# Patient Record
Sex: FEMALE | Race: BLACK OR AFRICAN AMERICAN | NOT HISPANIC OR LATINO | Employment: OTHER | URBAN - METROPOLITAN AREA
[De-identification: names, ages, dates, MRNs, and addresses within clinical notes are randomized per-mention and may not be internally consistent; named-entity substitution may affect disease eponyms.]

---

## 2022-08-18 ENCOUNTER — APPOINTMENT (OUTPATIENT)
Dept: GENERAL RADIOLOGY | Facility: HOSPITAL | Age: 57
End: 2022-08-18

## 2022-08-18 ENCOUNTER — HOSPITAL ENCOUNTER (OUTPATIENT)
Facility: HOSPITAL | Age: 57
Setting detail: OBSERVATION
LOS: 1 days | Discharge: HOME HEALTH | End: 2022-08-21
Attending: EMERGENCY MEDICINE | Admitting: INTERNAL MEDICINE

## 2022-08-18 DIAGNOSIS — N18.31 STAGE 3A CHRONIC KIDNEY DISEASE: ICD-10-CM

## 2022-08-18 DIAGNOSIS — R06.02 SHORTNESS OF BREATH: ICD-10-CM

## 2022-08-18 DIAGNOSIS — T78.3XXA ANGIOEDEMA DUE TO ANGIOTENSIN CONVERTING ENZYME INHIBITOR (ACE-I): Primary | ICD-10-CM

## 2022-08-18 DIAGNOSIS — D50.0 IRON DEFICIENCY ANEMIA DUE TO CHRONIC BLOOD LOSS: ICD-10-CM

## 2022-08-18 DIAGNOSIS — T46.4X5A ANGIOEDEMA DUE TO ANGIOTENSIN CONVERTING ENZYME INHIBITOR (ACE-I): Primary | ICD-10-CM

## 2022-08-18 DIAGNOSIS — N19 RENAL FAILURE, UNSPECIFIED CHRONICITY: ICD-10-CM

## 2022-08-18 DIAGNOSIS — D64.9 ANEMIA, UNSPECIFIED TYPE: ICD-10-CM

## 2022-08-18 LAB
ABO GROUP BLD: NORMAL
ALBUMIN SERPL-MCNC: 4.1 G/DL (ref 3.5–5.2)
ALBUMIN/GLOB SERPL: 1.1 G/DL
ALP SERPL-CCNC: 86 U/L (ref 39–117)
ALT SERPL W P-5'-P-CCNC: 5 U/L (ref 1–33)
ANION GAP SERPL CALCULATED.3IONS-SCNC: 10 MMOL/L (ref 5–15)
AST SERPL-CCNC: 12 U/L (ref 1–32)
BACTERIA UR QL AUTO: NORMAL /HPF
BASOPHILS # BLD AUTO: 0.04 10*3/MM3 (ref 0–0.2)
BASOPHILS NFR BLD AUTO: 0.6 % (ref 0–1.5)
BILIRUB SERPL-MCNC: 0.5 MG/DL (ref 0–1.2)
BILIRUB UR QL STRIP: NEGATIVE
BLD GP AB SCN SERPL QL: NEGATIVE
BUN SERPL-MCNC: 26 MG/DL (ref 6–20)
BUN/CREAT SERPL: 11.8 (ref 7–25)
C4 SERPL-MCNC: 41 MG/DL (ref 14–44)
CALCIUM SPEC-SCNC: 9.4 MG/DL (ref 8.6–10.5)
CHLORIDE SERPL-SCNC: 107 MMOL/L (ref 98–107)
CLARITY UR: CLEAR
CO2 SERPL-SCNC: 20 MMOL/L (ref 22–29)
COLOR UR: YELLOW
CREAT SERPL-MCNC: 2.2 MG/DL (ref 0.57–1)
CRP SERPL-MCNC: 0.58 MG/DL (ref 0–0.5)
DEPRECATED RDW RBC AUTO: 54 FL (ref 37–54)
EGFRCR SERPLBLD CKD-EPI 2021: 25.4 ML/MIN/1.73
EOSINOPHIL # BLD AUTO: 0.33 10*3/MM3 (ref 0–0.4)
EOSINOPHIL NFR BLD AUTO: 4.7 % (ref 0.3–6.2)
ERYTHROCYTE [DISTWIDTH] IN BLOOD BY AUTOMATED COUNT: 15.5 % (ref 12.3–15.4)
ERYTHROCYTE [SEDIMENTATION RATE] IN BLOOD: 73 MM/HR (ref 0–30)
FERRITIN SERPL-MCNC: 8.5 NG/ML (ref 13–150)
FLUAV RNA RESP QL NAA+PROBE: NOT DETECTED
FLUBV RNA RESP QL NAA+PROBE: NOT DETECTED
FOLATE SERPL-MCNC: 7.93 NG/ML (ref 4.78–24.2)
GLOBULIN UR ELPH-MCNC: 3.9 GM/DL
GLUCOSE SERPL-MCNC: 144 MG/DL (ref 65–99)
GLUCOSE UR STRIP-MCNC: NEGATIVE MG/DL
HCT VFR BLD AUTO: 27.9 % (ref 34–46.6)
HGB BLD-MCNC: 8 G/DL (ref 12–15.9)
HGB UR QL STRIP.AUTO: NEGATIVE
HOLD SPECIMEN: NORMAL
HYALINE CASTS UR QL AUTO: NORMAL /LPF
IMM GRANULOCYTES # BLD AUTO: 0.04 10*3/MM3 (ref 0–0.05)
IMM GRANULOCYTES NFR BLD AUTO: 0.6 % (ref 0–0.5)
IRON 24H UR-MRATE: 22 MCG/DL (ref 37–145)
IRON SATN MFR SERPL: 4 % (ref 20–50)
KETONES UR QL STRIP: NEGATIVE
LEUKOCYTE ESTERASE UR QL STRIP.AUTO: NEGATIVE
LYMPHOCYTES # BLD AUTO: 0.84 10*3/MM3 (ref 0.7–3.1)
LYMPHOCYTES NFR BLD AUTO: 11.9 % (ref 19.6–45.3)
MCH RBC QN AUTO: 27.3 PG (ref 26.6–33)
MCHC RBC AUTO-ENTMCNC: 28.7 G/DL (ref 31.5–35.7)
MCV RBC AUTO: 95.2 FL (ref 79–97)
MONOCYTES # BLD AUTO: 0.56 10*3/MM3 (ref 0.1–0.9)
MONOCYTES NFR BLD AUTO: 7.9 % (ref 5–12)
NEUTROPHILS NFR BLD AUTO: 5.24 10*3/MM3 (ref 1.7–7)
NEUTROPHILS NFR BLD AUTO: 74.3 % (ref 42.7–76)
NITRITE UR QL STRIP: NEGATIVE
NRBC BLD AUTO-RTO: 0 /100 WBC (ref 0–0.2)
PH UR STRIP.AUTO: 5.5 [PH] (ref 5–8)
PLATELET # BLD AUTO: 452 10*3/MM3 (ref 140–450)
PMV BLD AUTO: 9.2 FL (ref 6–12)
POTASSIUM SERPL-SCNC: 4.9 MMOL/L (ref 3.5–5.2)
PROT SERPL-MCNC: 8 G/DL (ref 6–8.5)
PROT UR QL STRIP: ABNORMAL
RBC # BLD AUTO: 2.93 10*6/MM3 (ref 3.77–5.28)
RBC # UR STRIP: NORMAL /HPF
REF LAB TEST METHOD: NORMAL
RH BLD: POSITIVE
SARS-COV-2 RNA RESP QL NAA+PROBE: NOT DETECTED
SODIUM SERPL-SCNC: 137 MMOL/L (ref 136–145)
SP GR UR STRIP: 1.01 (ref 1–1.03)
SQUAMOUS #/AREA URNS HPF: NORMAL /HPF
T&S EXPIRATION DATE: NORMAL
TIBC SERPL-MCNC: 557 MCG/DL (ref 298–536)
TRANSFERRIN SERPL-MCNC: 374 MG/DL (ref 200–360)
UROBILINOGEN UR QL STRIP: ABNORMAL
VIT B12 BLD-MCNC: 318 PG/ML (ref 211–946)
WBC # UR STRIP: NORMAL /HPF
WBC NRBC COR # BLD: 7.05 10*3/MM3 (ref 3.4–10.8)
WHOLE BLOOD HOLD COAG: NORMAL
WHOLE BLOOD HOLD SPECIMEN: NORMAL

## 2022-08-18 PROCEDURE — 25010000002 HEPARIN (PORCINE) PER 1000 UNITS: Performed by: HOSPITALIST

## 2022-08-18 PROCEDURE — 86140 C-REACTIVE PROTEIN: CPT | Performed by: HOSPITALIST

## 2022-08-18 PROCEDURE — P9059 PLASMA, FRZ BETWEEN 8-24HOUR: HCPCS

## 2022-08-18 PROCEDURE — 25010000002 METHYLPREDNISOLONE PER 125 MG: Performed by: EMERGENCY MEDICINE

## 2022-08-18 PROCEDURE — 99220 PR INITIAL OBSERVATION CARE/DAY 70 MINUTES: CPT | Performed by: HOSPITALIST

## 2022-08-18 PROCEDURE — 86923 COMPATIBILITY TEST ELECTRIC: CPT

## 2022-08-18 PROCEDURE — 82728 ASSAY OF FERRITIN: CPT | Performed by: HOSPITALIST

## 2022-08-18 PROCEDURE — 82607 VITAMIN B-12: CPT | Performed by: HOSPITALIST

## 2022-08-18 PROCEDURE — 86900 BLOOD TYPING SEROLOGIC ABO: CPT | Performed by: EMERGENCY MEDICINE

## 2022-08-18 PROCEDURE — 96372 THER/PROPH/DIAG INJ SC/IM: CPT

## 2022-08-18 PROCEDURE — 36430 TRANSFUSION BLD/BLD COMPNT: CPT

## 2022-08-18 PROCEDURE — 25010000002 EPINEPHRINE (ANAPHYLAXIS) 1 MG/ML SOLUTION: Performed by: EMERGENCY MEDICINE

## 2022-08-18 PROCEDURE — 25010000002 NA FERRIC GLUC CPLX PER 12.5 MG: Performed by: NURSE PRACTITIONER

## 2022-08-18 PROCEDURE — 87636 SARSCOV2 & INF A&B AMP PRB: CPT | Performed by: HOSPITALIST

## 2022-08-18 PROCEDURE — 82746 ASSAY OF FOLIC ACID SERUM: CPT | Performed by: HOSPITALIST

## 2022-08-18 PROCEDURE — 80053 COMPREHEN METABOLIC PANEL: CPT | Performed by: EMERGENCY MEDICINE

## 2022-08-18 PROCEDURE — 92610 EVALUATE SWALLOWING FUNCTION: CPT

## 2022-08-18 PROCEDURE — 85025 COMPLETE CBC W/AUTO DIFF WBC: CPT | Performed by: EMERGENCY MEDICINE

## 2022-08-18 PROCEDURE — 94799 UNLISTED PULMONARY SVC/PX: CPT

## 2022-08-18 PROCEDURE — 63710000001 DIPHENHYDRAMINE PER 50 MG: Performed by: HOSPITALIST

## 2022-08-18 PROCEDURE — 94640 AIRWAY INHALATION TREATMENT: CPT

## 2022-08-18 PROCEDURE — 86160 COMPLEMENT ANTIGEN: CPT | Performed by: HOSPITALIST

## 2022-08-18 PROCEDURE — 84466 ASSAY OF TRANSFERRIN: CPT | Performed by: HOSPITALIST

## 2022-08-18 PROCEDURE — 81001 URINALYSIS AUTO W/SCOPE: CPT | Performed by: HOSPITALIST

## 2022-08-18 PROCEDURE — 86850 RBC ANTIBODY SCREEN: CPT | Performed by: EMERGENCY MEDICINE

## 2022-08-18 PROCEDURE — 93005 ELECTROCARDIOGRAM TRACING: CPT | Performed by: EMERGENCY MEDICINE

## 2022-08-18 PROCEDURE — 96365 THER/PROPH/DIAG IV INF INIT: CPT

## 2022-08-18 PROCEDURE — P9017 PLASMA 1 DONOR FRZ W/IN 8 HR: HCPCS

## 2022-08-18 PROCEDURE — 71045 X-RAY EXAM CHEST 1 VIEW: CPT

## 2022-08-18 PROCEDURE — 99222 1ST HOSP IP/OBS MODERATE 55: CPT | Performed by: NURSE PRACTITIONER

## 2022-08-18 PROCEDURE — 36415 COLL VENOUS BLD VENIPUNCTURE: CPT

## 2022-08-18 PROCEDURE — 85652 RBC SED RATE AUTOMATED: CPT | Performed by: HOSPITALIST

## 2022-08-18 PROCEDURE — 86927 PLASMA FRESH FROZEN: CPT

## 2022-08-18 PROCEDURE — 99284 EMERGENCY DEPT VISIT MOD MDM: CPT

## 2022-08-18 PROCEDURE — 96361 HYDRATE IV INFUSION ADD-ON: CPT

## 2022-08-18 PROCEDURE — 86901 BLOOD TYPING SEROLOGIC RH(D): CPT | Performed by: EMERGENCY MEDICINE

## 2022-08-18 PROCEDURE — 63710000001 PREDNISONE PER 1 MG: Performed by: HOSPITALIST

## 2022-08-18 PROCEDURE — 96375 TX/PRO/DX INJ NEW DRUG ADDON: CPT

## 2022-08-18 PROCEDURE — C9803 HOPD COVID-19 SPEC COLLECT: HCPCS

## 2022-08-18 PROCEDURE — 83540 ASSAY OF IRON: CPT | Performed by: HOSPITALIST

## 2022-08-18 RX ORDER — SODIUM CHLORIDE 0.9 % (FLUSH) 0.9 %
10 SYRINGE (ML) INJECTION EVERY 12 HOURS SCHEDULED
Status: DISCONTINUED | OUTPATIENT
Start: 2022-08-18 | End: 2022-08-21 | Stop reason: HOSPADM

## 2022-08-18 RX ORDER — BUDESONIDE AND FORMOTEROL FUMARATE DIHYDRATE 160; 4.5 UG/1; UG/1
2 AEROSOL RESPIRATORY (INHALATION)
Status: DISCONTINUED | OUTPATIENT
Start: 2022-08-18 | End: 2022-08-21 | Stop reason: HOSPADM

## 2022-08-18 RX ORDER — SODIUM CHLORIDE 0.9 % (FLUSH) 0.9 %
10 SYRINGE (ML) INJECTION AS NEEDED
Status: DISCONTINUED | OUTPATIENT
Start: 2022-08-18 | End: 2022-08-21 | Stop reason: HOSPADM

## 2022-08-18 RX ORDER — ONDANSETRON 2 MG/ML
4 INJECTION INTRAMUSCULAR; INTRAVENOUS EVERY 6 HOURS PRN
Status: DISCONTINUED | OUTPATIENT
Start: 2022-08-18 | End: 2022-08-21 | Stop reason: HOSPADM

## 2022-08-18 RX ORDER — CARVEDILOL 3.12 MG/1
3.12 TABLET ORAL 2 TIMES DAILY
COMMUNITY
End: 2023-02-24 | Stop reason: HOSPADM

## 2022-08-18 RX ORDER — BISACODYL 10 MG
10 SUPPOSITORY, RECTAL RECTAL DAILY PRN
Status: DISCONTINUED | OUTPATIENT
Start: 2022-08-18 | End: 2022-08-21 | Stop reason: HOSPADM

## 2022-08-18 RX ORDER — CARVEDILOL 3.12 MG/1
3.12 TABLET ORAL EVERY 12 HOURS SCHEDULED
Status: DISCONTINUED | OUTPATIENT
Start: 2022-08-18 | End: 2022-08-21 | Stop reason: HOSPADM

## 2022-08-18 RX ORDER — ONDANSETRON 4 MG/1
4 TABLET, FILM COATED ORAL EVERY 6 HOURS PRN
Status: DISCONTINUED | OUTPATIENT
Start: 2022-08-18 | End: 2022-08-21 | Stop reason: HOSPADM

## 2022-08-18 RX ORDER — BUDESONIDE AND FORMOTEROL FUMARATE DIHYDRATE 160; 4.5 UG/1; UG/1
2 AEROSOL RESPIRATORY (INHALATION)
Status: ON HOLD | COMMUNITY
End: 2022-10-04 | Stop reason: SDUPTHER

## 2022-08-18 RX ORDER — ACETAMINOPHEN 160 MG/5ML
650 SOLUTION ORAL EVERY 4 HOURS PRN
Status: DISCONTINUED | OUTPATIENT
Start: 2022-08-18 | End: 2022-08-21 | Stop reason: HOSPADM

## 2022-08-18 RX ORDER — ALBUTEROL SULFATE 2.5 MG/3ML
2.5 SOLUTION RESPIRATORY (INHALATION) EVERY 4 HOURS PRN
COMMUNITY

## 2022-08-18 RX ORDER — TRANEXAMIC ACID 10 MG/ML
10 INJECTION, SOLUTION INTRAVENOUS ONCE
Status: COMPLETED | OUTPATIENT
Start: 2022-08-18 | End: 2022-08-18

## 2022-08-18 RX ORDER — HEPARIN SODIUM 5000 [USP'U]/ML
5000 INJECTION, SOLUTION INTRAVENOUS; SUBCUTANEOUS EVERY 8 HOURS SCHEDULED
Status: DISCONTINUED | OUTPATIENT
Start: 2022-08-18 | End: 2022-08-21 | Stop reason: HOSPADM

## 2022-08-18 RX ORDER — POLYETHYLENE GLYCOL 3350 17 G/17G
17 POWDER, FOR SOLUTION ORAL DAILY PRN
Status: DISCONTINUED | OUTPATIENT
Start: 2022-08-18 | End: 2022-08-21 | Stop reason: HOSPADM

## 2022-08-18 RX ORDER — EPINEPHRINE 1 MG/ML
0.3 INJECTION, SOLUTION INTRAMUSCULAR; SUBCUTANEOUS ONCE
Status: COMPLETED | OUTPATIENT
Start: 2022-08-18 | End: 2022-08-18

## 2022-08-18 RX ORDER — PANTOPRAZOLE SODIUM 40 MG/1
40 TABLET, DELAYED RELEASE ORAL
Status: DISCONTINUED | OUTPATIENT
Start: 2022-08-19 | End: 2022-08-21 | Stop reason: HOSPADM

## 2022-08-18 RX ORDER — DIPHENHYDRAMINE HCL 25 MG
25 CAPSULE ORAL EVERY 12 HOURS SCHEDULED
Status: COMPLETED | OUTPATIENT
Start: 2022-08-18 | End: 2022-08-18

## 2022-08-18 RX ORDER — ACETAMINOPHEN 650 MG/1
650 SUPPOSITORY RECTAL EVERY 4 HOURS PRN
Status: DISCONTINUED | OUTPATIENT
Start: 2022-08-18 | End: 2022-08-21 | Stop reason: HOSPADM

## 2022-08-18 RX ORDER — SODIUM CHLORIDE 9 MG/ML
100 INJECTION, SOLUTION INTRAVENOUS CONTINUOUS
Status: DISCONTINUED | OUTPATIENT
Start: 2022-08-18 | End: 2022-08-20

## 2022-08-18 RX ORDER — PREDNISONE 20 MG/1
20 TABLET ORAL
Status: DISCONTINUED | OUTPATIENT
Start: 2022-08-18 | End: 2022-08-20

## 2022-08-18 RX ORDER — CETIRIZINE HYDROCHLORIDE 10 MG/1
10 TABLET ORAL DAILY
Status: DISCONTINUED | OUTPATIENT
Start: 2022-08-18 | End: 2022-08-21 | Stop reason: HOSPADM

## 2022-08-18 RX ORDER — METHYLPREDNISOLONE SODIUM SUCCINATE 125 MG/2ML
125 INJECTION, POWDER, LYOPHILIZED, FOR SOLUTION INTRAMUSCULAR; INTRAVENOUS ONCE
Status: COMPLETED | OUTPATIENT
Start: 2022-08-18 | End: 2022-08-18

## 2022-08-18 RX ORDER — BISACODYL 5 MG/1
5 TABLET, DELAYED RELEASE ORAL DAILY PRN
Status: DISCONTINUED | OUTPATIENT
Start: 2022-08-18 | End: 2022-08-21 | Stop reason: HOSPADM

## 2022-08-18 RX ORDER — ACETAMINOPHEN 325 MG/1
650 TABLET ORAL EVERY 4 HOURS PRN
Status: DISCONTINUED | OUTPATIENT
Start: 2022-08-18 | End: 2022-08-21 | Stop reason: HOSPADM

## 2022-08-18 RX ORDER — FAMOTIDINE 20 MG/1
20 TABLET, FILM COATED ORAL
Status: DISCONTINUED | OUTPATIENT
Start: 2022-08-18 | End: 2022-08-21 | Stop reason: HOSPADM

## 2022-08-18 RX ORDER — AMOXICILLIN 250 MG
2 CAPSULE ORAL 2 TIMES DAILY
Status: DISCONTINUED | OUTPATIENT
Start: 2022-08-18 | End: 2022-08-21 | Stop reason: HOSPADM

## 2022-08-18 RX ORDER — ALBUTEROL SULFATE 2.5 MG/3ML
2.5 SOLUTION RESPIRATORY (INHALATION) EVERY 4 HOURS PRN
Status: DISCONTINUED | OUTPATIENT
Start: 2022-08-18 | End: 2022-08-21 | Stop reason: HOSPADM

## 2022-08-18 RX ADMIN — BUDESONIDE AND FORMOTEROL FUMARATE DIHYDRATE 2 PUFF: 160; 4.5 AEROSOL RESPIRATORY (INHALATION) at 19:13

## 2022-08-18 RX ADMIN — SODIUM CHLORIDE 100 ML/HR: 9 INJECTION, SOLUTION INTRAVENOUS at 13:50

## 2022-08-18 RX ADMIN — SODIUM CHLORIDE 250 MG: 9 INJECTION, SOLUTION INTRAVENOUS at 20:11

## 2022-08-18 RX ADMIN — DIPHENHYDRAMINE HYDROCHLORIDE 25 MG: 25 CAPSULE ORAL at 13:31

## 2022-08-18 RX ADMIN — PREDNISONE 20 MG: 20 TABLET ORAL at 13:31

## 2022-08-18 RX ADMIN — DIPHENHYDRAMINE HYDROCHLORIDE 25 MG: 25 CAPSULE ORAL at 20:12

## 2022-08-18 RX ADMIN — CARVEDILOL 3.12 MG: 3.12 TABLET, FILM COATED ORAL at 20:17

## 2022-08-18 RX ADMIN — Medication 10 ML: at 20:11

## 2022-08-18 RX ADMIN — CETIRIZINE HYDROCHLORIDE 10 MG: 10 TABLET, FILM COATED ORAL at 13:31

## 2022-08-18 RX ADMIN — TRANEXAMIC ACID 1470 MG: 10 INJECTION, SOLUTION INTRAVENOUS at 08:06

## 2022-08-18 RX ADMIN — FAMOTIDINE 20 MG: 20 TABLET ORAL at 16:55

## 2022-08-18 RX ADMIN — CARVEDILOL 3.12 MG: 3.12 TABLET, FILM COATED ORAL at 13:31

## 2022-08-18 RX ADMIN — HEPARIN SODIUM 5000 UNITS: 5000 INJECTION, SOLUTION INTRAVENOUS; SUBCUTANEOUS at 20:17

## 2022-08-18 RX ADMIN — Medication 10 ML: at 13:32

## 2022-08-18 RX ADMIN — EPINEPHRINE 0.3 MG: 1 INJECTION INTRAMUSCULAR; INTRAVENOUS; SUBCUTANEOUS at 07:51

## 2022-08-18 RX ADMIN — HEPARIN SODIUM 5000 UNITS: 5000 INJECTION, SOLUTION INTRAVENOUS; SUBCUTANEOUS at 13:31

## 2022-08-18 RX ADMIN — METHYLPREDNISOLONE SODIUM SUCCINATE 125 MG: 125 INJECTION, POWDER, FOR SOLUTION INTRAMUSCULAR; INTRAVENOUS at 08:03

## 2022-08-18 NOTE — PLAN OF CARE
Goal Outcome Evaluation:              Outcome Evaluation: Pt arrived from ED, A&Ox4, NSR, RA, tongue and throat swelling with difficulty speaking and controlling secretions at times, both seem to be improving, during SLP eval, pt placed on modified diet with NTL, pt refused modified, pt educated on benefits of modified diet as well as risk, aspiration equipment at bedside, refusal in chart, 4 units FFP administered with no adversed reaction, NS @ 75ml/hr, denies SOB.

## 2022-08-18 NOTE — CASE MANAGEMENT/SOCIAL WORK
Discharge Planning Assessment  [facility]     Patient Name: Sonia Bella  MRN: [id number]  Today's Date: 8/18/2022    Admit Date: 8/18/2022     Discharge Needs Assessment     Row Name 08/18/22 1346       Living Environment    People in Home child(priya), adult    Name(s) of People in Home Katerine Jimenez (daughter) [phone]    Current Living Arrangements home    Primary Care Provided by self    Provides Primary Care For no one    Family Caregiver if Needed child(priya), adult    Family Caregiver Names Katerine Jimenez (daughter)    Able to Return to Prior Arrangements yes       Resource/Environmental Concerns    Resource/Environmental Concerns none    Transportation Concerns none       Transition Planning    Patient/Family Anticipates Transition to home with help/services    Patient/Family Anticipated Services at Transition none    Transportation Anticipated family or friend will provide       Discharge Needs Assessment    Readmission Within the Last 30 Days no previous admission in last 30 days    Equipment Currently Used at Home cane, straight;rollator    Concerns to be Addressed denies needs/concerns at this time    Anticipated Changes Related to Illness none    Equipment Needed After Discharge none    Outpatient/Agency/Support Group Needs homecare agency    Discharge Facility/Level of Care Needs home with home health    Provided Post Acute Provider List? Yes    Post Acute Provider List Home Health    Provided Post Acute Provider Quality & Resource List? Yes    Post Acute Provider Quality and Resource List Home Health    Delivered To Patient    Method of Delivery In person    Patient's Choice of Community Agency(s) [facility]               Discharge Plan     Row Name 08/18/22 0179       Plan    Plan Home with [facility]    Patient/Family in Agreement with Plan yes    Plan Comments Spoke with patient at bedside. Lives with Yanci Jimenez (daughter) [phone] in [place]. No  problems with [place] Medicare/[place] Medicaid or medications. Uses straight cane and Rollator at home. No advanced directives. Plan is home with [facility]. Will need a referral closer to discharge. CM will continue to follow.    Final Discharge Disposition Code 06 - home with home health care              Continued Care and Services - Admitted Since 8/18/2022    Coordination has not been started for this encounter.          Demographic Summary     Row Name 08/18/22 1347       General Information    Admission Type inpatient    Arrived From emergency department    Referral Source admission list    Reason for Consult discharge planning    Preferred Language English       Contact Information    Permission Granted to Share Info With     Contact Information Obtained for     Contact Information Comments PCP is Cain Musa MD               Functional Status     Row Name 08/18/22 4447       Functional Status    Usual Activity Tolerance moderate    Current Activity Tolerance moderate       Functional Status, IADL    Medications independent    Meal Preparation independent    Housekeeping assistive person    Laundry assistive person    Shopping assistive person       Mental Status    General Appearance WDL WDL       Mental Status Summary    Recent Changes in Mental Status/Cognitive Functioning no changes       Employment/    Employment Status disabled               Psychosocial    No documentation.                Abuse/Neglect    No documentation.                Legal    No documentation.                Substance Abuse    No documentation.                Patient Forms    No documentation.                   Suhas Galan RN

## 2022-08-18 NOTE — THERAPY EVALUATION
Acute Care - Speech Language Pathology   Swallow Initial Evaluation [facility]   Clinical Swallow Evaluation       Patient Name: Sonia Bella  : 1965  MRN: [id number]  Today's Date: 2022               Admit Date: 2022    Visit Dx:     ICD-10-CM ICD-9-CM   1. Angioedema due to angiotensin converting enzyme inhibitor (ACE-I)  T78.3XXA 995.1    T46.4X5A E942.6   2. Shortness of breath  R06.02 786.05   3. Anemia, unspecified type  D64.9 285.9   4. Renal failure, unspecified chronicity  N19 586     Patient Active Problem List   Diagnosis   • Angioedema due to angiotensin converting enzyme inhibitor (ACE-I)     Past Medical History:   Diagnosis Date   • COPD (chronic obstructive pulmonary disease) (HCC)    • History of stomach ulcers    • Hypertension      History reviewed. No pertinent surgical history.    SLP Recommendation and Plan  SLP Swallowing Diagnosis: suspected pharyngeal dysphagia (22)  SLP Diet Recommendation: regular textures, nectar thick liquids (22)  Recommended Precautions and Strategies: general aspiration precautions (22)  SLP Rec. for Method of Medication Administration: meds crushed, with pudding or applesauce, as tolerated (22)     Monitor for Signs of Aspiration: yes, notify SLP if any concerns (22)  Recommended Diagnostics: VFSS (MBS), other (see comments) () (22)  Swallow Criteria for Skilled Therapeutic Interventions Met: demonstrates skilled criteria (22)  Anticipated Discharge Disposition (SLP): unknown (22)  Rehab Potential/Prognosis, Swallowing: good, to achieve stated therapy goals (22)     Predicted Duration Therapy Intervention (Days): until discharge (22)                                         SWALLOW EVALUATION (last 72 hours)     SLP Adult Swallow Evaluation     Row Name 22                   Rehab Evaluation    Document Type evaluation  -         Subjective Information no complaints  -        Patient Observations alert;cooperative  -        Patient/Family/Caregiver Comments/Observations No family present  -        Patient Effort good  -        Symptoms Noted During/After Treatment none  -                  General Information    Patient Profile Reviewed yes  -        Pertinent History Of Current Problem Adm w/ acute angioedema. Hx: COPD, stomach ulcers, HTN. CXR clear.  -        Current Method of Nutrition regular textures;thin liquids  -        Precautions/Limitations, Vision WFL;for purposes of eval  -        Precautions/Limitations, Hearing WFL;for purposes of eval  -        Prior Level of Function-Communication unknown  -        Prior Level of Function-Swallowing no diet consistency restrictions  -        Plans/Goals Discussed with patient;agreed upon  -        Barriers to Rehab none identified  -        Patient's Goals for Discharge return home  -                  Pain    Additional Documentation Pain Scale: FACES Pre/Post-Treatment (Group)  -                  Pain Scale: FACES Pre/Post-Treatment    Pain: FACES Scale, Pretreatment 0-->no hurt  -        Posttreatment Pain Rating 0-->no hurt  -                  Oral Motor Structure and Function    Dentition Assessment natural, present and adequate  -        Secretion Management WNL/WFL  -        Mucosal Quality moist, healthy  -                  Oral Musculature and Cranial Nerve Assessment    Oral Motor General Assessment WFL  -                  General Eating/Swallowing Observations    Respiratory Support Currently in Use room air  -        Eating/Swallowing Skills self-fed  -        Positioning During Eating upright in bed  -        Utensils Used spoon;cup;straw  -        Consistencies Trialed thin liquids;nectar/syrup-thick liquids;pureed;regular textures  -                  Clinical Swallow Eval    Pharyngeal Phase suspected pharyngeal  impairment  -        Clinical Swallow Evaluation Summary CSE completed.  Pt w/ overt s/s of aspiration. Cough and eventual throat clear w/ thin liquid trials via cup sip. Delayed throat clear w/ thin trials via straw sip. No overt s/s w/ nectar thick liquid, pureed, or regular solid trials. Pt reports difficulty swallowing secretions, slurred speech, and neck swelling 2/2 angioedema. At this time, pt reports symptoms have resolved. Rec: NTL w/ regular solids until MBS tomorrow.  -                  Pharyngeal Phase Concerns    Pharyngeal Phase Concerns cough;throat clear  -        Cough thin  -        Throat Clear thin  -                  SLP Evaluation Clinical Impression    SLP Swallowing Diagnosis suspected pharyngeal dysphagia  -        Functional Impact risk of aspiration/pneumonia  -        Rehab Potential/Prognosis, Swallowing good, to achieve stated therapy goals  -        Swallow Criteria for Skilled Therapeutic Interventions Met demonstrates skilled criteria  -                  Recommendations    Predicted Duration Therapy Intervention (Days) until discharge  -        SLP Diet Recommendation regular textures;nectar thick liquids  -        Recommended Diagnostics VFSS ([facility] [place] [facility]);other (see comments)  8/19  -        Recommended Precautions and Strategies general aspiration precautions  -        Oral Care Recommendations Oral Care BID/PRN  -        SLP Rec. for Method of Medication Administration meds crushed;with pudding or applesauce;as tolerated  -        Monitor for Signs of Aspiration yes;notify SLP if any concerns  -        Anticipated Discharge Disposition (SLP) unknown  -              User Key  (r) = Recorded By, (t) = Taken By, (c) = Cosigned By    Initials Name Effective Dates     Rosibel Brown, MS, LUIS-SLP 06/22/22 -                 EDUCATION  The patient has been educated in the following areas:   Dysphagia (Swallowing Impairment).              Time Calculation:     Time Calculation- SLP     Row Name 08/18/22 1645             Time Calculation- SLP    SLP Start Time 1415  -MH      SLP Received On 08/18/22  -              Untimed Charges    25842-SG Eval Oral Pharyng Swallow Minutes 45  -MH              Total Minutes    Untimed Charges Total Minutes 45  -MH       Total Minutes 45  -MH            User Key  (r) = Recorded By, (t) = Taken By, (c) = Cosigned By    Initials Name Provider Type     Rosibel Brown, MS, CFY-SLP Speech and Language Pathologist                Therapy Charges for Today     Code Description Service Date Service Provider Modifiers Qty    [id number] HC ST EVAL ORAL PHARYNG SWALLOW 3 8/18/2022 Rosibel Brown MS, CFY-SLP GN 1            Patient was not wearing a face mask and did not exhibit coughing during this therapy encounter.  Procedure performed was not aerosolizing, did not involve close contact (within 6 feet for at least 15 minutes or longer), and did not involve contact with infectious secretions or specimens.  Therapist used appropriate personal protective equipment including gloves, standard procedure mask and eye protection.  Appropriate PPE was worn during the entire therapy session.  Hand hygiene was completed before and after therapy session.     Rosibel Brown MS, CFY-SLP  8/18/2022

## 2022-08-18 NOTE — ED PROVIDER NOTES
Subjective   Mrs. Bella presents with swelling in her mouth.  She noted around 1:00 this morning swelling of her tongue and mouth.  She denies rash or itching.  She tells me she was afraid to lie down all night long due to shortness of breath.  Her daughter tells us that it looks worse today than it did last night.  She reports that her tongue does not feel quite as swollen but it feels like her throat is more swollen than it was earlier.  She has never had this happen before.  She takes lisinopril with her last dose being 1600 yesterday.  She took a total of 100 mg oral Benadryl prior to coming in.      History provided by:  Patient  Oral Swelling  Location:  Tongue and mouth  Quality:  Swollen  Severity:  Severe  Onset quality:  Gradual  Timing:  Constant  Progression:  Worsening  Chronicity:  New  Context:  With last dose of ACE inhibitor at 1600 yesterday  Relieved by:  Nothing  Worsened by:  Nothing  Ineffective treatments:  100 mg Benadryl  Associated symptoms: shortness of breath    Associated symptoms: no abdominal pain, no chest pain, no congestion, no cough, no diarrhea, no fever, no headaches, no nausea, no rash, no rhinorrhea, no sore throat and no vomiting        Review of Systems   Constitutional: Negative for chills, diaphoresis and fever.   HENT: Positive for trouble swallowing and voice change. Negative for congestion, rhinorrhea and sore throat.    Eyes: Negative for visual disturbance.   Respiratory: Positive for shortness of breath. Negative for cough.    Cardiovascular: Negative for chest pain and leg swelling.   Gastrointestinal: Negative for abdominal pain, diarrhea, nausea and vomiting.   Genitourinary: Negative for difficulty urinating and dysuria.   Skin: Negative for rash.   Neurological: Negative for dizziness, weakness, numbness and headaches.   Psychiatric/Behavioral: Negative for confusion.   All other systems reviewed and are negative.      Past Medical History:   Diagnosis Date   •  COPD (chronic obstructive pulmonary disease) (HCC)    • History of stomach ulcers    • Hypertension        No Known Allergies    History reviewed. No pertinent surgical history.    History reviewed. No pertinent family history.    Social History     Socioeconomic History   • Marital status:    Tobacco Use   • Smoking status: Former Smoker   • Smokeless tobacco: Never Used   Substance and Sexual Activity   • Alcohol use: Yes           Objective   Physical Exam  Vitals and nursing note reviewed.   Constitutional:       General: She is not in acute distress.     Appearance: Normal appearance. She is well-developed. She is obese.   HENT:      Head: Normocephalic and atraumatic.      Nose: Nose normal. No congestion or rhinorrhea.      Mouth/Throat:      Comments: There is obvious swelling of her tongue.  It is protruding a little bit from her mouth and I can see swelling under her tongue as well.  Her voice is altered.  She is handling her own secretions  Eyes:      General: No scleral icterus.     Conjunctiva/sclera: Conjunctivae normal.   Neck:      Vascular: No JVD.      Trachea: No tracheal deviation.      Comments: No JVD  Cardiovascular:      Rate and Rhythm: Normal rate and regular rhythm.      Heart sounds: Normal heart sounds. No murmur heard.    No friction rub. No gallop.   Pulmonary:      Effort: Pulmonary effort is normal. No respiratory distress.      Breath sounds: Normal breath sounds. No stridor. No wheezing, rhonchi or rales.      Comments: She is able to speak sentences, there is no stridor and lungs are clear  Chest:      Chest wall: No tenderness.   Abdominal:      General: Bowel sounds are normal. There is no distension.      Palpations: Abdomen is soft.      Tenderness: There is no abdominal tenderness. There is no guarding or rebound.   Musculoskeletal:         General: No tenderness or deformity. Normal range of motion.      Cervical back: Normal range of motion and neck supple.       Right lower leg: No edema.      Left lower leg: No edema.   Skin:     General: Skin is warm and dry.      Findings: No erythema or rash.   Neurological:      Mental Status: She is alert and oriented to person, place, and time.      Motor: No weakness.      Coordination: Coordination normal.   Psychiatric:         Mood and Affect: Mood normal.         Behavior: Behavior normal.         Thought Content: Thought content normal.         Procedures           ED Course  ED Course as of 08/18/22 1806   Thu Aug 18, 2022   0750 Called pharmacy to see what medicines we have available. [DT]   0750 I have ordered epinephrine, TXA, and FFP. [DT]   1008 On repeat exam the swelling has visibly gone down.  Her tongue looks a little bit swollen but I do not appreciate any swelling on the floor of her mouth.  She tells me her throat seems back to normal but she tells me her tongue still feels a little bit swollen.  Her voice seems normal now.  I spoke with her about lab findings.  She denies any history of prior renal insufficiency or anemia.  Primary care is Cain Musa.  We have no prior labs in our system however.  Will seek admission to the hospital.  She tells me her shortness of breath is now gone as well. [DT]   1031 D/w Dr Redmond who will admit. [DT]      ED Course User Index  [DT] Rickey Palomino MD                                           MDM  Number of Diagnoses or Management Options  Anemia, unspecified type: new and requires workup  Angioedema due to angiotensin converting enzyme inhibitor (ACE-I): new and requires workup  Renal failure, unspecified chronicity: new and requires workup  Shortness of breath: new and requires workup     Amount and/or Complexity of Data Reviewed  Clinical lab tests: ordered and reviewed  Tests in the radiology section of CPT®: ordered and reviewed  Obtain history from someone other than the patient: yes  Review and summarize past medical records: yes  Discuss the patient with other  providers: yes  Independent visualization of images, tracings, or specimens: yes    Patient Progress  Patient progress: improved      Final diagnoses:   Angioedema due to angiotensin converting enzyme inhibitor (ACE-I)   Shortness of breath   Anemia, unspecified type   Renal failure, unspecified chronicity       ED Disposition  ED Disposition     ED Disposition   Decision to Admit    Condition   --    Comment   Level of Care: Telemetry [5]   Diagnosis: Angioedema due to angiotensin converting enzyme inhibitor (ACE-I) [[id number]]   Admitting Physician: THERESA CONDON [[id number]]   Certification: I Certify That Inpatient Hospital Services Are Medically Necessary For Greater Than 2 Midnights               No follow-up provider specified.       Medication List      No changes were made to your prescriptions during this visit.          Rickey Palomino MD  08/18/22 8923

## 2022-08-18 NOTE — CONSULTS
[facility] [place] [facility]ology Consult    Referring Provider: No ref. provider found    PCP: Cain Musa MD    Reason for Consultation: Iron deficiency anemia    Chief complaint: Weakness, throat swelling    History of present illness:    Sonia Bella is a 57 y.o. female who is admitted with acute angioedema secondary to her ACE inhibitor.  GI consult was received this patient is found to be symptomatic anemic with iron deficiency.  Patient notes that for the past few months she has had worsening weakness and fatigue and change in her functional pattern at home; notes that she struggles completing basic activities of daily living due to the weakness and exertional dyspnea but thought this was secondary to her COPD.  Does not report any melena or hematochezia.  No prior history gastrointestinal bleeding.  Has never undergone EGD or colonoscopy.  At time of exam, does not endorse any N/V/D, abdominal pain, shortness of breath, chest pain, or fever/chills.  At time of exam, symptoms attributed to acute angioedema have subsided.  Denies use of NSAIDs and is not anticoagulated.  At time of admission, hemoglobin is 8.0 with iron panel consistent with SHAYAN. Past medical, surgical, social, and family histories are reviewed for accuracy.  No documented alleviating or exacerbating factors.  Does not endorse pain at time of exam.    Allergies:  Patient has no known allergies.    Scheduled Meds:  budesonide-formoterol, 2 puff, Inhalation, BID - RT  carvedilol, 3.125 mg, Oral, Q12H  cetirizine, 10 mg, Oral, Daily  diphenhydrAMINE, 25 mg, Oral, Q12H  famotidine, 20 mg, Oral, BID AC  heparin (porcine), 5,000 Units, Subcutaneous, Q8H  [START ON 8/19/2022] pantoprazole, 40 mg, Oral, Q AM  predniSONE, 20 mg, Oral, Daily With Breakfast  senna-docusate sodium, 2 tablet, Oral, BID  sodium chloride, 10 mL, Intravenous, Q12H         Infusions:  sodium chloride, 100 mL/hr, Last Rate: 100 mL/hr (08/18/22 1350)        PRN Meds:  •  acetaminophen  **OR** acetaminophen **OR** acetaminophen  •  albuterol  •  senna-docusate sodium **AND** polyethylene glycol **AND** bisacodyl **AND** bisacodyl  •  ondansetron **OR** ondansetron  •  sodium chloride  •  sodium chloride    Home Meds:  Medications Prior to Admission   Medication Sig Dispense Refill Last Dose   • LISINOPRIL PO Take  by mouth.   8/17/2022 at Unknown time   • albuterol (PROVENTIL) (2.5 MG/3ML) 0.083% nebulizer solution Take 2.5 mg by nebulization Every 4 (Four) Hours As Needed for Wheezing.      • budesonide-formoterol (SYMBICORT) 160-4.5 MCG/ACT inhaler Inhale 2 puffs 2 (Two) Times a Day.      • CARVEDILOL PO Take  by mouth.      • FAMOTIDINE PO Take  by mouth.          ROS: Review of Systems   Constitutional: Positive for activity change and fatigue. Negative for appetite change, chills, diaphoresis, fever and unexpected weight change.   HENT: Negative for sore throat, trouble swallowing and voice change.    Eyes: Negative.    Respiratory: Positive for shortness of breath (Exertional dyspnea). Negative for apnea, cough, choking, chest tightness, wheezing and stridor.    Cardiovascular: Negative for chest pain, palpitations and leg swelling.   Gastrointestinal: Positive for blood in stool. Negative for abdominal distention, abdominal pain, anal bleeding, constipation, diarrhea, nausea, rectal pain and vomiting.   Endocrine: Negative.    Genitourinary: Negative.    Musculoskeletal: Negative.    Skin: Positive for pallor.   Allergic/Immunologic: Negative.    Neurological: Positive for weakness and light-headedness.   Hematological: Negative for adenopathy. Does not bruise/bleed easily.   Psychiatric/Behavioral: The patient is nervous/anxious.    All other systems reviewed and are negative.      PAST MED HX:  Past Medical History:   Diagnosis Date   • COPD (chronic obstructive pulmonary disease) (HCC)    • History of stomach ulcers    • Hypertension        PAST SURG HX:  History reviewed. No pertinent  "surgical history.    FAM HX:  History reviewed. No pertinent family history.    SOC HX:  Social History     Socioeconomic History   • Marital status:    Tobacco Use   • Smoking status: Former Smoker   • Smokeless tobacco: Never Used   Substance and Sexual Activity   • Alcohol use: Yes       PHYSICAL EXAM  /98   Pulse 67   Temp 97.8 °F (36.6 °C) (Oral)   Resp 18   Ht 172.7 cm (68\")   Wt (!) 147 kg (325 lb)   SpO2 93%   BMI 49.42 kg/m²   Wt Readings from Last 3 Encounters:   08/18/22 (!) 147 kg (325 lb)   ,body mass index is 49.42 kg/m².  Physical Exam  Vitals and nursing note reviewed.   Constitutional:       General: She is not in acute distress.     Appearance: Normal appearance. She is normal weight. She is not ill-appearing or toxic-appearing.   HENT:      Head: Normocephalic and atraumatic.      Mouth/Throat:      Mouth: Mucous membranes are moist.      Pharynx: Oropharynx is clear. No oropharyngeal exudate.   Eyes:      General: No scleral icterus.     Extraocular Movements: Extraocular movements intact.      Conjunctiva/sclera: Conjunctivae normal.      Pupils: Pupils are equal, round, and reactive to light.   Cardiovascular:      Rate and Rhythm: Normal rate and regular rhythm.      Pulses: Normal pulses.      Heart sounds: Normal heart sounds.   Pulmonary:      Effort: Pulmonary effort is normal. No respiratory distress.      Breath sounds: Normal breath sounds.   Abdominal:      General: Abdomen is flat. Bowel sounds are normal. There is no distension.      Palpations: Abdomen is soft. There is no mass.      Tenderness: There is no abdominal tenderness. There is no guarding or rebound.      Hernia: No hernia is present.      Comments: Exam for organomegaly limited due to abdominal obesity.   Genitourinary:     Comments: Defer  Musculoskeletal:         General: Normal range of motion.      Cervical back: Normal range of motion and neck supple. No rigidity or tenderness.      Right lower " leg: No edema.      Left lower leg: No edema.   Lymphadenopathy:      Cervical: No cervical adenopathy.   Skin:     General: Skin is warm and dry.      Capillary Refill: Capillary refill takes less than 2 seconds.      Coloration: Skin is not jaundiced or pale.   Neurological:      General: No focal deficit present.      Mental Status: She is alert and oriented to person, place, and time.   Psychiatric:         Mood and Affect: Mood normal.         Behavior: Behavior normal.         Thought Content: Thought content normal.         Judgment: Judgment normal.         Results Review:   I reviewed the patient's new clinical results.  I reviewed the patient's new imaging results and agree with the interpretation.  I personally viewed and interpreted the patient's EKG/Telemetry data    Lab Results   Component Value Date    WBC 7.05 08/18/2022    HGB 8.0 (L) 08/18/2022    HCT 27.9 (L) 08/18/2022    MCV 95.2 08/18/2022     (H) 08/18/2022       No results found for: INR    Lab Results   Component Value Date    GLUCOSE 144 (H) 08/18/2022    BUN 26 (H) 08/18/2022    CREATININE 2.20 (H) 08/18/2022    BCR 11.8 08/18/2022     08/18/2022    K 4.9 08/18/2022    CO2 20.0 (L) 08/18/2022    CALCIUM 9.4 08/18/2022    ALBUMIN 4.10 08/18/2022    ALKPHOS 86 08/18/2022    BILITOT 0.5 08/18/2022    ALT 5 08/18/2022    AST 12 08/18/2022     XR Chest 1 View  Result Date: 8/18/2022  DATE OF EXAM: 8/18/2022 8:15 AM  PROCEDURE: XR CHEST 1 VW-  INDICATIONS: Short of breath; T78.3XXA-Angioneurotic edema, initial encounter; T46.8V4U-Yhakptj effect of angiotensin-converting-enzyme inhibitors, initial encounter; R06.02-Shortness of breath  COMPARISON: No comparisons available.  TECHNIQUE: Single radiographic AP view of the chest was obtained.  FINDINGS: Prominent cardiac enlargement is present. There is no focal airspace consolidation. No significant pleural effusion or distinct pneumothorax.      Prominent cardiac enlargement, without  additional acute cardiopulmonary abnormality.  This report was finalized on 8/18/2022 8:58 AM by Иван Busby.      COVID19   Date Value Ref Range Status   08/18/2022 Not Detected Not Detected - Ref. Range Final     ASSESSMENTS/PLANS  1.  Acute angioedema, secondary to ACE inhibitor  2.  Acute on chronic anemia, iron deficiency anemia, symptomatic  3.  Acute kidney injury  4.  Obesity, BMI 49.42, complicating all aspects of care    Sonia Bella is a 57 y.o. female presenting to hospital secondary to her ACE inhibitor.  Patient was also found to be anemic with hemoglobin of 8.0 and iron deficient concerning for chronic blood losses, likely through the bowel.  Typically, would proceed with bidirectional endoscopy for further evaluation of GI blood losses, however, patient is adamant that we wait and do this as an outpatient given her anxiety related to hospitals and medical providers.  I discussed the risk and benefit of waiting in detail as well as the likelihood that her iron deficiency anemia is contributing greatly to her decreased functional status at home secondary to exertional dyspnea and overall energy level; patient voices understanding of the risk and benefits and remains adamant that we proceed as an outpatient.  Plan for IV iron while inpatient with outpatient bidirectional endoscopy within the next month.    >>> Okay for diet at this time  >>> IV ferric gluconate 250 mg daily x3 doses  >>> At time of d/c, recommend Feosol with Bifera 2 capsules every other day  >>> Continue to monitor H&H; transfuse for hemoglobins less than 7 or symptomatic anemia per protocol.  >>> GI prophylaxis with PPI    Will arrange outpatient appointment for bidirectional endoscopy in the next month; should anemia worsen or patient have clear evidence of gastrointestinal bleeding, or evaluate for possible inpatient scope before discharge.    I discussed the patient's findings and my recommendations with patient, family and  nursing staff    Margarito Solorio, YASHIRA  08/18/22  18:10 EDT

## 2022-08-18 NOTE — PLAN OF CARE
Goal Outcome Evaluation:  SLP evaluation completed. Will continue to address dysphagia. Please see note for further details and recommendations.

## 2022-08-18 NOTE — H&P
[facility] Medicine Services  HISTORY AND PHYSICAL    Patient Name: Sonia Bella  : 1965  MRN: [id number]  Primary Care Physician: Provider, No Known  Date of admission: 2022      Subjective   Subjective     Chief Complaint:  Tongue swelling    HPI:  Sonia Bella is a 57 y.o. female that noted tongue swelling around 1 AM. Then around 5 AM she noted difficulty clearing secretions and was worried about her breathing. Her speech became slurred. Her daughter noted that her neck seemed swollen. She presented to the ED and was given benadryl, steroids, tranexamic acid, epinephrine, and FFP. There were no exacerbating factors at home and nothing relieved. She denies any new exposures or new medications at home. Prior to her presentation she feels that she was in her usual state of health. She had been eating and drinking well. Denies hematuria. Denies bloody stool. Denies vaginal bleeding. No recent f/c/cough. No SOA. No palpitations. Does note occasional dizziness.       Review of Systems   Constitutional: Positive for activity change and fatigue. Negative for appetite change, chills and diaphoresis.   HENT:        Tongue and neck swelling, drooling   Respiratory: Positive for chest tightness and shortness of breath.    Gastrointestinal: Negative.    Genitourinary: Negative.    Musculoskeletal: Negative.    Neurological: Positive for dizziness and light-headedness.   Hematological: Negative.         All other systems reviewed and are negative.     Personal History     Past Medical History:   Diagnosis Date   • COPD (chronic obstructive pulmonary disease) (HCC)    • History of stomach ulcers    • Hypertension    Stated that she has a cardiac history but denies CHF or history of CAD and couldn't further characterize    Knee surgery  Tubal ligation    Family History Her family history is not on file. Otherwise pertinent FHx was reviewed and unremarkable.     Social History:   reports that she has quit smoking. She has never used smokeless tobacco. She reports current alcohol use.  Social History     Social History Narrative   • Not on file       Medications:  Available home medication information reviewed.  Medications Prior to Admission   Medication Sig Dispense Refill Last Dose   • LISINOPRIL PO Take  by mouth.   8/17/2022 at Unknown time   • albuterol (PROVENTIL) (2.5 MG/3ML) 0.083% nebulizer solution Take 2.5 mg by nebulization Every 4 (Four) Hours As Needed for Wheezing.      • budesonide-formoterol (SYMBICORT) 160-4.5 MCG/ACT inhaler Inhale 2 puffs 2 (Two) Times a Day.      • CARVEDILOL PO Take  by mouth.      • FAMOTIDINE PO Take  by mouth.          No Known Allergies    Objective   Objective     Vital Signs:   Temp:  [97.2 °F (36.2 °C)-98.4 °F (36.9 °C)] 97.2 °F (36.2 °C)  Heart Rate:  [61-84] 66  Resp:  [20] 20  BP: (139-162)/() 139/85       Physical Exam   NAD, alert and oriented  OP clear, MMM  Neck supple  No LAD  RRR  CTAB  +BS, ND, NT, soft  CARL  Normal affect  Speech slightly slurred, she feels that it's close to baseline now  No obvious rashes    Result Review:  I have personally reviewed the results from the time of this admission to 8/18/2022 12:50 EDT and agree with these findings:  []  Laboratory list / accordion  []  Microbiology  []  Radiology  []  EKG/Telemetry   []  Cardiology/Vascular   []  Pathology  []  Old records  []  Other:  Most notable findings include: Imaging/labs reviewed        LAB RESULTS:      Lab 08/18/22  0753   WBC 7.05   HEMOGLOBIN 8.0*   HEMATOCRIT 27.9*   PLATELETS 452*   NEUTROS ABS 5.24   IMMATURE GRANS (ABS) 0.04   LYMPHS ABS 0.84   MONOS ABS 0.56   EOS ABS 0.33   MCV 95.2         Lab 08/18/22  0855   SODIUM 137   POTASSIUM 4.9   CHLORIDE 107   CO2 20.0*   ANION GAP 10.0   BUN 26*   CREATININE 2.20*   EGFR 25.4*   GLUCOSE 144*   CALCIUM 9.4         Lab 08/18/22  0855   TOTAL PROTEIN 8.0   ALBUMIN 4.10   GLOBULIN 3.9   ALT (SGPT) 5    AST (SGOT) 12   BILIRUBIN 0.5   ALK PHOS 86                 Lab 08/18/22  0855   IRON 22*   IRON SATURATION 4*   TIBC 557*   TRANSFERRIN 374*   ABO TYPING O   RH TYPING Positive   ANTIBODY SCREEN Negative             Microbiology Results (last 10 days)     ** No results found for the last 240 hours. **          XR Chest 1 View    Result Date: 8/18/2022  DATE OF EXAM: 8/18/2022 8:15 AM  PROCEDURE: XR CHEST 1 VW-  INDICATIONS: Short of breath; T78.3XXA-Angioneurotic edema, initial encounter; T46.9P7I-Myieeup effect of angiotensin-converting-enzyme inhibitors, initial encounter; R06.02-Shortness of breath  COMPARISON: No comparisons available.  TECHNIQUE: Single radiographic AP view of the chest was obtained.  FINDINGS: Prominent cardiac enlargement is present. There is no focal airspace consolidation. No significant pleural effusion or distinct pneumothorax.      Impression: Prominent cardiac enlargement, without additional acute cardiopulmonary abnormality.  This report was finalized on 8/18/2022 8:58 AM by Иван uBsby.            Assessment & Plan   Assessment & Plan     Active Hospital Problems    Diagnosis  POA   • Angioedema due to angiotensin converting enzyme inhibitor (ACE-I) [T78.3XXA, T46.4X5A]  Yes     Acute angioedema  -stop ACE  -s/p steroids, benadryl, tranexamic acid, FFP, epinephrine  -check ESR, CRP, C4 (although s/p FFP)  -continue prednisone, zyrtec, benadryl    Anemia  SHAYAN  -hx of PUD  -PPI  -consult GI    ELIANE, presumed  -unknown baseline  -check UA, renal US, give IVF and consult nephrology    HTN  -monitor for now        DVT prophylaxis:: SCD      CODE STATUS:  Full/CPR  Code Status and Medical Interventions:   Ordered at: 08/18/22 1249     Code Status (Patient has no pulse and is not breathing):    CPR (Attempt to Resuscitate)     Medical Interventions (Patient has pulse or is breathing):    Full Support         Fernando Yu MD  08/18/22

## 2022-08-19 ENCOUNTER — APPOINTMENT (OUTPATIENT)
Dept: GENERAL RADIOLOGY | Facility: HOSPITAL | Age: 57
End: 2022-08-19

## 2022-08-19 ENCOUNTER — APPOINTMENT (OUTPATIENT)
Dept: ULTRASOUND IMAGING | Facility: HOSPITAL | Age: 57
End: 2022-08-19

## 2022-08-19 LAB
ALBUMIN SERPL-MCNC: 4.1 G/DL (ref 3.5–5.2)
ALBUMIN/GLOB SERPL: 1.2 G/DL
ALP SERPL-CCNC: 78 U/L (ref 39–117)
ALT SERPL W P-5'-P-CCNC: 7 U/L (ref 1–33)
ANION GAP SERPL CALCULATED.3IONS-SCNC: 11 MMOL/L (ref 5–15)
AST SERPL-CCNC: 10 U/L (ref 1–32)
BH BB BLOOD EXPIRATION DATE: NORMAL
BH BB BLOOD TYPE BARCODE: 5100
BH BB DISPENSE STATUS: NORMAL
BH BB PRODUCT CODE: NORMAL
BH BB UNIT NUMBER: NORMAL
BILIRUB SERPL-MCNC: 0.3 MG/DL (ref 0–1.2)
BUN SERPL-MCNC: 25 MG/DL (ref 6–20)
BUN/CREAT SERPL: 12.5 (ref 7–25)
CALCIUM SPEC-SCNC: 9.6 MG/DL (ref 8.6–10.5)
CHLORIDE SERPL-SCNC: 109 MMOL/L (ref 98–107)
CHOLEST SERPL-MCNC: 154 MG/DL (ref 0–200)
CO2 SERPL-SCNC: 19 MMOL/L (ref 22–29)
CREAT SERPL-MCNC: 2 MG/DL (ref 0.57–1)
DEPRECATED RDW RBC AUTO: 52.2 FL (ref 37–54)
EGFRCR SERPLBLD CKD-EPI 2021: 28.5 ML/MIN/1.73
ERYTHROCYTE [DISTWIDTH] IN BLOOD BY AUTOMATED COUNT: 15.4 % (ref 12.3–15.4)
GLOBULIN UR ELPH-MCNC: 3.4 GM/DL
GLUCOSE SERPL-MCNC: 122 MG/DL (ref 65–99)
HAPTOGLOB SERPL-MCNC: 159 MG/DL (ref 30–200)
HBA1C MFR BLD: 5.1 % (ref 4.8–5.6)
HCT VFR BLD AUTO: 22.6 % (ref 34–46.6)
HCT VFR BLD AUTO: 23.4 % (ref 34–46.6)
HDLC SERPL-MCNC: 64 MG/DL (ref 40–60)
HGB BLD-MCNC: 6.6 G/DL (ref 12–15.9)
HGB BLD-MCNC: 7 G/DL (ref 12–15.9)
LDH SERPL-CCNC: 148 U/L (ref 135–214)
LDLC SERPL CALC-MCNC: 77 MG/DL (ref 0–100)
LDLC/HDLC SERPL: 1.21 {RATIO}
MCH RBC QN AUTO: 26.9 PG (ref 26.6–33)
MCHC RBC AUTO-ENTMCNC: 29.2 G/DL (ref 31.5–35.7)
MCV RBC AUTO: 92.2 FL (ref 79–97)
PLATELET # BLD AUTO: 396 10*3/MM3 (ref 140–450)
PMV BLD AUTO: 9.8 FL (ref 6–12)
POTASSIUM SERPL-SCNC: 5.3 MMOL/L (ref 3.5–5.2)
PROT SERPL-MCNC: 7.5 G/DL (ref 6–8.5)
PTH-INTACT SERPL-MCNC: 84.2 PG/ML (ref 15–65)
QT INTERVAL: 430 MS
QTC INTERVAL: 436 MS
RBC # BLD AUTO: 2.45 10*6/MM3 (ref 3.77–5.28)
RETICS # AUTO: 0.07 10*6/MM3 (ref 0.02–0.13)
RETICS/RBC NFR AUTO: 2.94 % (ref 0.7–1.9)
SODIUM SERPL-SCNC: 139 MMOL/L (ref 136–145)
TRIGL SERPL-MCNC: 63 MG/DL (ref 0–150)
TSH SERPL DL<=0.05 MIU/L-ACNC: 0.34 UIU/ML (ref 0.27–4.2)
UNIT  ABO: NORMAL
UNIT  RH: NORMAL
VLDLC SERPL-MCNC: 13 MG/DL (ref 5–40)
WBC NRBC COR # BLD: 9.12 10*3/MM3 (ref 3.4–10.8)

## 2022-08-19 PROCEDURE — 94664 DEMO&/EVAL PT USE INHALER: CPT

## 2022-08-19 PROCEDURE — 80061 LIPID PANEL: CPT | Performed by: INTERNAL MEDICINE

## 2022-08-19 PROCEDURE — 63710000001 PREDNISONE PER 1 MG: Performed by: HOSPITALIST

## 2022-08-19 PROCEDURE — 83615 LACTATE (LD) (LDH) ENZYME: CPT | Performed by: INTERNAL MEDICINE

## 2022-08-19 PROCEDURE — 85045 AUTOMATED RETICULOCYTE COUNT: CPT | Performed by: INTERNAL MEDICINE

## 2022-08-19 PROCEDURE — 92611 MOTION FLUOROSCOPY/SWALLOW: CPT

## 2022-08-19 PROCEDURE — 96367 TX/PROPH/DG ADDL SEQ IV INF: CPT

## 2022-08-19 PROCEDURE — 76775 US EXAM ABDO BACK WALL LIM: CPT

## 2022-08-19 PROCEDURE — 74230 X-RAY XM SWLNG FUNCJ C+: CPT

## 2022-08-19 PROCEDURE — 99226 PR SBSQ OBSERVATION CARE/DAY 35 MINUTES: CPT | Performed by: INTERNAL MEDICINE

## 2022-08-19 PROCEDURE — 94799 UNLISTED PULMONARY SVC/PX: CPT

## 2022-08-19 PROCEDURE — 96366 THER/PROPH/DIAG IV INF ADDON: CPT

## 2022-08-19 PROCEDURE — 85014 HEMATOCRIT: CPT | Performed by: INTERNAL MEDICINE

## 2022-08-19 PROCEDURE — 83010 ASSAY OF HAPTOGLOBIN QUANT: CPT | Performed by: INTERNAL MEDICINE

## 2022-08-19 PROCEDURE — G0378 HOSPITAL OBSERVATION PER HR: HCPCS

## 2022-08-19 PROCEDURE — 96372 THER/PROPH/DIAG INJ SC/IM: CPT

## 2022-08-19 PROCEDURE — 85018 HEMOGLOBIN: CPT | Performed by: INTERNAL MEDICINE

## 2022-08-19 PROCEDURE — 25010000002 HEPARIN (PORCINE) PER 1000 UNITS: Performed by: HOSPITALIST

## 2022-08-19 PROCEDURE — 83036 HEMOGLOBIN GLYCOSYLATED A1C: CPT | Performed by: INTERNAL MEDICINE

## 2022-08-19 PROCEDURE — 96361 HYDRATE IV INFUSION ADD-ON: CPT

## 2022-08-19 PROCEDURE — 84443 ASSAY THYROID STIM HORMONE: CPT | Performed by: HOSPITALIST

## 2022-08-19 PROCEDURE — 25010000002 NA FERRIC GLUC CPLX PER 12.5 MG: Performed by: NURSE PRACTITIONER

## 2022-08-19 PROCEDURE — 99232 SBSQ HOSP IP/OBS MODERATE 35: CPT | Performed by: PHYSICIAN ASSISTANT

## 2022-08-19 PROCEDURE — 85027 COMPLETE CBC AUTOMATED: CPT | Performed by: HOSPITALIST

## 2022-08-19 PROCEDURE — 83970 ASSAY OF PARATHORMONE: CPT | Performed by: INTERNAL MEDICINE

## 2022-08-19 PROCEDURE — 80053 COMPREHEN METABOLIC PANEL: CPT | Performed by: HOSPITALIST

## 2022-08-19 RX ORDER — TRIAMTERENE AND HYDROCHLOROTHIAZIDE 75; 50 MG/1; MG/1
1 TABLET ORAL DAILY
COMMUNITY
End: 2022-10-04 | Stop reason: HOSPADM

## 2022-08-19 RX ORDER — FLUOXETINE 10 MG/1
10 CAPSULE ORAL DAILY
COMMUNITY
End: 2022-08-21 | Stop reason: HOSPADM

## 2022-08-19 RX ORDER — PEG-3350, SODIUM SULFATE, SODIUM CHLORIDE, POTASSIUM CHLORIDE, SODIUM ASCORBATE AND ASCORBIC ACID 7.5-2.691G
1000 KIT ORAL
Status: DISCONTINUED | OUTPATIENT
Start: 2022-08-19 | End: 2022-08-19

## 2022-08-19 RX ORDER — PEG-3350, SODIUM SULFATE, SODIUM CHLORIDE, POTASSIUM CHLORIDE, SODIUM ASCORBATE AND ASCORBIC ACID 7.5-2.691G
1000 KIT ORAL
Status: DISCONTINUED | OUTPATIENT
Start: 2022-08-20 | End: 2022-08-19

## 2022-08-19 RX ADMIN — SENNOSIDES AND DOCUSATE SODIUM 2 TABLET: 50; 8.6 TABLET ORAL at 21:29

## 2022-08-19 RX ADMIN — Medication 10 ML: at 08:41

## 2022-08-19 RX ADMIN — HEPARIN SODIUM 5000 UNITS: 5000 INJECTION, SOLUTION INTRAVENOUS; SUBCUTANEOUS at 14:38

## 2022-08-19 RX ADMIN — SODIUM CHLORIDE 100 ML/HR: 9 INJECTION, SOLUTION INTRAVENOUS at 12:01

## 2022-08-19 RX ADMIN — HEPARIN SODIUM 5000 UNITS: 5000 INJECTION, SOLUTION INTRAVENOUS; SUBCUTANEOUS at 06:07

## 2022-08-19 RX ADMIN — SODIUM CHLORIDE 100 ML/HR: 9 INJECTION, SOLUTION INTRAVENOUS at 23:22

## 2022-08-19 RX ADMIN — SODIUM CHLORIDE 100 ML/HR: 9 INJECTION, SOLUTION INTRAVENOUS at 01:15

## 2022-08-19 RX ADMIN — CARVEDILOL 3.12 MG: 3.12 TABLET, FILM COATED ORAL at 08:44

## 2022-08-19 RX ADMIN — Medication 10 ML: at 21:30

## 2022-08-19 RX ADMIN — PREDNISONE 20 MG: 20 TABLET ORAL at 08:40

## 2022-08-19 RX ADMIN — BUDESONIDE AND FORMOTEROL FUMARATE DIHYDRATE 2 PUFF: 160; 4.5 AEROSOL RESPIRATORY (INHALATION) at 09:20

## 2022-08-19 RX ADMIN — BARIUM SULFATE 20 ML: 400 PASTE ORAL at 12:37

## 2022-08-19 RX ADMIN — FAMOTIDINE 20 MG: 20 TABLET ORAL at 16:58

## 2022-08-19 RX ADMIN — SENNOSIDES AND DOCUSATE SODIUM 2 TABLET: 50; 8.6 TABLET ORAL at 08:40

## 2022-08-19 RX ADMIN — PANTOPRAZOLE SODIUM 40 MG: 40 TABLET, DELAYED RELEASE ORAL at 06:07

## 2022-08-19 RX ADMIN — BARIUM SULFATE 100 ML: 0.81 POWDER, FOR SUSPENSION ORAL at 12:37

## 2022-08-19 RX ADMIN — SODIUM CHLORIDE 250 MG: 9 INJECTION, SOLUTION INTRAVENOUS at 08:40

## 2022-08-19 RX ADMIN — CETIRIZINE HYDROCHLORIDE 10 MG: 10 TABLET, FILM COATED ORAL at 08:40

## 2022-08-19 RX ADMIN — CARVEDILOL 3.12 MG: 3.12 TABLET, FILM COATED ORAL at 21:29

## 2022-08-19 RX ADMIN — HEPARIN SODIUM 5000 UNITS: 5000 INJECTION, SOLUTION INTRAVENOUS; SUBCUTANEOUS at 21:29

## 2022-08-19 RX ADMIN — FAMOTIDINE 20 MG: 20 TABLET ORAL at 06:06

## 2022-08-19 NOTE — PROGRESS NOTES
"                    Clinical Nutrition     Patient Name: Sonia Bella  YOB: 1965  MRN: [id number]  Date of Encounter: 08/19/22 13:22 EDT  Admission date: 8/18/2022    Reason for Visit   Identified at risk by screening criteria (difficulty chewing/swallowing)    EMR Reviewed: Yes     Diet Nutrition Related History:      Pt admitted d/t acute angioedema. Pt reports no UWL. Pt said they are having difficulties swallowing d/t swollen tongue and throat. Prior to symptoms, pt reported not having any appetite decrease, or decrease in intakes. SLP seen pt and recommends regular textures, nectar thick liquids. Pt reports being ready for regular liquids. Pt's diet downgraded from a regular texture, to clear liquids after visit. GI is following, had plans for EGD and colonoscopy for tomorrow d/t possibly GI bleed. Pt refused EGD and colonoscopy for tomorrow d/t not wanting to be on a clear liquid diet. Diet advanced to low fiber/low residue diet. RD f/up with SLP to determine if nectar thick liquids were needed, SLP reported to RD pt passed their MBS for regular diet.     Anthropometrics   Height: Height: 172.7 cm (68\")  Admission Weight:   Flowsheet Rows    Flowsheet Row First Filed Value   Admission Height 172.7 cm (68\") Documented at 08/18/2022 0746   Admission Weight 147 kg (325 lb) Documented at 08/18/2022 0746        Last Filed Weight:Weight: (!) 147 kg (325 lb) (08/18/22 0746) (unknown source)  BMI: BMI (Calculated): 49.4  BMI classification: Obese Class III extreme obesity: > or equal to 40kg/m2   IBW: Ideal Body Weight (IBW) (kg): 64.15     Current Nutrition Prescription     Current Diet: Low Fiber/Low Residue    Average Intake from Charting:   No intakes recorded    Intake History:     Intake Category  N/A    Actions   Appropriateness for MSA:  Criteria for further malnutrition exam not met at this time. Follow per protocol.   Interventions:   Follow treatment progress, Care plan reviewed, Menu " provided, Encourage intake    Sujatha Nogueira,   Time Spent: 25 min

## 2022-08-19 NOTE — PLAN OF CARE
Goal Outcome Evaluation:  Plan of Care Reviewed With: patient            MBS completed Functional oropharyngeal swallow. Safe for regular diet and thin liquids. No further SLP needs. Please see full note for further details.

## 2022-08-19 NOTE — PROGRESS NOTES
"GI Daily Progress Note  Subjective:    Chief Complaint:  Follow up iron deficiency anemia     Anemia worsening overnight.   Complains of fatigue and generalized weakness.       Objective:    /76 (BP Location: Left arm, Patient Position: Lying)   Pulse 62   Temp 97.2 °F (36.2 °C) (Oral)   Resp 18   Ht 172.7 cm (68\")   Wt (!) 147 kg (325 lb)   SpO2 95%   BMI 49.42 kg/m²     Physical Exam  Constitutional:       General: She is not in acute distress.     Appearance: She is obese.   Cardiovascular:      Rate and Rhythm: Normal rate and regular rhythm.   Pulmonary:      Effort: Pulmonary effort is normal. No respiratory distress.   Abdominal:      General: Bowel sounds are normal. There is no distension.      Palpations: Abdomen is soft.      Tenderness: There is no abdominal tenderness.   Neurological:      Mental Status: She is alert and oriented to person, place, and time.         Lab  Lab Results   Component Value Date    WBC 9.12 08/19/2022    HGB 7.0 (L) 08/19/2022    HGB 6.6 (C) 08/19/2022    HGB 8.0 (L) 08/18/2022    MCV 92.2 08/19/2022     08/19/2022       Lab Results   Component Value Date    GLUCOSE 122 (H) 08/19/2022    BUN 25 (H) 08/19/2022    CREATININE 2.00 (H) 08/19/2022    BCR 12.5 08/19/2022     08/19/2022    K 5.3 (H) 08/19/2022    CO2 19.0 (L) 08/19/2022    CALCIUM 9.6 08/19/2022    ALBUMIN 4.10 08/19/2022    ALKPHOS 78 08/19/2022    BILITOT 0.3 08/19/2022    ALT 7 08/19/2022    AST 10 08/19/2022       Assessment:    Acute on chronic iron deficiency anemia  Acute angioedema, secondary to ACE inhibitor, resolved.    Acute kidney injury  Obesity, BMI of 49, complicating all aspects of care     Plan:    >>> Recommend inpatient EGD and Colonoscopy tomorrow, 8/20/22, secondary to worsening anemia.  We discussed this in detail and she elects to proceed  >>> Split dose Moviprep to begin this afternoon  >>> NPO at midnight  >>> Clear liquid diet diet   >>> IV Iron.  Serial H&H.  " Anticipate she will need transfusion.      Addendum: 1302 contacted by patient's primary RN, Nano that patient refuses to undergo inpatient colonoscopy as she does not wish to receive a clear liquid only diet.   Risks of worsening anemia and ongoing blood loss discussed and patient voiced understanding.   Will cancel EGD and Colonoscopy for tomorrow.  Low residue diet ordered.   She does wish to pursue this outpatient.       ELIJAH Barajas  08/19/22  11:00 EDT

## 2022-08-19 NOTE — PROGRESS NOTES
Spoke with patient she is agreeable to [facility]. Message left for Dr Musa-to sign home health orders. Leonor STEWART, [facility]-Liaison

## 2022-08-19 NOTE — CASE MANAGEMENT/SOCIAL WORK
Continued Stay Note  [facility]     Patient Name: Sonia Bella  MRN: [id number]  Today's Date: 8/19/2022    Admit Date: 8/18/2022     Discharge Plan     Row Name 08/19/22 0943       Plan    Plan home with [facility] SN/PT/OT    Patient/Family in Agreement with Plan yes    Plan Comments Spoke with patient at bedside. Plan is home with [facility] SN/PT/OT. Leonor has accepted. Patient denies any discharge needs at this time. CM will continue to follow.    Final Discharge Disposition Code 06 - home with home health care               Discharge Codes    No documentation.               Expected Discharge Date and Time     Expected Discharge Date Expected Discharge Time    Aug 22, 2022             Suhas Galan RN

## 2022-08-19 NOTE — MBS/VFSS/FEES
Acute Care - Speech Language Pathology   Swallow Initial Evaluation  Giovanna   Modified Barium Swallow Study (MBS)     Patient Name: Sonia Bella  : 1965  MRN: [id number]  Today's Date: 2022               Admit Date: 2022    Visit Dx:     ICD-10-CM ICD-9-CM   1. Angioedema due to angiotensin converting enzyme inhibitor (ACE-I)  T78.3XXA 995.1    T46.4X5A E942.6   2. Shortness of breath  R06.02 786.05   3. Anemia, unspecified type  D64.9 285.9   4. Renal failure, unspecified chronicity  N19 586   5. Iron deficiency anemia due to chronic blood loss  D50.0 280.0   6. Dysphagia, unspecified type  R13.10 787.20     Patient Active Problem List   Diagnosis   • Angioedema due to angiotensin converting enzyme inhibitor (ACE-I)     Past Medical History:   Diagnosis Date   • COPD (chronic obstructive pulmonary disease) (HCC)    • History of stomach ulcers    • Hypertension      History reviewed. No pertinent surgical history.    SLP Recommendation and Plan  SLP Swallowing Diagnosis: functional oral phase, functional pharyngeal phase (22 1220)  SLP Diet Recommendation: regular textures, thin liquids, other (see comments) (ordered later PM once RD conveyed off clear liquid diet.) (22 1220)  Recommended Precautions and Strategies: upright posture during/after eating (22 1220)  SLP Rec. for Method of Medication Administration: as tolerated (22 1220)           Swallow Criteria for Skilled Therapeutic Interventions Met: no problems identified which require skilled intervention (22 1220)        Therapy Frequency (Swallow): evaluation only (22 122)                                     Plan of Care Reviewed With: patient      SWALLOW EVALUATION (last 72 hours)     SLP Adult Swallow Evaluation     Row Name 22 1220 22 1415                Rehab Evaluation    Document Type evaluation  -SM evaluation  -       Subjective Information no complaints  -SM no complaints   -       Patient Observations alert;cooperative  - alert;cooperative  [facility]       Patient/Family/Caregiver Comments/Observations -- No family present  -       Patient Effort -- good  -       Symptoms Noted During/After Treatment -- none  -                General Information    Patient Profile Reviewed -- yes  -       Pertinent History Of Current Problem Pt reported to have colonoscopy today but that was going to tell MD that she did not wish to have it, will not consent. [place] [facility] [place] to reason for refusal)  - Adm w/ acute angioedema. Hx: COPD, stomach ulcers, HTN. CXR clear.  -       Current Method of Nutrition clear liquids;other (see comments)  previously regular solids and nectar-thick liquids  - regular textures;thin liquids  -       Precautions/Limitations, Vision -- WFL;for purposes of eval  -       Precautions/Limitations, Hearing -- WFL;for purposes of eval  -       Prior Level of Function-Communication -- unknown  -       Prior Level of Function-Swallowing safe, efficient swallowing in all situations  - no diet consistency restrictions  -       Plans/Goals Discussed with patient;agreed upon  - patient;agreed upon  [facility]       Barriers to Rehab none identified  - none identified  -       Patient's Goals for Discharge -- return home  -                Pain    Additional Documentation -- Pain Scale: FACES Pre/Post-Treatment (Group)  [facility]                Pain Scale: FACES Pre/Post-Treatment    Pain: FACES Scale, Pretreatment 2-->hurts little bit  - 0-->no hurt  -       Posttreatment Pain Rating 2-->hurts little bit  - 0-->no hurt  [facility]                Oral Motor Structure and Function    Dentition Assessment -- natural, present and adequate  -       Secretion Management -- WNL/WF  -       Mucosal Quality -- moist, healthy  -                Oral Musculature and Cranial Nerve Assessment    Oral Motor General Assessment -- WFL  -                 General Eating/Swallowing Observations    Respiratory Support Currently in Use -- room air  -       Eating/Swallowing Skills -- self-fed  -       Positioning During Eating -- upright in bed  -       Utensils Used -- spoon;cup;straw  -       Consistencies Trialed -- thin liquids;nectar/syrup-thick liquids;pureed;regular textures  -                Clinical Swallow Eval    Pharyngeal Phase -- suspected pharyngeal impairment  -       Clinical Swallow Evaluation Summary -- CSE completed.  Pt w/ overt s/s of aspiration. Cough and eventual throat clear w/ thin liquid trials via cup sip. Delayed throat clear w/ thin trials via straw sip. No overt s/s w/ nectar thick liquid, pureed, or regular solid trials. Pt reports difficulty swallowing secretions, slurred speech, and neck swelling 2/2 angioedema. At this time, pt reports symptoms have resolved. Rec: NTL w/ regular solids until MBS tomorrow.  -                Pharyngeal Phase Concerns    Pharyngeal Phase Concerns -- cough;throat clear  -       Cough -- thin  -       Throat Clear -- thin  -                MBS/VFSS    Utensils Used spoon;cup;straw  - --       Consistencies Trialed thin liquids;pureed;regular textures  - --                MBS/VFSS Interpretation    Oral Prep Phase WFL  -SM --       Oral Transit Phase WFL  -SM --       Oral Residue WFL  -SM --                Initiation of Pharyngeal Swallow    Pharyngeal Phase functional pharyngeal phase of swallowing  - --                SLP Evaluation Clinical Impression    SLP Swallowing Diagnosis functional oral phase;functional pharyngeal phase  - suspected pharyngeal dysphagia  -       Functional Impact -- risk of aspiration/pneumonia  -       Rehab Potential/Prognosis, Swallowing -- good, to achieve stated therapy goals  -       Swallow Criteria for Skilled Therapeutic Interventions Met no problems identified which require skilled intervention  - demonstrates skilled criteria  -                 Recommendations    Therapy Frequency (Swallow) evaluation only  - --       Predicted Duration Therapy Intervention (Days) -- until discharge  -       SLP Diet Recommendation regular textures;thin liquids;other (see comments)  ordered later PM once RD conveyed off clear liquid diet.  - regular textures;nectar thick liquids  -       Recommended Diagnostics -- VFSS (MBS);other (see comments)  8/19  -       Recommended Precautions and Strategies upright posture during/after eating  - general aspiration precautions  -       Oral Care Recommendations -- Oral Care BID/PRN  -       SLP Rec. for Method of Medication Administration as tolerated  - meds crushed;with pudding or applesauce;as tolerated  -       Monitor for Signs of Aspiration -- yes;notify SLP if any concerns  -       Anticipated Discharge Disposition (SLP) -- unknown  -             User Key  (r) = Recorded By, (t) = Taken By, (c) = Cosigned By    Initials Name Effective Dates    Carolann Carpenter MS CCC-SLP 06/16/21 -      Rosibel Brown MS, CFY-SLP 06/22/22 -                 EDUCATION  The patient has been educated in the following areas:   Dysphagia (Swallowing Impairment).              Time Calculation:    Time Calculation- SLP     Row Name 08/19/22 1450             Time Calculation- [facility]    SLP Start Time 1220  -      SLP Received On 08/19/22  -              Untimed Charges    34716-[facility] Motion Fluoro Eval Swallow Minutes 54  -SM              Total Minutes    Untimed Charges Total Minutes 54  -SM       Total Minutes 54  -SM            User Key  (r) = Recorded By, (t) = Taken By, (c) = Cosigned By    Initials Name Provider Type    Carolann Carpenter MS CCC-SLP Speech and Language Pathologist                Therapy Charges for Today     Code Description Service Date Service Provider Modifiers Qty    [id number] HC ST MOTION FLUORO EVAL SWALLOW 4 8/19/2022 Carolann Quiroz MS CCC-SLP GN 1             Patient was not wearing a face mask and did not exhibit coughing during this therapy encounter.  Procedure performed was aerosolizing, involved close contact (within 6 feet for at least 15 minutes or longer), and did not involve contact with infectious secretions or specimens.  Therapist used appropriate personal protective equipment including gloves, standard procedure mask and eye protection.  Appropriate PPE was worn during the entire therapy session.  Hand hygiene was completed before and after therapy session.       Carolann Quiroz MS CCC-SLP  8/19/2022

## 2022-08-19 NOTE — CONSULTS
Patient Care Team:  Cain Musa MD as PCP - General (Family Medicine)    Chief complaint: Acute kidney injury   Angioedema     Subjective     History of Present Illness: Ms Bella is a 56 yo lady with hx of HTN. She is admitted with acute tongue and throat swelling. Patient woke up at 1 am noticing swelling of her tongue. She later noticed slurred speech and throat swelling. On admission patient received solu-medrol, benadryl and epi. Nephrology has been consulted for evaluation and management of acute kidney injury. Patient has no prior hx of renal disease. Labs on this admission remarkable for cr ~ 2.2, UA significant for trace proteinuria. Patient admits taking intermittent NSAID for pain. Anastasia any n/v or diarrhea. She has been taking lisinopril for many years.     Review of Systems   Constitutional: Negative.    HENT: Negative.    Respiratory: Negative.    Cardiovascular: Negative.    Gastrointestinal: Negative.    Genitourinary: Negative.    Musculoskeletal: Negative.    Skin: Negative.    Neurological: Negative.    Hematological: Negative.    Psychiatric/Behavioral: Negative.         Past Medical History:   Diagnosis Date   • COPD (chronic obstructive pulmonary disease) (HCC)    • History of stomach ulcers    • Hypertension    , History reviewed. No pertinent surgical history., History reviewed. No pertinent family history.,   Social History     Socioeconomic History   • Marital status:    Tobacco Use   • Smoking status: Former Smoker   • Smokeless tobacco: Never Used   Substance and Sexual Activity   • Alcohol use: Yes     E-cigarette/Vaping     E-cigarette/Vaping Substances     E-cigarette/Vaping Devices       ,   Medications Prior to Admission   Medication Sig Dispense Refill Last Dose   • LISINOPRIL PO Take  by mouth.   8/17/2022 at Unknown time   • albuterol (PROVENTIL) (2.5 MG/3ML) 0.083% nebulizer solution Take 2.5 mg by nebulization Every 4 (Four) Hours As Needed for Wheezing.      •  budesonide-formoterol (SYMBICORT) 160-4.5 MCG/ACT inhaler Inhale 2 puffs 2 (Two) Times a Day.      • CARVEDILOL PO Take  by mouth.      • FAMOTIDINE PO Take  by mouth.       and Scheduled Meds:  budesonide-formoterol, 2 puff, Inhalation, BID - RT  carvedilol, 3.125 mg, Oral, Q12H  cetirizine, 10 mg, Oral, Daily  famotidine, 20 mg, Oral, BID AC  ferric gluconate, 250 mg, Intravenous, Daily  heparin (porcine), 5,000 Units, Subcutaneous, Q8H  [START ON 8/19/2022] pantoprazole, 40 mg, Oral, Q AM  predniSONE, 20 mg, Oral, Daily With Breakfast  senna-docusate sodium, 2 tablet, Oral, BID  sodium chloride, 10 mL, Intravenous, Q12H        Objective     Vital Signs  Temp:  [97.2 °F (36.2 °C)-98.5 °F (36.9 °C)] 98.5 °F (36.9 °C)  Heart Rate:  [61-84] 71  Resp:  [18-20] 18  BP: (139-162)/() 150/79    No intake/output data recorded.  I/O last 3 completed shifts:  In: 1181 [Blood:1181]  Out: -     Physical Exam  Constitutional:       General: She is not in acute distress.     Appearance: Normal appearance. She is not ill-appearing.   HENT:      Head: Normocephalic.      Nose: Nose normal.      Mouth/Throat:      Mouth: Mucous membranes are moist.   Eyes:      Extraocular Movements: Extraocular movements intact.      Pupils: Pupils are equal, round, and reactive to light.   Cardiovascular:      Rate and Rhythm: Normal rate and regular rhythm.      Pulses: Normal pulses.      Heart sounds: Normal heart sounds.   Pulmonary:      Effort: Pulmonary effort is normal.      Breath sounds: Normal breath sounds.   Abdominal:      General: Abdomen is flat.      Palpations: Abdomen is soft.   Musculoskeletal:         General: Normal range of motion.      Right lower leg: No edema.      Left lower leg: No edema.   Skin:     General: Skin is warm.   Neurological:      General: No focal deficit present.      Mental Status: She is alert and oriented to person, place, and time. Mental status is at baseline.   Psychiatric:         Mood and  Affect: Mood normal.         Behavior: Behavior normal.         Results Review:    I reviewed the patient's new clinical results.    WBC WBC   Date Value Ref Range Status   08/18/2022 7.05 3.40 - 10.80 10*3/mm3 Final      HGB Hemoglobin   Date Value Ref Range Status   08/18/2022 8.0 (L) 12.0 - 15.9 g/dL Final      HCT Hematocrit   Date Value Ref Range Status   08/18/2022 27.9 (L) 34.0 - 46.6 % Final      Platlets No results found for: LABPLAT   MCV MCV   Date Value Ref Range Status   08/18/2022 95.2 79.0 - 97.0 fL Final          Sodium Sodium   Date Value Ref Range Status   08/18/2022 137 136 - 145 mmol/L Final      Potassium Potassium   Date Value Ref Range Status   08/18/2022 4.9 3.5 - 5.2 mmol/L Final      Chloride Chloride   Date Value Ref Range Status   08/18/2022 107 98 - 107 mmol/L Final      CO2 CO2   Date Value Ref Range Status   08/18/2022 20.0 (L) 22.0 - 29.0 mmol/L Final      BUN BUN   Date Value Ref Range Status   08/18/2022 26 (H) 6 - 20 mg/dL Final      Creatinine Creatinine   Date Value Ref Range Status   08/18/2022 2.20 (H) 0.57 - 1.00 mg/dL Final      Calcium Calcium   Date Value Ref Range Status   08/18/2022 9.4 8.6 - 10.5 mg/dL Final      PO4 No results found for: CAPO4   Albumin Albumin   Date Value Ref Range Status   08/18/2022 4.10 3.50 - 5.20 g/dL Final      Magnesium No results found for: MG   Uric Acid No results found for: URICACID         Assessment & Plan       Angioedema due to angiotensin converting enzyme inhibitor (ACE-I)      Assessment & Plan    Abnormal kidney function: Unknown baseline renal function. Cr 2.2 on this admission. UA bland other than trace proteinuria. ELIANE vs CKD. Pending urine lytes and renal US.     Angioedema: Likely related to ACE I. ( Although she has been on it for 4 yrs)    HTN: Well controlled    Recs  Continue with IV fluids to determine renal response. Will do additional workup to determine to evaluate for chronic kidney disease: Check urine protein/cr.  Renal US, Urine Na, cl and cr. Dose meds to eGFR. She needs to avoid chronic NSAID use. Discontinue ACE I indefinitely     I discussed the patients findings and my recommendations with patient    Jamal Juan MD  08/18/22  22:03 EDT

## 2022-08-19 NOTE — PLAN OF CARE
Problem: Adult Inpatient Plan of Care  Goal: Plan of Care Review  8/19/2022 1411 by Carolann Quiroz MS CCC-SLP  Outcome: Ongoing, Progressing  Flowsheets (Taken 8/19/2022 1411)  Plan of Care Reviewed With: patient   Goal Outcome Evaluation:  Plan of Care Reviewed With: patient         SLP evaluation with MBS completed. Safe for regular diet and thin liquids. No SLP needs. Will sign-off. Please see note for further details and recommendations.

## 2022-08-19 NOTE — PROGRESS NOTES
[facility] Medicine Services  PROGRESS NOTE    Patient Name: Sonia Bella  : 1965  MRN: [id number]    Date of Admission: 2022  Primary Care Physician: Cain Musa MD    Subjective   Subjective     CC:  Follow-up for Enge edema and anemia    HPI:  I have seen and evaluated the patient this morning.  Comfortable in bed.  Denies any shortness of breath or chest pain.  Admits to feeling weak and tired and smothering with minimal activity at home.  Reported that she did not have any blood work-up over the last 7 years with her family doctor and she is not aware of any chronic kidney problems.  Surprised to find out about her kidney disease and about her anemia.    ROS:  General : no fevers, chills  CVS: No chest pain, palpitations  Respiratory: No cough, dyspnea  GI: No N/V/D, abd pain  10 point review of systems is negative except for what is mentioned in HPI    Objective   Objective     Vital Signs:   Temp:  [97.2 °F (36.2 °C)-98.8 °F (37.1 °C)] 97.2 °F (36.2 °C)  Heart Rate:  [57-84] 62  Resp:  [18-20] 20  BP: (135-162)/(76-99) 135/76  Flow (L/min):  [1.5-2] 1.5     Physical Exam:  General: Comfortable, not in any acute distress, appears stated age, conversant and cooperative  Head: Atraumatic and normocephalic, without obvious abnormality  Eyes:   Conjunctivae and sclerae normal, no Icterus.  Slight pallor  Ears:  Ears appear intact with no abnormalities noted  Throat: No oral lesions, no thrush, oral mucosa moist  Neck: Supple, trachea midline, no thyromegaly  Back:   No kyphoscoliosis present. No tenderness to palpation,   no sacral edema  Lungs: Clear to auscultation bilaterally, equal air entry, no wheezing or crackles  Heart:  Normal S1 and S2, no murmur, no gallop, No JVD, no lower extremity swelling  Abdomen:  Soft, no tenderness, no organomegaly, normal bowel sounds  Normal bowel sounds, no masses, no organomegaly. Soft, nontender, nondistended, no guarding, no  rebound tenderness.  Extremities: No gross abnormalities, no clubbing, pulses palpable and equal bilaterally  Skin: No bleeding, bruising or rash, normal skin turgor and elasticity  Neurologic: Cranial nerves appear intact with no evidence of facial asymmetry, normal motor and sensory functions in all 4 extremities  Psych: Alert and oriented x 3, normal mood    Results Reviewed:  LAB RESULTS:      Lab 08/19/22  0427 08/18/22  0855 08/18/22  0753   WBC 9.12  --  7.05   HEMOGLOBIN 6.6*  --  8.0*   HEMATOCRIT 22.6*  --  27.9*   PLATELETS 396  --  452*   NEUTROS ABS  --   --  5.24   IMMATURE GRANS (ABS)  --   --  0.04   LYMPHS ABS  --   --  0.84   MONOS ABS  --   --  0.56   EOS ABS  --   --  0.33   MCV 92.2  --  95.2   SED RATE  --   --  73*   CRP  --  0.58*  --      --   --          Lab 08/19/22  0427 08/18/22  0855   SODIUM 139 137   POTASSIUM 5.3* 4.9   CHLORIDE 109* 107   CO2 19.0* 20.0*   ANION GAP 11.0 10.0   BUN 25* 26*   CREATININE 2.00* 2.20*   EGFR 28.5* 25.4*   GLUCOSE 122* 144*   CALCIUM 9.6 9.4   TSH 0.338  --          Lab 08/19/22  0427 08/18/22  0855   TOTAL PROTEIN 7.5 8.0   ALBUMIN 4.10 4.10   GLOBULIN 3.4 3.9   ALT (SGPT) 7 5   AST (SGOT) 10 12   BILIRUBIN 0.3 0.5   ALK PHOS 78 86                 Lab 08/18/22  0855   IRON 22*   IRON SATURATION 4*   TIBC 557*   TRANSFERRIN 374*   FERRITIN 8.50*   FOLATE 7.93   VITAMIN B 12 318   ABO TYPING O   RH TYPING Positive   ANTIBODY SCREEN Negative         Brief Urine Lab Results  (Last result in the past 365 days)      Color   Clarity   Blood   Leuk Est   Nitrite   Protein   CREAT   Urine HCG        08/18/22 1615 Yellow   Clear   Negative   Negative   Negative   30 mg/dL (1+)                 Microbiology Results Abnormal     Procedure Component Value - Date/Time    COVID PRE-OP / PRE-PROCEDURE SCREENING ORDER (NO ISOLATION) - Swab, Nasopharynx [[id number]]  (Normal) Collected: 08/18/22 1355    Lab Status: Final result Specimen: Swab from Nasopharynx  Updated: 08/18/22 1417    Narrative:      The following orders were created for panel order COVID PRE-OP / PRE-PROCEDURE SCREENING ORDER (NO ISOLATION) - Swab, Nasopharynx.  Procedure                               Abnormality         Status                     ---------                               -----------         ------                     COVID-19 and FLU A/B PCR...[[id number]]  Normal              Final result                 Please view results for these tests on the individual orders.    COVID-19 and FLU A/B PCR - Swab, Nasopharynx [[id number]]  (Normal) Collected: 08/18/22 5681    Lab Status: Final result Specimen: Swab from Nasopharynx Updated: 08/18/22 1417     COVID19 Not Detected     Influenza A PCR Not Detected     Influenza B PCR Not Detected    Narrative:      Fact sheet for providers: https://www.fda.gov/media/100585/download    Fact sheet for patients: https://www.fda.gov/media/252904/download    Test performed by PCR.          XR Chest 1 View    Result Date: 8/18/2022  DATE OF EXAM: 8/18/2022 8:15 AM  PROCEDURE: XR CHEST 1 VW-  INDICATIONS: Short of breath; T78.3XXA-Angioneurotic edema, initial encounter; T46.6I7H-Ahzqzfu effect of angiotensin-converting-enzyme inhibitors, initial encounter; R06.02-Shortness of breath  COMPARISON: No comparisons available.  TECHNIQUE: Single radiographic AP view of the chest was obtained.  FINDINGS: Prominent cardiac enlargement is present. There is no focal airspace consolidation. No significant pleural effusion or distinct pneumothorax.      Impression: Prominent cardiac enlargement, without additional acute cardiopulmonary abnormality.  This report was finalized on 8/18/2022 8:58 AM by Иван Busby.            I have reviewed the medications:  Scheduled Meds:budesonide-formoterol, 2 puff, Inhalation, BID - RT  carvedilol, 3.125 mg, Oral, Q12H  cetirizine, 10 mg, Oral, Daily  famotidine, 20 mg, Oral, BID AC  ferric gluconate, 250 mg, Intravenous,  Daily  heparin (porcine), 5,000 Units, Subcutaneous, Q8H  pantoprazole, 40 mg, Oral, Q AM  predniSONE, 20 mg, Oral, Daily With Breakfast  senna-docusate sodium, 2 tablet, Oral, BID  sodium chloride, 10 mL, Intravenous, Q12H      Continuous Infusions:sodium chloride, 100 mL/hr, Last Rate: 100 mL/hr (08/19/22 0115)      PRN Meds:.•  acetaminophen **OR** acetaminophen **OR** acetaminophen  •  albuterol  •  senna-docusate sodium **AND** polyethylene glycol **AND** bisacodyl **AND** bisacodyl  •  ondansetron **OR** ondansetron  •  sodium chloride  •  sodium chloride    Assessment & Plan   Assessment & Plan     Active Hospital Problems    Diagnosis  POA   • Angioedema due to angiotensin converting enzyme inhibitor (ACE-I) [T78.3XXA, T46.4X5A]  Yes      Resolved Hospital Problems   No resolved problems to display.        Brief Hospital Course to date:  Sonia Bella is a 57 y.o. female with past medical history of essential hypertension, gastric ulcers and COPD who presented to the hospital with angioedema and was found to be anemic with elevated creatinine    Assessment and plan:  Angioedema, resolved  Likely precipitated by ACE inhibitor use  Continue steroids, Zyrtec and Benadryl  ACE will need to be discontinued upon discharge    Iron deficiency anemia  Vitamin B12 deficiency  Anemia work-up is indicative of iron deficiency anemia and B12 deficiency  Receiving IV iron infusion  Start B12 replacement therapy  Hemoglobin this morning is 6.6.  We will repeat H&H to confirm low hemoglobin prior to blood transfusion  Will benefit from GI eval as outpatient given her history of gastric ulcer    ELIANE versus CKD  Patient denies any history of chronic kidney problems but did not have any blood work-up over the last 7 years  Continue IV fluids and monitor kidney functions  Check intact PTH to determine the chronicity of her kidney disease  Nephrology following.  Appreciate help    Essential hypertension  Currently normotensive  off ACE inhibitor  Monitor for now    PCP work-up: Since the patient did not have any blood work-up for the last 7 years, will check  Check A1c  Check lipid profile      Expected Discharge Location and Transportation: Home via personal vehicle  Expected Discharge Date: 8/20/2022    DVT prophylaxis:  Medical and mechanical DVT prophylaxis orders are present.          CODE STATUS:   Code Status and Medical Interventions:   Ordered at: 08/18/22 1249     Code Status (Patient has no pulse and is not breathing):    CPR (Attempt to Resuscitate)     Medical Interventions (Patient has pulse or is breathing):    Full Support       Faisal Perez MD  08/19/22

## 2022-08-19 NOTE — PROGRESS NOTES
" LOS: 1 day   Patient Care Team:  Cain Musa MD as PCP - General (Family Medicine)    Chief Complaint: Angioedema     Subjective       Subjective:  Symptoms:  Stable.  No shortness of breath, chest pain, chest pressure or anxiety.    Diet:  Adequate intake.    Activity level: Normal.    Pain:  She reports no pain.        History taken from: patient    Objective     Vital Sign Min/Max for last 24 hours  Temp  Min: 97.2 °F (36.2 °C)  Max: 98.8 °F (37.1 °C)   BP  Min: 135/76  Max: 159/98   Pulse  Min: 57  Max: 81   Resp  Min: 18  Max: 20   SpO2  Min: 89 %  Max: 95 %   Flow (L/min)  Min: 1.5  Max: 2   No data recorded     Flowsheet Rows    Flowsheet Row First Filed Value   Admission Height 172.7 cm (68\") Documented at 08/18/2022 0746   Admission Weight 147 kg (325 lb) Documented at 08/18/2022 0746          I/O this shift:  In: 1890 [I.V.:1890]  Out: -   I/O last 3 completed shifts:  In: 1541 [P.O.:360; Blood:1181]  Out: -     Objective:  General Appearance:  Comfortable.    Vital signs: (most recent): Blood pressure 135/76, pulse 81, temperature 97.2 °F (36.2 °C), temperature source Oral, resp. rate 18, height 172.7 cm (68\"), weight (!) 147 kg (325 lb), SpO2 93 %.  Vital signs are normal.    Output: Producing urine.    HEENT: Normal HEENT exam.    Lungs:  Normal effort and normal respiratory rate.  Breath sounds clear to auscultation.  She is not in respiratory distress.    Heart: Normal rate.  Regular rhythm.  S1 normal and S2 normal.  No murmur.   Abdomen: Abdomen is soft and non-distended.  Bowel sounds are normal.     Extremities: Normal range of motion.  There is no dependent edema or local swelling.    Neurological: Patient is alert and oriented to person, place and time.  Normal strength.    Pupils:  Pupils are equal, round, and reactive to light.              Results Review:     I reviewed the patient's new clinical results.    WBC WBC   Date Value Ref Range Status   08/19/2022 9.12 3.40 - 10.80 10*3/mm3 " Final   08/18/2022 7.05 3.40 - 10.80 10*3/mm3 Final      HGB Hemoglobin   Date Value Ref Range Status   08/19/2022 7.0 (L) 12.0 - 15.9 g/dL Final   08/19/2022 6.6 (C) 12.0 - 15.9 g/dL Final   08/18/2022 8.0 (L) 12.0 - 15.9 g/dL Final      HCT Hematocrit   Date Value Ref Range Status   08/19/2022 23.4 (L) 34.0 - 46.6 % Final   08/19/2022 22.6 (L) 34.0 - 46.6 % Final   08/18/2022 27.9 (L) 34.0 - 46.6 % Final      Platlets No results found for: LABPLAT   MCV MCV   Date Value Ref Range Status   08/19/2022 92.2 79.0 - 97.0 fL Final   08/18/2022 95.2 79.0 - 97.0 fL Final          Sodium Sodium   Date Value Ref Range Status   08/19/2022 139 136 - 145 mmol/L Final   08/18/2022 137 136 - 145 mmol/L Final      Potassium Potassium   Date Value Ref Range Status   08/19/2022 5.3 (H) 3.5 - 5.2 mmol/L Final   08/18/2022 4.9 3.5 - 5.2 mmol/L Final      Chloride Chloride   Date Value Ref Range Status   08/19/2022 109 (H) 98 - 107 mmol/L Final   08/18/2022 107 98 - 107 mmol/L Final      CO2 CO2   Date Value Ref Range Status   08/19/2022 19.0 (L) 22.0 - 29.0 mmol/L Final   08/18/2022 20.0 (L) 22.0 - 29.0 mmol/L Final      BUN BUN   Date Value Ref Range Status   08/19/2022 25 (H) 6 - 20 mg/dL Final   08/18/2022 26 (H) 6 - 20 mg/dL Final      Creatinine Creatinine   Date Value Ref Range Status   08/19/2022 2.00 (H) 0.57 - 1.00 mg/dL Final   08/18/2022 2.20 (H) 0.57 - 1.00 mg/dL Final      Calcium Calcium   Date Value Ref Range Status   08/19/2022 9.6 8.6 - 10.5 mg/dL Final   08/18/2022 9.4 8.6 - 10.5 mg/dL Final      PO4 No results found for: CAPO4   Albumin Albumin   Date Value Ref Range Status   08/19/2022 4.10 3.50 - 5.20 g/dL Final   08/18/2022 4.10 3.50 - 5.20 g/dL Final      Magnesium No results found for: MG   Uric Acid No results found for: URICACID     Medication Review: yes    Assessment & Plan       Angioedema due to angiotensin converting enzyme inhibitor (ACE-I)      Assessment & Plan     Abnormal kidney function: Unknown  baseline renal function. Cr 2.2 on this admission. UA bland other than trace proteinuria. ELIANE vs CKD. Pending urine lytes and renal US.      Angioedema: Likely related to ACE I. ( Although she has been on it for 4 yrs)     HTN: Well controlled    Hyperkalemia: K 5.3     Anemia: GI following. Plan for EGD     Recs  Suspect chronic kidney disease likely etiology is nephrosclerosis. Pending renal US. Lokelma 10 gm x 1. Dose meds to eGFR. She needs to avoid chronic NSAID use. Discontinue ACE I indefinitely      I discussed the patients findings and my recommendations with patient    Jamal Juan MD  08/19/22  12:55 EDT

## 2022-08-20 LAB
ABO GROUP BLD: NORMAL
ALBUMIN SERPL-MCNC: 3.6 G/DL (ref 3.5–5.2)
ALBUMIN/GLOB SERPL: 1.1 G/DL
ALP SERPL-CCNC: 69 U/L (ref 39–117)
ALT SERPL W P-5'-P-CCNC: 5 U/L (ref 1–33)
ANION GAP SERPL CALCULATED.3IONS-SCNC: 8 MMOL/L (ref 5–15)
AST SERPL-CCNC: 9 U/L (ref 1–32)
BASOPHILS # BLD AUTO: 0.02 10*3/MM3 (ref 0–0.2)
BASOPHILS NFR BLD AUTO: 0.2 % (ref 0–1.5)
BILIRUB SERPL-MCNC: 0.2 MG/DL (ref 0–1.2)
BUN SERPL-MCNC: 28 MG/DL (ref 6–20)
BUN/CREAT SERPL: 14.7 (ref 7–25)
CALCIUM SPEC-SCNC: 9.1 MG/DL (ref 8.6–10.5)
CHLORIDE SERPL-SCNC: 111 MMOL/L (ref 98–107)
CO2 SERPL-SCNC: 21 MMOL/L (ref 22–29)
CREAT SERPL-MCNC: 1.91 MG/DL (ref 0.57–1)
DEPRECATED RDW RBC AUTO: 53.5 FL (ref 37–54)
EGFRCR SERPLBLD CKD-EPI 2021: 30.1 ML/MIN/1.73
EOSINOPHIL # BLD AUTO: 0.04 10*3/MM3 (ref 0–0.4)
EOSINOPHIL NFR BLD AUTO: 0.4 % (ref 0.3–6.2)
ERYTHROCYTE [DISTWIDTH] IN BLOOD BY AUTOMATED COUNT: 15.7 % (ref 12.3–15.4)
GLOBULIN UR ELPH-MCNC: 3.2 GM/DL
GLUCOSE SERPL-MCNC: 91 MG/DL (ref 65–99)
HCT VFR BLD AUTO: 22 % (ref 34–46.6)
HEMOCCULT STL QL: NEGATIVE
HGB BLD-MCNC: 6.4 G/DL (ref 12–15.9)
IMM GRANULOCYTES # BLD AUTO: 0.07 10*3/MM3 (ref 0–0.05)
IMM GRANULOCYTES NFR BLD AUTO: 0.6 % (ref 0–0.5)
LYMPHOCYTES # BLD AUTO: 0.53 10*3/MM3 (ref 0.7–3.1)
LYMPHOCYTES NFR BLD AUTO: 4.9 % (ref 19.6–45.3)
MCH RBC QN AUTO: 27.4 PG (ref 26.6–33)
MCHC RBC AUTO-ENTMCNC: 29.1 G/DL (ref 31.5–35.7)
MCV RBC AUTO: 94 FL (ref 79–97)
MONOCYTES # BLD AUTO: 0.81 10*3/MM3 (ref 0.1–0.9)
MONOCYTES NFR BLD AUTO: 7.5 % (ref 5–12)
NEUTROPHILS NFR BLD AUTO: 86.4 % (ref 42.7–76)
NEUTROPHILS NFR BLD AUTO: 9.34 10*3/MM3 (ref 1.7–7)
NRBC BLD AUTO-RTO: 0.2 /100 WBC (ref 0–0.2)
PLATELET # BLD AUTO: 376 10*3/MM3 (ref 140–450)
PMV BLD AUTO: 9.6 FL (ref 6–12)
POTASSIUM SERPL-SCNC: 4.3 MMOL/L (ref 3.5–5.2)
PROT SERPL-MCNC: 6.8 G/DL (ref 6–8.5)
RBC # BLD AUTO: 2.34 10*6/MM3 (ref 3.77–5.28)
RH BLD: POSITIVE
SODIUM SERPL-SCNC: 140 MMOL/L (ref 136–145)
WBC NRBC COR # BLD: 10.81 10*3/MM3 (ref 3.4–10.8)

## 2022-08-20 PROCEDURE — 94664 DEMO&/EVAL PT USE INHALER: CPT

## 2022-08-20 PROCEDURE — G0378 HOSPITAL OBSERVATION PER HR: HCPCS

## 2022-08-20 PROCEDURE — 25010000002 NA FERRIC GLUC CPLX PER 12.5 MG: Performed by: NURSE PRACTITIONER

## 2022-08-20 PROCEDURE — P9016 RBC LEUKOCYTES REDUCED: HCPCS

## 2022-08-20 PROCEDURE — 86901 BLOOD TYPING SEROLOGIC RH(D): CPT

## 2022-08-20 PROCEDURE — 63710000001 PREDNISONE PER 1 MG: Performed by: HOSPITALIST

## 2022-08-20 PROCEDURE — 82272 OCCULT BLD FECES 1-3 TESTS: CPT | Performed by: INTERNAL MEDICINE

## 2022-08-20 PROCEDURE — 96372 THER/PROPH/DIAG INJ SC/IM: CPT

## 2022-08-20 PROCEDURE — 94799 UNLISTED PULMONARY SVC/PX: CPT

## 2022-08-20 PROCEDURE — 96375 TX/PRO/DX INJ NEW DRUG ADDON: CPT

## 2022-08-20 PROCEDURE — 85018 HEMOGLOBIN: CPT | Performed by: INTERNAL MEDICINE

## 2022-08-20 PROCEDURE — 36430 TRANSFUSION BLD/BLD COMPNT: CPT

## 2022-08-20 PROCEDURE — 99226 PR SBSQ OBSERVATION CARE/DAY 35 MINUTES: CPT | Performed by: INTERNAL MEDICINE

## 2022-08-20 PROCEDURE — 99232 SBSQ HOSP IP/OBS MODERATE 35: CPT | Performed by: INTERNAL MEDICINE

## 2022-08-20 PROCEDURE — 25010000002 HEPARIN (PORCINE) PER 1000 UNITS: Performed by: HOSPITALIST

## 2022-08-20 PROCEDURE — 85025 COMPLETE CBC W/AUTO DIFF WBC: CPT | Performed by: INTERNAL MEDICINE

## 2022-08-20 PROCEDURE — 96361 HYDRATE IV INFUSION ADD-ON: CPT

## 2022-08-20 PROCEDURE — 85014 HEMATOCRIT: CPT | Performed by: INTERNAL MEDICINE

## 2022-08-20 PROCEDURE — 86900 BLOOD TYPING SEROLOGIC ABO: CPT

## 2022-08-20 PROCEDURE — 97530 THERAPEUTIC ACTIVITIES: CPT

## 2022-08-20 PROCEDURE — 25010000002 FUROSEMIDE PER 20 MG: Performed by: INTERNAL MEDICINE

## 2022-08-20 PROCEDURE — 80053 COMPREHEN METABOLIC PANEL: CPT | Performed by: INTERNAL MEDICINE

## 2022-08-20 PROCEDURE — 25010000002 CYANOCOBALAMIN PER 1000 MCG: Performed by: INTERNAL MEDICINE

## 2022-08-20 PROCEDURE — 97162 PT EVAL MOD COMPLEX 30 MIN: CPT

## 2022-08-20 RX ORDER — CYANOCOBALAMIN 1000 UG/ML
1000 INJECTION, SOLUTION INTRAMUSCULAR; SUBCUTANEOUS DAILY
Status: DISCONTINUED | OUTPATIENT
Start: 2022-08-20 | End: 2022-08-21 | Stop reason: HOSPADM

## 2022-08-20 RX ORDER — FUROSEMIDE 10 MG/ML
20 INJECTION INTRAMUSCULAR; INTRAVENOUS ONCE
Status: COMPLETED | OUTPATIENT
Start: 2022-08-20 | End: 2022-08-20

## 2022-08-20 RX ORDER — DIPHENHYDRAMINE HYDROCHLORIDE AND LIDOCAINE HYDROCHLORIDE AND ALUMINUM HYDROXIDE AND MAGNESIUM HYDRO
5 KIT EVERY 6 HOURS
Status: DISCONTINUED | OUTPATIENT
Start: 2022-08-20 | End: 2022-08-21 | Stop reason: HOSPADM

## 2022-08-20 RX ADMIN — DIPHENHYDRAMINE HYDROCHLORIDE AND LIDOCAINE HYDROCHLORIDE AND ALUMINUM HYDROXIDE AND MAGNESIUM HYDRO 5 ML: KIT at 22:39

## 2022-08-20 RX ADMIN — SODIUM CHLORIDE 100 ML/HR: 9 INJECTION, SOLUTION INTRAVENOUS at 09:03

## 2022-08-20 RX ADMIN — CETIRIZINE HYDROCHLORIDE 10 MG: 10 TABLET, FILM COATED ORAL at 09:04

## 2022-08-20 RX ADMIN — SODIUM CHLORIDE 250 MG: 9 INJECTION, SOLUTION INTRAVENOUS at 09:03

## 2022-08-20 RX ADMIN — NYSTATIN 500000 UNITS: 100000 SUSPENSION ORAL at 11:21

## 2022-08-20 RX ADMIN — CYANOCOBALAMIN 1000 MCG: 1000 INJECTION, SOLUTION INTRAMUSCULAR; SUBCUTANEOUS at 11:21

## 2022-08-20 RX ADMIN — CARVEDILOL 3.12 MG: 3.12 TABLET, FILM COATED ORAL at 20:43

## 2022-08-20 RX ADMIN — ACETAMINOPHEN 650 MG: 325 TABLET, FILM COATED ORAL at 12:03

## 2022-08-20 RX ADMIN — SENNOSIDES AND DOCUSATE SODIUM 2 TABLET: 50; 8.6 TABLET ORAL at 09:03

## 2022-08-20 RX ADMIN — FAMOTIDINE 20 MG: 20 TABLET ORAL at 18:03

## 2022-08-20 RX ADMIN — Medication 10 ML: at 20:49

## 2022-08-20 RX ADMIN — HEPARIN SODIUM 5000 UNITS: 5000 INJECTION, SOLUTION INTRAVENOUS; SUBCUTANEOUS at 20:43

## 2022-08-20 RX ADMIN — FAMOTIDINE 20 MG: 20 TABLET ORAL at 06:36

## 2022-08-20 RX ADMIN — FUROSEMIDE 20 MG: 10 INJECTION INTRAMUSCULAR; INTRAVENOUS at 11:21

## 2022-08-20 RX ADMIN — NYSTATIN 500000 UNITS: 100000 SUSPENSION ORAL at 18:03

## 2022-08-20 RX ADMIN — Medication 10 ML: at 09:04

## 2022-08-20 RX ADMIN — PANTOPRAZOLE SODIUM 40 MG: 40 TABLET, DELAYED RELEASE ORAL at 06:36

## 2022-08-20 RX ADMIN — PREDNISONE 20 MG: 20 TABLET ORAL at 09:03

## 2022-08-20 RX ADMIN — DIPHENHYDRAMINE HYDROCHLORIDE AND LIDOCAINE HYDROCHLORIDE AND ALUMINUM HYDROXIDE AND MAGNESIUM HYDRO 5 ML: KIT at 11:22

## 2022-08-20 RX ADMIN — CARVEDILOL 3.12 MG: 3.12 TABLET, FILM COATED ORAL at 09:04

## 2022-08-20 RX ADMIN — BUDESONIDE AND FORMOTEROL FUMARATE DIHYDRATE 2 PUFF: 160; 4.5 AEROSOL RESPIRATORY (INHALATION) at 10:06

## 2022-08-20 RX ADMIN — NYSTATIN 500000 UNITS: 100000 SUSPENSION ORAL at 22:39

## 2022-08-20 RX ADMIN — DIPHENHYDRAMINE HYDROCHLORIDE AND LIDOCAINE HYDROCHLORIDE AND ALUMINUM HYDROXIDE AND MAGNESIUM HYDRO 5 ML: KIT at 18:03

## 2022-08-20 NOTE — PROGRESS NOTES
" LOS: 1 day   Patient Care Team:  Cain Musa MD as PCP - General (Family Medicine)    Chief Complaint: Angioedema     Subjective   C/o mild sob this morning on 2 liter supplemental o2. Cr 1.9mg/dl. Getting 1 unit PRBC today     Subjective:  Symptoms:  Stable.  No shortness of breath, chest pain, chest pressure or anxiety.    Diet:  Adequate intake.    Activity level: Normal.    Pain:  She reports no pain.        History taken from: patient    Objective     Vital Sign Min/Max for last 24 hours  Temp  Min: 97.7 °F (36.5 °C)  Max: 98.2 °F (36.8 °C)   BP  Min: 114/57  Max: 134/81   Pulse  Min: 59  Max: 88   Resp  Min: 18  Max: 18   SpO2  Min: 93 %  Max: 99 %   Flow (L/min)  Min: 1.5  Max: 1.5   No data recorded     Flowsheet Rows    Flowsheet Row First Filed Value   Admission Height 172.7 cm (68\") Documented at 08/18/2022 0746   Admission Weight 147 kg (325 lb) Documented at 08/18/2022 0746          No intake/output data recorded.  I/O last 3 completed shifts:  In: 3311.7 [P.O.:840; I.V.:2471.7]  Out: -     Objective:  General Appearance:  Comfortable.    Vital signs: (most recent): Blood pressure 127/65, pulse 74, temperature 98.2 °F (36.8 °C), temperature source Oral, resp. rate 18, height 172.7 cm (68\"), weight (!) 147 kg (325 lb), SpO2 99 %.  Vital signs are normal.    Output: Producing urine.    HEENT: Normal HEENT exam.    Lungs:  Normal effort and normal respiratory rate.  Breath sounds clear to auscultation.  She is not in respiratory distress.    Heart: Normal rate.  Regular rhythm.  S1 normal and S2 normal.  No murmur.   Abdomen: Abdomen is soft and non-distended.  Bowel sounds are normal.     Extremities: Normal range of motion.  There is no dependent edema or local swelling.    Neurological: Patient is alert and oriented to person, place and time.  Normal strength.    Pupils:  Pupils are equal, round, and reactive to light.              Results Review:     I reviewed the patient's new clinical " results.    WBC WBC   Date Value Ref Range Status   08/20/2022 10.81 (H) 3.40 - 10.80 10*3/mm3 Final   08/19/2022 9.12 3.40 - 10.80 10*3/mm3 Final   08/18/2022 7.05 3.40 - 10.80 10*3/mm3 Final      HGB Hemoglobin   Date Value Ref Range Status   08/20/2022 6.4 (C) 12.0 - 15.9 g/dL Final   08/19/2022 7.0 (L) 12.0 - 15.9 g/dL Final   08/19/2022 6.6 (C) 12.0 - 15.9 g/dL Final   08/18/2022 8.0 (L) 12.0 - 15.9 g/dL Final      HCT Hematocrit   Date Value Ref Range Status   08/20/2022 22.0 (L) 34.0 - 46.6 % Final   08/19/2022 23.4 (L) 34.0 - 46.6 % Final   08/19/2022 22.6 (L) 34.0 - 46.6 % Final   08/18/2022 27.9 (L) 34.0 - 46.6 % Final      Platlets No results found for: LABPLAT   MCV MCV   Date Value Ref Range Status   08/20/2022 94.0 79.0 - 97.0 fL Final   08/19/2022 92.2 79.0 - 97.0 fL Final   08/18/2022 95.2 79.0 - 97.0 fL Final          Sodium Sodium   Date Value Ref Range Status   08/20/2022 140 136 - 145 mmol/L Final   08/19/2022 139 136 - 145 mmol/L Final   08/18/2022 137 136 - 145 mmol/L Final      Potassium Potassium   Date Value Ref Range Status   08/20/2022 4.3 3.5 - 5.2 mmol/L Final   08/19/2022 5.3 (H) 3.5 - 5.2 mmol/L Final   08/18/2022 4.9 3.5 - 5.2 mmol/L Final      Chloride Chloride   Date Value Ref Range Status   08/20/2022 111 (H) 98 - 107 mmol/L Final   08/19/2022 109 (H) 98 - 107 mmol/L Final   08/18/2022 107 98 - 107 mmol/L Final      CO2 CO2   Date Value Ref Range Status   08/20/2022 21.0 (L) 22.0 - 29.0 mmol/L Final   08/19/2022 19.0 (L) 22.0 - 29.0 mmol/L Final   08/18/2022 20.0 (L) 22.0 - 29.0 mmol/L Final      BUN BUN   Date Value Ref Range Status   08/20/2022 28 (H) 6 - 20 mg/dL Final   08/19/2022 25 (H) 6 - 20 mg/dL Final   08/18/2022 26 (H) 6 - 20 mg/dL Final      Creatinine Creatinine   Date Value Ref Range Status   08/20/2022 1.91 (H) 0.57 - 1.00 mg/dL Final   08/19/2022 2.00 (H) 0.57 - 1.00 mg/dL Final   08/18/2022 2.20 (H) 0.57 - 1.00 mg/dL Final      Calcium Calcium   Date Value Ref  Range Status   08/20/2022 9.1 8.6 - 10.5 mg/dL Final   08/19/2022 9.6 8.6 - 10.5 mg/dL Final   08/18/2022 9.4 8.6 - 10.5 mg/dL Final      PO4 No results found for: CAPO4   Albumin Albumin   Date Value Ref Range Status   08/20/2022 3.60 3.50 - 5.20 g/dL Final   08/19/2022 4.10 3.50 - 5.20 g/dL Final   08/18/2022 4.10 3.50 - 5.20 g/dL Final      Magnesium No results found for: MG   Uric Acid No results found for: URICACID     Medication Review: yes    Assessment & Plan       Angioedema due to angiotensin converting enzyme inhibitor (ACE-I)      Assessment & Plan     Abnormal kidney function: Unknown baseline renal function. Cr 2.2 on this admission. UA bland other than trace proteinuria. ELIANE vs CKD. Renal US b/l echogenic kidneys. Simple cyst on the left kidney      Angioedema: Likely related to ACE I. ( Although she has been on it for 4 yrs)     HTN: Well controlled    Hyperkalemia: K 5.3     Anemia: GI following. Plan for EGD     Recs  Suspect chronic kidney disease likely etiology is nephrosclerosis. D/C IV fluids. Agre with 1 unit PRBC for hb<7. Will give lasix 20 mg x 1 with blood transfusion. Dose meds to eGFR. She needs to avoid chronic NSAID use. Discontinue ACE I indefinitely      I discussed the patients findings and my recommendations with patient    Jamal Juan MD  08/20/22  11:02 EDT

## 2022-08-20 NOTE — PLAN OF CARE
Goal Outcome Evaluation:  Plan of Care Reviewed With: patient           Outcome Evaluation: Pt A/Ox4, VSS on 1.5LNC, pt sats 90's on RA but reports she feels more comfortable on 02 while sleeping. Facial swelling has improved, no difficutlies noted while eating or drinking. Critical HGB this AM 6.4. Provider notified x2, waiting for response. Pt asymptomatic. No acute events overnight. Safety maintained.

## 2022-08-20 NOTE — PROGRESS NOTES
"GI Daily Progress Note  Subjective     Sonia Bella is a 57 y.o. female who was admitted with angioedema.  This is improving.  States her stools are green.  No nausea or vomiting.  States she has history peptic ulcer disease in 2016.  Had a colonoscopy that time as well..         Chief Complaint: Iron deficiency anemia    Objective     /76   Pulse 72   Temp 98.4 °F (36.9 °C) (Oral)   Resp 18   Ht 172.7 cm (68\")   Wt (!) 147 kg (325 lb)   SpO2 97%   BMI 49.42 kg/m²     Intake/Output last 3 shifts:  I/O last 3 completed shifts:  In: 3311.7 [P.O.:840; I.V.:2471.7]  Out: -   Intake/Output this shift:  I/O this shift:  In: 360 [Blood:360]  Out: -       Physical Exam  Wt Readings from Last 3 Encounters:   08/18/22 (!) 147 kg (325 lb)   ,body mass index is 49.42 kg/m².,@FLOWAMB(6)@,@FLOWAMB(5)@,@FLOWAMB(8)@   CONSTITUTIONAL: Lying in bed no distress  Resp CTA; no rhonchi, rales, or wheezes.  Respiration effort normal  CV RRR; no M/R/G. No lower extremity edema  GI Abd soft, NT, ND, normal active bowel sounds.    Psych: Awake alert and oriented    DATA:   Latest Reference Range & Units 08/18/22 07:53 08/19/22 04:27 08/19/22 08:35 08/20/22 04:07   WBC 3.40 - 10.80 10*3/mm3 7.05 9.12  10.81 (H)   RBC 3.77 - 5.28 10*6/mm3 2.93 (L) 2.45 (L)  2.34 (L)   Hemoglobin 12.0 - 15.9 g/dL 8.0 (L) 6.6 (C) 7.0 (L) 6.4 (C)   Hematocrit 34.0 - 46.6 % 27.9 (L) 22.6 (L) 23.4 (L) 22.0 (L)   RDW 12.3 - 15.4 % 15.5 (H) 15.4  15.7 (H)   MCV 79.0 - 97.0 fL 95.2 92.2  94.0   MCH 26.6 - 33.0 pg 27.3 26.9  27.4   MCHC 31.5 - 35.7 g/dL 28.7 (L) 29.2 (L)  29.1 (L)   MPV 6.0 - 12.0 fL 9.2 9.8  9.6   Platelets 140 - 450 10*3/mm3 452 (H) 396  376   (H): Data is abnormally high  (L): Data is abnormally low  (C): Data is critically low     Latest Reference Range & Units 08/20/22 09:28   Fecal Occult Blood Negative  Negative       Assessment & Plan     Iron deficient anemia  Angioedema  B12 deficiency    Unsure as to the etiology of why her " hemoglobin keeps dropping.  No evidence of active or overt GI bleeding.  She is currently Hemoccult negative.  She is agreeable to outpatient EGD and colonoscopy.  LFTs are not consistent with hemolysis.    It is unclear as to why her hemoglobin would continue to drop.    Would recommend CT scan of abdomen/pelvis without contrast if this trend continues.    My office will arrange outpatient EGD and colonoscopy.  She states she is willing to comply          Angioedema due to angiotensin converting enzyme inhibitor (ACE-I)       LOS: 1 day     Win Al MD  08/20/22  13:37 EDT

## 2022-08-20 NOTE — PLAN OF CARE
Goal Outcome Evaluation:  Plan of Care Reviewed With: patient        Progress: no change  Outcome Evaluation: PT initial evaluation completed. Pt demonstrates generalized weakness and decreased functional endurance re: mobilty tasks, warranting further skilled pT services to promote PLOF. Limited today by fatigue and SOA, but able to ambulate 80 ft with RW, CGA. Recommend d/c home with assist and HHPT.

## 2022-08-20 NOTE — THERAPY EVALUATION
Patient Name: Sonia Bella  : 1965    MRN: [id number]                              Today's Date: 2022       Admit Date: 2022    Visit Dx:     ICD-10-CM ICD-9-CM   1. Angioedema due to angiotensin converting enzyme inhibitor (ACE-I)  T78.3XXA 995.1    T46.4X5A E942.6   2. Shortness of breath  R06.02 786.05   3. Anemia, unspecified type  D64.9 285.9   4. Renal failure, unspecified chronicity  N19 586   5. Iron deficiency anemia due to chronic blood loss  D50.0 280.0     Patient Active Problem List   Diagnosis   • Angioedema due to angiotensin converting enzyme inhibitor (ACE-I)     Past Medical History:   Diagnosis Date   • COPD (chronic obstructive pulmonary disease) (HCC)    • History of stomach ulcers    • Hypertension      History reviewed. No pertinent surgical history.   General Information     Row Name 22 154          Physical Therapy Time and Intention    Document Type evaluation  -LS     Mode of Treatment physical therapy  -     Row Name 22 1542          General Information    Patient Profile Reviewed yes  -LS     Prior Level of Function independent:;all household mobility;ADL's;min assist:;bathing  short distance gait at home  -LS     Existing Precautions/Restrictions fall;oxygen therapy device and L/min;other (see comments)  monitor H/H  -LS     Barriers to Rehab medically complex;previous functional deficit  -LS     Row Name 22 1542          Living Environment    People in Home child(priya), adult;grandchild(priya)  -LS     Row Name 22 1542          Home Main Entrance    Number of Stairs, Main Entrance two  -LS     Row Name 22 1542          Stairs Within Home, Primary    Number of Stairs, Within Home, Primary none  -LS     Row Name 22 1542          Cognition    Orientation Status (Cognition) oriented x 3  -LS     Row Name 22 1542          Safety Issues, Functional Mobility    Impairments Affecting Function (Mobility) balance;endurance/activity  tolerance;shortness of breath;strength  -LS           User Key  (r) = Recorded By, (t) = Taken By, (c) = Cosigned By    Initials Name Provider Type    Shavon Villeda, PT Physical Therapist               Mobility     Row Name 08/20/22 1544          Bed Mobility    Bed Mobility supine-sit;sit-supine  -LS     Supine-Sit [place] (Bed Mobility) supervision;verbal cues  -LS     Sit-Supine [place] (Bed Mobility) supervision;verbal cues  -LS     Assistive Device (Bed Mobility) head of bed elevated;bed rails  -LS     Row Name 08/20/22 1547          Sit-Stand Transfer    Sit-Stand [place] (Transfers) contact guard;verbal cues  -LS     Assistive Device (Sit-Stand Transfers) walker, front-wheeled  -LS     Row Name 08/20/22 1543          Gait/Stairs (Locomotion)    [place] Level (Gait) contact guard;verbal cues  -LS     Assistive Device (Gait) walker, front-wheeled  -LS     Distance in Feet (Gait) 80  -LS     Deviations/Abnormal Patterns (Gait) bilateral deviations;fredis decreased;stride length decreased  -LS     Left Sided Gait Deviations forward flexed posture;heel strike decreased  -LS     Comment, (Gait/Stairs) VC for upright posture, keeping RW close to body. Distance limited by fatigue, SOA.  -LS           User Key  (r) = Recorded By, (t) = Taken By, (c) = Cosigned By    Initials Name Provider Type    Shavon Villeda, PT Physical Therapist               Obj/Interventions     Row Name 08/20/22 1541          Range of Motion Comprehensive    General Range of Motion bilateral lower extremity ROM WFL  -     Row Name 08/20/22 1547          Strength Comprehensive (MMT)    General Manual Muscle Testing (MMT) Assessment lower extremity strength deficits identified  -LS     Comment, General Manual Muscle Testing (MMT) Assessment BLE grossly 4-/5  -LS     Row Name 08/20/22 1548          Motor Skills    Therapeutic Exercise ankle;knee  -     Row Name 08/20/22 154          Knee (Therapeutic Exercise)     Knee (Therapeutic Exercise) isometric exercises  -     Knee Isometrics (Therapeutic Exercise) bilateral;quad sets;gluteal sets;supine;10 repetitions  -     Row Name 08/20/22 1545          Ankle (Therapeutic Exercise)    Ankle (Therapeutic Exercise) AROM (active range of motion)  -     Ankle AROM (Therapeutic Exercise) bilateral;dorsiflexion;plantarflexion;10 repetitions;supine  -     Row Name 08/20/22 1545          Balance    Balance Assessment sitting static balance;standing static balance  -     Static Sitting Balance supervision  -LS     Position, Sitting Balance unsupported;sitting edge of bed  -     Static Standing Balance supervision  -LS     Position/Device Used, Standing Balance supported;walker, front-wheeled  -     Row Name 08/20/22 1545          Sensory Assessment (Somatosensory)    Sensory Assessment (Somatosensory) LE sensation intact  -           User Key  (r) = Recorded By, (t) = Taken By, (c) = Cosigned By    Initials Name Provider Type    LS Shavon Nelson, PT Physical Therapist               Goals/Plan     Row Name 08/20/22 1600          Bed Mobility Goal 1 (PT)    Activity/Assistive Device (Bed Mobility Goal 1, PT) sit to supine/supine to sit  -LS     [place] Level/Cues Needed (Bed Mobility Goal 1, PT) independent  -LS     Time Frame (Bed Mobility Goal 1, PT) 2 weeks  -LS     Progress/Outcomes (Bed Mobility Goal 1, PT) goal ongoing  -     Row Name 08/20/22 1600          Transfer Goal 1 (PT)    Activity/Assistive Device (Transfer Goal 1, PT) sit-to-stand/stand-to-sit;walker, rolling  -LS     [place] Level/Cues Needed (Transfer Goal 1, PT) modified independence  -LS     Time Frame (Transfer Goal 1, PT) 2 weeks  -LS     Progress/Outcome (Transfer Goal 1, PT) goal ongoing  -     Row Name 08/20/22 1600          Gait Training Goal 1 (PT)    Activity/Assistive Device (Gait Training Goal 1, PT) gait (walking locomotion);walker, rolling  -LS     [place] Level (Gait  Training Goal 1, PT) modified independence  -LS     Distance (Gait Training Goal 1, PT) 150  -LS     Time Frame (Gait Training Goal 1, PT) 2 weeks  -LS     Progress/Outcome (Gait Training Goal 1, PT) goal ongoing  -LS     Row Name 08/20/22 1600          Stairs Goal 1 (PT)    Activity/Assistive Device (Stairs Goal 1, PT) ascending stairs;descending stairs  -LS     Number of Stairs (Stairs Goal 1, PT) 2  -LS     Time Frame (Stairs Goal 1, PT) 2 weeks  -LS     Progress/Outcome (Stairs Goal 1, PT) goal ongoing  -LS     Row Name 08/20/22 1600          Therapy Assessment/Plan (PT)    Planned Therapy Interventions (PT) balance training;bed mobility training;gait training;home exercise program;strengthening;stair training;transfer training;patient/family education  -           User Key  (r) = Recorded By, (t) = Taken By, (c) = Cosigned By    Initials Name Provider Type    LS Shavon Nelson, PT Physical Therapist               Clinical Impression     Row Name 08/20/22 1939          Pain    Pretreatment Pain Rating 0/10 - no pain  -LS     Posttreatment Pain Rating 0/10 - no pain  -LS     Row Name 08/20/22 1545          Plan of Care Review    Plan of Care Reviewed With patient  -LS     Progress no change  -LS     Outcome Evaluation PT initial evaluation completed. Pt demonstrates generalized weakness and decreased functional endurance re: mobilty tasks, warranting further skilled pT services to promote PLOF. Limited today by fatigue and SOA, but able to ambulate 80 ft with RW, CGA. Recommend d/c home with assist and HHPT.  -     Row Name 08/20/22 7104          Therapy Assessment/Plan (PT)    Patient/Family Therapy Goals Statement (PT) return to PLOF; to go home  -     Rehab Potential (PT) good, to achieve stated therapy goals  -LS     Criteria for Skilled Interventions Met (PT) yes;skilled treatment is necessary  -     Therapy Frequency (PT) daily  -     Row Name 08/20/22 6844          Vital Signs    Pre Systolic BP  Rehab 119  -LS     Pre Treatment Diastolic BP 63  -LS     Pretreatment Heart Rate (beats/min) 74  -LS     Posttreatment Heart Rate (beats/min) 61  -LS     Pre SpO2 (%) 96  -LS     O2 Delivery Pre Treatment nasal cannula  -LS     Intra SpO2 (%) 94  -LS     O2 Delivery Intra Treatment nasal cannula  -LS     Post SpO2 (%) 97  -LS     O2 Delivery Post Treatment nasal cannula  -LS     Pre Patient Position Supine  -LS     Intra Patient Position Standing  -LS     Post Patient Position Supine  -LS     Row Name 08/20/22 1547          Positioning and Restraints    Pre-Treatment Position in bed  -LS     Post Treatment Position bed  -LS     In Bed notified nsg;fowlers;exit alarm on;encouraged to call for assist;call light within reach  -LS           User Key  (r) = Recorded By, (t) = Taken By, (c) = Cosigned By    Initials Name Provider Type    Shavon Villeda, PT Physical Therapist               Outcome Measures     Row Name 08/20/22 1601          How much help from another person do you currently need...    Turning from your back to your side while in flat bed without using bedrails? 3  -LS     Moving from lying on back to sitting on the side of a flat bed without bedrails? 3  -LS     Moving to and from a bed to a chair (including a wheelchair)? 3  -LS     Standing up from a chair using your arms (e.g., wheelchair, bedside chair)? 3  -LS     Climbing 3-5 steps with a railing? 3  -LS     To walk in hospital room? 3  -LS     AM-PAC 6 Clicks Score (PT) 18  -LS     Highest level of mobility 6 --> Walked 10 steps or more  -     Row Name 08/20/22 1601          Functional Assessment    Outcome Measure Options AM-PAC 6 Clicks Basic Mobility (PT)  -LS           User Key  (r) = Recorded By, (t) = Taken By, (c) = Cosigned By    Initials Name Provider Type    Shavon Villeda PT Physical Therapist                             Physical Therapy Education                 Title: PT OT SLP Therapies (Done)     Topic: Physical Therapy  (Done)     Point: Mobility training (Done)     Learning Progress Summary           Patient Acceptance, E,D, VU,NR by  at 8/20/2022 1602                   Point: Home exercise program (Done)     Learning Progress Summary           Patient Acceptance, E,D, VU,NR by  at 8/20/2022 1602                   Point: Body mechanics (Done)     Learning Progress Summary           Patient Acceptance, E,D, VU,NR by  at 8/20/2022 1602                   Point: Precautions (Done)     Learning Progress Summary           Patient Acceptance, E,D, VU,NR by  at 8/20/2022 1602                               User Key     Initials Effective Dates Name Provider Type Discipline     06/16/21 -  Shavon Nelson, PT Physical Therapist PT              PT Recommendation and Plan  Planned Therapy Interventions (PT): balance training, bed mobility training, gait training, home exercise program, strengthening, stair training, transfer training, patient/family education  Plan of Care Reviewed With: patient  Progress: no change  Outcome Evaluation: PT initial evaluation completed. Pt demonstrates generalized weakness and decreased functional endurance re: mobilty tasks, warranting further skilled pT services to promote PLOF. Limited today by fatigue and SOA, but able to ambulate 80 ft with RW, CGA. Recommend d/c home with assist and HHPT.     Time Calculation:    PT Charges     Row Name 08/20/22 1603             Time Calculation    Start Time 1402  -LS      PT Received On 08/20/22  -      PT Goal Re-Cert Due Date 08/30/22  -              Timed Charges    90021 - PT Therapeutic Exercise Minutes 4  -LS      46652 - PT Therapeutic Activity Minutes 6  -LS              Untimed Charges    PT Eval/Re-eval Minutes 34  -LS              Total Minutes    Timed Charges Total Minutes 10  -LS      Untimed Charges Total Minutes 34  -LS       Total Minutes 44  -LS            User Key  (r) = Recorded By, (t) = Taken By, (c) = Cosigned By    Initials Name  Provider Type    LS Shavon Nelson, PT Physical Therapist              Therapy Charges for Today     Code Description Service Date Service Provider Modifiers Qty    47660709218 HC PT EVAL MOD COMPLEXITY 3 8/20/2022 Shavon Nelson, PT GP 1    92473969333 HC PT THER SUPP EA 15 MIN 8/20/2022 Shavon Nelson, PT GP 2    09471787170 HC PT THERAPEUTIC ACT EA 15 MIN 8/20/2022 Shavon Nelson, PT GP 1          PT G-Codes  Outcome Measure Options: AM-PAC 6 Clicks Basic Mobility (PT)  AM-PAC 6 Clicks Score (PT): 18    Shavon Nelson, PT  8/20/2022

## 2022-08-20 NOTE — PROGRESS NOTES
[facility] Medicine Services  PROGRESS NOTE    Patient Name: Sonia Bella  : 1965  MRN: [id number]    Date of Admission: 2022  Primary Care Physician: Cain Musa MD    Subjective   Subjective     CC:  Follow-up for Enge edema and anemia    HPI:  I have seen and evaluated the patient this morning.  Today she appears to be in a better mood.  With a drop of her hemoglobin, she was offered EGD and colonoscopy but declined and she wants to have them done as outpatient.  Currently with no acute complaints.  Agreeable to blood transfusion    ROS:  General : no fevers, chills  CVS: No chest pain, palpitations  Respiratory: No cough, dyspnea  GI: No N/V/D, abd pain  10 point review of systems is negative except for what is mentioned in HPI    Objective   Objective     Vital Signs:   Temp:  [97.7 °F (36.5 °C)-98.2 °F (36.8 °C)] 98.2 °F (36.8 °C)  Heart Rate:  [59-88] 74  Resp:  [18] 18  BP: (114-134)/(57-81) 127/65  Flow (L/min):  [1.5] 1.5     Physical Exam:  General: Comfortable, not in any acute distress, appears stated age, conversant and cooperative  Head: Atraumatic and normocephalic, without obvious abnormality  Eyes:   Conjunctivae and sclerae normal, no Icterus.  Slight pallor  Ears:  Ears appear intact with no abnormalities noted  Throat: No oral lesions, no thrush, oral mucosa moist  Neck: Supple, trachea midline, no thyromegaly  Back:   No kyphoscoliosis present. No tenderness to palpation,   no sacral edema  Lungs: Clear to auscultation bilaterally, equal air entry, no wheezing or crackles  Heart:  Normal S1 and S2, no murmur, no gallop, No JVD, no lower extremity swelling  Abdomen:  Soft, no tenderness, no organomegaly, normal bowel sounds  Normal bowel sounds, no masses, no organomegaly. Soft, nontender, nondistended, no guarding, no rebound tenderness.  Extremities: No gross abnormalities, no clubbing, pulses palpable and equal bilaterally  Skin: No bleeding,  bruising or rash, normal skin turgor and elasticity  Neurologic: Cranial nerves appear intact with no evidence of facial asymmetry, normal motor and sensory functions in all 4 extremities  Psych: Alert and oriented x 3, normal mood    Results Reviewed:  LAB RESULTS:      Lab 08/20/22  0407 08/19/22  0835 08/19/22 0427 08/18/22  0855 08/18/22  0753   WBC 10.81*  --  9.12  --  7.05   HEMOGLOBIN 6.4* 7.0* 6.6*  --  8.0*   HEMATOCRIT 22.0* 23.4* 22.6*  --  27.9*   PLATELETS 376  --  396  --  452*   NEUTROS ABS 9.34*  --   --   --  5.24   IMMATURE GRANS (ABS) 0.07*  --   --   --  0.04   LYMPHS ABS 0.53*  --   --   --  0.84   MONOS ABS 0.81  --   --   --  0.56   EOS ABS 0.04  --   --   --  0.33   MCV 94.0  --  92.2  --  95.2   SED RATE  --   --   --   --  73*   CRP  --   --   --  0.58*  --    LDH  --   --  148  --   --          Lab 08/20/22 0407 08/19/22  0427 08/18/22  0855   SODIUM 140 139 137   POTASSIUM 4.3 5.3* 4.9   CHLORIDE 111* 109* 107   CO2 21.0* 19.0* 20.0*   ANION GAP 8.0 11.0 10.0   BUN 28* 25* 26*   CREATININE 1.91* 2.00* 2.20*   EGFR 30.1* 28.5* 25.4*   GLUCOSE 91 122* 144*   CALCIUM 9.1 9.6 9.4   HEMOGLOBIN A1C  --  5.10  --    TSH  --  0.338  --          Lab 08/20/22 0407 08/19/22  0427 08/18/22  0855   TOTAL PROTEIN 6.8 7.5 8.0   ALBUMIN 3.60 4.10 4.10   GLOBULIN 3.2 3.4 3.9   ALT (SGPT) 5 7 5   AST (SGOT) 9 10 12   BILIRUBIN 0.2 0.3 0.5   ALK PHOS 69 78 86             Lab 08/19/22  0835   CHOLESTEROL 154   LDL CHOL 77   HDL CHOL 64*   TRIGLYCERIDES 63         Lab 08/18/22  0855   IRON 22*   IRON SATURATION 4*   TIBC 557*   TRANSFERRIN 374*   FERRITIN 8.50*   FOLATE 7.93   VITAMIN B 12 318   ABO TYPING O   RH TYPING Positive   ANTIBODY SCREEN Negative         Brief Urine Lab Results  (Last result in the past 365 days)      Color   Clarity   Blood   Leuk Est   Nitrite   Protein   CREAT   Urine HCG        08/18/22 1615 Yellow   Clear   Negative   Negative   Negative   30 mg/dL (1+)                  Microbiology Results Abnormal     Procedure Component Value - Date/Time    COVID PRE-OP / PRE-PROCEDURE SCREENING ORDER (NO ISOLATION) - Swab, Nasopharynx [[id number]]  (Normal) Collected: 08/18/22 1355    Lab Status: Final result Specimen: Swab from Nasopharynx Updated: 08/18/22 1417    Narrative:      The following orders were created for panel order COVID PRE-OP / PRE-PROCEDURE SCREENING ORDER (NO ISOLATION) - Swab, Nasopharynx.  Procedure                               Abnormality         Status                     ---------                               -----------         ------                     COVID-19 and FLU A/B PCR...[[id number]]  Normal              Final result                 Please view results for these tests on the individual orders.    COVID-19 and FLU A/B PCR - Swab, Nasopharynx [[id number]]  (Normal) Collected: 08/18/22 1355    Lab Status: Final result Specimen: Swab from Nasopharynx Updated: 08/18/22 1417     COVID19 Not Detected     Influenza A PCR Not Detected     Influenza B PCR Not Detected    Narrative:      Fact sheet for providers: https://www.fda.gov/media/730790/download    Fact sheet for patients: https://www.fda.gov/media/379106/download    Test performed by PCR.          FL Video Swallow With Speech Single Contrast    Result Date: 8/19/2022  DATE OF EXAM: 8/19/2022 12:18 PM  PROCEDURE: FL VIDEO SWALLOW W SPEECH SINGLE-CONTRAST-  INDICATIONS: dysphagia; T78.3XXA-Angioneurotic edema, initial encounter; T46.5H6X-Hliitbq effect of angiotensin-converting-enzyme inhibitors, initial encounter; R06.02-Shortness of breath; D64.9-Anemia, unspecified; N19-Unspecified kidney failure; D50.0-Iron deficiency anemia secondary to blood loss (chronic)  TECHNIQUE: 36 seconds of fluoroscopic time was used for this exam. 6 associated fluoroscopic loops were saved. The patient was evaluated in the seated lateral position while taking a variety of consistencies of barium by mouth under the direction  of speech pathology.  COMPARISON: NONE  FINDINGS: 36 seconds of fluoroscopy provided for a modified barium swallow. Please see speech therapy report for full details and recommendations.      Impression: Fluoroscopy provided for a modified barium swallow. Please see speech therapy report for full details and recommendations.   This report was finalized on 8/19/2022 2:31 PM by Иван Busby.      XR Chest 1 View    Result Date: 8/18/2022  DATE OF EXAM: 8/18/2022 8:15 AM  PROCEDURE: XR CHEST 1 VW-  INDICATIONS: Short of breath; T78.3XXA-Angioneurotic edema, initial encounter; T46.0I7G-Khyfsmv effect of angiotensin-converting-enzyme inhibitors, initial encounter; R06.02-Shortness of breath  COMPARISON: No comparisons available.  TECHNIQUE: Single radiographic AP view of the chest was obtained.  FINDINGS: Prominent cardiac enlargement is present. There is no focal airspace consolidation. No significant pleural effusion or distinct pneumothorax.      Impression: Prominent cardiac enlargement, without additional acute cardiopulmonary abnormality.  This report was finalized on 8/18/2022 8:58 AM by Иван Busby.      US Renal Bilateral    Result Date: 8/19/2022  Examination: US RENAL BILATERAL-  Date of Exam: 8/19/2022 3:45 PM  Indication: Renal failure; T78.3XXA-Angioneurotic edema, initial encounter; T46.7U7Q-Fjmbzlk effect of angiotensin-converting-enzyme inhibitors, initial encounter; R06.02-Shortness of breath; D64.9-Anemia, unspecified; N19-Unspecified kidney failure; D50.0-Iron deficiency anemia secondary to blood loss (chronic).   Comparison: None available  Technique: Grayscale and color Doppler ultrasound evaluation of the kidneys and urinary bladder was performed  Findings: The right kidney measures 10.7 x 4.5 x 3.7 cm. There is increased echogenicity of the renal parenchyma with thinning of the renal cortex to 8 mm likely related to underlying chronic medical renal disease. There is no hydronephrosis.   The left kidney measures 10.3 x 4.2 x 4.1 cm. There is increased echogenicity of the renal parenchyma likely related to chronic medical renal disease. The renal cortex thickness is preserved measuring 1.4 cm. There is a anechoic simple cyst measuring 2.4 cm within the inferior aspect of the left kidney.  The urinary bladder is fluid-filled without wall thickening.      Impression: 1. Increased echogenicity of the bilateral kidneys compatible with underlying chronic medical renal disease. No hydronephrosis. 2. Benign anechoic simple cyst measuring 2.4 cm at the inferior aspect of the left kidney.  This report was finalized on 8/19/2022 4:16 PM by Andre Stoll MD.            I have reviewed the medications:  Scheduled Meds:budesonide-formoterol, 2 puff, Inhalation, BID - RT  carvedilol, 3.125 mg, Oral, Q12H  cetirizine, 10 mg, Oral, Daily  famotidine, 20 mg, Oral, BID AC  ferric gluconate, 250 mg, Intravenous, Daily  heparin (porcine), 5,000 Units, Subcutaneous, Q8H  pantoprazole, 40 mg, Oral, Q AM  predniSONE, 20 mg, Oral, Daily With Breakfast  senna-docusate sodium, 2 tablet, Oral, BID  sodium chloride, 10 mL, Intravenous, Q12H      Continuous Infusions:sodium chloride, 100 mL/hr, Last Rate: 100 mL/hr (08/19/22 2322)      PRN Meds:.•  acetaminophen **OR** acetaminophen **OR** acetaminophen  •  albuterol  •  senna-docusate sodium **AND** polyethylene glycol **AND** bisacodyl **AND** bisacodyl  •  ondansetron **OR** ondansetron  •  sodium chloride  •  sodium chloride    Assessment & Plan   Assessment & Plan     Active Hospital Problems    Diagnosis  POA   • Angioedema due to angiotensin converting enzyme inhibitor (ACE-I) [T78.3XXA, T46.4X5A]  Yes      Resolved Hospital Problems   No resolved problems to display.        Brief Hospital Course to date:  Sonia Bella is a 57 y.o. female with past medical history of essential hypertension, gastric ulcers and COPD who presented to the hospital with angioedema and  was found to be anemic with elevated creatinine    Assessment and plan:  Iron deficiency anemia  Vitamin B12 deficiency  Anemia work-up is indicative of iron deficiency anemia and B12 deficiency  Receiving IV iron infusion x3 days  Continue parenteral B12 replacement therapy, transition to p.o. upon discharge  Hemoglobin this morning is 6.5 with morning lab.  Patient is agreeable to blood transfusion.  We will transfuse 2 units of blood  GI team following and recommended EGD and colonoscopy but the patient wants to have them done as outpatient.  Currently no evidence of active GI bleed.  We will arrange for that after discharge    Angioedema, resolved  Likely precipitated by ACE inhibitor use  Discontinue steroids  Continue Zyrtec and Benadryl  ACE will need to be discontinued upon discharge    ELIANE versus CKD  Patient denies any history of chronic kidney problems but did not have any blood work-up over the last 7 years  Continue IV fluids and monitor kidney functions.  Creatinine remains elevated at 1.9 with morning lab  intact PTH is elevated which might indicate chronic kidney disease   Nephrology following.  Appreciate help    Essential hypertension  Currently normotensive off ACE inhibitor  Monitor for now    PCP work-up: Since the patient did not have any blood work-up for the last 7 years, will check  A1c 5.1%  Lipid profile within normal limits      Expected Discharge Location and Transportation: Home via personal vehicle  Expected Discharge Date: 8/20/2022    DVT prophylaxis:  Medical and mechanical DVT prophylaxis orders are present.          CODE STATUS:   Code Status and Medical Interventions:   Ordered at: 08/18/22 1249     Code Status (Patient has no pulse and is not breathing):    CPR (Attempt to Resuscitate)     Medical Interventions (Patient has pulse or is breathing):    Full Support       Faisal Perez MD  08/20/22

## 2022-08-21 ENCOUNTER — HOME HEALTH ADMISSION (OUTPATIENT)
Dept: HOME HEALTH SERVICES | Facility: HOME HEALTHCARE | Age: 57
End: 2022-08-21

## 2022-08-21 VITALS
DIASTOLIC BLOOD PRESSURE: 87 MMHG | BODY MASS INDEX: 44.41 KG/M2 | TEMPERATURE: 98.6 F | HEIGHT: 68 IN | WEIGHT: 293 LBS | SYSTOLIC BLOOD PRESSURE: 139 MMHG | RESPIRATION RATE: 14 BRPM | HEART RATE: 65 BPM | OXYGEN SATURATION: 98 %

## 2022-08-21 PROBLEM — D50.9 IRON DEFICIENCY ANEMIA: Status: ACTIVE | Noted: 2022-08-21

## 2022-08-21 PROBLEM — D51.9 B12 DEFICIENCY ANEMIA: Status: ACTIVE | Noted: 2022-08-21

## 2022-08-21 PROBLEM — N18.30 CKD (CHRONIC KIDNEY DISEASE) STAGE 3, GFR 30-59 ML/MIN: Status: ACTIVE | Noted: 2022-08-21

## 2022-08-21 PROBLEM — I10 HTN (HYPERTENSION): Status: ACTIVE | Noted: 2022-08-21

## 2022-08-21 LAB
ANION GAP SERPL CALCULATED.3IONS-SCNC: 9 MMOL/L (ref 5–15)
BASOPHILS # BLD AUTO: 0.04 10*3/MM3 (ref 0–0.2)
BASOPHILS NFR BLD AUTO: 0.3 % (ref 0–1.5)
BH BB BLOOD EXPIRATION DATE: NORMAL
BH BB BLOOD EXPIRATION DATE: NORMAL
BH BB BLOOD TYPE BARCODE: 5100
BH BB BLOOD TYPE BARCODE: 5100
BH BB DISPENSE STATUS: NORMAL
BH BB DISPENSE STATUS: NORMAL
BH BB PRODUCT CODE: NORMAL
BH BB PRODUCT CODE: NORMAL
BH BB UNIT NUMBER: NORMAL
BH BB UNIT NUMBER: NORMAL
BUN SERPL-MCNC: 31 MG/DL (ref 6–20)
BUN/CREAT SERPL: 14.8 (ref 7–25)
CALCIUM SPEC-SCNC: 9.3 MG/DL (ref 8.6–10.5)
CHLORIDE SERPL-SCNC: 109 MMOL/L (ref 98–107)
CO2 SERPL-SCNC: 24 MMOL/L (ref 22–29)
CREAT SERPL-MCNC: 2.1 MG/DL (ref 0.57–1)
CROSSMATCH INTERPRETATION: NORMAL
CROSSMATCH INTERPRETATION: NORMAL
DEPRECATED RDW RBC AUTO: 57.2 FL (ref 37–54)
EGFRCR SERPLBLD CKD-EPI 2021: 26.9 ML/MIN/1.73
EOSINOPHIL # BLD AUTO: 0.22 10*3/MM3 (ref 0–0.4)
EOSINOPHIL NFR BLD AUTO: 1.9 % (ref 0.3–6.2)
ERYTHROCYTE [DISTWIDTH] IN BLOOD BY AUTOMATED COUNT: 17.2 % (ref 12.3–15.4)
GLUCOSE SERPL-MCNC: 80 MG/DL (ref 65–99)
HCT VFR BLD AUTO: 27 % (ref 34–46.6)
HCT VFR BLD AUTO: 29.5 % (ref 34–46.6)
HGB BLD-MCNC: 8 G/DL (ref 12–15.9)
HGB BLD-MCNC: 8.8 G/DL (ref 12–15.9)
IMM GRANULOCYTES # BLD AUTO: 0.07 10*3/MM3 (ref 0–0.05)
IMM GRANULOCYTES NFR BLD AUTO: 0.6 % (ref 0–0.5)
LYMPHOCYTES # BLD AUTO: 0.79 10*3/MM3 (ref 0.7–3.1)
LYMPHOCYTES NFR BLD AUTO: 6.9 % (ref 19.6–45.3)
MCH RBC QN AUTO: 27.3 PG (ref 26.6–33)
MCHC RBC AUTO-ENTMCNC: 29.8 G/DL (ref 31.5–35.7)
MCV RBC AUTO: 91.6 FL (ref 79–97)
MONOCYTES # BLD AUTO: 1.06 10*3/MM3 (ref 0.1–0.9)
MONOCYTES NFR BLD AUTO: 9.3 % (ref 5–12)
NEUTROPHILS NFR BLD AUTO: 81 % (ref 42.7–76)
NEUTROPHILS NFR BLD AUTO: 9.25 10*3/MM3 (ref 1.7–7)
NRBC BLD AUTO-RTO: 0 /100 WBC (ref 0–0.2)
PLATELET # BLD AUTO: 359 10*3/MM3 (ref 140–450)
PMV BLD AUTO: 9.3 FL (ref 6–12)
POTASSIUM SERPL-SCNC: 4.2 MMOL/L (ref 3.5–5.2)
RBC # BLD AUTO: 3.22 10*6/MM3 (ref 3.77–5.28)
SODIUM SERPL-SCNC: 142 MMOL/L (ref 136–145)
UNIT  ABO: NORMAL
UNIT  ABO: NORMAL
UNIT  RH: NORMAL
UNIT  RH: NORMAL
WBC NRBC COR # BLD: 11.43 10*3/MM3 (ref 3.4–10.8)

## 2022-08-21 PROCEDURE — 94664 DEMO&/EVAL PT USE INHALER: CPT

## 2022-08-21 PROCEDURE — 25010000002 HEPARIN (PORCINE) PER 1000 UNITS: Performed by: HOSPITALIST

## 2022-08-21 PROCEDURE — 94799 UNLISTED PULMONARY SVC/PX: CPT

## 2022-08-21 PROCEDURE — 96372 THER/PROPH/DIAG INJ SC/IM: CPT

## 2022-08-21 PROCEDURE — 25010000002 CYANOCOBALAMIN PER 1000 MCG: Performed by: INTERNAL MEDICINE

## 2022-08-21 PROCEDURE — 80048 BASIC METABOLIC PNL TOTAL CA: CPT | Performed by: INTERNAL MEDICINE

## 2022-08-21 PROCEDURE — 97165 OT EVAL LOW COMPLEX 30 MIN: CPT

## 2022-08-21 PROCEDURE — 85025 COMPLETE CBC W/AUTO DIFF WBC: CPT | Performed by: INTERNAL MEDICINE

## 2022-08-21 PROCEDURE — G0378 HOSPITAL OBSERVATION PER HR: HCPCS

## 2022-08-21 PROCEDURE — 99217 PR OBSERVATION CARE DISCHARGE MANAGEMENT: CPT | Performed by: INTERNAL MEDICINE

## 2022-08-21 RX ORDER — PANTOPRAZOLE SODIUM 40 MG/1
40 TABLET, DELAYED RELEASE ORAL DAILY
Qty: 30 TABLET | Refills: 3 | Status: SHIPPED | OUTPATIENT
Start: 2022-08-21 | End: 2023-08-21

## 2022-08-21 RX ORDER — DIPHENHYDRAMINE HYDROCHLORIDE AND LIDOCAINE HYDROCHLORIDE AND ALUMINUM HYDROXIDE AND MAGNESIUM HYDRO
5 KIT EVERY 6 HOURS
Qty: 119 ML | Refills: 2 | Status: SHIPPED | OUTPATIENT
Start: 2022-08-21 | End: 2022-10-04 | Stop reason: HOSPADM

## 2022-08-21 RX ORDER — NIFEDIPINE 30 MG/1
30 TABLET, EXTENDED RELEASE ORAL DAILY
Qty: 30 TABLET | Refills: 3 | Status: ON HOLD | OUTPATIENT
Start: 2022-08-21 | End: 2023-02-16

## 2022-08-21 RX ORDER — LANOLIN ALCOHOL/MO/W.PET/CERES
325 CREAM (GRAM) TOPICAL
Qty: 60 TABLET | Refills: 3 | Status: ON HOLD | OUTPATIENT
Start: 2022-08-21 | End: 2023-02-16

## 2022-08-21 RX ADMIN — CARVEDILOL 3.12 MG: 3.12 TABLET, FILM COATED ORAL at 08:57

## 2022-08-21 RX ADMIN — FAMOTIDINE 20 MG: 20 TABLET ORAL at 06:52

## 2022-08-21 RX ADMIN — NYSTATIN 500000 UNITS: 100000 SUSPENSION ORAL at 08:57

## 2022-08-21 RX ADMIN — Medication 10 ML: at 08:58

## 2022-08-21 RX ADMIN — HEPARIN SODIUM 5000 UNITS: 5000 INJECTION, SOLUTION INTRAVENOUS; SUBCUTANEOUS at 06:52

## 2022-08-21 RX ADMIN — PANTOPRAZOLE SODIUM 40 MG: 40 TABLET, DELAYED RELEASE ORAL at 06:52

## 2022-08-21 RX ADMIN — CETIRIZINE HYDROCHLORIDE 10 MG: 10 TABLET, FILM COATED ORAL at 08:57

## 2022-08-21 RX ADMIN — DIPHENHYDRAMINE HYDROCHLORIDE AND LIDOCAINE HYDROCHLORIDE AND ALUMINUM HYDROXIDE AND MAGNESIUM HYDRO 5 ML: KIT at 10:30

## 2022-08-21 RX ADMIN — SENNOSIDES AND DOCUSATE SODIUM 2 TABLET: 50; 8.6 TABLET ORAL at 08:57

## 2022-08-21 RX ADMIN — BUDESONIDE AND FORMOTEROL FUMARATE DIHYDRATE 2 PUFF: 160; 4.5 AEROSOL RESPIRATORY (INHALATION) at 08:08

## 2022-08-21 RX ADMIN — CYANOCOBALAMIN 1000 MCG: 1000 INJECTION, SOLUTION INTRAMUSCULAR; SUBCUTANEOUS at 08:57

## 2022-08-21 NOTE — DISCHARGE SUMMARY
[facility] Medicine Services  DISCHARGE SUMMARY    Patient Name: Sonia Bella  : 1965  MRN: [id number]    Date of Admission: 2022  7:37 AM  Date of Discharge:  2022  Primary Care Physician: Cain Musa MD    Consults     Date and Time Order Name Status Description    2022 12:50 PM Inpatient Nephrology Consult      2022 12:50 PM Inpatient Gastroenterology Consult Completed         Hospital Course     Presenting Problem:   Angioedema due to angiotensin converting enzyme inhibitor (ACE-I) [T78.3XXA, T46.4X5A]    Active Hospital Problems    Diagnosis  POA   • HTN (hypertension) [I10]  Yes   • B12 deficiency anemia [D51.9]  Yes   • Iron deficiency anemia [D50.9]  Yes   • CKD (chronic kidney disease) stage 3, GFR 30-59 ml/min (HCC) [N18.30]  Yes   • Angioedema due to angiotensin converting enzyme inhibitor (ACE-I) [T78.3XXA, T46.4X5A]  Yes      Resolved Hospital Problems   No resolved problems to display.      Hospital Course:  Sonia Bella is a 57 y.o. female  with past medical history of essential hypertension, gastric ulcers and COPD who presented to the hospital with angioedema and was found to be anemic with elevated creatinine.  Angioedema improved after holding her ACE inhibitor and with IV steroids and Zyrtec.  Patient was instructed not to take lisinopril or losartan at any point in time and was started on nifedipine upon discharge for her hypertension.  She reported that she did not have any work-up for 7 years now.  Serial creatinine while hospitalized remained stable around 7 and her intact PTH was elevated which is indicative of chronic kidney disease.  She was given referral to nephrology service after discharge.  In regards to her anemia, hemoglobin dropped to 6.5 and her follow-up lab and her work-up showed that she has iron deficiency anemia and vitamin B12 deficiency.  She was transfused 2 units of blood and hemoglobin remained stable with  no evidence of active GI bleed.  She was offered to have EGD and colonoscopy done during this hospitalization by GI service but she declined and wants to have them done as outpatient which she was given referral to see GI service after discharge.     Iron deficiency anemia  Vitamin B12 deficiency  · Anemia work-up is indicative of iron deficiency anemia and B12 deficiency  · Received IV iron infusion x3 days, discharged on oral supplement  · Received parenteral B12 replacement therapy, discharged on p.o. supplement   · Hemoglobin was 6.5 with morning lab.  Status post 2 units of blood transfusion  · GI team following and recommended EGD and colonoscopy but the patient wants to have them done as outpatient.  Currently no evidence of active GI bleed.    Given referral to GI service after discharge     Angioedema, resolved  · Likely precipitated by ACE inhibitor use  · Received steroids  · Received Zyrtec and Benadryl  · ACE inhibitor was discontinued upon discharge.  Given nifedipine XL for hypertension      CKD stage III  · Patient denies any history of chronic kidney problems but did not have any blood work-up over the last 7 years  · Continue IV fluids and monitor kidney functions.  Creatinine remains elevated at 1.9 with morning lab  · intact PTH is elevated which might indicate chronic kidney disease   · Given referral to see nephrology service after discharge     Essential hypertension  · Lisinopril discontinued  · Nifedipine XL prescription sent to pharmacy     Discharge Follow Up Recommendations for outpatient labs/diagnostics:  PCP in 1 week  GI service and nephrology service in 4 weeks    Day of Discharge     HPI:   I have seen and evaluated the patient this morning.  Feeling much better today after she received blood transfusion.  More energetic.  Again offered her to stay to get EGD and colonoscopy done but she declined and she wants to have them done as outpatient.  She reported that she is got a change  in her family doctor who is retiring in September.  Willing to follow-up with GI and nephrology service after discharge    Review of Systems  General : no fevers, chills  CVS: No chest pain, palpitations  Respiratory: No cough, dyspnea  GI: No N/V/D, abd pain  10 point review of systems is negative except for what is mentioned in HPI    Vital Signs:   Temp:  [97.5 °F (36.4 °C)-99.4 °F (37.4 °C)] 98.6 °F (37 °C)  Heart Rate:  [63-82] 65  Resp:  [14-22] 14  BP: (119-151)/(63-88) 139/87  Flow (L/min):  [1.5-2] 2    Physical Exam:  General: Comfortable, not in any acute distress, appears stated age, conversant and cooperative  Head: Atraumatic and normocephalic, without obvious abnormality  Eyes:   Conjunctivae and sclerae normal, no Icterus. No pallor  Ears:  Ears appear intact with no abnormalities noted  Throat: No oral lesions, no thrush, oral mucosa moist  Neck: Supple, trachea midline, no thyromegaly  Back:   No kyphoscoliosis present. No tenderness to palpation,   no sacral edema  Lungs: Clear to auscultation bilaterally, equal air entry, no wheezing or crackles  Heart:  Normal S1 and S2, no murmur, no gallop, No JVD, no lower extremity swelling  Abdomen:  Soft, no tenderness, no organomegaly, normal bowel sounds  Normal bowel sounds, no masses, no organomegaly. Soft, nontender, nondistended, no guarding, no rebound tenderness.  Extremities: No gross abnormalities, no clubbing, pulses palpable and equal bilaterally  Skin: No bleeding, bruising or rash, normal skin turgor and elasticity  Neurologic: Cranial nerves appear intact with no evidence of facial asymmetry, normal motor and sensory functions in all 4 extremities  Psych: Alert and oriented x 3, normal mood    Pertinent  and/or Most Recent Results     LAB RESULTS:      Lab 08/21/22  0839 08/20/22  2323 08/20/22  0407 08/19/22  0835 08/19/22  0427 08/18/22  0855 08/18/22  0753   WBC 11.43*  --  10.81*  --  9.12  --  7.05   HEMOGLOBIN 8.8* 8.0* 6.4* 7.0* 6.6*   --  8.0*   HEMATOCRIT 29.5* 27.0* 22.0* 23.4* 22.6*  --  27.9*   PLATELETS 359  --  376  --  396  --  452*   NEUTROS ABS 9.25*  --  9.34*  --   --   --  5.24   IMMATURE GRANS (ABS) 0.07*  --  0.07*  --   --   --  0.04   LYMPHS ABS 0.79  --  0.53*  --   --   --  0.84   MONOS ABS 1.06*  --  0.81  --   --   --  0.56   EOS ABS 0.22  --  0.04  --   --   --  0.33   MCV 91.6  --  94.0  --  92.2  --  95.2   SED RATE  --   --   --   --   --   --  73*   CRP  --   --   --   --   --  0.58*  --    LDH  --   --   --   --  148  --   --          Lab 08/21/22  0839 08/20/22  0407 08/19/22  0427 08/18/22  0855   SODIUM 142 140 139 137   POTASSIUM 4.2 4.3 5.3* 4.9   CHLORIDE 109* 111* 109* 107   CO2 24.0 21.0* 19.0* 20.0*   ANION GAP 9.0 8.0 11.0 10.0   BUN 31* 28* 25* 26*   CREATININE 2.10* 1.91* 2.00* 2.20*   EGFR 26.9* 30.1* 28.5* 25.4*   GLUCOSE 80 91 122* 144*   CALCIUM 9.3 9.1 9.6 9.4   HEMOGLOBIN A1C  --   --  5.10  --    TSH  --   --  0.338  --          Lab 08/20/22  0407 08/19/22  0427 08/18/22  0855   TOTAL PROTEIN 6.8 7.5 8.0   ALBUMIN 3.60 4.10 4.10   GLOBULIN 3.2 3.4 3.9   ALT (SGPT) 5 7 5   AST (SGOT) 9 10 12   BILIRUBIN 0.2 0.3 0.5   ALK PHOS 69 78 86             Lab 08/19/22  0835   CHOLESTEROL 154   LDL CHOL 77   HDL CHOL 64*   TRIGLYCERIDES 63         Lab 08/20/22  0407 08/18/22  0855   IRON  --  22*   IRON SATURATION  --  4*   TIBC  --  557*   TRANSFERRIN  --  374*   FERRITIN  --  8.50*   FOLATE  --  7.93   VITAMIN B 12  --  318   ABO TYPING O O   RH TYPING Positive Positive   ANTIBODY SCREEN  --  Negative         Brief Urine Lab Results  (Last result in the past 365 days)      Color   Clarity   Blood   Leuk Est   Nitrite   Protein   CREAT   Urine HCG        08/18/22 1615 Yellow   Clear   Negative   Negative   Negative   30 mg/dL (1+)               Microbiology Results (last 10 days)     Procedure Component Value - Date/Time    COVID PRE-OP / PRE-PROCEDURE SCREENING ORDER (NO ISOLATION) - Swab, Nasopharynx  [[id number]]  (Normal) Collected: 08/18/22 1355    Lab Status: Final result Specimen: Swab from Nasopharynx Updated: 08/18/22 1417    Narrative:      The following orders were created for panel order COVID PRE-OP / PRE-PROCEDURE SCREENING ORDER (NO ISOLATION) - Swab, Nasopharynx.  Procedure                               Abnormality         Status                     ---------                               -----------         ------                     COVID-19 and FLU A/B PCR...[[id number]]  Normal              Final result                 Please view results for these tests on the individual orders.    COVID-19 and FLU A/B PCR - Swab, Nasopharynx [[id number]]  (Normal) Collected: 08/18/22 1355    Lab Status: Final result Specimen: Swab from Nasopharynx Updated: 08/18/22 1417     COVID19 Not Detected     Influenza A PCR Not Detected     Influenza B PCR Not Detected    Narrative:      Fact sheet for providers: https://www.fda.gov/media/055043/download    Fact sheet for patients: https://www.fda.gov/media/277629/download    Test performed by PCR.        FL Video Swallow With Speech Single Contrast    Result Date: 8/19/2022  DATE OF EXAM: 8/19/2022 12:18 PM  PROCEDURE: FL VIDEO SWALLOW W SPEECH SINGLE-CONTRAST-  INDICATIONS: dysphagia; T78.3XXA-Angioneurotic edema, initial encounter; T46.4H6S-Ftirlmo effect of angiotensin-converting-enzyme inhibitors, initial encounter; R06.02-Shortness of breath; D64.9-Anemia, unspecified; N19-Unspecified kidney failure; D50.0-Iron deficiency anemia secondary to blood loss (chronic)  TECHNIQUE: 36 seconds of fluoroscopic time was used for this exam. 6 associated fluoroscopic loops were saved. The patient was evaluated in the seated lateral position while taking a variety of consistencies of barium by mouth under the direction of speech pathology.  COMPARISON: NONE  FINDINGS: 36 seconds of fluoroscopy provided for a modified barium swallow. Please see speech therapy report for full  details and recommendations.      Fluoroscopy provided for a modified barium swallow. Please see speech therapy report for full details and recommendations.   This report was finalized on 8/19/2022 2:31 PM by Иван Busby.      XR Chest 1 View    Result Date: 8/18/2022  DATE OF EXAM: 8/18/2022 8:15 AM  PROCEDURE: XR CHEST 1 VW-  INDICATIONS: Short of breath; T78.3XXA-Angioneurotic edema, initial encounter; T46.5X7T-Japfift effect of angiotensin-converting-enzyme inhibitors, initial encounter; R06.02-Shortness of breath  COMPARISON: No comparisons available.  TECHNIQUE: Single radiographic AP view of the chest was obtained.  FINDINGS: Prominent cardiac enlargement is present. There is no focal airspace consolidation. No significant pleural effusion or distinct pneumothorax.      Prominent cardiac enlargement, without additional acute cardiopulmonary abnormality.  This report was finalized on 8/18/2022 8:58 AM by Иван Busby.      US Renal Bilateral    Result Date: 8/19/2022  Examination: US RENAL BILATERAL-  Date of Exam: 8/19/2022 3:45 PM  Indication: Renal failure; T78.3XXA-Angioneurotic edema, initial encounter; T46.5D7Z-Hraixab effect of angiotensin-converting-enzyme inhibitors, initial encounter; R06.02-Shortness of breath; D64.9-Anemia, unspecified; N19-Unspecified kidney failure; D50.0-Iron deficiency anemia secondary to blood loss (chronic).   Comparison: None available  Technique: Grayscale and color Doppler ultrasound evaluation of the kidneys and urinary bladder was performed  Findings: The right kidney measures 10.7 x 4.5 x 3.7 cm. There is increased echogenicity of the renal parenchyma with thinning of the renal cortex to 8 mm likely related to underlying chronic medical renal disease. There is no hydronephrosis.  The left kidney measures 10.3 x 4.2 x 4.1 cm. There is increased echogenicity of the renal parenchyma likely related to chronic medical renal disease. The renal cortex thickness is  preserved measuring 1.4 cm. There is a anechoic simple cyst measuring 2.4 cm within the inferior aspect of the left kidney.  The urinary bladder is fluid-filled without wall thickening.      1. Increased echogenicity of the bilateral kidneys compatible with underlying chronic medical renal disease. No hydronephrosis. 2. Benign anechoic simple cyst measuring 2.4 cm at the inferior aspect of the left kidney.  This report was finalized on 8/19/2022 4:16 PM by Andre Stoll MD.      Plan for Follow-up of Pending Labs/Results:     Discharge Details        Discharge Medications      New Medications      Instructions Start Date   cyanocobalamin 1000 MCG tablet  Commonly known as: CVS Vitamin B-12   1,000 mcg, Oral, Daily      ferrous sulfate 325 (65 FE) MG EC tablet   325 mg, Oral, Daily With Breakfast      First Mouthwash (Magic Mouthwash) suspension   5 mL, Swish & Spit, Every 6 Hours      NIFEdipine XL 30 MG 24 hr tablet  Commonly known as: PROCARDIA XL   30 mg, Oral, Daily      nystatin 100,000 unit/mL suspension  Commonly known as: MYCOSTATIN   500,000 Units, Swish & Spit, 4 Times Daily      pantoprazole 40 MG EC tablet  Commonly known as: Protonix   40 mg, Oral, Daily         Continue These Medications      Instructions Start Date   albuterol (2.5 MG/3ML) 0.083% nebulizer solution  Commonly known as: PROVENTIL   2.5 mg, Nebulization, Every 4 Hours PRN      budesonide-formoterol 160-4.5 MCG/ACT inhaler  Commonly known as: SYMBICORT   2 puffs, Inhalation, 2 Times Daily - RT      CARVEDILOL PO   3.125 mg, Oral, 2 Times Daily      triamterene-hydrochlorothiazide 75-50 MG per tablet  Commonly known as: MAXZIDE   1 tablet, Oral, Daily         Stop These Medications    FAMOTIDINE PO     FLUoxetine 10 MG capsule  Commonly known as: PROzac     LISINOPRIL PO          Allergies   Allergen Reactions   • Ace Inhibitors Angioedema     lisinopril     Discharge Disposition:  Home or Self Care    Diet:  Hospital:  Diet Order    Procedures   • Diet Regular; Thin; Low Fiber / Low Residue     Activity:  Activity Instructions     Activity as Tolerated          Restrictions or Other Recommendations:  None        CODE STATUS:    Code Status and Medical Interventions:   Ordered at: 08/18/22 1249     Code Status (Patient has no pulse and is not breathing):    CPR (Attempt to Resuscitate)     Medical Interventions (Patient has pulse or is breathing):    Full Support     No future appointments.    Faisal Perez MD  08/21/22    Time Spent on Discharge:  I spent  36 minutes on this discharge activity which included: face-to-face encounter with the patient, reviewing the data in the system, coordination of the care with the nursing staff as well as consultants, documentation, and entering orders.

## 2022-08-21 NOTE — CASE MANAGEMENT/SOCIAL WORK
Case Management Discharge Note      Final Note: Order placed for home health to follow patient for skilled nursing, PT, and OT. Referral was previously called and accepted by Leonor at [facility]. Order placed today and called to [place] with [facility] for notification of discharge today. Patient has all necessary DME at home already - no new needs identified - melissa [phone]    Provided Post Acute Provider List?: Yes  Post Acute Provider List: Home Health  Provided Post Acute Provider Quality & Resource List?: Yes  Post Acute Provider Quality and Resource List: Home Health  Delivered To: Patient  Method of Delivery: In person    Selected Continued Care - Discharged on 8/21/2022 Admission date: 8/18/2022 - Discharge disposition: Home or Self Care    Destination    No services have been selected for the patient.              Durable Medical Equipment    No services have been selected for the patient.              Dialysis/Infusion    No services have been selected for the patient.              Home Medical Care Coordination complete.    Service Provider Selected Services Address Phone Fax Patient Preferred    [facility]  Home Health Services [address] [address] [phone] --          Therapy    No services have been selected for the patient.              Community Resources    No services have been selected for the patient.              Community & DME    No services have been selected for the patient.                       Final Discharge Disposition Code: 06 - home with home health care

## 2022-08-21 NOTE — PLAN OF CARE
Goal Outcome Evaluation:  Plan of Care Reviewed With: patient        Progress: no change  Outcome Evaluation: OT evaluation completed with pt. demonstrating generalized weakness, decreased balance in standing and impaired functional endurance impacting ADL independence.  Skilled OT services appropriate to promote return to PLOF.  Pt. demonstrated ability to independently transfer in and out of bed, SBA with transfer and mvmt in room with walker.  Pt. completed UBD/LBD post setup.  Recommend home with family and  HHOT services.  Recommend grab bars for tub and pt. may need home 02.  Pt. with concerns that her insurance does not cover 02 at home.

## 2022-08-21 NOTE — THERAPY EVALUATION
Patient Name: Sonia Bella  : 1965    MRN: [id number]                              Today's Date: 2022       Admit Date: 2022    Visit Dx:     ICD-10-CM ICD-9-CM   1. Angioedema due to angiotensin converting enzyme inhibitor (ACE-I)  T78.3XXA 995.1    T46.4X5A E942.6   2. Shortness of breath  R06.02 786.05   3. Anemia, unspecified type  D64.9 285.9   4. Renal failure, unspecified chronicity  N19 586   5. Iron deficiency anemia due to chronic blood loss  D50.0 280.0     Patient Active Problem List   Diagnosis   • Angioedema due to angiotensin converting enzyme inhibitor (ACE-I)     Past Medical History:   Diagnosis Date   • COPD (chronic obstructive pulmonary disease) (HCC)    • History of stomach ulcers    • Hypertension      History reviewed. No pertinent surgical history.   General Information     Row Name 22 [facility]          OT Time and Intention    Document Type evaluation  -MIKE     Mode of Treatment individual therapy;occupational therapy  -MIKE     Row Name 22 [facility]          General Information    Patient Profile Reviewed yes  -MIKE     Prior Level of Function independent:;all household mobility;gait;transfer;bed mobility;feeding;grooming;dressing;bathing;mod assist:;cooking;dependent:;cleaning  pt. watches 3 yo grandchild while dtr. works, dtr. does most of cleaning  -MIKE     Existing Precautions/Restrictions oxygen therapy device and L/min  watch HH  -MIKE     Barriers to Rehab previous functional deficit;medically complex  -MIKE     Row Name 22 [facility]          Occupational Profile    Environmental Supports and Barriers (Occupational Profile) tub shower with shower seat, rollator, cane  -MIKE     Row Name 22 [facility]          Living Environment    People in Home child(priya), adult;grandchild(priya)  pt. lives with dtr., who works days, and 10 yo and 1yo grandchildren  -MIKE     Row Name 22 [facility]          Home Main Entrance    Number of Stairs, Main Entrance two  -MIKE     Stair Railings,  Main Entrance none  -     Row Name 08/21/22 0836          Stairs Within Home, Primary    Number of Stairs, Within Home, Primary none  -     Row Name 08/21/22 0836          Cognition    Orientation Status (Cognition) oriented x 3  -     Row Name 08/21/22 0836          Safety Issues, Functional Mobility    Impairments Affecting Function (Mobility) balance;endurance/activity tolerance;shortness of breath  -           User Key  (r) = Recorded By, (t) = Taken By, (c) = Cosigned By    Initials Name Provider Type    Radha Bautista, OT Occupational Therapist                 Mobility/ADL's     Row Name 08/21/22 0838          Bed Mobility    Supine-Sit [place] (Bed Mobility) modified independence  -     Sit-Supine [place] (Bed Mobility) independent  -     Assistive Device (Bed Mobility) head of bed elevated  -MIKE     Comment, (Bed Mobility) HOB up for supine to sit, but flat no rail sit to supine  -     Row Name 08/21/22 0838          Transfers    Transfers sit-stand transfer;stand-sit transfer;toilet transfer  -     Sit-Stand [place] (Transfers) standby assist  -     Stand-Sit [place] (Transfers) standby assist  -     [place] Level (Toilet Transfer) standby assist  -     Assistive Device (Toilet Transfer) commode;grab bars/safety frame  -     Row Name 08/21/22 0838          Sit-Stand Transfer    Assistive Device (Sit-Stand Transfers) walker, front-wheeled  -     Row Name 08/21/22 0838          Stand-Sit Transfer    Assistive Device (Stand-Sit Transfers) walker, front-wheeled  -     Row Name 08/21/22 0838          Toilet Transfer    Type (Toilet Transfer) sit-stand;stand-sit  -MIKE     Comment, (Toilet Transfer) pt. report having a sink to pull up on at home  -     Row Name 08/21/22 0838          Functional Mobility    Functional Mobility- Ind. Level standby assist  -     Functional Mobility- Device walker, front-wheeled  -     Functional Mobility-Distance (Feet) 32   -     Functional Mobility- Safety Issues supplemental O2;step length decreased  -     Row Name 08/21/22 0838          Activities of Daily Living    BADL Assessment/Intervention lower body dressing;upper body dressing;grooming;toileting  -     Row Name 08/21/22 0838          Lower Body Dressing Assessment/Training    [place] Level (Lower Body Dressing) doff;don;socks;independent  -MIKE     Position (Lower Body Dressing) edge of bed sitting  -     Row Name 08/21/22 0838          Upper Body Dressing Assessment/Training    [place] Level (Upper Body Dressing) don;pajama/robe;set up  -MIKE     Position (Upper Body Dressing) edge of bed sitting  -MIKE     Row Name 08/21/22 0838          Grooming Assessment/Training    Comment, (Grooming) pt. declined  -     Row Name 08/21/22 0838          Toileting Assessment/Training    [place] Level (Toileting) adjust/manage clothing;independent  -MIKE     Position (Toileting) unsupported sitting  -MIKE     Comment, (Toileting) with toilet transfer pt. was able to control robe and gown  -           User Key  (r) = Recorded By, (t) = Taken By, (c) = Cosigned By    Initials Name Provider Type    Radha Bautista OT Occupational Therapist               Obj/Interventions     Row Name 08/21/22 0841          Sensory Assessment (Somatosensory)    Sensory Assessment (Somatosensory) UE sensation intact  -     Row Name 08/21/22 0841          Vision Assessment/Intervention    Visual Impairment/Limitations corrective lenses full-time  -     Row Name 08/21/22 0841          Range of Motion Comprehensive    General Range of Motion bilateral upper extremity ROM WFL  -     Row Name 08/21/22 0841          Strength Comprehensive (MMT)    General Manual Muscle Testing (MMT) Assessment upper extremity strength deficits identified  -     Comment, General Manual Muscle Testing (MMT) Assessment BUE grossly 4/5  -     Row Name 08/21/22 0841          Motor Skills    Motor Skills  functional endurance  -MIKE     Functional Endurance Pt. on 2L NC.  With mvmt. up in room pt. with increased dyspnea.  02 sats low to mid 90's.  -MIKE     Row Name 08/21/22 0841          Balance    Balance Assessment sitting dynamic balance;standing static balance;standing dynamic balance  -MIKE     Static Sitting Balance independent  -MIKE     Dynamic Sitting Balance independent  -MIKE     Position, Sitting Balance unsupported;sitting edge of bed  LBD  -MIKE     Static Standing Balance supervision  -MIKE     Dynamic Standing Balance standby assist  -MIKE     Position/Device Used, Standing Balance supported;walker, rolling  -MIKE     Balance Interventions sit to stand;occupation based/functional task  -MIKE           User Key  (r) = Recorded By, (t) = Taken By, (c) = Cosigned By    Initials Name Provider Type    Radha Bautista OT Occupational Therapist               Goals/Plan     Row Name 08/21/22 0847          ROM Goal 1 (OT)    ROM Goal 1 (OT) Pt. will complete a 10 minutes BADL task with 50% in standing with 02 sats 95%+ on 2L or less 02 NC.  -MIKE     Time Frame (ROM Goal 1, OT) long term goal (LTG);10 days  -MIKE     Progress/Outcome (ROM Goal 1, OT) new goal  -MIKE     Row Name 08/21/22 0847          Problem Specific Goal 1 (OT)    Problem Specific Goal 1 (OT) Pt. will verbalize understanding of EC/WS techniques and home safety recommendations.  -MIKE     Time Frame (Problem Specific Goal 1, OT) long term goal (LTG);10 days  -MIKE     Progress/Outcome (Problem Specific Goal 1, OT) new goal  -MIKE           User Key  (r) = Recorded By, (t) = Taken By, (c) = Cosigned By    Initials Name Provider Type    Radha Bautista, OT Occupational Therapist               Clinical Impression     Row Name 08/21/22 0843          Pain Assessment    Pretreatment Pain Rating 2/10  -MIKE     Posttreatment Pain Rating 2/10  -MIKE     Pain Location - Side/Orientation Bilateral  -MKIE     Pain Location - head  -MIKE     Pre/Posttreatment Pain Comment pt. declined  need for pain meds  -MIKE     Pain Intervention(s) Repositioned;Ambulation/increased activity  -     Row Name 08/21/22 0843          Plan of Care Review    Plan of Care Reviewed With patient  -MIKE     Progress no change  -MIKE     Outcome Evaluation OT evaluation completed with pt. demonstrating generalized weakness, decreased balance in standing and impaired functional endurance impacting ADL independence.  Skilled OT services appropriate to promote return to PLOF.  Pt. demonstrated ability to independently transfer in and out of bed, SBA with transfer and mvmt in room with walker.  Pt. completed UBD/LBD post setup.  Recommend home with family and  HHOT services.  Recommend grab bars for tub and pt. may need home 02.  Pt. with concerns that her insurance does not cover 02 at home.  -     Row Name 08/21/22 0843          Therapy Assessment/Plan (OT)    Patient/Family Therapy Goal Statement (OT) return to PLOF  -MIKE     Rehab Potential (OT) good, to achieve stated therapy goals  -     Criteria for Skilled Therapeutic Interventions Met (OT) yes;meets criteria;skilled treatment is necessary  -     Therapy Frequency (OT) daily  -     Row Name 08/21/22 0843          Therapy Plan Review/Discharge Plan (OT)    Equipment Needs Upon Discharge (OT) --  grab bars  -MIKE     Anticipated Discharge Disposition (OT) home with assist;home with home health  -     Row Name 08/21/22 0843          Vital Signs    Pre Systolic BP Rehab 139  -MIKE     Pre Treatment Diastolic BP 87  -MIKE     Post Systolic BP Rehab 150  -MIKE     Post Treatment Diastolic BP 98  -MIKE     Pretreatment Heart Rate (beats/min) 65  -MIKE     Posttreatment Heart Rate (beats/min) 68  -MIKE     Pre SpO2 (%) 99  -MIKE     O2 Delivery Pre Treatment nasal cannula  -MIKE     O2 Delivery Intra Treatment nasal cannula  -MIKE     Post SpO2 (%) 96  -MIKE     O2 Delivery Post Treatment nasal cannula  -MIKE     Pre Patient Position Supine  -MIKE     Intra Patient Position Standing  -MIKE     Post  Patient Position Supine  -MIKE     Row Name 08/21/22 0843          Positioning and Restraints    Pre-Treatment Position in bed  -MIKE     Post Treatment Position bed  -MIKE     In Bed sitting;call light within reach;encouraged to call for assist  per nursing no alarm needed  -           User Key  (r) = Recorded By, (t) = Taken By, (c) = Cosigned By    Initials Name Provider Type    Radha Bautista OT Occupational Therapist               Outcome Measures     Row Name 08/21/22 0850          How much help from another is currently needed...    Putting on and taking off regular lower body clothing? 4  -MIKE     Bathing (including washing, rinsing, and drying) 3  -MIKE     Toileting (which includes using toilet bed pan or urinal) 4  -MIKE     Putting on and taking off regular upper body clothing 4  -MIKE     Taking care of personal grooming (such as brushing teeth) 4  -MIKE     Eating meals 4  -MIKE     AM-PAC 6 Clicks Score (OT) 23  -MIKE     Row Name 08/21/22 0850          Functional Assessment    Outcome Measure Options AM-PAC 6 Clicks Daily Activity (OT)  -           User Key  (r) = Recorded By, (t) = Taken By, (c) = Cosigned By    Initials Name Provider Type    Radha Bautista, BANDAR Occupational Therapist                Occupational Therapy Education                 Title: PT OT SLP Therapies (In Progress)     Topic: Occupational Therapy (In Progress)     Point: ADL training (Done)     Description:   Instruct learner(s) on proper safety adaptation and remediation techniques during self care or transfers.   Instruct in proper use of assistive devices.              Learning Progress Summary           Patient Acceptance, E,H, VU,NR by MIKE at 8/21/2022 0851    Comment: reason for consult, evaluation results, EC/WS, home safety                   Point: Home exercise program (Not Started)     Description:   Instruct learner(s) on appropriate technique for monitoring, assisting and/or progressing therapeutic exercises/activities.               Learner Progress:  Not documented in this visit.          Point: Precautions (Not Started)     Description:   Instruct learner(s) on prescribed precautions during self-care and functional transfers.              Learner Progress:  Not documented in this visit.          Point: Body mechanics (Done)     Description:   Instruct learner(s) on proper positioning and spine alignment during self-care, functional mobility activities and/or exercises.              Learning Progress Summary           Patient Acceptance, E,H, VU,NR by  at 8/21/2022 0851    Comment: reason for consult, evaluation results, EC/WS, home safety                               User Key     Initials Effective Dates Name Provider Type Discipline     06/16/21 -  Radha Silva, OT Occupational Therapist OT              OT Recommendation and Plan  Therapy Frequency (OT): daily  Plan of Care Review  Plan of Care Reviewed With: patient  Progress: no change  Outcome Evaluation: OT evaluation completed with pt. demonstrating generalized weakness, decreased balance in standing and impaired functional endurance impacting ADL independence.  Skilled OT services appropriate to promote return to PLOF.  Pt. demonstrated ability to independently transfer in and out of bed, SBA with transfer and mvmt in room with walker.  Pt. completed UBD/LBD post setup.  Recommend home with family and  HHOT services.  Recommend grab bars for tub and pt. may need home 02.  Pt. with concerns that her insurance does not cover 02 at home.     Time Calculation:    Time Calculation- OT     Row Name 08/21/22 0852             Time Calculation- OT    OT Start Time 0810  -MIKE      OT Received On 08/21/22  -MIKE      OT Goal Re-Cert Due Date 08/31/22  -MIKE              Untimed Charges    OT Eval/Re-eval Minutes 46  -MIKE              Total Minutes    Untimed Charges Total Minutes 46  -MIKE       Total Minutes 46  -MIKE            User Key  (r) = Recorded By, (t) = Taken By, (c) = Cosigned  By    Initials Name Provider Type    Radha Bautista, OT Occupational Therapist              Therapy Charges for Today     Code Description Service Date Service Provider Modifiers Qty    [id number] HC OT EVAL LOW COMPLEXITY 4 8/21/2022 Radha Silva OT GO 1               Radha Silva OT  8/21/2022

## 2022-09-30 ENCOUNTER — HOSPITAL ENCOUNTER (INPATIENT)
Facility: HOSPITAL | Age: 57
LOS: 4 days | Discharge: HOME | End: 2022-10-04
Attending: EMERGENCY MEDICINE | Admitting: INTERNAL MEDICINE

## 2022-09-30 ENCOUNTER — APPOINTMENT (OUTPATIENT)
Dept: GENERAL RADIOLOGY | Facility: HOSPITAL | Age: 57
End: 2022-09-30

## 2022-09-30 ENCOUNTER — APPOINTMENT (OUTPATIENT)
Dept: CT IMAGING | Facility: HOSPITAL | Age: 57
End: 2022-09-30

## 2022-09-30 DIAGNOSIS — J96.22 ACUTE ON CHRONIC RESPIRATORY FAILURE WITH HYPOXIA AND HYPERCAPNIA: Primary | ICD-10-CM

## 2022-09-30 DIAGNOSIS — U07.1 COVID-19 VIRUS INFECTION: ICD-10-CM

## 2022-09-30 DIAGNOSIS — J96.21 ACUTE ON CHRONIC RESPIRATORY FAILURE WITH HYPOXIA AND HYPERCAPNIA: Primary | ICD-10-CM

## 2022-09-30 DIAGNOSIS — U07.1 COVID-19: ICD-10-CM

## 2022-09-30 PROBLEM — J96.01 ACUTE RESPIRATORY FAILURE WITH HYPOXIA AND HYPERCAPNIA: Status: ACTIVE | Noted: 2022-09-30

## 2022-09-30 PROBLEM — E66.01 OBESITY, MORBID, BMI 40.0-49.9: Status: ACTIVE | Noted: 2022-09-30

## 2022-09-30 PROBLEM — J44.9 COPD (CHRONIC OBSTRUCTIVE PULMONARY DISEASE): Status: ACTIVE | Noted: 2022-09-30

## 2022-09-30 PROBLEM — J96.02 ACUTE RESPIRATORY FAILURE WITH HYPOXIA AND HYPERCAPNIA: Status: ACTIVE | Noted: 2022-09-30

## 2022-09-30 LAB
ALBUMIN SERPL-MCNC: 4 G/DL (ref 3.5–5.2)
ALBUMIN/GLOB SERPL: 1.1 G/DL
ALP SERPL-CCNC: 140 U/L (ref 39–117)
ALT SERPL W P-5'-P-CCNC: 17 U/L (ref 1–33)
ANION GAP SERPL CALCULATED.3IONS-SCNC: 8 MMOL/L (ref 5–15)
ARTERIAL PATENCY WRIST A: ABNORMAL
AST SERPL-CCNC: 17 U/L (ref 1–32)
ATMOSPHERIC PRESS: ABNORMAL MM[HG]
BASE EXCESS BLDA CALC-SCNC: 0.5 MMOL/L (ref 0–2)
BASOPHILS # BLD AUTO: 0.02 10*3/MM3 (ref 0–0.2)
BASOPHILS NFR BLD AUTO: 0.4 % (ref 0–1.5)
BDY SITE: ABNORMAL
BILIRUB SERPL-MCNC: 0.5 MG/DL (ref 0–1.2)
BODY TEMPERATURE: 37 C
BUN SERPL-MCNC: 15 MG/DL (ref 6–20)
BUN/CREAT SERPL: 8.7 (ref 7–25)
CALCIUM SPEC-SCNC: 9.3 MG/DL (ref 8.6–10.5)
CHLORIDE SERPL-SCNC: 105 MMOL/L (ref 98–107)
CO2 BLDA-SCNC: 30.8 MMOL/L (ref 22–33)
CO2 SERPL-SCNC: 28 MMOL/L (ref 22–29)
COHGB MFR BLD: 1.5 % (ref 0–2)
CREAT SERPL-MCNC: 1.73 MG/DL (ref 0.57–1)
CRP SERPL-MCNC: 2.26 MG/DL (ref 0–0.5)
D DIMER PPP FEU-MCNC: 0.88 MCGFEU/ML (ref 0.01–0.5)
D-LACTATE SERPL-SCNC: 1.1 MMOL/L (ref 0.5–2)
DEPRECATED RDW RBC AUTO: 62.7 FL (ref 37–54)
EGFRCR SERPLBLD CKD-EPI 2021: 33.9 ML/MIN/1.73
EOSINOPHIL # BLD AUTO: 0.08 10*3/MM3 (ref 0–0.4)
EOSINOPHIL NFR BLD AUTO: 1.7 % (ref 0.3–6.2)
EPAP: 0
ERYTHROCYTE [DISTWIDTH] IN BLOOD BY AUTOMATED COUNT: 17.7 % (ref 12.3–15.4)
FERRITIN SERPL-MCNC: 25.55 NG/ML (ref 13–150)
FIBRINOGEN PPP-MCNC: 497 MG/DL (ref 220–470)
GLOBULIN UR ELPH-MCNC: 3.8 GM/DL
GLUCOSE SERPL-MCNC: 113 MG/DL (ref 65–99)
HCO3 BLDA-SCNC: 28.8 MMOL/L (ref 20–26)
HCT VFR BLD AUTO: 35.7 % (ref 34–46.6)
HCT VFR BLD CALC: 29.9 % (ref 38–51)
HGB BLD-MCNC: 9.9 G/DL (ref 12–15.9)
HGB BLDA-MCNC: 9.8 G/DL (ref 14–18)
HOLD SPECIMEN: NORMAL
IMM GRANULOCYTES # BLD AUTO: 0.02 10*3/MM3 (ref 0–0.05)
IMM GRANULOCYTES NFR BLD AUTO: 0.4 % (ref 0–0.5)
INHALED O2 CONCENTRATION: 80 %
INR PPP: 1.16 (ref 0.84–1.13)
IPAP: 0
LDH SERPL-CCNC: 188 U/L (ref 135–214)
LYMPHOCYTES # BLD AUTO: 0.47 10*3/MM3 (ref 0.7–3.1)
LYMPHOCYTES NFR BLD AUTO: 10.2 % (ref 19.6–45.3)
Lab: ABNORMAL
MCH RBC QN AUTO: 26.9 PG (ref 26.6–33)
MCHC RBC AUTO-ENTMCNC: 27.7 G/DL (ref 31.5–35.7)
MCV RBC AUTO: 97 FL (ref 79–97)
METHGB BLD QL: 0.2 % (ref 0–1.5)
MODALITY: ABNORMAL
MONOCYTES # BLD AUTO: 0.51 10*3/MM3 (ref 0.1–0.9)
MONOCYTES NFR BLD AUTO: 11.1 % (ref 5–12)
NEUTROPHILS NFR BLD AUTO: 3.5 10*3/MM3 (ref 1.7–7)
NEUTROPHILS NFR BLD AUTO: 76.2 % (ref 42.7–76)
NOTE: ABNORMAL
NOTIFIED BY: ABNORMAL
NOTIFIED WHO: ABNORMAL
NRBC BLD AUTO-RTO: 0.7 /100 WBC (ref 0–0.2)
NT-PROBNP SERPL-MCNC: 6926 PG/ML (ref 0–900)
OVALOCYTES BLD QL SMEAR: NORMAL
OXYHGB MFR BLDV: 97.9 % (ref 94–99)
PAW @ PEAK INSP FLOW SETTING VENT: 0 CMH2O
PCO2 BLDA: 67 MM HG (ref 35–45)
PCO2 TEMP ADJ BLD: 67 MM HG (ref 35–45)
PH BLDA: 7.24 PH UNITS (ref 7.35–7.45)
PH, TEMP CORRECTED: 7.24 PH UNITS
PLAT MORPH BLD: NORMAL
PLATELET # BLD AUTO: 301 10*3/MM3 (ref 140–450)
PMV BLD AUTO: 10.3 FL (ref 6–12)
PO2 BLDA: 160 MM HG (ref 83–108)
PO2 TEMP ADJ BLD: 160 MM HG (ref 83–108)
POTASSIUM SERPL-SCNC: 4.2 MMOL/L (ref 3.5–5.2)
PROCALCITONIN SERPL-MCNC: 0.11 NG/ML (ref 0–0.25)
PROT SERPL-MCNC: 7.8 G/DL (ref 6–8.5)
PROTHROMBIN TIME: 14.7 SECONDS (ref 11.4–14.4)
RBC # BLD AUTO: 3.68 10*6/MM3 (ref 3.77–5.28)
SODIUM SERPL-SCNC: 141 MMOL/L (ref 136–145)
TOTAL RATE: 0 BREATHS/MINUTE
TROPONIN T SERPL-MCNC: 0.02 NG/ML (ref 0–0.03)
WBC MORPH BLD: NORMAL
WBC NRBC COR # BLD: 4.6 10*3/MM3 (ref 3.4–10.8)
WHOLE BLOOD HOLD COAG: NORMAL
WHOLE BLOOD HOLD SPECIMEN: NORMAL

## 2022-09-30 PROCEDURE — 87040 BLOOD CULTURE FOR BACTERIA: CPT | Performed by: EMERGENCY MEDICINE

## 2022-09-30 PROCEDURE — 99284 EMERGENCY DEPT VISIT MOD MDM: CPT

## 2022-09-30 PROCEDURE — 85379 FIBRIN DEGRADATION QUANT: CPT | Performed by: NURSE PRACTITIONER

## 2022-09-30 PROCEDURE — 80053 COMPREHEN METABOLIC PANEL: CPT | Performed by: EMERGENCY MEDICINE

## 2022-09-30 PROCEDURE — 94799 UNLISTED PULMONARY SVC/PX: CPT

## 2022-09-30 PROCEDURE — 84145 PROCALCITONIN (PCT): CPT | Performed by: EMERGENCY MEDICINE

## 2022-09-30 PROCEDURE — 25010000002 METHYLPREDNISOLONE PER 125 MG: Performed by: EMERGENCY MEDICINE

## 2022-09-30 PROCEDURE — 94640 AIRWAY INHALATION TREATMENT: CPT

## 2022-09-30 PROCEDURE — 25010000002 ENOXAPARIN PER 10 MG: Performed by: INTERNAL MEDICINE

## 2022-09-30 PROCEDURE — 94660 CPAP INITIATION&MGMT: CPT

## 2022-09-30 PROCEDURE — 36415 COLL VENOUS BLD VENIPUNCTURE: CPT

## 2022-09-30 PROCEDURE — 85610 PROTHROMBIN TIME: CPT | Performed by: NURSE PRACTITIONER

## 2022-09-30 PROCEDURE — 84484 ASSAY OF TROPONIN QUANT: CPT | Performed by: EMERGENCY MEDICINE

## 2022-09-30 PROCEDURE — 85025 COMPLETE CBC W/AUTO DIFF WBC: CPT | Performed by: EMERGENCY MEDICINE

## 2022-09-30 PROCEDURE — 83605 ASSAY OF LACTIC ACID: CPT | Performed by: EMERGENCY MEDICINE

## 2022-09-30 PROCEDURE — 25010000002 DEXAMETHASONE SODIUM PHOSPHATE 100 MG/10ML SOLUTION: Performed by: INTERNAL MEDICINE

## 2022-09-30 PROCEDURE — 99291 CRITICAL CARE FIRST HOUR: CPT | Performed by: INTERNAL MEDICINE

## 2022-09-30 PROCEDURE — 85007 BL SMEAR W/DIFF WBC COUNT: CPT | Performed by: EMERGENCY MEDICINE

## 2022-09-30 PROCEDURE — 82728 ASSAY OF FERRITIN: CPT | Performed by: NURSE PRACTITIONER

## 2022-09-30 PROCEDURE — 83050 HGB METHEMOGLOBIN QUAN: CPT

## 2022-09-30 PROCEDURE — 83880 ASSAY OF NATRIURETIC PEPTIDE: CPT | Performed by: EMERGENCY MEDICINE

## 2022-09-30 PROCEDURE — 86140 C-REACTIVE PROTEIN: CPT | Performed by: NURSE PRACTITIONER

## 2022-09-30 PROCEDURE — 85384 FIBRINOGEN ACTIVITY: CPT | Performed by: NURSE PRACTITIONER

## 2022-09-30 PROCEDURE — 82375 ASSAY CARBOXYHB QUANT: CPT

## 2022-09-30 PROCEDURE — 36600 WITHDRAWAL OF ARTERIAL BLOOD: CPT

## 2022-09-30 PROCEDURE — 71045 X-RAY EXAM CHEST 1 VIEW: CPT

## 2022-09-30 PROCEDURE — 93005 ELECTROCARDIOGRAM TRACING: CPT | Performed by: EMERGENCY MEDICINE

## 2022-09-30 PROCEDURE — 83615 LACTATE (LD) (LDH) ENZYME: CPT | Performed by: NURSE PRACTITIONER

## 2022-09-30 PROCEDURE — 82805 BLOOD GASES W/O2 SATURATION: CPT

## 2022-09-30 RX ORDER — SODIUM CHLORIDE 0.9 % (FLUSH) 0.9 %
10 SYRINGE (ML) INJECTION AS NEEDED
Status: DISCONTINUED | OUTPATIENT
Start: 2022-09-30 | End: 2022-10-04 | Stop reason: HOSPADM

## 2022-09-30 RX ORDER — PANTOPRAZOLE SODIUM 40 MG/1
40 TABLET, DELAYED RELEASE ORAL DAILY
Status: DISCONTINUED | OUTPATIENT
Start: 2022-09-30 | End: 2022-10-04 | Stop reason: HOSPADM

## 2022-09-30 RX ORDER — NITROGLYCERIN 20 MG/100ML
5-200 INJECTION INTRAVENOUS
Status: DISCONTINUED | OUTPATIENT
Start: 2022-09-30 | End: 2022-10-02

## 2022-09-30 RX ORDER — ALBUTEROL SULFATE 90 UG/1
2 AEROSOL, METERED RESPIRATORY (INHALATION) ONCE
Status: COMPLETED | OUTPATIENT
Start: 2022-09-30 | End: 2022-09-30

## 2022-09-30 RX ORDER — IPRATROPIUM BROMIDE AND ALBUTEROL SULFATE 2.5; .5 MG/3ML; MG/3ML
3 SOLUTION RESPIRATORY (INHALATION)
Status: DISCONTINUED | OUTPATIENT
Start: 2022-09-30 | End: 2022-10-01

## 2022-09-30 RX ORDER — METHYLPREDNISOLONE SODIUM SUCCINATE 125 MG/2ML
125 INJECTION, POWDER, LYOPHILIZED, FOR SOLUTION INTRAMUSCULAR; INTRAVENOUS ONCE
Status: COMPLETED | OUTPATIENT
Start: 2022-09-30 | End: 2022-09-30

## 2022-09-30 RX ORDER — ENOXAPARIN SODIUM 100 MG/ML
40 INJECTION SUBCUTANEOUS EVERY 12 HOURS SCHEDULED
Status: DISCONTINUED | OUTPATIENT
Start: 2022-09-30 | End: 2022-10-03

## 2022-09-30 RX ADMIN — PANTOPRAZOLE SODIUM 40 MG: 40 TABLET, DELAYED RELEASE ORAL at 23:54

## 2022-09-30 RX ADMIN — ENOXAPARIN SODIUM 40 MG: 40 INJECTION SUBCUTANEOUS at 23:54

## 2022-09-30 RX ADMIN — DEXAMETHASONE SODIUM PHOSPHATE 10 MG: 10 INJECTION, SOLUTION INTRAMUSCULAR; INTRAVENOUS at 22:20

## 2022-09-30 RX ADMIN — NITROGLYCERIN 5 MCG/MIN: 20 INJECTION INTRAVENOUS at 18:18

## 2022-09-30 RX ADMIN — METHYLPREDNISOLONE SODIUM SUCCINATE 125 MG: 125 INJECTION, POWDER, FOR SOLUTION INTRAMUSCULAR; INTRAVENOUS at 18:15

## 2022-09-30 RX ADMIN — ALBUTEROL SULFATE 2 PUFF: 90 AEROSOL, METERED RESPIRATORY (INHALATION) at 17:53

## 2022-09-30 RX ADMIN — Medication 10 ML: at 22:21

## 2022-10-01 ENCOUNTER — APPOINTMENT (OUTPATIENT)
Dept: CARDIOLOGY | Facility: HOSPITAL | Age: 57
End: 2022-10-01

## 2022-10-01 ENCOUNTER — APPOINTMENT (OUTPATIENT)
Dept: CT IMAGING | Facility: HOSPITAL | Age: 57
End: 2022-10-01

## 2022-10-01 PROBLEM — J96.21 ACUTE ON CHRONIC RESPIRATORY FAILURE WITH HYPOXIA AND HYPERCAPNIA: Status: ACTIVE | Noted: 2022-10-01

## 2022-10-01 PROBLEM — J96.22 ACUTE ON CHRONIC RESPIRATORY FAILURE WITH HYPOXIA AND HYPERCAPNIA: Status: ACTIVE | Noted: 2022-10-01

## 2022-10-01 LAB
ALBUMIN SERPL-MCNC: 3.7 G/DL (ref 3.5–5.2)
ALBUMIN/GLOB SERPL: 0.9 G/DL
ALP SERPL-CCNC: 129 U/L (ref 39–117)
ALT SERPL W P-5'-P-CCNC: 16 U/L (ref 1–33)
ANION GAP SERPL CALCULATED.3IONS-SCNC: 10 MMOL/L (ref 5–15)
AST SERPL-CCNC: 15 U/L (ref 1–32)
BACTERIA UR QL AUTO: NORMAL /HPF
BASOPHILS # BLD AUTO: 0.01 10*3/MM3 (ref 0–0.2)
BASOPHILS NFR BLD AUTO: 0.2 % (ref 0–1.5)
BH CV ECHO MEAS - AI P1/2T: 541.7 MSEC
BH CV ECHO MEAS - AO MAX PG: 13.4 MMHG
BH CV ECHO MEAS - AO MEAN PG: 7 MMHG
BH CV ECHO MEAS - AO ROOT DIAM: 3.4 CM
BH CV ECHO MEAS - AO V2 MAX: 183 CM/SEC
BH CV ECHO MEAS - AO V2 VTI: 39.2 CM
BH CV ECHO MEAS - AVA(I,D): 2.44 CM2
BH CV ECHO MEAS - EDV(CUBED): 274.6 ML
BH CV ECHO MEAS - EDV(MOD-SP2): 133 ML
BH CV ECHO MEAS - EDV(MOD-SP4): 178 ML
BH CV ECHO MEAS - EF(MOD-BP): 61 %
BH CV ECHO MEAS - EF(MOD-SP2): 54.8 %
BH CV ECHO MEAS - EF(MOD-SP4): 67.8 %
BH CV ECHO MEAS - ESV(CUBED): 68.9 ML
BH CV ECHO MEAS - ESV(MOD-SP2): 60.1 ML
BH CV ECHO MEAS - ESV(MOD-SP4): 57.3 ML
BH CV ECHO MEAS - FS: 36.9 %
BH CV ECHO MEAS - IVS/LVPW: 0.73 CM
BH CV ECHO MEAS - IVSD: 0.8 CM
BH CV ECHO MEAS - LA DIMENSION: 4.6 CM
BH CV ECHO MEAS - LAT PEAK E' VEL: 7.2 CM/SEC
BH CV ECHO MEAS - LV DIASTOLIC VOL/BSA (35-75): 73.1 CM2
BH CV ECHO MEAS - LV MASS(C)D: 265.2 GRAMS
BH CV ECHO MEAS - LV MAX PG: 8.2 MMHG
BH CV ECHO MEAS - LV MEAN PG: 3 MMHG
BH CV ECHO MEAS - LV SYSTOLIC VOL/BSA (12-30): 23.5 CM2
BH CV ECHO MEAS - LV V1 MAX: 143 CM/SEC
BH CV ECHO MEAS - LV V1 VTI: 30.4 CM
BH CV ECHO MEAS - LVIDD: 6.5 CM
BH CV ECHO MEAS - LVIDS: 4.1 CM
BH CV ECHO MEAS - LVOT AREA: 3.1 CM2
BH CV ECHO MEAS - LVOT DIAM: 2 CM
BH CV ECHO MEAS - LVPWD: 1.1 CM
BH CV ECHO MEAS - MED PEAK E' VEL: 8.3 CM/SEC
BH CV ECHO MEAS - MV A MAX VEL: 84.4 CM/SEC
BH CV ECHO MEAS - MV DEC SLOPE: 490 CM/SEC2
BH CV ECHO MEAS - MV DEC TIME: 0.18 MSEC
BH CV ECHO MEAS - MV E MAX VEL: 87.5 CM/SEC
BH CV ECHO MEAS - MV E/A: 1.04
BH CV ECHO MEAS - PA ACC TIME: 0.09 SEC
BH CV ECHO MEAS - PA PR(ACCEL): 40.8 MMHG
BH CV ECHO MEAS - RAP SYSTOLE: 8 MMHG
BH CV ECHO MEAS - RVSP: 49 MMHG
BH CV ECHO MEAS - SI(MOD-SP2): 29.9 ML/M2
BH CV ECHO MEAS - SI(MOD-SP4): 49.6 ML/M2
BH CV ECHO MEAS - SV(LVOT): 95.5 ML
BH CV ECHO MEAS - SV(MOD-SP2): 72.9 ML
BH CV ECHO MEAS - SV(MOD-SP4): 120.7 ML
BH CV ECHO MEAS - TAPSE (>1.6): 2.09 CM
BH CV ECHO MEAS - TR MAX PG: 28.8 MMHG
BH CV ECHO MEAS - TR MAX VEL: 260.3 CM/SEC
BH CV ECHO MEASUREMENTS AVERAGE E/E' RATIO: 11.29
BH CV VAS BP LEFT ARM: NORMAL MMHG
BH CV XLRA - RV BASE: 5.1 CM
BH CV XLRA - RV LENGTH: 8.4 CM
BH CV XLRA - RV MID: 4.1 CM
BH CV XLRA - TDI S': 12.8 CM/SEC
BILIRUB SERPL-MCNC: 0.3 MG/DL (ref 0–1.2)
BILIRUB UR QL STRIP: NEGATIVE
BUN SERPL-MCNC: 14 MG/DL (ref 6–20)
BUN/CREAT SERPL: 8 (ref 7–25)
CALCIUM SPEC-SCNC: 9 MG/DL (ref 8.6–10.5)
CHLORIDE SERPL-SCNC: 108 MMOL/L (ref 98–107)
CLARITY UR: CLEAR
CO2 SERPL-SCNC: 26 MMOL/L (ref 22–29)
COLOR UR: YELLOW
CREAT SERPL-MCNC: 1.76 MG/DL (ref 0.57–1)
DEPRECATED RDW RBC AUTO: 64.1 FL (ref 37–54)
EGFRCR SERPLBLD CKD-EPI 2021: 33.2 ML/MIN/1.73
EOSINOPHIL # BLD AUTO: 0 10*3/MM3 (ref 0–0.4)
EOSINOPHIL NFR BLD AUTO: 0 % (ref 0.3–6.2)
ERYTHROCYTE [DISTWIDTH] IN BLOOD BY AUTOMATED COUNT: 17.4 % (ref 12.3–15.4)
GLOBULIN UR ELPH-MCNC: 3.9 GM/DL
GLUCOSE SERPL-MCNC: 128 MG/DL (ref 65–99)
GLUCOSE UR STRIP-MCNC: NEGATIVE MG/DL
HCT VFR BLD AUTO: 35.1 % (ref 34–46.6)
HGB BLD-MCNC: 9.4 G/DL (ref 12–15.9)
HGB UR QL STRIP.AUTO: ABNORMAL
HYALINE CASTS UR QL AUTO: NORMAL /LPF
IMM GRANULOCYTES # BLD AUTO: 0.03 10*3/MM3 (ref 0–0.05)
IMM GRANULOCYTES NFR BLD AUTO: 0.7 % (ref 0–0.5)
KETONES UR QL STRIP: ABNORMAL
LEFT ATRIUM VOLUME INDEX: 39.1 ML/M2
LEUKOCYTE ESTERASE UR QL STRIP.AUTO: NEGATIVE
LV EF 2D ECHO EST: 60 %
LYMPHOCYTES # BLD AUTO: 0.14 10*3/MM3 (ref 0.7–3.1)
LYMPHOCYTES NFR BLD AUTO: 3.2 % (ref 19.6–45.3)
MAGNESIUM SERPL-MCNC: 2.2 MG/DL (ref 1.6–2.6)
MAXIMAL PREDICTED HEART RATE: 162 BPM
MCH RBC QN AUTO: 26.6 PG (ref 26.6–33)
MCHC RBC AUTO-ENTMCNC: 26.8 G/DL (ref 31.5–35.7)
MCV RBC AUTO: 99.4 FL (ref 79–97)
MONOCYTES # BLD AUTO: 0.05 10*3/MM3 (ref 0.1–0.9)
MONOCYTES NFR BLD AUTO: 1.2 % (ref 5–12)
NEUTROPHILS NFR BLD AUTO: 4.08 10*3/MM3 (ref 1.7–7)
NEUTROPHILS NFR BLD AUTO: 94.7 % (ref 42.7–76)
NITRITE UR QL STRIP: NEGATIVE
NRBC BLD AUTO-RTO: 0.7 /100 WBC (ref 0–0.2)
PH UR STRIP.AUTO: 5.5 [PH] (ref 5–8)
PHOSPHATE SERPL-MCNC: 3.9 MG/DL (ref 2.5–4.5)
PLATELET # BLD AUTO: 289 10*3/MM3 (ref 140–450)
PMV BLD AUTO: 10.1 FL (ref 6–12)
POTASSIUM SERPL-SCNC: 5 MMOL/L (ref 3.5–5.2)
PROT SERPL-MCNC: 7.6 G/DL (ref 6–8.5)
PROT UR QL STRIP: ABNORMAL
RBC # BLD AUTO: 3.53 10*6/MM3 (ref 3.77–5.28)
RBC # UR STRIP: NORMAL /HPF
REF LAB TEST METHOD: NORMAL
SODIUM SERPL-SCNC: 144 MMOL/L (ref 136–145)
SP GR UR STRIP: 1.02 (ref 1–1.03)
SQUAMOUS #/AREA URNS HPF: NORMAL /HPF
STRESS TARGET HR: 138 BPM
UROBILINOGEN UR QL STRIP: ABNORMAL
WBC # UR STRIP: NORMAL /HPF
WBC NRBC COR # BLD: 4.31 10*3/MM3 (ref 3.4–10.8)

## 2022-10-01 PROCEDURE — 93306 TTE W/DOPPLER COMPLETE: CPT

## 2022-10-01 PROCEDURE — 81001 URINALYSIS AUTO W/SCOPE: CPT | Performed by: EMERGENCY MEDICINE

## 2022-10-01 PROCEDURE — XW033H5 INTRODUCTION OF TOCILIZUMAB INTO PERIPHERAL VEIN, PERCUTANEOUS APPROACH, NEW TECHNOLOGY GROUP 5: ICD-10-PCS | Performed by: INTERNAL MEDICINE

## 2022-10-01 PROCEDURE — 99233 SBSQ HOSP IP/OBS HIGH 50: CPT | Performed by: INTERNAL MEDICINE

## 2022-10-01 PROCEDURE — 93970 EXTREMITY STUDY: CPT

## 2022-10-01 PROCEDURE — 94799 UNLISTED PULMONARY SVC/PX: CPT

## 2022-10-01 PROCEDURE — 25010000002 REMDESIVIR 100 MG/20ML SOLUTION 1 EACH VIAL: Performed by: INTERNAL MEDICINE

## 2022-10-01 PROCEDURE — 93306 TTE W/DOPPLER COMPLETE: CPT | Performed by: INTERNAL MEDICINE

## 2022-10-01 PROCEDURE — XW033E5 INTRODUCTION OF REMDESIVIR ANTI-INFECTIVE INTO PERIPHERAL VEIN, PERCUTANEOUS APPROACH, NEW TECHNOLOGY GROUP 5: ICD-10-PCS | Performed by: INTERNAL MEDICINE

## 2022-10-01 PROCEDURE — 93970 EXTREMITY STUDY: CPT | Performed by: INTERNAL MEDICINE

## 2022-10-01 PROCEDURE — 85025 COMPLETE CBC W/AUTO DIFF WBC: CPT | Performed by: NURSE PRACTITIONER

## 2022-10-01 PROCEDURE — 25010000002 ENOXAPARIN PER 10 MG: Performed by: INTERNAL MEDICINE

## 2022-10-01 PROCEDURE — 84100 ASSAY OF PHOSPHORUS: CPT | Performed by: NURSE PRACTITIONER

## 2022-10-01 PROCEDURE — 71250 CT THORAX DX C-: CPT

## 2022-10-01 PROCEDURE — 80053 COMPREHEN METABOLIC PANEL: CPT | Performed by: INTERNAL MEDICINE

## 2022-10-01 PROCEDURE — 25010000002 TOCILIZUMAB 400 MG/20ML SOLUTION 20 ML VIAL: Performed by: INTERNAL MEDICINE

## 2022-10-01 PROCEDURE — M0249 HC INTRAVENOUS INFUSION TOCILIZUMAB 1ST DOSE: HCPCS | Performed by: INTERNAL MEDICINE

## 2022-10-01 PROCEDURE — 25010000002 DEXAMETHASONE SODIUM PHOSPHATE 100 MG/10ML SOLUTION: Performed by: INTERNAL MEDICINE

## 2022-10-01 PROCEDURE — 83735 ASSAY OF MAGNESIUM: CPT | Performed by: NURSE PRACTITIONER

## 2022-10-01 RX ORDER — ALBUTEROL SULFATE 90 UG/1
2 AEROSOL, METERED RESPIRATORY (INHALATION)
Status: DISCONTINUED | OUTPATIENT
Start: 2022-10-01 | End: 2022-10-02

## 2022-10-01 RX ORDER — BUDESONIDE AND FORMOTEROL FUMARATE DIHYDRATE 80; 4.5 UG/1; UG/1
2 AEROSOL RESPIRATORY (INHALATION) 2 TIMES DAILY
COMMUNITY
Start: 2022-07-18 | End: 2022-10-04 | Stop reason: HOSPADM

## 2022-10-01 RX ORDER — ACETAMINOPHEN 325 MG/1
650 TABLET ORAL EVERY 6 HOURS PRN
Status: DISCONTINUED | OUTPATIENT
Start: 2022-10-01 | End: 2022-10-04 | Stop reason: HOSPADM

## 2022-10-01 RX ORDER — FAMOTIDINE 20 MG/1
20 TABLET, FILM COATED ORAL 2 TIMES DAILY
COMMUNITY
Start: 2022-07-18

## 2022-10-01 RX ADMIN — TOCILIZUMAB 800 MG: 20 INJECTION, SOLUTION, CONCENTRATE INTRAVENOUS at 00:50

## 2022-10-01 RX ADMIN — ENOXAPARIN SODIUM 40 MG: 40 INJECTION SUBCUTANEOUS at 08:38

## 2022-10-01 RX ADMIN — NITROGLYCERIN 80 MCG/MIN: 20 INJECTION INTRAVENOUS at 13:45

## 2022-10-01 RX ADMIN — ACETAMINOPHEN 650 MG: 325 TABLET, FILM COATED ORAL at 05:49

## 2022-10-01 RX ADMIN — DEXAMETHASONE SODIUM PHOSPHATE 10 MG: 10 INJECTION, SOLUTION INTRAMUSCULAR; INTRAVENOUS at 08:38

## 2022-10-01 RX ADMIN — ENOXAPARIN SODIUM 40 MG: 40 INJECTION SUBCUTANEOUS at 20:38

## 2022-10-01 RX ADMIN — NITROGLYCERIN 120 MCG/MIN: 20 INJECTION INTRAVENOUS at 06:42

## 2022-10-01 RX ADMIN — ALBUTEROL SULFATE 2 PUFF: 90 AEROSOL, METERED RESPIRATORY (INHALATION) at 19:57

## 2022-10-01 RX ADMIN — REMDESIVIR 100 MG: 100 INJECTION, POWDER, LYOPHILIZED, FOR SOLUTION INTRAVENOUS at 16:01

## 2022-10-01 RX ADMIN — REMDESIVIR 200 MG: 100 INJECTION, POWDER, LYOPHILIZED, FOR SOLUTION INTRAVENOUS at 01:27

## 2022-10-01 RX ADMIN — ALBUTEROL SULFATE 2 PUFF: 90 AEROSOL, METERED RESPIRATORY (INHALATION) at 13:08

## 2022-10-01 NOTE — PLAN OF CARE
Goal Outcome Evaluation:  Plan of Care Reviewed With: patient, daughter        Progress: improving  Outcome Evaluation: Nitro gtt weaned off today. ECHO and Venous Duplex completed. Pt on high flow NC at 45L and 40%. Pt desaturates with activity to the 80's but comes up quickly. Keeping O2sat between 88 and 92%. While sleeping wears BiPap. BM x1. UOP adequate. Family updated.

## 2022-10-01 NOTE — PROGRESS NOTES
Intensivist Note     10/2/2022  Hospital Day: 2  * No surgery found *  ICU Stays Timeline            Hospital Admission: 09/30/22 1722 - Current  ICU stays: 1      In Date/Time Event Department ICU Stay Duration     09/30/22 1722 Admission  [facility]      09/30/22 2320 Transfer In [facility] 2A ICU 19 hours 15 minutes             Ms. Sonia Bella, 57 y.o. female is followed for:    COVID-19 virus infection    COPD    A/C mixed hypoxemic/hypercarbic respiratory failure (HCC)    HTN (hypertension)    CKD (chronic kidney disease) stage 3, GFR 30-59 ml/min ([facility])    Obesity, morbid, BMI 40.0-49.9 ([facility])       SUBJECTIVE     57-year-old unvaccinated white female with PMH COPD and hypertension.  Has apparently been on oxygen in the past but her insurance company would no longer pay for it so she has been without for quite some time.  Her daughter states that at times her O2 saturation at home will drop into the 70s.  Patient presented to [facility] ER 9/30/2022 complaining of dyspnea, cough, hypoxemia.  Apparently she was exposed to numerous family members with COVID over the past 2 weeks.  Patient began to note increased cough, dyspnea, and fever for the past week and went for COVID testing which was positive the day of admission 9/30/2022.  While at the testing center her pulse ox was checked and saturations were in the 60s and it was recommended she be taken to [facility] immediately for evaluation.  In the ER ABGs revealed a pH 7.24, PCO2 67, PO2 of 160 on an unknown amount of oxygen.  Pertinent labs included hematocrit 35.7, WBC 4.6, sodium 141, potassium 4.2, chloride 105, bicarb 28, BUN 15, creatinine 1.73, glucose 113.  LDH was 188 troponin was 0.018, and proBNP was 6,926  ROS: Per subjective, all other systems reviewed and were negative.  Procalcitonin was 0.11, CRP 2.26, lactate 1.1, D-dimer 0.88, fibrinogen 497, INR 1.13, and proBNP 6,926.  Chest x-ray revealed marked cardiomegaly with pulmonary vascular congestion.   "Patient was admitted and placed on Decadron, remdesivir, and Actemra.  The possibility of occult PE was in the differential, but because of her renal impairment it was decided not to proceed with CT angiogram.  Instead she underwent lower extremity venous duplex and a noncontrast CT of the chest.    Interval history: Uneventful evening since midnight.  On 40% BiPAP did well last evening but was converted to HFNC at 50% today and O2 sats remained 91%.  Hemodynamics adequate with blood pressure 152/84 and HR is 73 bpm.  UOP adequate with 800 cc out since admission and BUN/creatinine nearly unchanged at 14/1.76.  Remains afebrile and WBC only 4.31.  Completed Actemra and remains on Decadron and remdesivir.  Lower extremity venous duplex yesterday revealed no evidence of DVT and echocardiogram is normal.    The patient's relevant PMH, PSH, FH, and SH were reviewed and updated in Epic as appropriate. Allergies and Medications reviewed.    OBJECTIVE     /90   Pulse 70   Temp 97.8 °F (36.6 °C) (Axillary)   Resp 20   Ht 170 cm (66.93\")   Wt (!) 137 kg (302 lb)   SpO2 99%   BMI 47.40 kg/m²   Oxygen Concentration (%): 45      Flowsheet Rows    Flowsheet Row First Filed Value   Admission Height 172.7 cm (68\") Documented at 09/30/2022 1724   Admission Weight 147 kg (325 lb) Documented at 09/30/2022 1724        Intake & Output (last day)       09/30 0701  10/01 0700 10/01 0701  10/02 0700    P.O. 480     I.V. (mL/kg) 140 (1) 281.3 (2.1)    IV Piggyback 390 50    Total Intake(mL/kg) 1010 (7.4) 331.3 (2.4)    Urine (mL/kg/hr)  400 (0.3)    Stool  0    Total Output  400    Net +1010 -68.7          Urine Unmeasured Occurrence  1 x    Stool Unmeasured Occurrence  1 x          Exam:  General Exam:  Obese white female propped up in bed in NAD.  HEENT: Pupils equal and reactive. Nose and throat clear.  Neck:                          Supple, no JVD, thyromegaly, or adenopathy  Lungs: Clear anteriorly and laterally without " wheezes, rales, rhonchi  Cardiovascular: RRR without murmurs or gallops.  HR 72 bpm  Abdomen: Soft nontender without organomegaly or masses.  Obese   and rectal: External urinary collection device  Extremities: No cyanosis clubbing edema.  Neurologic:                 Symmetric strength. No focal deficits.      Chest X-Ray: No film today    INFUSIONS  nitroglycerin, 5-200 mcg/min, Last Rate: Stopped (10/01/22 1600)        Results from last 7 days   Lab Units 10/01/22  0215 09/30/22  1754   WBC 10*3/mm3 4.31 4.60   HEMOGLOBIN g/dL 9.4* 9.9*   HEMATOCRIT % 35.1 35.7   PLATELETS 10*3/mm3 289 301     Results from last 7 days   Lab Units 10/01/22  0215 09/30/22  1754   SODIUM mmol/L 144 141   POTASSIUM mmol/L 5.0 4.2   CHLORIDE mmol/L 108* 105   CO2 mmol/L 26.0 28.0   BUN mg/dL 14 15   CREATININE mg/dL 1.76* 1.73*   GLUCOSE mg/dL 128* 113*   CALCIUM mg/dL 9.0 9.3     Results from last 7 days   Lab Units 10/01/22  0215   MAGNESIUM mg/dL 2.2   PHOSPHORUS mg/dL 3.9     Results from last 7 days   Lab Units 10/01/22  0215 09/30/22  1754   ALK PHOS U/L 129* 140*   BILIRUBIN mg/dL 0.3 0.5   ALT (SGPT) U/L 16 17   AST (SGOT) U/L 15 17       Lab Results   Component Value Date    SEDRATE 73 (H) 08/18/2022       Lab Results   Component Value Date    PROBNP 6,926.0 (H) 09/30/2022         Procalitonin Results:      Lab 09/30/22  1754   PROCALCITONIN 0.11         Covid Tests    Common Labsle 8/18/22   COVID19 Not Detected            COVID LABS:  Results From Last 14 Days   Lab Units 09/30/22  1754   PROBNP pg/mL 6,926.0*   CRP mg/dL 2.26*   D DIMER QUANT MCGFEU/mL 0.88*   FERRITIN ng/mL 25.55   LACTATE mmol/L 1.1   LDH U/L 188   PROCALCITONIN ng/mL 0.11   PROTIME Seconds 14.7*   INR  1.16*   TROPONIN T ng/mL 0.018          Lab Results   Component Value Date    TROPONINT 0.018 09/30/2022     Lab Results   Component Value Date    TSH 0.338 08/19/2022     Lab Results   Component Value Date    LACTATE 1.1 09/30/2022     No results found  for: CORTISOL    Results from last 7 days   Lab Units 09/30/22  1742   PH, ARTERIAL pH units 7.242*   PCO2, ARTERIAL mm Hg 67.0*   PO2 ART mm Hg 160.0*   HCO3 ART mmol/L 28.8*   FIO2 % 80           I reviewed the patient's results, images and medication.    Assessment & Plan   ASSESSMENT        COVID-19 virus infection    COPD    A/C mixed hypoxemic/hypercarbic respiratory failure (HCC)    HTN (hypertension)    CKD (chronic kidney disease) stage 3, GFR 30-59 ml/min ([facility])    Obesity, morbid, BMI 40.0-49.9 ([facility])      DISCUSSION: Acceptable hospital course and on appropriate therapy.  Was placed on a nitroglycerin drip for hypertension last evening but is not on her home antihypertensives.  These will be resumed    PLAN     Start home Maxide, Coreg, and nifedipine.  Continue Decadron and remdesivir  Continue to follow renal function closely  Follow-up ABGs and chest x-ray in a.m.    Plan of care and goals reviewed with multidisciplinary team at daily rounds.    I discussed the patient's findings and my recommendations with patient and nursing staff    Patient is critically ill due to COVID-pneumonia and mixed hypoxemic/hypercarbic respiratory failure and has a high risk of life-threatening decline in condition.  They require continuous monitoring and frequent reassessment of condition for adjustment of management in order to lessen risk.  I visited the patient's bedside and interacted with nurse numerous times today.    Time spent Critical care 30 min (It does not include procedure time).    Electronically signed by Jerel Yoder MD, 10/01/22, 6:35 PM EDT.   Pulmonary / Critical care medicine

## 2022-10-01 NOTE — PAYOR COMM NOTE
"Leanne Callahan (57 y.o. Female)             Date of Birth   1965    Social Security Number       Address   [address] [place] [address] [facility] [address]    Home Phone   [phone]    MRN   [id number]       Worship   None    Marital Status                               Admission Date   9/30/22    Admission Type   Emergency    Admitting Provider   Abraham Dixon MD    Attending Provider   Abraham Dixon MD    Department, Room/Bed   [facility] 2A ICU, N209/1       Discharge Date       Discharge Disposition       Discharge Destination                               Attending Provider: Abraham Dixon MD    Allergies: Ace Inhibitors    Isolation: None   Infection: None   Code Status: CPR   Advance Care Planning Activity    Ht: 172.7 cm (68\")   Wt: 137 kg (302 lb 4 oz)    Admission Cmt: None   Principal Problem: Acute respiratory failure with hypoxia and hypercapnia (HCC) [J96.01,J96.02]                 Active Insurance as of 9/30/2022     Primary Coverage     Payor Plan Insurance Group Employer/Plan Group    ANTHEM MEDICARE REPLACEMENT ANTHEM MEDICARE ADVANTAGE [id number]     Payor Plan Address Payor Plan Phone Number Payor Plan Fax Number Effective Dates    [address] [phone]  6/1/2022 - None Entered    [facility] [phone]       Subscriber Name Subscriber Birth Date Member ID       LEANNE CALLAHAN 1965 [id number]           Secondary Coverage     Payor Plan Insurance Group Employer/Plan Group    [place]      Payor Plan Address Payor Plan Phone Number Payor Plan Fax Number Effective Dates    [address] [phone]  8/18/2022 - None Entered    [facility] [id number]       Subscriber Name Subscriber Birth Date Member ID       LEANNE CALLAHAN 1965 [id number]                 Emergency Contacts      (Rel.) Home Phone Work Phone Mobile Phone    OMAR MILES (Daughter) [phone] -- [phone]    "         Giovanna: ANURADHA [id number] Tax ID [id number]  Insurance Information                ANTHEM MEDICARE REPLACEMENT/ANTHEM MEDICARE ADVANTAGE Phone: [phone]    Subscriber: Sonia Bella Subscriber#: [id number]    Group#: [id number] Precert#: [id number]        [place] Phone: [phone]    Subscriber: Sonia Bella Subscriber#: [id number]    Group#: -- Precert#: --             History & Physical      Abraham Dixon MD at 09/30/22 5039                Chief complaint:  COVID-19    Subjective     Sonia Bella is a 57 y.o. female with PMH COPD, HTN, and who presents to [facility] ED with SOA and hypoxia.  She reports that their family has had COVID over the past two weeks and the patient has experienced coughing, shortness of breath and fever for the past week. She was taken for COVID testing which was positive today and she was noted to be hypoxic with saturations in the 60s. It was recommended the patient come to the ED immediately for evaluation.       Pertinent labs: ABG: pH 7.242, pCO2 67.0, pO2 160, HCO3 28.8. ProBNP 6,926, Cr 1.73 (baseline averages 2.0), CRP 2.26, D-dimer 0.88, fibrinogen 497. Blood cultures obtained in ED pending.    Patient's daughter states that her oxygen saturation stays in the 70s and that, due to problems with insurance, she has been unable to obtain home oxygen.     Time spent: 15 minutes    Maryjane Hinton, MSN, APRN, ACNPC-AG    Electronically signed by YASHIRA Turner, 09/30/22, 6:51 PM EDT.         History  Past Medical History:   Diagnosis Date   • Bleeding ulcer    • COPD (chronic obstructive pulmonary disease) (HCC)    • Coronary artery disease    • History of stomach ulcers    • Hypertension      History reviewed. No pertinent surgical history.  History reviewed. No pertinent family history.  Social History     Tobacco Use   • Smoking status: Former Smoker   • Smokeless tobacco: Never Used   Substance Use Topics   • Alcohol use:  "Yes       Review of Systems   Pertinent items are noted in HPI, all other systems reviewed and negative      Objective     Vital Signs  Temp:  [98.9 °F (37.2 °C)] 98.9 °F (37.2 °C)  Heart Rate:  [68-79] 68  Resp:  [18] 18  BP: (154-211)/() 154/96    Physical Exam:    Objective:  General Appearance:  Ill-appearing and uncomfortable.    Vital signs: (most recent): Blood pressure 154/96, pulse 68, temperature 98.9 °F (37.2 °C), temperature source Oral, resp. rate 18, height 172.7 cm (68\"), weight (!) 147 kg (325 lb), SpO2 94 %.    HEENT: (BiPAP mask in place)    Lungs:  There are rales and decreased breath sounds.  No wheezes or rhonchi.    Heart: Normal rate.  Regular rhythm.  S1 normal and S2 normal.  No murmur, gallop or friction rub.   Chest: Symmetric chest wall expansion.   Abdomen: Abdomen is soft and non-distended.  Bowel sounds are normal.   There is no abdominal tenderness.   There is no mass. There is no splenomegaly. There is no hepatomegaly.   Extremities: There is no deformity or dependent edema.    Neurological: Patient is alert and oriented to person, place and time.    Pupils:  Pupils are equal, round, and reactive to light.    Skin:  Warm and dry.              Results Review:    I reviewed the patient's new clinical results.  I reviewed the patient's new imaging results and agree with the interpretation.  I reviewed the patient's other test results and agree with the interpretation    Assessment & Plan     Assessment:    Active Hospital Problems    Diagnosis    • **Acute respiratory failure with hypoxia and hypercapnia (HCC)    • COVID-19 virus infection    • COPD    • Obesity, morbid, BMI 40.0-49.9 (HCC)    • CKD (chronic kidney disease) stage 3, GFR 30-59 ml/min (HCC)    • HTN (hypertension)        57-year-old female with a past medical history of COPD, hypertension, chronic kidney disease, anemia, and angioedema.  She has a BMI of 49.  She presents with a positive COVID test as well as " approximately a week of upper and lower respiratory symptoms as well as fever.  She has been exposed to COVID.  She has never received a COVID vaccination.    Her ABG is consistent with acute hypercapnic and hypoxic respiratory failure.  Chest x-ray reveals an enlarged heart as well as interstitial and vascular prominence.  I think she has a significant COVID infection.  She is at risk for thromboembolic phenomenon but she does not have any evidence of this in her lower extremities.  Her D-dimer is mildly elevated but this could be related to her systemic inflammation.  Her GFR is very low and I feel that she would be at risk for renal failure with a contrast infusion.    Plan:    1. ICU admission   2. IV Decadron  3. I think that the benefits of both remdesivir and Actemra outweigh the risks.  Renal function is marginal but acceptable.  I have discussed this with her.  4. Her D-dimer is mildly elevated but I am loath to perform a CT angiogram as I feel with her very low GFR this would put her at risk of renal failure.  We will dose her with Lovenox as her GFR and BMI are borderline but acceptable.  Will check lower extremity Dopplers.  I will check a noncontrast CT  5. Echocardiogram given her enlarged cardiac silhouette  6. Monitor renal function  7. BiPAP for now  8. Blood pressure control within parameters  9. I discussed the possibility of intubation with her.  She was reticent to have this done but would not commit to a full DNR status.  We will therefore maintain her as a full code.  She will continue to think about this and update us if she changes her mind.  Given her comorbidities, BMI, and lack of prior vaccination she is at high risk for significant morbidity and mortality with a severe COVID infection.    I discussed the patients findings and my recommendations with patient, nursing staff and Dr. Flores.     Critical Care time spent in direct patient care: 50 minutes (excluding procedure time, if  applicable) including high complexity decision making to assess, manipulate, and support vital organ system failure in this individual who has impairment of one or more vital organ systems such that there is a high probability of imminent or life threatening deterioration in the patient’s condition.    I have seen and examined patient, performing a face-to-face diagnostic evaluation with plan of care reviewed and developed with APRN and nursing staff. I have addended and modified the above history of present illness, physical examination, and assessment and plan to reflect my findings and impressions.    Abraham Dixon MD  Pulmonary and Critical Care Medicine                Electronically signed by Abraham Dixon MD at 09/30/22 2058       Emergency Department Notes    No notes of this type exist for this encounter.         Vital Signs (last day)     Date/Time Temp Temp src Pulse Resp BP Patient Position SpO2    10/01/22 0642 -- -- 78 -- 146/81 -- --    10/01/22 0630 -- -- 71 -- 161/76 -- 94    10/01/22 0600 -- -- 66 -- 148/84 -- 94    10/01/22 0556 -- -- 106 -- 158/89 -- --    10/01/22 0547 -- -- 105 -- 144/83 -- --    10/01/22 0529 -- -- 74 -- 146/83 -- 91    10/01/22 0510 -- -- 74 -- 145/89 -- 95    10/01/22 0505 -- -- 73 -- 151/88 -- 92    10/01/22 0500 -- -- 76 -- 150/88 -- 94    10/01/22 0455 -- -- 82 -- 125/94 -- 95    10/01/22 0452 -- -- 110 -- 147/93 -- --    10/01/22 0450 -- -- 79 -- 147/93 -- 93    10/01/22 0445 -- -- 79 -- 153/92 -- 94    10/01/22 0444 -- -- 84 -- 145/90 -- --    10/01/22 0442 -- -- -- -- -- -- 94    10/01/22 0440 -- -- 78 -- 145/90 -- 87    10/01/22 0439 -- -- 79 -- 141/86 -- --    10/01/22 0435 -- -- 80 -- 141/86 -- --    10/01/22 0432 -- -- 79 -- 150/90 -- --    10/01/22 0430 -- -- 79 -- 150/90 -- --    10/01/22 0427 -- -- 77 -- 160/96 -- --    10/01/22 0426 -- -- 76 -- 150/89 -- 95    10/01/22 0423 -- -- 77 -- 160/96 -- 95    10/01/22 0400 98.6 (16) Axillary 79 21  158/99 Lying 94    10/01/22 0346 -- -- 80 -- -- -- 95    10/01/22 0330 -- -- 81 -- 158/100 -- 91    10/01/22 0323 -- -- 73 -- 143/101 -- --    10/01/22 0318 -- -- 82 -- 161/96 -- --    10/01/22 0300 -- -- 72 -- 161/96 -- 95    10/01/22 0230 -- -- 76 -- 152/96 -- 95    10/01/22 0200 -- -- 75 -- 150/85 -- 94    10/01/22 0130 -- -- 77 -- 161/95 -- 94    10/01/22 0030 -- -- 73 -- 134/77 -- 92    10/01/22 0000 98.6 (37) Axillary 76 25 147/102 Lying 93    09/30/22 2338 -- -- 73 -- -- -- 95    09/30/22 2330 -- -- 80 -- 156/94 -- 96    09/30/22 2325 97 (36.1) Axillary 84 28 156/64 Lying 95    09/30/22 2049 -- -- 73 -- -- -- 95    09/30/22 2045 -- -- 73 -- 151/98 -- 93    09/30/22 2015 -- -- 68 -- 154/96 -- 94    09/30/22 1946 -- -- 70 -- -- -- 95    09/30/22 1945 -- -- 69 -- 161/106 -- 94    09/30/22 1915 -- -- 69 -- 163/102 -- 96    09/30/22 1900 -- -- 68 -- 169/105 -- 96    09/30/22 1857 -- -- 71 -- 174/109 -- --    09/30/22 1845 -- -- 73 -- 174/109 -- 98    09/30/22 1830 -- -- 71 -- 203/133 -- 97    09/30/22 1818 -- -- 72 -- 189/131 -- --    09/30/22 1815 -- -- -- -- -- -- 100    09/30/22 1800 -- -- -- -- 189/131 -- 100    09/30/22 1745 -- -- -- -- -- -- 100    09/30/22 1739 98.9 (37.2) Oral -- -- -- -- --    09/30/22 1726 -- -- 79 18 211/146 -- 95            Current Facility-Administered Medications   Medication Dose Route Frequency Provider Last Rate Last Admin   • acetaminophen (TYLENOL) tablet 650 mg  650 mg Oral Q6H PRN Vickie Swenson APRN   650 mg at 10/01/22 0549   • dexamethasone (DECADRON) IVPB 10 mg  10 mg Intravenous Daily Abraham Dixon MD   10 mg at 09/30/22 2220   • Enoxaparin Sodium (LOVENOX) syringe 40 mg  40 mg Subcutaneous Q12H Abraham Dixon MD   40 mg at 09/30/22 8214   • ipratropium-albuterol (DUO-NEB) nebulizer solution 3 mL  3 mL Nebulization Q6H - RT Abraham Dixon MD       • nitroglycerin (TRIDIL) 200 mcg/ml infusion  5-200 mcg/min Intravenous Titrated [place],  DO Olegario 39 mL/hr at 10/01/22 0652 130 mcg/min at 10/01/22 0652   • pantoprazole (PROTONIX) EC tablet 40 mg  40 mg Oral Daily Abraham Dixon MD   40 mg at 09/30/22 4021   • Pharmacy Consult - Remdesivir   Does not apply Continuous PRN Abraham Dixon MD       • Pharmacy to Dose Tocilizumab (COVID-19)   Does not apply Once Abraham Dixon MD       • remdesivir 100 mg in sodium chloride 0.9 % 250 mL IVPB (powder vial)  100 mg Intravenous Daily With Lunch Abraham Dixon MD       • sodium chloride 0.9 % flush 10 mL  10 mL Intravenous PRN Olegario Flores DO   10 mL at 09/30/22 2221       Lab Results (last 24 hours)     Procedure Component Value Units Date/Time    Urinalysis, Microscopic Only - Urine, Clean Catch [[id number]] Collected: 10/01/22 0434    Specimen: Urine, Clean Catch Updated: 10/01/22 0517     RBC, UA 0-2 /HPF      WBC, UA None Seen /HPF      Bacteria, UA None Seen /HPF      Squamous Epithelial Cells, UA 0-2 /HPF      Hyaline Casts, UA 0-6 /LPF      Methodology Automated Microscopy    Urinalysis With Microscopic If Indicated (No Culture) - Urine, Clean Catch [[id number]]  (Abnormal) Collected: 10/01/22 0434    Specimen: Urine, Clean Catch Updated: 10/01/22 0517     Color, UA Yellow     Appearance, UA Clear     pH, UA 5.5     Specific Gravity, UA 1.022     Glucose, UA Negative     Ketones, UA Trace     Bilirubin, UA Negative     Blood, UA Trace     Protein, UA >=300 mg/dL (3+)     Leuk Esterase, UA Negative     Nitrite, UA Negative     Urobilinogen, UA 0.2 E.U./dL    CBC & Differential [[id number]]  (Abnormal) Collected: 10/01/22 0215    Specimen: Blood Updated: 10/01/22 0314    Narrative:      The following orders were created for panel order CBC & Differential.  Procedure                               Abnormality         Status                     ---------                               -----------         ------                     CBC Auto Differential[[id number]]         Abnormal            Final result               Scan Slide[[id number]]                                                                    Please view results for these tests on the individual orders.    CBC Auto Differential [[id number]]  (Abnormal) Collected: 10/01/22 0215    Specimen: Blood Updated: 10/01/22 0314     WBC 4.31 10*3/mm3      RBC 3.53 10*6/mm3      Hemoglobin 9.4 g/dL      Hematocrit 35.1 %      MCV 99.4 fL      MCH 26.6 pg      MCHC 26.8 g/dL      RDW 17.4 %      RDW-SD 64.1 fl      MPV 10.1 fL      Platelets 289 10*3/mm3      Neutrophil % 94.7 %      Lymphocyte % 3.2 %      Monocyte % 1.2 %      Eosinophil % 0.0 %      Basophil % 0.2 %      Immature Grans % 0.7 %      Neutrophils, Absolute 4.08 10*3/mm3      Lymphocytes, Absolute 0.14 10*3/mm3      Monocytes, Absolute 0.05 10*3/mm3      Eosinophils, Absolute 0.00 10*3/mm3      Basophils, Absolute 0.01 10*3/mm3      Immature Grans, Absolute 0.03 10*3/mm3      nRBC 0.7 /100 WBC     Comprehensive Metabolic Panel [[id number]]  (Abnormal) Collected: 10/01/22 0215    Specimen: Blood Updated: 10/01/22 0307     Glucose 128 mg/dL      BUN 14 mg/dL      Creatinine 1.76 mg/dL      Sodium 144 mmol/L      Potassium 5.0 mmol/L      Chloride 108 mmol/L      CO2 26.0 mmol/L      Calcium 9.0 mg/dL      Total Protein 7.6 g/dL      Albumin 3.70 g/dL      ALT (SGPT) 16 U/L      AST (SGOT) 15 U/L      Alkaline Phosphatase 129 U/L      Total Bilirubin 0.3 mg/dL      Globulin 3.9 gm/dL      Comment: Calculated Result        A/G Ratio 0.9 g/dL      BUN/Creatinine Ratio 8.0     Anion Gap 10.0 mmol/L      eGFR 33.2 mL/min/1.73      Comment: National Kidney Foundation and American Society of Nephrology (ASN) Task Force recommended calculation based on the Chronic Kidney Disease Epidemiology Collaboration (CKD-EPI) equation refit without adjustment for race.       Narrative:      GFR Normal >60  Chronic Kidney Disease <60  Kidney Failure <15      Magnesium [[id number]]  (Normal)  Collected: 10/01/22 0215    Specimen: Blood Updated: 10/01/22 0307     Magnesium 2.2 mg/dL     Phosphorus [[id number]]  (Normal) Collected: 10/01/22 0215    Specimen: Blood Updated: 10/01/22 0307     Phosphorus 3.9 mg/dL     [place] Draw [[id number]] Collected: 09/30/22 1754    Specimen: Blood Updated: 09/30/22 2202    Narrative:      The following orders were created for panel order [place] Draw.  Procedure                               Abnormality         Status                     ---------                               -----------         ------                     Green Top (Gel)[[id number]]                                  Final result               Lavender Top[[id number]]                                     Final result               Gold Top - SST[[id number]]                                   Final result               Hill Top[[id number]]                                         Final result               Light Blue Top[[id number]]                                   Final result                 Please view results for these tests on the individual orders.    Gray Top [[id number]] Collected: 09/30/22 1754    Specimen: Blood Updated: 09/30/22 2202     Extra Tube Hold for add-ons.     Comment: Auto resulted.       Ferritin [[id number]]  (Normal) Collected: 09/30/22 1754    Specimen: Blood Updated: 09/30/22 1952     Ferritin 25.55 ng/mL     Narrative:      Results may be falsely decreased if patient taking Biotin.      Blood Culture - Blood, Arm, Right [[id number]] Collected: 09/30/22 1800    Specimen: Blood from Arm, Right Updated: 09/30/22 1912    Blood Culture - Blood, Arm, Right [[id number]] Collected: 09/30/22 1825    Specimen: Blood from Arm, Right Updated: 09/30/22 1912    Light Blue Top [[id number]] Collected: 09/30/22 1754    Specimen: Blood Updated: 09/30/22 1904     Extra Tube Hold for add-ons.     Comment: Auto resulted       Green Top (Gel) [[id number]] Collected: 09/30/22 1754    Specimen: Blood Updated:  09/30/22 1904     Extra Tube Hold for add-ons.     Comment: Auto resulted.       Lavender Top [[id number]] Collected: 09/30/22 1754    Specimen: Blood Updated: 09/30/22 1904     Extra Tube hold for add-on     Comment: Auto resulted       Gold Top - SST [[id number]] Collected: 09/30/22 1754    Specimen: Blood Updated: 09/30/22 1904     Extra Tube Hold for add-ons.     Comment: Auto resulted.       D-dimer, Quantitative [[id number]]  (Abnormal) Collected: 09/30/22 1754    Specimen: Blood Updated: 09/30/22 1856     D-Dimer, Quantitative 0.88 MCGFEU/mL     Narrative:      The D-Dimer assay test is to be used in conjunction with a clinical pretest probability (PTP) assessment model, and has been approved by the FDA to exclude pulmonary embolism (PE) and deep venous thrombosis (DVT) in outpatients suspected of PE or DVT, with a cutoff of 0.5 MCGFEU/mL.    Protime-INR [[id number]]  (Abnormal) Collected: 09/30/22 1754    Specimen: Blood Updated: 09/30/22 1856     Protime 14.7 Seconds      INR 1.16    Fibrinogen [[id number]]  (Abnormal) Collected: 09/30/22 1754    Specimen: Blood Updated: 09/30/22 1856     Fibrinogen 497 mg/dL     Lactate Dehydrogenase [[id number]]  (Normal) Collected: 09/30/22 1754    Specimen: Blood Updated: 09/30/22 1847      U/L     C-reactive Protein [[id number]]  (Abnormal) Collected: 09/30/22 1754    Specimen: Blood Updated: 09/30/22 1847     C-Reactive Protein 2.26 mg/dL     Procalcitonin [[id number]]  (Normal) Collected: 09/30/22 1754    Specimen: Blood Updated: 09/30/22 1837     Procalcitonin 0.11 ng/mL     Narrative:      As a Marker for Sepsis (Non-Neonates):    1. <0.5 ng/mL represents a low risk of severe sepsis and/or septic shock.  2. >2 ng/mL represents a high risk of severe sepsis and/or septic shock.    As a Marker for Lower Respiratory Tract Infections that require antibiotic therapy:    PCT on Admission    Antibiotic Therapy       6-12 Hrs later    >0.5                Strongly  "Recommended  >0.25 - <0.5        Recommended   0.1 - 0.25          Discouraged              Remeasure/reassess PCT  <0.1                Strongly Discouraged     Remeasure/reassess PCT    As 28 day mortality risk marker: \"Change in Procalcitonin Result\" (>80% or <=80%) if Day 0 (or Day 1) and Day 4 values are available. Refer to http://www.[facility]-pct-calculator.com    Change in PCT <=80%  A decrease of PCT levels below or equal to 80% defines a positive change in PCT test result representing a higher risk for 28-day all-cause mortality of patients diagnosed with severe sepsis for septic shock.    Change in PCT >80%  A decrease of PCT levels of more than 80% defines a negative change in PCT result representing a lower risk for 28-day all-cause mortality of patients diagnosed with severe sepsis or septic shock.       Comprehensive Metabolic Panel [[id number]]  (Abnormal) Collected: 09/30/22 1754    Specimen: Blood Updated: 09/30/22 1832     Glucose 113 mg/dL      BUN 15 mg/dL      Creatinine 1.73 mg/dL      Sodium 141 mmol/L      Potassium 4.2 mmol/L      Chloride 105 mmol/L      CO2 28.0 mmol/L      Calcium 9.3 mg/dL      Total Protein 7.8 g/dL      Albumin 4.00 g/dL      ALT (SGPT) 17 U/L      AST (SGOT) 17 U/L      Alkaline Phosphatase 140 U/L      Total Bilirubin 0.5 mg/dL      Globulin 3.8 gm/dL      Comment: Calculated Result        A/G Ratio 1.1 g/dL      BUN/Creatinine Ratio 8.7     Anion Gap 8.0 mmol/L      eGFR 33.9 mL/min/1.73      Comment: National Kidney Foundation and American Society of Nephrology (ASN) Task Force recommended calculation based on the Chronic Kidney Disease Epidemiology Collaboration (CKD-EPI) equation refit without adjustment for race.       Narrative:      GFR Normal >60  Chronic Kidney Disease <60  Kidney Failure <15      CBC & Differential [[id number]]  (Abnormal) Collected: 09/30/22 1754    Specimen: Blood Updated: 09/30/22 1830    Narrative:      The following orders were created for " panel order CBC & Differential.  Procedure                               Abnormality         Status                     ---------                               -----------         ------                     CBC Auto Differential[[id number]]        Abnormal            Final result               Scan Slide[[id number]]                                       Final result                 Please view results for these tests on the individual orders.    CBC Auto Differential [[id number]]  (Abnormal) Collected: 09/30/22 1754    Specimen: Blood Updated: 09/30/22 1830     WBC 4.60 10*3/mm3      RBC 3.68 10*6/mm3      Hemoglobin 9.9 g/dL      Hematocrit 35.7 %      MCV 97.0 fL      MCH 26.9 pg      MCHC 27.7 g/dL      RDW 17.7 %      RDW-SD 62.7 fl      MPV 10.3 fL      Platelets 301 10*3/mm3      Neutrophil % 76.2 %      Lymphocyte % 10.2 %      Monocyte % 11.1 %      Eosinophil % 1.7 %      Basophil % 0.4 %      Immature Grans % 0.4 %      Neutrophils, Absolute 3.50 10*3/mm3      Lymphocytes, Absolute 0.47 10*3/mm3      Monocytes, Absolute 0.51 10*3/mm3      Eosinophils, Absolute 0.08 10*3/mm3      Basophils, Absolute 0.02 10*3/mm3      Immature Grans, Absolute 0.02 10*3/mm3      nRBC 0.7 /100 WBC     Narrative:      Appended report. These results have been appended to a previously verified report.    Scan Slide [[id number]] Collected: 09/30/22 1754    Specimen: Blood Updated: 09/30/22 1830     Ovalocytes Slight/1+     WBC Morphology Normal     Platelet Morphology Normal    Lactic Acid, Plasma [[id number]]  (Normal) Collected: 09/30/22 1754    Specimen: Blood Updated: 09/30/22 1830     Lactate 1.1 mmol/L      Comment: Falsely depressed results may occur on samples drawn from patients receiving N-Acetylcysteine (NAC) or Metamizole.       Troponin [[id number]]  (Normal) Collected: 09/30/22 1754    Specimen: Blood Updated: 09/30/22 1830     Troponin T 0.018 ng/mL     Narrative:      Troponin T Reference Range:  <= 0.03 ng/mL-    Negative for AMI  >0.03 ng/mL-     Abnormal for myocardial necrosis.  Clinicians would have to utilize clinical acumen, EKG, Troponin and serial changes to determine if it is an Acute Myocardial Infarction or myocardial injury due to an underlying chronic condition.       Results may be falsely decreased if patient taking Biotin.      BNP [[id number]]  (Abnormal) Collected: 09/30/22 1754    Specimen: Blood Updated: 09/30/22 1830     proBNP 6,926.0 pg/mL     Narrative:      Among patients with dyspnea, NT-proBNP is highly sensitive for the detection of acute congestive heart failure. In addition NT-proBNP of <300 pg/ml effectively rules out acute congestive heart failure with 99% negative predictive value.    Results may be falsely decreased if patient taking Biotin.      Blood Gas, Arterial With Co-Ox [[id number]]  (Abnormal) Collected: 09/30/22 1742    Specimen: Arterial Blood Updated: 09/30/22 1743     Site Right Brachial     Stephen's Test N/A     pH, Arterial 7.242 pH units      Comment: 84 Value below reference range        pCO2, Arterial 67.0 mm Hg      Comment: 86 Value above critical limit        pO2, Arterial 160.0 mm Hg      Comment: 83 Value above reference range        HCO3, Arterial 28.8 mmol/L      Base Excess, Arterial 0.5 mmol/L      Hemoglobin, Blood Gas 9.8 g/dL      Comment: 84 Value below reference range        Hematocrit, Blood Gas 29.9 %      Oxyhemoglobin 97.9 %      Methemoglobin 0.20 %      Carboxyhemoglobin 1.5 %      CO2 Content 30.8 mmol/L      Temperature 37.0 C      Barometric Pressure for Blood Gas --     Comment: N/A        Modality NRB     FIO2 80 %      Rate 0 Breaths/minute      PIP 0 cmH2O      Comment: Meter: [id number]     :  [id number]        IPAP 0     EPAP 0     Note --     Notified Who DO CURTIS     Notified By [id number]     Notified Time 09/30/2022 17:41     pH, Temp Corrected 7.242 pH Units      pCO2, Temperature Corrected 67.0 mm Hg      pO2, Temperature Corrected  160 mm Hg         Imaging Results (Last 24 Hours)     Procedure Component Value Units Date/Time    CT Chest Without Contrast Diagnostic [[id number]] Collected: 10/01/22 0136     Updated: 10/01/22 0142    Narrative:      EXAMINATION: CT SCAN OF THE CHEST WITHOUT INTRAVENOUS CONTRAST    DATE OF EXAM: 10/1/2022 1:06 AM    HISTORY: Pneumonia, suspected complication. Covid positive, shortness of breath    COMPARISON: Chest radiograph dated August 18, 2022.    TECHNIQUE: CT examination of the chest was performed without intravenous contrast. CT dose lowering techniques were used, to include: automated exposure control, adjustment for patient size, and/or use of iterative reconstruction. 3D MIP reconstruction   was performed under concurrent supervision not requiring an independent workstation, in order to increase the sensitivity for detection of pulmonary nodules.    Note: The exam is limited because some types of pathology may not be adequately demonstrated due to lack of contrast enhancement.    FINDINGS:    CHEST:    Lungs/pleura:  Mild interlobular septal thickening most pronounced in the right apex. Bilateral pleural effusions, right greater than left. Atelectasis or consolidation right lung base.     Mediastinum And Cecilia: Trachea and central airways are patent. Visualized portion of the thyroid gland is normal. Esophagus is unremarkable. No mediastinal or hilar lymphadenopathy. Mildly prominent AP window node measures approximately 9 mm in short   axis, likely reactive. Calcified lymph nodes compatible with prior granulomatous infection.    Cardiovascular: Heart size is enlarged without significant pericardial fluid. Mediastinal vessels are normal in course and caliber. Atherosclerotic vascular calcifications of the descending thoracic aorta.    Chest Wall:  Normal.     Upper Abdomen: Granulomatous calcifications in the spleen. Subtle hyperdensity in the dependent gallbladder, likely sludge or  "stones.    MUSCULOSKELETAL: No acute osseous abnormality.        Impression:        1.  Findings may reflect edema or pneumonia.    2.  Cardiomegaly without significant pericardial fluid.    3.  Sludge and gallstones in the gallbladder.          Thank you for the referral of this patient. This exam was interpreted by an American Board of Radiology certified radiologist with subspecialty training in . If there are any questions regarding this exam please feel free to contact a radiologist   directly at [phone].      Slot    Electronically signed by:  Felicity Bynum D.O.    9/30/2022 11:40 PM Mountain Time    XR Chest 1 View [[id number]] Collected: 09/30/22 1955     Updated: 09/30/22 1959    Narrative:      XR CHEST 1 VW-     Date of Exam: 9/30/2022 7:15 PM     Indication: SOA triage protocol.     Comparison Exams: 08/18/2022     Technique: Single AP chest radiograph     FINDINGS:  The lungs are clear. The heart is enlarged. There is pulmonary vascular  congestion. The osseous structures appear intact.       Impression:      Cardiomegaly with pulmonary vascular congestion.     This report was finalized on 9/30/2022 7:56 PM by Issac Smith MD.           Orders (last 24 hrs)      Start     Ordered    10/01/22 1600  remdesivir 100 mg in sodium chloride 0.9 % 250 mL IVPB (powder vial)  Daily With Lunch        \"Followed by\" Linked Group Details    09/30/22 2050    10/01/22 0650  Inpatient Admission  Once         10/01/22 0649    10/01/22 0600  Document Pulse Oximetry - On Room Air / Home O2 Level  Daily      Comments: Reapply Oxygen if O2 Sat Drops Below 88%    09/30/22 1829    10/01/22 0600  CBC & Differential  Daily       09/30/22 1829    10/01/22 0600  Magnesium  Daily       09/30/22 1829    10/01/22 0600  Phosphorus  Daily       09/30/22 1829    10/01/22 0600  Comprehensive Metabolic Panel  Daily       09/30/22 2034    10/01/22 0600  CBC Auto Differential  PROCEDURE ONCE         09/30/22 2205    10/01/22 0516  " "Urinalysis, Microscopic Only - Urine, Clean Catch  Once         10/01/22 0515    10/01/22 0512  acetaminophen (TYLENOL) tablet 650 mg  Every 6 Hours PRN         10/01/22 0512    10/01/22 0305  Scan Slide  Once,   Status:  Canceled         10/01/22 0304    10/01/22 0000  Duplex Venous Lower Extremity - Bilateral CAR  Once         09/30/22 2056 09/30/22 2300  tocilizumab (ACTEMRA) 800 mg in sodium chloride 0.9 % 100 mL IVPB  Once         09/30/22 2110 09/30/22 2200  remdesivir 200 mg in sodium chloride 0.9 % 290 mL IVPB (powder vial)  Every 24 Hours        \"Followed by\" Linked Group Details    09/30/22 2050 09/30/22 2109  tocilizumab (ACTEMRA) injection 800 mg  Once,   Status:  Discontinued         09/30/22 2107 09/30/22 2106  pantoprazole (PROTONIX) EC tablet 40 mg  Daily         09/30/22 2057 09/30/22 2100  Enoxaparin Sodium (LOVENOX) syringe 40 mg  Every 12 Hours Scheduled         09/30/22 2048 09/30/22 2057  Diet Clear Liquid  Diet Effective Now         09/30/22 2057 09/30/22 2054  ipratropium-albuterol (DUO-NEB) nebulizer solution 3 mL  Every 6 Hours - RT         09/30/22 2052 09/30/22 2053  Pharmacy to Dose Tocilizumab (COVID-19)  Once         09/30/22 2051 09/30/22 2051  dexamethasone (DECADRON) IVPB 10 mg  Daily         09/30/22 2049 09/30/22 2050  Pharmacy Consult - Remdesivir  Continuous PRN         09/30/22 2050 09/30/22 2038  CT Chest Without Contrast Diagnostic  1 Time Imaging         09/30/22 2037 09/30/22 2038  ICU / CCU Bed Request ([facility] / [facility] ONLY)  Once         09/30/22 2038 09/30/22 1932  COVID PRE-OP / PRE-PROCEDURE SCREENING ORDER (NO ISOLATION) - Swab, Nasopharynx  Once,   Status:  Canceled         09/30/22 1932 09/30/22 1932  Respiratory Panel PCR w/COVID-19(SARS-CoV-2) [facility] In-House, NP Swab in UTM/VTM, 3-4 HR TAT - Swab, Nasopharynx  PROCEDURE ONCE,   Status:  Canceled         09/30/22 1932 09/30/22 1849  Adult Transthoracic " Echo Complete W/ Cont if Necessary Per Protocol  Once         09/30/22 1848    09/30/22 1829  Fibrinogen  STAT         09/30/22 1829    09/30/22 1829  D-dimer, Quantitative  STAT         09/30/22 1829 09/30/22 1829  C-reactive Protein  STAT         09/30/22 1829 09/30/22 1829  Procalcitonin  STAT,   Status:  Canceled         09/30/22 1829 09/30/22 1828  VTE Prophylaxis Not Indicated: Other: Patient Currently Anticoagulated / Receiving Prophylaxis  Once         09/30/22 1829    09/30/22 1828  Code Status and Medical Interventions:  Continuous         09/30/22 1829    09/30/22 1828  Lactate Dehydrogenase  STAT         09/30/22 1829 09/30/22 1828  Ferritin  STAT         09/30/22 1829 09/30/22 1828  Protime-INR  STAT         09/30/22 1829 09/30/22 1827  Intake & Output  Every Shift       09/30/22 1829 09/30/22 1827  Weigh Patient  Once         09/30/22 1829 09/30/22 1827  Notify Provider (Specify Parameters)  Until Discontinued         09/30/22 1829 09/30/22 1812  Scan Slide  Once         09/30/22 1811    09/30/22 1758  Urinalysis With Microscopic If Indicated (No Culture) - Urine, Clean Catch  Once         09/30/22 1757    09/30/22 1757  Blood Culture - Blood, Arm, Right  Once         09/30/22 1756    09/30/22 1757  Blood Culture - Blood, Arm, Right  Once         09/30/22 1756    09/30/22 1756  NIPPV (CPAP or BIPAP)  Until Discontinued         09/30/22 1755    09/30/22 1743  Blood Gas, Arterial With Co-Ox  PROCEDURE ONCE         09/30/22 1742    09/30/22 1732  albuterol sulfate HFA (PROVENTIL HFA;VENTOLIN HFA;PROAIR HFA) inhaler 2 puff  Once         09/30/22 1730    09/30/22 1732  methylPREDNISolone sodium succinate (SOLU-Medrol) injection 125 mg  Once         09/30/22 1730    09/30/22 1732  nitroglycerin (TRIDIL) 200 mcg/ml infusion  Titrated         09/30/22 1730    09/30/22 1728  Procalcitonin  STAT         09/30/22 1727    09/30/22 1728  Lactic Acid, Plasma  STAT         09/30/22 1727     09/30/22 1727  Blood Gas, Arterial -With Co-Ox Panel: Yes  STAT         09/30/22 1726    09/30/22 1726  CT Angiogram Chest  1 Time Imaging,   Status:  Canceled         09/30/22 1726    09/30/22 1723  NPO Diet NPO Type: Strict NPO  Diet Effective Now,   Status:  Canceled         09/30/22 1722    09/30/22 1723  Undress & Gown  Once         09/30/22 1722    09/30/22 1723  Cardiac Monitoring  Continuous         09/30/22 1722    09/30/22 1723  Continuous Pulse Oximetry  Continuous         09/30/22 1722    09/30/22 1723  Vital Signs  Per Hospital Policy         09/30/22 1722    09/30/22 1723  ECG 12 Lead  Once         09/30/22 1722    09/30/22 1723  XR Chest 1 View  1 Time Imaging         09/30/22 1722    09/30/22 1723  Insert Peripheral IV  Once         09/30/22 1722    09/30/22 1723  [place] Draw  Once         09/30/22 1722    09/30/22 1723  CBC & Differential  Once         09/30/22 1722    09/30/22 1723  Comprehensive Metabolic Panel  Once         09/30/22 1722    09/30/22 1723  BNP  Once         09/30/22 1722    09/30/22 1723  Troponin  Once         09/30/22 1722    09/30/22 1723  Green Top (Gel)  PROCEDURE ONCE         09/30/22 1722    09/30/22 1723  Lavender Top  PROCEDURE ONCE         09/30/22 1722    09/30/22 1723  Gold Top - SST  PROCEDURE ONCE         09/30/22 1722    09/30/22 1723  Hill Top  PROCEDURE ONCE         09/30/22 1722    09/30/22 1723  Light Blue Top  PROCEDURE ONCE         09/30/22 1722    09/30/22 1723  CBC Auto Differential  PROCEDURE ONCE         09/30/22 1722    09/30/22 1722  sodium chloride 0.9 % flush 10 mL  As Needed         09/30/22 1722    07/18/22 0000  famotidine (PEPCID) 20 MG tablet  2 Times Daily         10/01/22 0447    07/18/22 0000  budesonide-formoterol (SYMBICORT) 80-4.5 MCG/ACT inhaler  2 Times Daily         10/01/22 4132    Unscheduled  Oxygen Therapy- Nasal Cannula; 2 LPM; Titrate for SPO2: equal to or greater than, 92%  Continuous PRN       09/30/22 5441                 Physician Progress Notes (last 24 hours)  Notes from 09/30/22 0802 through 10/01/22 0802   No notes of this type exist for this encounter.         Consult Notes (last 24 hours)  Notes from 09/30/22 0802 through 10/01/22 0802   No notes of this type exist for this encounter.

## 2022-10-01 NOTE — PLAN OF CARE
Goal Outcome Evaluation:  Plan of Care Reviewed With: patient        Progress: no change  Outcome Evaluation: Pt arrived from ED around midnight. O2 > 94% on 40% BiPap. Continuing to titrate up on the Nitro gtt to keep SBP < 140. Pt has rested well throughout the night. No complaints of pain or SOA. UOP adequate.

## 2022-10-02 LAB
ALBUMIN SERPL-MCNC: 3.6 G/DL (ref 3.5–5.2)
ALBUMIN/GLOB SERPL: 1.1 G/DL
ALP SERPL-CCNC: 108 U/L (ref 39–117)
ALT SERPL W P-5'-P-CCNC: 11 U/L (ref 1–33)
ANION GAP SERPL CALCULATED.3IONS-SCNC: 8 MMOL/L (ref 5–15)
ANISOCYTOSIS BLD QL: NORMAL
AST SERPL-CCNC: 10 U/L (ref 1–32)
BASOPHILS # BLD AUTO: 0.01 10*3/MM3 (ref 0–0.2)
BASOPHILS NFR BLD AUTO: 0.4 % (ref 0–1.5)
BH CV LOWER VASCULAR LEFT COMMON FEMORAL AUGMENT: NORMAL
BH CV LOWER VASCULAR LEFT COMMON FEMORAL COMPRESS: NORMAL
BH CV LOWER VASCULAR LEFT COMMON FEMORAL PHASIC: NORMAL
BH CV LOWER VASCULAR LEFT COMMON FEMORAL SPONT: NORMAL
BH CV LOWER VASCULAR LEFT DISTAL FEMORAL AUGMENT: NORMAL
BH CV LOWER VASCULAR LEFT DISTAL FEMORAL COMPRESS: NORMAL
BH CV LOWER VASCULAR LEFT DISTAL FEMORAL PHASIC: NORMAL
BH CV LOWER VASCULAR LEFT DISTAL FEMORAL SPONT: NORMAL
BH CV LOWER VASCULAR LEFT GASTRONEMIUS COMPRESS: NORMAL
BH CV LOWER VASCULAR LEFT GREATER SAPH AK COMPRESS: NORMAL
BH CV LOWER VASCULAR LEFT GREATER SAPH BK COMPRESS: NORMAL
BH CV LOWER VASCULAR LEFT LESSER SAPH COMPRESS: NORMAL
BH CV LOWER VASCULAR LEFT MID FEMORAL AUGMENT: NORMAL
BH CV LOWER VASCULAR LEFT MID FEMORAL COMPRESS: NORMAL
BH CV LOWER VASCULAR LEFT MID FEMORAL PHASIC: NORMAL
BH CV LOWER VASCULAR LEFT MID FEMORAL SPONT: NORMAL
BH CV LOWER VASCULAR LEFT PERONEAL COMPRESS: NORMAL
BH CV LOWER VASCULAR LEFT POPLITEAL AUGMENT: NORMAL
BH CV LOWER VASCULAR LEFT POPLITEAL COMPRESS: NORMAL
BH CV LOWER VASCULAR LEFT POPLITEAL PHASIC: NORMAL
BH CV LOWER VASCULAR LEFT POPLITEAL SPONT: NORMAL
BH CV LOWER VASCULAR LEFT POSTERIOR TIBIAL COMPRESS: NORMAL
BH CV LOWER VASCULAR LEFT PROFUNDA FEMORAL AUGMENT: NORMAL
BH CV LOWER VASCULAR LEFT PROFUNDA FEMORAL COMPRESS: NORMAL
BH CV LOWER VASCULAR LEFT PROFUNDA FEMORAL PHASIC: NORMAL
BH CV LOWER VASCULAR LEFT PROFUNDA FEMORAL SPONT: NORMAL
BH CV LOWER VASCULAR LEFT PROXIMAL FEMORAL AUGMENT: NORMAL
BH CV LOWER VASCULAR LEFT PROXIMAL FEMORAL COMPRESS: NORMAL
BH CV LOWER VASCULAR LEFT PROXIMAL FEMORAL PHASIC: NORMAL
BH CV LOWER VASCULAR LEFT PROXIMAL FEMORAL SPONT: NORMAL
BH CV LOWER VASCULAR LEFT SAPHENOFEMORAL JUNCTION AUGMENT: NORMAL
BH CV LOWER VASCULAR LEFT SAPHENOFEMORAL JUNCTION COMPRESS: NORMAL
BH CV LOWER VASCULAR LEFT SAPHENOFEMORAL JUNCTION PHASIC: NORMAL
BH CV LOWER VASCULAR LEFT SAPHENOFEMORAL JUNCTION SPONT: NORMAL
BH CV LOWER VASCULAR LEFT SOLEAL COMPRESS: NORMAL
BH CV LOWER VASCULAR RIGHT COMMON FEMORAL AUGMENT: NORMAL
BH CV LOWER VASCULAR RIGHT COMMON FEMORAL COMPRESS: NORMAL
BH CV LOWER VASCULAR RIGHT COMMON FEMORAL PHASIC: NORMAL
BH CV LOWER VASCULAR RIGHT COMMON FEMORAL SPONT: NORMAL
BH CV LOWER VASCULAR RIGHT DISTAL FEMORAL AUGMENT: NORMAL
BH CV LOWER VASCULAR RIGHT DISTAL FEMORAL COMPRESS: NORMAL
BH CV LOWER VASCULAR RIGHT DISTAL FEMORAL PHASIC: NORMAL
BH CV LOWER VASCULAR RIGHT DISTAL FEMORAL SPONT: NORMAL
BH CV LOWER VASCULAR RIGHT GASTRONEMIUS COMPRESS: NORMAL
BH CV LOWER VASCULAR RIGHT GREATER SAPH AK COMPRESS: NORMAL
BH CV LOWER VASCULAR RIGHT GREATER SAPH BK COMPRESS: NORMAL
BH CV LOWER VASCULAR RIGHT LESSER SAPH COMPRESS: NORMAL
BH CV LOWER VASCULAR RIGHT MID FEMORAL AUGMENT: NORMAL
BH CV LOWER VASCULAR RIGHT MID FEMORAL COMPRESS: NORMAL
BH CV LOWER VASCULAR RIGHT MID FEMORAL PHASIC: NORMAL
BH CV LOWER VASCULAR RIGHT MID FEMORAL SPONT: NORMAL
BH CV LOWER VASCULAR RIGHT PERONEAL COMPRESS: NORMAL
BH CV LOWER VASCULAR RIGHT POPLITEAL AUGMENT: NORMAL
BH CV LOWER VASCULAR RIGHT POPLITEAL COMPRESS: NORMAL
BH CV LOWER VASCULAR RIGHT POPLITEAL PHASIC: NORMAL
BH CV LOWER VASCULAR RIGHT POPLITEAL SPONT: NORMAL
BH CV LOWER VASCULAR RIGHT POSTERIOR TIBIAL COMPRESS: NORMAL
BH CV LOWER VASCULAR RIGHT PROFUNDA FEMORAL AUGMENT: NORMAL
BH CV LOWER VASCULAR RIGHT PROFUNDA FEMORAL COMPRESS: NORMAL
BH CV LOWER VASCULAR RIGHT PROFUNDA FEMORAL PHASIC: NORMAL
BH CV LOWER VASCULAR RIGHT PROFUNDA FEMORAL SPONT: NORMAL
BH CV LOWER VASCULAR RIGHT PROXIMAL FEMORAL AUGMENT: NORMAL
BH CV LOWER VASCULAR RIGHT PROXIMAL FEMORAL COMPRESS: NORMAL
BH CV LOWER VASCULAR RIGHT PROXIMAL FEMORAL PHASIC: NORMAL
BH CV LOWER VASCULAR RIGHT PROXIMAL FEMORAL SPONT: NORMAL
BH CV LOWER VASCULAR RIGHT SAPHENOFEMORAL JUNCTION AUGMENT: NORMAL
BH CV LOWER VASCULAR RIGHT SAPHENOFEMORAL JUNCTION COMPRESS: NORMAL
BH CV LOWER VASCULAR RIGHT SAPHENOFEMORAL JUNCTION PHASIC: NORMAL
BH CV LOWER VASCULAR RIGHT SAPHENOFEMORAL JUNCTION SPONT: NORMAL
BH CV LOWER VASCULAR RIGHT SOLEAL COMPRESS: NORMAL
BILIRUB SERPL-MCNC: 0.3 MG/DL (ref 0–1.2)
BUN SERPL-MCNC: 18 MG/DL (ref 6–20)
BUN/CREAT SERPL: 11.6 (ref 7–25)
CALCIUM SPEC-SCNC: 9.1 MG/DL (ref 8.6–10.5)
CHLORIDE SERPL-SCNC: 106 MMOL/L (ref 98–107)
CO2 SERPL-SCNC: 28 MMOL/L (ref 22–29)
CREAT SERPL-MCNC: 1.55 MG/DL (ref 0.57–1)
DEPRECATED RDW RBC AUTO: 63.5 FL (ref 37–54)
EGFRCR SERPLBLD CKD-EPI 2021: 38.7 ML/MIN/1.73
EOSINOPHIL # BLD AUTO: 0 10*3/MM3 (ref 0–0.4)
EOSINOPHIL NFR BLD AUTO: 0 % (ref 0.3–6.2)
ERYTHROCYTE [DISTWIDTH] IN BLOOD BY AUTOMATED COUNT: 17.5 % (ref 12.3–15.4)
GLOBULIN UR ELPH-MCNC: 3.3 GM/DL
GLUCOSE SERPL-MCNC: 86 MG/DL (ref 65–99)
HCT VFR BLD AUTO: 33.2 % (ref 34–46.6)
HGB BLD-MCNC: 9 G/DL (ref 12–15.9)
IMM GRANULOCYTES # BLD AUTO: 0.04 10*3/MM3 (ref 0–0.05)
IMM GRANULOCYTES NFR BLD AUTO: 1.4 % (ref 0–0.5)
LYMPHOCYTES # BLD AUTO: 0.46 10*3/MM3 (ref 0.7–3.1)
LYMPHOCYTES NFR BLD AUTO: 16.5 % (ref 19.6–45.3)
MAGNESIUM SERPL-MCNC: 2 MG/DL (ref 1.6–2.6)
MAXIMAL PREDICTED HEART RATE: 162 BPM
MCH RBC QN AUTO: 26.8 PG (ref 26.6–33)
MCHC RBC AUTO-ENTMCNC: 27.1 G/DL (ref 31.5–35.7)
MCV RBC AUTO: 98.8 FL (ref 79–97)
MONOCYTES # BLD AUTO: 0.46 10*3/MM3 (ref 0.1–0.9)
MONOCYTES NFR BLD AUTO: 16.5 % (ref 5–12)
NEUTROPHILS NFR BLD AUTO: 1.81 10*3/MM3 (ref 1.7–7)
NEUTROPHILS NFR BLD AUTO: 65.2 % (ref 42.7–76)
NRBC BLD AUTO-RTO: 1.8 /100 WBC (ref 0–0.2)
OVALOCYTES BLD QL SMEAR: NORMAL
PHOSPHATE SERPL-MCNC: 3.2 MG/DL (ref 2.5–4.5)
PLAT MORPH BLD: NORMAL
PLATELET # BLD AUTO: 301 10*3/MM3 (ref 140–450)
PMV BLD AUTO: 10.7 FL (ref 6–12)
POTASSIUM SERPL-SCNC: 4.4 MMOL/L (ref 3.5–5.2)
PROT SERPL-MCNC: 6.9 G/DL (ref 6–8.5)
RBC # BLD AUTO: 3.36 10*6/MM3 (ref 3.77–5.28)
SODIUM SERPL-SCNC: 142 MMOL/L (ref 136–145)
SODIUM UR-SCNC: 67 MMOL/L
STRESS TARGET HR: 138 BPM
WBC MORPH BLD: NORMAL
WBC NRBC COR # BLD: 2.78 10*3/MM3 (ref 3.4–10.8)

## 2022-10-02 PROCEDURE — 94799 UNLISTED PULMONARY SVC/PX: CPT

## 2022-10-02 PROCEDURE — 99232 SBSQ HOSP IP/OBS MODERATE 35: CPT | Performed by: INTERNAL MEDICINE

## 2022-10-02 PROCEDURE — 94761 N-INVAS EAR/PLS OXIMETRY MLT: CPT

## 2022-10-02 PROCEDURE — 85007 BL SMEAR W/DIFF WBC COUNT: CPT | Performed by: NURSE PRACTITIONER

## 2022-10-02 PROCEDURE — 94664 DEMO&/EVAL PT USE INHALER: CPT

## 2022-10-02 PROCEDURE — 25010000002 DEXAMETHASONE SODIUM PHOSPHATE 100 MG/10ML SOLUTION: Performed by: INTERNAL MEDICINE

## 2022-10-02 PROCEDURE — 83735 ASSAY OF MAGNESIUM: CPT | Performed by: NURSE PRACTITIONER

## 2022-10-02 PROCEDURE — 85025 COMPLETE CBC W/AUTO DIFF WBC: CPT | Performed by: NURSE PRACTITIONER

## 2022-10-02 PROCEDURE — 84100 ASSAY OF PHOSPHORUS: CPT | Performed by: NURSE PRACTITIONER

## 2022-10-02 PROCEDURE — 25010000002 REMDESIVIR 100 MG/20ML SOLUTION 1 EACH VIAL: Performed by: INTERNAL MEDICINE

## 2022-10-02 PROCEDURE — 80053 COMPREHEN METABOLIC PANEL: CPT | Performed by: INTERNAL MEDICINE

## 2022-10-02 PROCEDURE — 84300 ASSAY OF URINE SODIUM: CPT | Performed by: INTERNAL MEDICINE

## 2022-10-02 PROCEDURE — 25010000002 ENOXAPARIN PER 10 MG: Performed by: INTERNAL MEDICINE

## 2022-10-02 RX ORDER — ALBUTEROL SULFATE 90 UG/1
2 AEROSOL, METERED RESPIRATORY (INHALATION)
Status: DISCONTINUED | OUTPATIENT
Start: 2022-10-02 | End: 2022-10-04 | Stop reason: HOSPADM

## 2022-10-02 RX ORDER — CARVEDILOL 3.12 MG/1
3.12 TABLET ORAL 2 TIMES DAILY WITH MEALS
Status: DISCONTINUED | OUTPATIENT
Start: 2022-10-02 | End: 2022-10-04 | Stop reason: HOSPADM

## 2022-10-02 RX ORDER — TRIAMTERENE AND HYDROCHLOROTHIAZIDE 37.5; 25 MG/1; MG/1
1 TABLET ORAL DAILY
Status: DISCONTINUED | OUTPATIENT
Start: 2022-10-03 | End: 2022-10-03

## 2022-10-02 RX ORDER — NIFEDIPINE 30 MG/1
30 TABLET, EXTENDED RELEASE ORAL
Status: DISCONTINUED | OUTPATIENT
Start: 2022-10-02 | End: 2022-10-04 | Stop reason: HOSPADM

## 2022-10-02 RX ADMIN — ALBUTEROL SULFATE 2 PUFF: 90 AEROSOL, METERED RESPIRATORY (INHALATION) at 19:53

## 2022-10-02 RX ADMIN — PANTOPRAZOLE SODIUM 40 MG: 40 TABLET, DELAYED RELEASE ORAL at 09:10

## 2022-10-02 RX ADMIN — ALBUTEROL SULFATE 2 PUFF: 90 AEROSOL, METERED RESPIRATORY (INHALATION) at 15:59

## 2022-10-02 RX ADMIN — ALBUTEROL SULFATE 2 PUFF: 90 AEROSOL, METERED RESPIRATORY (INHALATION) at 13:18

## 2022-10-02 RX ADMIN — TIOTROPIUM BROMIDE INHALATION SPRAY 2 PUFF: 3.12 SPRAY, METERED RESPIRATORY (INHALATION) at 08:16

## 2022-10-02 RX ADMIN — Medication 10 ML: at 20:35

## 2022-10-02 RX ADMIN — CARVEDILOL 3.12 MG: 3.12 TABLET, FILM COATED ORAL at 18:03

## 2022-10-02 RX ADMIN — CARVEDILOL 3.12 MG: 3.12 TABLET, FILM COATED ORAL at 09:09

## 2022-10-02 RX ADMIN — ALBUTEROL SULFATE 2 PUFF: 90 AEROSOL, METERED RESPIRATORY (INHALATION) at 08:17

## 2022-10-02 RX ADMIN — ENOXAPARIN SODIUM 40 MG: 40 INJECTION SUBCUTANEOUS at 09:09

## 2022-10-02 RX ADMIN — ENOXAPARIN SODIUM 40 MG: 40 INJECTION SUBCUTANEOUS at 20:35

## 2022-10-02 RX ADMIN — REMDESIVIR 100 MG: 100 INJECTION, POWDER, LYOPHILIZED, FOR SOLUTION INTRAVENOUS at 12:43

## 2022-10-02 RX ADMIN — NIFEDIPINE 30 MG: 30 TABLET, FILM COATED, EXTENDED RELEASE ORAL at 09:09

## 2022-10-02 RX ADMIN — DEXAMETHASONE SODIUM PHOSPHATE 10 MG: 10 INJECTION, SOLUTION INTRAMUSCULAR; INTRAVENOUS at 09:10

## 2022-10-02 NOTE — PLAN OF CARE
Goal Outcome Evaluation:  Plan of Care Reviewed With: patient        Progress: improving  Outcome Evaluation: VSS. Weaned pt to 1L NC. Up to chair for 4 hours. Up with standby assistance. Diet advanced to regular cardiac. UOP adequate. BM x1.

## 2022-10-02 NOTE — PROGRESS NOTES
Intensivist Note     10/2/2022  Hospital Day: 2  * No surgery found *  ICU Stays Timeline            Hospital Admission: 09/30/22 1722 - Current  ICU stays: 1      In Date/Time Event Department ICU Stay Duration     09/30/22 1722 Admission  [facility]      09/30/22 2320 Transfer In [facility] 2A ICU 1 day 15 hours 25 minutes             Ms. Sonia Bella, 57 y.o. female is followed for:    COVID-19 virus infection    COPD    A/C mixed hypoxemic/hypercarbic respiratory failure (HCC)    HTN (hypertension)    CKD (chronic kidney disease) stage 3, GFR 30-59 ml/min ([facility])    Obesity, morbid, BMI 40.0-49.9 ([facility])       SUBJECTIVE     57-year-old unvaccinated white female with PMH COPD and hypertension.  Has apparently been on oxygen in the past but her insurance company would no longer pay for it so she has been without for quite some time.  Her daughter states that at times her O2 saturation at home will drop into the 70s.  Patient presented to [facility] ER 9/30/2022 complaining of dyspnea, cough, hypoxemia.  Apparently she was exposed to numerous family members with COVID over the past 2 weeks.  Patient began to note increased cough, dyspnea, and fever for the past week and went for COVID testing which was positive the day of admission 9/30/2022.  While at the testing center her pulse ox was checked and saturations were in the 60s and it was recommended she be taken to [facility] immediately for evaluation.  In the ER ABGs revealed a pH 7.24, PCO2 67, PO2 of 160 on an unknown amount of oxygen.  Pertinent labs included hematocrit 35.7, WBC 4.6, sodium 141, potassium 4.2, chloride 105, bicarb 28, BUN 15, creatinine 1.73, glucose 113.  LDH was 188 troponin was 0.018, and proBNP was 6,926,  Procalcitonin was 0.11, CRP 2.26, lactate 1.1, D-dimer 0.88, fibrinogen 497, INR 1.13, and proBNP 6,926.  CXR Revealed marked cardiomegaly with pulmonary vascular congestion.  Patient was admitted and placed on Decadron, remdesivir, and  "Actemra.  The possibility of occult PE was in the differential, but because of her renal impairment it was decided not to proceed with CT angiogram.  Instead she underwent lower extremity venous duplex and a noncontrast CT of the chest.  The duplex was negative for DVT, echocardiogram was normal, and CT of the chest did not reveal consolidative infiltrate although did reveal some atelectasis in the bases right greater than left.     Interval history: Patient had an uneventful evening she rested well on BiPAP with an FiO2 of 45%.  She had 1 episode in which she dropped her sats into the 70s but rapidly recovered and was asymptomatic.  Today she is much improved and on 2 L her O2 sat is 92% and she denies dyspnea.  She denies chest pain, cough, purulent sputum production, hemoptysis, or pleuritic pain.  No nausea, vomiting, diarrhea, melena, hematochezia, or hematemesis.  Denies difficulty voiding but has a external urinary collection device in place.  UOP is adequate with 800 cc and BUNs/creatinine continue to improve and today are 18/1.55 (creatinine yesterday was 1.76).  Patient afebrile but WBC down to 2.78 (despite daily Decadron).      ROS: Per subjective, all other systems reviewed and were negative.    The patient's relevant PMH, PSH, FH, and SH were reviewed and updated in Epic as appropriate. Allergies and Medications reviewed.    OBJECTIVE     /81 (BP Location: Left arm, Patient Position: Lying)   Pulse 77   Temp 98.5 °F (36.9 °C) (Axillary)   Resp 16   Ht 170 cm (66.93\")   Wt (!) 137 kg (302 lb)   SpO2 93%   BMI 47.40 kg/m²   Oxygen Concentration (%): 35  Flow (L/min): 2    Flowsheet Rows    Flowsheet Row First Filed Value   Admission Height 172.7 cm (68\") Documented at 09/30/2022 1724   Admission Weight 147 kg (325 lb) Documented at 09/30/2022 1724        Intake & Output (last day)       10/01 0701  10/02 0700 10/02 0701  10/03 0700    P.O. 240 240    I.V. (mL/kg) 281.3 (2.1)     IV Piggyback " 50     Total Intake(mL/kg) 571.3 (4.2) 240 (1.8)    Urine (mL/kg/hr) 800 (0.2) 400 (0.4)    Stool 0     Total Output 800 400    Net -228.7 -160          Urine Unmeasured Occurrence 1 x 1 x    Stool Unmeasured Occurrence 1 x           Exam:  General Exam:  Well-developed female in good spirits. No acute distress.  HEENT: Pupils equal and reactive. Nose and throat clear.  Neck:                          Supple, no JVD, thyromegaly, or adenopathy  Lungs: Clear to auscultation and percussion anteriorly and posteriorly.  Cardiovascular: Regular rate and rhythm without murmurs or gallops.  Abdomen: Soft nontender without organomegaly or masses.   and rectal: Deferred.  Extremities: No cyanosis clubbing edema.  Neurologic:                 Symmetric strength. No focal deficits.      Chest X-Ray: No film today    INFUSIONS  nitroglycerin, 5-200 mcg/min, Last Rate: Stopped (10/01/22 1600)        Results from last 7 days   Lab Units 10/02/22  0504 10/01/22  0215 09/30/22  1754   WBC 10*3/mm3 2.78* 4.31 4.60   HEMOGLOBIN g/dL 9.0* 9.4* 9.9*   HEMATOCRIT % 33.2* 35.1 35.7   PLATELETS 10*3/mm3 301 289 301     Results from last 7 days   Lab Units 10/02/22  0504 10/01/22  0215   SODIUM mmol/L 142 144   POTASSIUM mmol/L 4.4 5.0   CHLORIDE mmol/L 106 108*   CO2 mmol/L 28.0 26.0   BUN mg/dL 18 14   CREATININE mg/dL 1.55* 1.76*   GLUCOSE mg/dL 86 128*   CALCIUM mg/dL 9.1 9.0     Results from last 7 days   Lab Units 10/02/22  0504 10/01/22  0215   MAGNESIUM mg/dL 2.0 2.2   PHOSPHORUS mg/dL 3.2 3.9     Results from last 7 days   Lab Units 10/02/22  0504 10/01/22  0215 09/30/22  1754   ALK PHOS U/L 108 129* 140*   BILIRUBIN mg/dL 0.3 0.3 0.5   ALT (SGPT) U/L 11 16 17   AST (SGOT) U/L 10 15 17       Lab Results   Component Value Date    SEDRATE 73 (H) 08/18/2022       Lab Results   Component Value Date    PROBNP 6,926.0 (H) 09/30/2022         Procalitonin Results:      Lab 09/30/22  1754   PROCALCITONIN 0.11         Covid Tests    Common  Labsle 8/18/22   COVID19 Not Detected            COVID LABS:  Results From Last 14 Days   Lab Units 09/30/22  1754   PROBNP pg/mL 6,926.0*   CRP mg/dL 2.26*   D DIMER QUANT MCGFEU/mL 0.88*   FERRITIN ng/mL 25.55   LACTATE mmol/L 1.1   LDH U/L 188   PROCALCITONIN ng/mL 0.11   PROTIME Seconds 14.7*   INR  1.16*   TROPONIN T ng/mL 0.018          Lab Results   Component Value Date    TROPONINT 0.018 09/30/2022     Lab Results   Component Value Date    TSH 0.338 08/19/2022     Lab Results   Component Value Date    LACTATE 1.1 09/30/2022     No results found for: CORTISOL    Results from last 7 days   Lab Units 09/30/22  1742   PH, ARTERIAL pH units 7.242*   PCO2, ARTERIAL mm Hg 67.0*   PO2 ART mm Hg 160.0*   HCO3 ART mmol/L 28.8*   FIO2 % 80       I reviewed the patient's results, images and medication.    Assessment & Plan   ASSESSMENT        COVID-19 virus infection    COPD    A/C mixed hypoxemic/hypercarbic respiratory failure (HCC)    HTN (hypertension)    CKD (chronic kidney disease) stage 3, GFR 30-59 ml/min (HCC)    Obesity, morbid, BMI 40.0-49.9 ([facility])      DISCUSSION: Doing well with respect to oxygenation and only requiring 2 L of nasal oxygen now.  I am concerned about the drop in her white count.  It could be due to COVID-19 and associated treatment.  Only has 1 more day of remdesivir    PLAN     Continue same  Okay to transfer to telemetry and will ask the hospitalists to assume attending role  Follow-up on low WBC  Repeat a baseline ABG tomorrow    Plan of care and goals reviewed with multidisciplinary team at daily rounds.    I discussed the patient's findings and my recommendations with patient and nursing staff    Time spent Critical care 20 min (It does not include procedure time).    Electronically signed by Jerel Yoder MD, 10/02/22, 2:45 PM EDT.   Pulmonary / Critical care medicine

## 2022-10-02 NOTE — PLAN OF CARE
Problem: Adult Inpatient Plan of Care  Goal: Plan of Care Review  Outcome: Ongoing, Progressing  Flowsheets (Taken 10/2/2022 0526)  Progress: improving  Outcome Evaluation: VSS. Pt rested well through out the night on the BiPap at 45%. Patient had one episode where her sats dropped to the 70's, but was able to recover. UOP adequate.     Problem: Adult Inpatient Plan of Care  Goal: Plan of Care Review  Outcome: Ongoing, Progressing  Flowsheets (Taken 10/2/2022 0526)  Progress: improving  Outcome Evaluation: VSS. Pt rested well through out the night on the BiPap at 45%. Patient had one episode where her sats dropped to the 70's, but was able to recover. UOP adequate.   Goal Outcome Evaluation:           Progress: improving  Outcome Evaluation: VSS. Pt rested well through out the night on the BiPap at 45%. Patient had one episode where her sats dropped to the 70's, but was able to recover. UOP adequate.

## 2022-10-02 NOTE — CASE MANAGEMENT/SOCIAL WORK
Discharge Planning Assessment  [facility]     Patient Name: Sonia Bella  MRN: [id number]  Today's Date: 10/2/2022    Admit Date: 9/30/2022    Plan: Home   Discharge Needs Assessment     Row Name 10/02/22 1311       Living Environment    People in Home child(priya), adult    Current Living Arrangements home    Primary Care Provided by self    Provides Primary Care For no one    Family Caregiver if Needed child(priya), adult    Quality of Family Relationships unable to assess    Able to Return to Prior Arrangements yes       Resource/Environmental Concerns    Resource/Environmental Concerns none       Transition Planning    Patient/Family Anticipates Transition to home with family    Patient/Family Anticipated Services at Transition ;rehabilitation services    Transportation Anticipated family or friend will provide       Discharge Needs Assessment    Readmission Within the Last 30 Days no previous admission in last 30 days    Equipment Currently Used at Home cane, straight;rollator    Concerns to be Addressed discharge planning    Anticipated Changes Related to Illness none    Equipment Needed After Discharge oxygen               Discharge Plan     Row Name 10/02/22 1312       Plan    Plan Home    Patient/Family in Agreement with Plan yes    Plan Comments Patient lives with her daughter in [facility].  She is independent with ADL's, using either a straight cane or rollator to assist with ambulation.  She has no other DME at home and is not current with home health.  Her PCP is Cain Musa.  She does not have an advanced directive.  Ms. Bella has RX coverage and has her scripts filled at [facility].  Her plan is to return home at discharge.  CM consulted to arrange home O2 for patient.  Will need a qualifying room air saturation documented closer to discharge.  CM will continue to follow.    Final Discharge Disposition Code 01 - home or self-care              Continued Care and Services - Admitted  Since 9/30/2022    Coordination has not been started for this encounter.     Selected Continued Care - Prior Encounters Includes selections from prior encounters from 7/2/2022 to 10/2/2022    Discharged on 8/21/2022 Admission date: 8/18/2022 - Discharge disposition: Home-Health Care c    Home Medical Care     Service Provider Selected Services Address Phone Fax Patient Preferred    [facility]  Home Health Services [address] [place] [id number] [phone] --                       Demographic Summary     Row Name 10/02/22 1310       General Information    Admission Type inpatient    Arrived From emergency department    Referral Source admission list    Reason for Consult discharge planning    Preferred Language English               Functional Status     Row Name 10/02/22 1311       Functional Status    Usual Activity Tolerance moderate    Current Activity Tolerance moderate       Functional Status, IADL    Medications independent    Meal Preparation independent    Housekeeping independent    Laundry independent    Shopping independent               Psychosocial    No documentation.                Abuse/Neglect    No documentation.                Legal    No documentation.                Substance Abuse    No documentation.                Patient Forms    No documentation.                   Hanna Jackson RN

## 2022-10-03 ENCOUNTER — APPOINTMENT (OUTPATIENT)
Dept: GENERAL RADIOLOGY | Facility: HOSPITAL | Age: 57
End: 2022-10-03

## 2022-10-03 LAB
ALBUMIN SERPL-MCNC: 3.3 G/DL (ref 3.5–5.2)
ALBUMIN/GLOB SERPL: 0.9 G/DL
ALP SERPL-CCNC: 106 U/L (ref 39–117)
ALT SERPL W P-5'-P-CCNC: 12 U/L (ref 1–33)
ANION GAP SERPL CALCULATED.3IONS-SCNC: 8 MMOL/L (ref 5–15)
ARTERIAL PATENCY WRIST A: ABNORMAL
AST SERPL-CCNC: 14 U/L (ref 1–32)
ATMOSPHERIC PRESS: ABNORMAL MM[HG]
BASE EXCESS BLDA CALC-SCNC: 1.8 MMOL/L (ref 0–2)
BASOPHILS # BLD AUTO: 0.01 10*3/MM3 (ref 0–0.2)
BASOPHILS NFR BLD AUTO: 0.3 % (ref 0–1.5)
BDY SITE: ABNORMAL
BILIRUB SERPL-MCNC: 0.3 MG/DL (ref 0–1.2)
BODY TEMPERATURE: 37 C
BUN SERPL-MCNC: 24 MG/DL (ref 6–20)
BUN/CREAT SERPL: 11.7 (ref 7–25)
CALCIUM SPEC-SCNC: 8.8 MG/DL (ref 8.6–10.5)
CHLORIDE SERPL-SCNC: 105 MMOL/L (ref 98–107)
CO2 BLDA-SCNC: 30.7 MMOL/L (ref 22–33)
CO2 SERPL-SCNC: 27 MMOL/L (ref 22–29)
COHGB MFR BLD: 1.4 % (ref 0–2)
CREAT SERPL-MCNC: 2.05 MG/DL (ref 0.57–1)
DEPRECATED RDW RBC AUTO: 62.8 FL (ref 37–54)
EGFRCR SERPLBLD CKD-EPI 2021: 27.7 ML/MIN/1.73
EOSINOPHIL # BLD AUTO: 0.04 10*3/MM3 (ref 0–0.4)
EOSINOPHIL NFR BLD AUTO: 1.1 % (ref 0.3–6.2)
EPAP: 0
ERYTHROCYTE [DISTWIDTH] IN BLOOD BY AUTOMATED COUNT: 17.6 % (ref 12.3–15.4)
GLOBULIN UR ELPH-MCNC: 3.5 GM/DL
GLUCOSE SERPL-MCNC: 95 MG/DL (ref 65–99)
HCO3 BLDA-SCNC: 28.9 MMOL/L (ref 20–26)
HCT VFR BLD AUTO: 33.4 % (ref 34–46.6)
HCT VFR BLD CALC: 29.7 % (ref 38–51)
HGB BLD-MCNC: 9.4 G/DL (ref 12–15.9)
HGB BLDA-MCNC: 9.7 G/DL (ref 14–18)
IMM GRANULOCYTES # BLD AUTO: 0.01 10*3/MM3 (ref 0–0.05)
IMM GRANULOCYTES NFR BLD AUTO: 0.3 % (ref 0–0.5)
INHALED O2 CONCENTRATION: 21 %
IPAP: 0
LYMPHOCYTES # BLD AUTO: 0.56 10*3/MM3 (ref 0.7–3.1)
LYMPHOCYTES NFR BLD AUTO: 16 % (ref 19.6–45.3)
MAGNESIUM SERPL-MCNC: 2.1 MG/DL (ref 1.6–2.6)
MCH RBC QN AUTO: 27.2 PG (ref 26.6–33)
MCHC RBC AUTO-ENTMCNC: 28.1 G/DL (ref 31.5–35.7)
MCV RBC AUTO: 96.8 FL (ref 79–97)
METHGB BLD QL: 0.5 % (ref 0–1.5)
MODALITY: ABNORMAL
MONOCYTES # BLD AUTO: 0.48 10*3/MM3 (ref 0.1–0.9)
MONOCYTES NFR BLD AUTO: 13.8 % (ref 5–12)
NEUTROPHILS NFR BLD AUTO: 2.39 10*3/MM3 (ref 1.7–7)
NEUTROPHILS NFR BLD AUTO: 68.5 % (ref 42.7–76)
NOTE: ABNORMAL
NRBC BLD AUTO-RTO: 0 /100 WBC (ref 0–0.2)
OXYHGB MFR BLDV: 89.7 % (ref 94–99)
PAW @ PEAK INSP FLOW SETTING VENT: 0 CMH2O
PCO2 BLDA: 58.1 MM HG (ref 35–45)
PCO2 TEMP ADJ BLD: 58.1 MM HG (ref 35–45)
PH BLDA: 7.31 PH UNITS (ref 7.35–7.45)
PH, TEMP CORRECTED: 7.31 PH UNITS
PHOSPHATE SERPL-MCNC: 2.8 MG/DL (ref 2.5–4.5)
PLATELET # BLD AUTO: 280 10*3/MM3 (ref 140–450)
PMV BLD AUTO: 10.2 FL (ref 6–12)
PO2 BLDA: 63.4 MM HG (ref 83–108)
PO2 TEMP ADJ BLD: 63.4 MM HG (ref 83–108)
POTASSIUM SERPL-SCNC: 4.7 MMOL/L (ref 3.5–5.2)
PROT SERPL-MCNC: 6.8 G/DL (ref 6–8.5)
RBC # BLD AUTO: 3.45 10*6/MM3 (ref 3.77–5.28)
SODIUM SERPL-SCNC: 140 MMOL/L (ref 136–145)
TOTAL RATE: 0 BREATHS/MINUTE
WBC NRBC COR # BLD: 3.49 10*3/MM3 (ref 3.4–10.8)

## 2022-10-03 PROCEDURE — 94761 N-INVAS EAR/PLS OXIMETRY MLT: CPT

## 2022-10-03 PROCEDURE — 94799 UNLISTED PULMONARY SVC/PX: CPT

## 2022-10-03 PROCEDURE — 25010000002 REMDESIVIR 100 MG/20ML SOLUTION 1 EACH VIAL: Performed by: INTERNAL MEDICINE

## 2022-10-03 PROCEDURE — 63710000001 DEXAMETHASONE PER 0.25 MG: Performed by: INTERNAL MEDICINE

## 2022-10-03 PROCEDURE — 25010000002 ENOXAPARIN PER 10 MG: Performed by: INTERNAL MEDICINE

## 2022-10-03 PROCEDURE — 36600 WITHDRAWAL OF ARTERIAL BLOOD: CPT

## 2022-10-03 PROCEDURE — 71045 X-RAY EXAM CHEST 1 VIEW: CPT

## 2022-10-03 PROCEDURE — 82375 ASSAY CARBOXYHB QUANT: CPT

## 2022-10-03 PROCEDURE — 83050 HGB METHEMOGLOBIN QUAN: CPT

## 2022-10-03 PROCEDURE — 80053 COMPREHEN METABOLIC PANEL: CPT | Performed by: INTERNAL MEDICINE

## 2022-10-03 PROCEDURE — 82805 BLOOD GASES W/O2 SATURATION: CPT

## 2022-10-03 PROCEDURE — 94664 DEMO&/EVAL PT USE INHALER: CPT

## 2022-10-03 PROCEDURE — 83735 ASSAY OF MAGNESIUM: CPT | Performed by: INTERNAL MEDICINE

## 2022-10-03 PROCEDURE — 84100 ASSAY OF PHOSPHORUS: CPT | Performed by: INTERNAL MEDICINE

## 2022-10-03 PROCEDURE — 99232 SBSQ HOSP IP/OBS MODERATE 35: CPT | Performed by: INTERNAL MEDICINE

## 2022-10-03 PROCEDURE — 85025 COMPLETE CBC W/AUTO DIFF WBC: CPT | Performed by: INTERNAL MEDICINE

## 2022-10-03 RX ORDER — ENOXAPARIN SODIUM 100 MG/ML
40 INJECTION SUBCUTANEOUS EVERY 24 HOURS
Status: DISCONTINUED | OUTPATIENT
Start: 2022-10-04 | End: 2022-10-04 | Stop reason: HOSPADM

## 2022-10-03 RX ADMIN — CARVEDILOL 3.12 MG: 3.12 TABLET, FILM COATED ORAL at 08:22

## 2022-10-03 RX ADMIN — ALBUTEROL SULFATE 2 PUFF: 90 AEROSOL, METERED RESPIRATORY (INHALATION) at 20:11

## 2022-10-03 RX ADMIN — PANTOPRAZOLE SODIUM 40 MG: 40 TABLET, DELAYED RELEASE ORAL at 08:22

## 2022-10-03 RX ADMIN — DEXAMETHASONE 10 MG: 2 TABLET ORAL at 08:22

## 2022-10-03 RX ADMIN — NIFEDIPINE 30 MG: 30 TABLET, FILM COATED, EXTENDED RELEASE ORAL at 08:22

## 2022-10-03 RX ADMIN — TIOTROPIUM BROMIDE INHALATION SPRAY 2 PUFF: 3.12 SPRAY, METERED RESPIRATORY (INHALATION) at 08:12

## 2022-10-03 RX ADMIN — ENOXAPARIN SODIUM 40 MG: 40 INJECTION SUBCUTANEOUS at 08:22

## 2022-10-03 RX ADMIN — ALBUTEROL SULFATE 2 PUFF: 90 AEROSOL, METERED RESPIRATORY (INHALATION) at 08:12

## 2022-10-03 RX ADMIN — CARVEDILOL 3.12 MG: 3.12 TABLET, FILM COATED ORAL at 17:29

## 2022-10-03 RX ADMIN — REMDESIVIR 100 MG: 100 INJECTION, POWDER, LYOPHILIZED, FOR SOLUTION INTRAVENOUS at 12:57

## 2022-10-03 NOTE — PLAN OF CARE
Goal Outcome Evaluation:     Patient is a transfer from 2A to room 622.  Patient is NSR on the monitor and on 1-2L NC. Alert and oriented times four.  Purewick in use.  Patient is up with standby assist.  Bed in lowest position and phone and call light in use.

## 2022-10-03 NOTE — CASE MANAGEMENT/SOCIAL WORK
Continued Stay Note  [facility]     Patient Name: Sonia Bella  MRN: [id number]  Today's Date: 10/3/2022    Admit Date: 9/30/2022    Plan: Home   Discharge Plan     Row Name 10/03/22 1052       Plan    Plan Home    Plan Comments Spoke with patients daughter Katerine over the phone.  Daughter states that patient had home oxygen just a little over a year ago but states that patient had told her her insurance had stopped paying for it.  Daughter also states that patient was not going to any of her follow up appointments with her PCP.  Daughter doesn't know who the provider was for her mothers oxygen when she did have it.  Patient will need a new order and referral for home oxygen.  She will need a resting room air sat within 24 hours of discharge.  CM will continue to follow.               Discharge Codes    No documentation.               Expected Discharge Date and Time     Expected Discharge Date Expected Discharge Time    Oct 8, 2022             Anabell Truong RN

## 2022-10-03 NOTE — PROGRESS NOTES
Intensive Care Follow-up     Hospital:  LOS: 3 days   Ms. Sonia Bella, 57 y.o. female is followed for:   COVID-19 virus infection            History of present illness:     57-year-old unvaccinated female with past medical history of COPD and hypertension.  Patient apparently was on supplemental oxygen in the past but was taken off from her.  Patient presented to [facility] ER on September 30 with complaints of dyspnea, cough, hypoxemia.  Patient has had exposure to a lot of family members with COVID over the past few weeks.  In the ER patient was found to have mixed hypercapnic and hypoxemic respiratory failure so was admitted to intensive care unit.  Patient was given Decadron, remdesivir and Actemra.  Patient also underwent work-up for thromboembolic disease and lower extremity duplex was negative.  CT PE study was not done due to renal dysfunction.  Echocardiogram is normal.      Subjective   Interval History:  Overnight no acute events.  Patient currently is on 1 L nasal cannula oxygen.  She is eating and drinking well.             The patient's past medical, surgical and social history were reviewed and updated in Epic as appropriate.       Objective denies any other acute issues.  Creatinine slightly worse.     Infusions:     Medications:  albuterol sulfate HFA, 2 puff, Inhalation, 4x Daily - RT   And  tiotropium bromide monohydrate, 2 puff, Inhalation, Daily - RT  carvedilol, 3.125 mg, Oral, BID With Meals  [START ON 10/4/2022] dexamethasone, 6 mg, Oral, Daily With Breakfast  [START ON 10/4/2022] enoxaparin, 40 mg, Subcutaneous, Q24H  NIFEdipine XL, 30 mg, Oral, Q24H  pantoprazole, 40 mg, Oral, Daily  remdesivir, 100 mg, Intravenous, Daily With Lunch        Vital Sign Min/Max for last 24 hours  Temp  Min: 97.5 °F (36.4 °C)  Max: 98.6 °F (37 °C)   BP  Min: 120/85  Max: 161/101   Pulse  Min: 66  Max: 89   Resp  Min: 16  Max: 25   SpO2  Min: 88 %  Max: 97 %   Flow (L/min)  Min: 1  Max: 2        Input/Output for last 24 hour shift  10/02 0701 - 10/03 0700  In: 240 [P.O.:240]  Out: 600 [Urine:600]      Objective  General Appearance: Awake, alert, in no acute distress  Head:    Atraumatic, Normocephalic, without obvious abnormality.   Lungs:   B/L Breath sounds present with decreased breath sounds on bases, no wheezing heard, no crackles.   Heart: S1 and S2 present, no murmur  Abdomen: Soft, nontender, no guarding or rigidity, bowel sounds positive, no masses.  Extremities:  no cyanosis or clubbing,  no edema, warm to touch.  Pulses: Positive and symmetric.  Neurologic:  Moving all four extremities. Good strength bilaterally.  Psychological: Normal affect, Cooperative    Results from last 7 days   Lab Units 10/03/22  0324 10/02/22  0504 10/01/22  0215   WBC 10*3/mm3 3.49 2.78* 4.31   HEMOGLOBIN g/dL 9.4* 9.0* 9.4*   PLATELETS 10*3/mm3 280 301 289     Results from last 7 days   Lab Units 10/03/22  0324 10/02/22  0504 10/01/22  0215   SODIUM mmol/L 140 142 144   POTASSIUM mmol/L 4.7 4.4 5.0   CO2 mmol/L 27.0 28.0 26.0   BUN mg/dL 24* 18 14   CREATININE mg/dL 2.05* 1.55* 1.76*   MAGNESIUM mg/dL 2.1 2.0 2.2   PHOSPHORUS mg/dL 2.8 3.2 3.9   GLUCOSE mg/dL 95 86 128*     Estimated Creatinine Clearance: 43.3 mL/min (A) (by C-G formula based on SCr of 2.05 mg/dL (H)).    Results from last 7 days   Lab Units 10/03/22  0841   PH, ARTERIAL pH units 7.306*   PCO2, ARTERIAL mm Hg 58.1*   PO2 ART mm Hg 63.4*       Images:   CXR reviewed and showed minimal basilar atelectasis left base.     I reviewed the patient's results and images.     Assessment & Plan   Impression        COVID-19 virus infection    HTN (hypertension)    CKD (chronic kidney disease) stage 3, GFR 30-59 ml/min (HCC)    COPD    A/C mixed hypoxemic/hypercarbic respiratory failure (HCC)    Obesity, morbid, BMI 40.0-49.9 ([facility])       Plan        1.  Patient clinically doing better.  Blood gases are showing improvement from before.  Patient is more awake and  alert.  Continue BiPAP support as needed.  FiO2 requirements are significantly improved.  We will continue to monitor closely.  Patient is s/p remdesivir and Actemra.  On Decadron 10 mg daily.  Since patient is clinically improving I will decrease Decadron to 6 mg daily and complete 10 days of therapy.  2.  Renal function is slightly worse.  Will DC Maxide for now.  Monitor renal function closely.  She is eating and drinking well.   Hemodynamically stable.  3.  Adjust Lovenox dose for renal function.  4.  BP control with current meds.   5.  GI prophylaxis.    6.  WBC count normalized.  7.  Patient will benefit from outpatient work-up for sleep apnea.    Patient awaiting bed to transfer to telemetry floor.  Hospitalist service resume attending care    Plan of care and goals reviewed with multidisciplinary/antibiotic stewardship team during rounds.   I discussed the patient's findings and my recommendations with patient and nursing staff       Time spent 25 min (exclusive of procedure time)  including high complexity decision making to assess, manipulate, and support vital organ system failure in this individual who has impairment of one or more vital organ systems such that there is a high probability of imminent or life threatening deterioration in the patient’s condition.      Rashel Bowden MD, [facility]  Pulmonary, Critical care and Sleep Medicine

## 2022-10-03 NOTE — PLAN OF CARE
Goal Outcome Evaluation:  Plan of Care Reviewed With: patient        Progress: improving  Outcome Evaluation: VSS. Patient rested well through out the night on the BiPap at 30% and on 1 L NC when awake. UOP adequate. Patient has transfer orders.

## 2022-10-04 ENCOUNTER — READMISSION MANAGEMENT (OUTPATIENT)
Dept: CALL CENTER | Facility: HOSPITAL | Age: 57
End: 2022-10-04

## 2022-10-04 VITALS
SYSTOLIC BLOOD PRESSURE: 141 MMHG | RESPIRATION RATE: 18 BRPM | OXYGEN SATURATION: 93 % | HEIGHT: 67 IN | WEIGHT: 293 LBS | DIASTOLIC BLOOD PRESSURE: 87 MMHG | HEART RATE: 75 BPM | TEMPERATURE: 98.3 F | BODY MASS INDEX: 45.99 KG/M2

## 2022-10-04 PROBLEM — D89.831 CYTOKINE RELEASE SYNDROME, GRADE 1: Status: ACTIVE | Noted: 2022-10-04

## 2022-10-04 LAB
BASOPHILS # BLD AUTO: 0.01 10*3/MM3 (ref 0–0.2)
BASOPHILS NFR BLD AUTO: 0.2 % (ref 0–1.5)
DEPRECATED RDW RBC AUTO: 59.4 FL (ref 37–54)
EOSINOPHIL # BLD AUTO: 0 10*3/MM3 (ref 0–0.4)
EOSINOPHIL NFR BLD AUTO: 0 % (ref 0.3–6.2)
ERYTHROCYTE [DISTWIDTH] IN BLOOD BY AUTOMATED COUNT: 17.1 % (ref 12.3–15.4)
HCT VFR BLD AUTO: 33 % (ref 34–46.6)
HGB BLD-MCNC: 9.3 G/DL (ref 12–15.9)
IMM GRANULOCYTES # BLD AUTO: 0.01 10*3/MM3 (ref 0–0.05)
IMM GRANULOCYTES NFR BLD AUTO: 0.2 % (ref 0–0.5)
LYMPHOCYTES # BLD AUTO: 0.36 10*3/MM3 (ref 0.7–3.1)
LYMPHOCYTES NFR BLD AUTO: 8.5 % (ref 19.6–45.3)
MCH RBC QN AUTO: 26.6 PG (ref 26.6–33)
MCHC RBC AUTO-ENTMCNC: 28.2 G/DL (ref 31.5–35.7)
MCV RBC AUTO: 94.6 FL (ref 79–97)
MONOCYTES # BLD AUTO: 0.49 10*3/MM3 (ref 0.1–0.9)
MONOCYTES NFR BLD AUTO: 11.6 % (ref 5–12)
NEUTROPHILS NFR BLD AUTO: 3.35 10*3/MM3 (ref 1.7–7)
NEUTROPHILS NFR BLD AUTO: 79.5 % (ref 42.7–76)
NRBC BLD AUTO-RTO: 0.9 /100 WBC (ref 0–0.2)
PLATELET # BLD AUTO: 292 10*3/MM3 (ref 140–450)
PMV BLD AUTO: 10.5 FL (ref 6–12)
QT INTERVAL: 448 MS
QTC INTERVAL: 490 MS
RBC # BLD AUTO: 3.49 10*6/MM3 (ref 3.77–5.28)
WBC NRBC COR # BLD: 4.22 10*3/MM3 (ref 3.4–10.8)

## 2022-10-04 PROCEDURE — 25010000002 ENOXAPARIN PER 10 MG: Performed by: INTERNAL MEDICINE

## 2022-10-04 PROCEDURE — 25010000002 REMDESIVIR 100 MG/20ML SOLUTION 1 EACH VIAL: Performed by: INTERNAL MEDICINE

## 2022-10-04 PROCEDURE — 94799 UNLISTED PULMONARY SVC/PX: CPT

## 2022-10-04 PROCEDURE — 63710000001 DEXAMETHASONE PER 0.25 MG: Performed by: INTERNAL MEDICINE

## 2022-10-04 PROCEDURE — 99239 HOSP IP/OBS DSCHRG MGMT >30: CPT | Performed by: INTERNAL MEDICINE

## 2022-10-04 PROCEDURE — 85025 COMPLETE CBC W/AUTO DIFF WBC: CPT | Performed by: INTERNAL MEDICINE

## 2022-10-04 RX ORDER — DEXAMETHASONE 6 MG/1
6 TABLET ORAL
Qty: 5 TABLET | Refills: 0 | Status: SHIPPED | OUTPATIENT
Start: 2022-10-05 | End: 2022-10-10

## 2022-10-04 RX ORDER — BUDESONIDE AND FORMOTEROL FUMARATE DIHYDRATE 160; 4.5 UG/1; UG/1
2 AEROSOL RESPIRATORY (INHALATION)
Qty: 10.2 G | Refills: 0 | Status: SHIPPED | OUTPATIENT
Start: 2022-10-04

## 2022-10-04 RX ORDER — AMLODIPINE BESYLATE 5 MG/1
5 TABLET ORAL
Status: DISCONTINUED | OUTPATIENT
Start: 2022-10-04 | End: 2022-10-04 | Stop reason: HOSPADM

## 2022-10-04 RX ORDER — AMLODIPINE BESYLATE 5 MG/1
5 TABLET ORAL
Qty: 30 TABLET | Refills: 0 | Status: SHIPPED | OUTPATIENT
Start: 2022-10-04 | End: 2022-11-23 | Stop reason: SDUPTHER

## 2022-10-04 RX ADMIN — ALBUTEROL SULFATE 2 PUFF: 90 AEROSOL, METERED RESPIRATORY (INHALATION) at 08:19

## 2022-10-04 RX ADMIN — ENOXAPARIN SODIUM 40 MG: 40 INJECTION SUBCUTANEOUS at 10:26

## 2022-10-04 RX ADMIN — REMDESIVIR 100 MG: 100 INJECTION, POWDER, LYOPHILIZED, FOR SOLUTION INTRAVENOUS at 12:51

## 2022-10-04 RX ADMIN — ALBUTEROL SULFATE 2 PUFF: 90 AEROSOL, METERED RESPIRATORY (INHALATION) at 12:09

## 2022-10-04 RX ADMIN — PANTOPRAZOLE SODIUM 40 MG: 40 TABLET, DELAYED RELEASE ORAL at 10:26

## 2022-10-04 RX ADMIN — ALBUTEROL SULFATE 2 PUFF: 90 AEROSOL, METERED RESPIRATORY (INHALATION) at 16:25

## 2022-10-04 RX ADMIN — NIFEDIPINE 30 MG: 30 TABLET, FILM COATED, EXTENDED RELEASE ORAL at 10:26

## 2022-10-04 RX ADMIN — DEXAMETHASONE 6 MG: 2 TABLET ORAL at 10:26

## 2022-10-04 RX ADMIN — AMLODIPINE BESYLATE 5 MG: 5 TABLET ORAL at 10:27

## 2022-10-04 RX ADMIN — CARVEDILOL 3.12 MG: 3.12 TABLET, FILM COATED ORAL at 10:26

## 2022-10-04 NOTE — CASE MANAGEMENT/SOCIAL WORK
Case Management Discharge Note      Final Note: Patient's plan is to return home at discharge.  CM consulted to arrange home O2.  Order called to [place] with [facility].  Portable tank to be delivered prior to discharge.  No other need snoted.         Selected Continued Care - Admitted Since 9/30/2022     Destination    No services have been selected for the patient.              Durable Medical Equipment     Service Provider Selected Services Address Phone Fax Patient Preferred    [facility] [place]  [facility] Medical [facility] [address] [id number] [phone] --          Dialysis/Infusion    No services have been selected for the patient.              Home Medical Care    No services have been selected for the patient.              Therapy    No services have been selected for the patient.              Community Resources    No services have been selected for the patient.              Community & DME    No services have been selected for the patient.                Selected Continued Care - Prior Encounters Includes selections from prior encounters from 7/2/2022 to 10/4/2022    Discharged on 8/21/2022 Admission date: 8/18/2022 - Discharge disposition: Home-Health Care [facility]    Home Medical Care     Service Provider Selected Services Address Phone Fax Patient Preferred    [facility]  [facility] Health Services [address] [facility] [id number] [phone] --                         Final Discharge Disposition Code: 01 - home or self-care

## 2022-10-04 NOTE — DISCHARGE SUMMARY
[facility] Medicine Services  DISCHARGE SUMMARY    Patient Name: Sonia Bella  : 1965  MRN: [id number]    Date of Admission: 2022  5:22 PM  Date of Discharge:  10/4/22  Primary Care Physician: Cain Musa MD    Consults     No orders found from 2022 to 10/1/2022.          Hospital Course     Presenting Problem:   Acute on chronic respiratory failure with hypoxia and hypercapnia (HCC) [J96.21, J96.22]    Active Hospital Problems    Diagnosis  POA   • **COVID-19 virus infection [U07.1]  Yes   • Cytokine release syndrome, grade 1 [D89.831]  No   • COPD [J44.9]  Yes   • A/C mixed hypoxemic/hypercarbic respiratory failure (HCC) [J96.01, J96.02]  Yes   • Obesity, morbid, BMI 40.0-49.9 ([facility]) [E66.01]  Yes   • CKD (chronic kidney disease) stage 3, GFR 30-59 ml/min ([facility]) [N18.30]  Yes   • HTN (hypertension) [I10]  Yes      Resolved Hospital Problems   No resolved problems to display.          Hospital Course:  57-year-old female with a past medical history of COPD, hypertension, chronic kidney disease, anemia, and angioedema.  She has a BMI of 49.  She presents with a positive COVID test as well as approximately a week of upper and lower respiratory symptoms as well as fever.  She has been exposed to COVID.  She has never received a COVID vaccination.     Her ABG was consistent with acute hypercapnic and hypoxic respiratory failure.  Chest x-ray reveals an enlarged heart as well as interstitial and vascular prominence.  Her D-dimer was mildly elevated but this could be related to her systemic inflammation.  Her GFR is very low and CT Angiogram was deferred    COVID 19 infection  Acute hypercarbic and hypoxic respiratory failure  - patient received remdesivir x 5 days, actemra and will complete 10 days of total steroid therapy.    COPD  - Will discharge on symbicort and spirva. Patient has a rescue inhaler.  - Needs Pulm f/u outpatient    Possible BERNY  - referral for sleep  study    CKDIII  - ACEi discontinued by Nephrology during recent admit  - follow up Nephrology outpatient    HTN  - continue Nifedipine  - added norvasc 5 mg    Morbid Obesity    Anemia  - hemoglobin 9.3  - follow up [facility] PCP for further workup  - BMI 48             Discharge Follow Up Recommendations for outpatient labs/diagnostics:  Further Anemia workup by PCP    Day of Discharge     HPI:   Feels much better. Cough, slightly productive, improving.  Wants to go home today    Review of Systems  Gen- No fevers, chills  CV- No chest pain, palpitations  Resp- + cough  GI- No N/V/D, abd pain        Vital Signs:   Temp:  [97.7 °F (36.5 °C)-98.5 °F (36.9 °C)] 98.2 °F (36.8 °C)  Heart Rate:  [70-79] 71  Resp:  [18-20] 18  BP: (135-152)/(83-92) 152/83  Flow (L/min):  [1-2] 2      Physical Exam:  Constitutional - no acute distress, nontoxic, in bed, eating breakfast  HEENT-NCAT, mucous membranes moist  CV-RRR, S1 S2 normal, no m/r/g  Resp-diminished bilaterally  Abd-soft, nontender, nondistended, normoactive bowel sounds, obese  Ext-No lower extremity cyanosis, clubbing or edema bilaterally  Neuro-alert and oriented, speech clear, moves all extremities   Psych-normal affect   Skin- No rash on exposed UE or LE bilaterally      Pertinent  and/or Most Recent Results     LAB RESULTS:      Lab 10/04/22  0559 10/03/22  0324 10/02/22  0504 10/01/22  0215 09/30/22  1754   WBC 4.22 3.49 2.78* 4.31 4.60   HEMOGLOBIN 9.3* 9.4* 9.0* 9.4* 9.9*   HEMATOCRIT 33.0* 33.4* 33.2* 35.1 35.7   PLATELETS 292 280 301 289 301   NEUTROS ABS 3.35 2.39 1.81 4.08 3.50   IMMATURE GRANS (ABS) 0.01 0.01 0.04 0.03 0.02   LYMPHS ABS 0.36* 0.56* 0.46* 0.14* 0.47*   MONOS ABS 0.49 0.48 0.46 0.05* 0.51   EOS ABS 0.00 0.04 0.00 0.00 0.08   MCV 94.6 96.8 98.8* 99.4* 97.0   CRP  --   --   --   --  2.26*   PROCALCITONIN  --   --   --   --  0.11   LACTATE  --   --   --   --  1.1   LDH  --   --   --   --  188   PROTIME  --   --   --   --  14.7*   D DIMER QUANT  --    --   --   --  0.88*         Lab 10/03/22  0324 10/02/22  0504 10/01/22  0215 09/30/22  1754   SODIUM 140 142 144 141   POTASSIUM 4.7 4.4 5.0 4.2   CHLORIDE 105 106 108* 105   CO2 27.0 28.0 26.0 28.0   ANION GAP 8.0 8.0 10.0 8.0   BUN 24* 18 14 15   CREATININE 2.05* 1.55* 1.76* 1.73*   EGFR 27.7* 38.7* 33.2* 33.9*   GLUCOSE 95 86 128* 113*   CALCIUM 8.8 9.1 9.0 9.3   MAGNESIUM 2.1 2.0 2.2  --    PHOSPHORUS 2.8 3.2 3.9  --          Lab 10/03/22  0324 10/02/22  0504 10/01/22  0215 09/30/22  1754   TOTAL PROTEIN 6.8 6.9 7.6 7.8   ALBUMIN 3.30* 3.60 3.70 4.00   GLOBULIN 3.5 3.3 3.9 3.8   ALT (SGPT) 12 11 16 17   AST (SGOT) 14 10 15 17   BILIRUBIN 0.3 0.3 0.3 0.5   ALK PHOS 106 108 129* 140*         Lab 09/30/22  1754   PROBNP 6,926.0*   TROPONIN T 0.018   PROTIME 14.7*   INR 1.16*             Lab 09/30/22  1754   FERRITIN 25.55         Lab 10/03/22  0841 09/30/22  1742   PH, ARTERIAL 7.306* 7.242*   PCO2, ARTERIAL 58.1* 67.0*   PO2 ART 63.4* 160.0*   FIO2 21 80   HCO3 ART 28.9* 28.8*   BASE EXCESS ART 1.8 0.5   CARBOXYHEMOGLOBIN 1.4 1.5     Brief Urine Lab Results  (Last result in the past 365 days)      Color   Clarity   Blood   Leuk Est   Nitrite   Protein   CREAT   Urine HCG        10/01/22 0434 Yellow   Clear   Trace   Negative   Negative   >=300 mg/dL (3+)               Microbiology Results (last 10 days)     Procedure Component Value - Date/Time    Blood Culture - Blood, Arm, Right [[id number]]  (Normal) Collected: 09/30/22 1825    Lab Status: Preliminary result Specimen: Blood from Arm, Right Updated: 10/03/22 1918     Blood Culture No growth at 3 days    Blood Culture - Blood, Arm, Right [[id number]]  (Normal) Collected: 09/30/22 1800    Lab Status: Preliminary result Specimen: Blood from Arm, Right Updated: 10/03/22 1918     Blood Culture No growth at 3 days          Adult Transthoracic Echo Complete W/ Cont if Necessary Per Protocol    Result Date: 10/1/2022  · Estimated left ventricular EF = 60% · No  hemodynamically significant valvular heart disease      CT Chest Without Contrast Diagnostic    Result Date: 10/1/2022  EXAMINATION: CT SCAN OF THE CHEST WITHOUT INTRAVENOUS CONTRAST DATE OF EXAM: 10/1/2022 1:06 AM HISTORY: Pneumonia, suspected complication. Covid positive, shortness of breath COMPARISON: Chest radiograph dated August 18, 2022. TECHNIQUE: CT examination of the chest was performed without intravenous contrast. CT dose lowering techniques were used, to include: automated exposure control, adjustment for patient size, and/or use of iterative reconstruction. 3D MIP reconstruction was performed under concurrent supervision not requiring an independent workstation, in order to increase the sensitivity for detection of pulmonary nodules. Note: The exam is limited because some types of pathology may not be adequately demonstrated due to lack of contrast enhancement. FINDINGS: CHEST: Lungs/pleura:  Mild interlobular septal thickening most pronounced in the right apex. Bilateral pleural effusions, right greater than left. Atelectasis or consolidation right lung base. Mediastinum And Cecilia: Trachea and central airways are patent. Visualized portion of the thyroid gland is normal. Esophagus is unremarkable. No mediastinal or hilar lymphadenopathy. Mildly prominent AP window node measures approximately 9 mm in short axis, likely reactive. Calcified lymph nodes compatible with prior granulomatous infection. Cardiovascular: Heart size is enlarged without significant pericardial fluid. Mediastinal vessels are normal in course and caliber. Atherosclerotic vascular calcifications of the descending thoracic aorta. Chest Wall:  Normal. Upper Abdomen: Granulomatous calcifications in the spleen. Subtle hyperdensity in the dependent gallbladder, likely sludge or stones. MUSCULOSKELETAL: No acute osseous abnormality.     1.  Findings may reflect edema or pneumonia. 2.  Cardiomegaly without significant pericardial fluid. 3.   Sludge and gallstones in the gallbladder. Thank you for the referral of this patient. This exam was interpreted by an American Board of Radiology certified radiologist with subspecialty training in . If there are any questions regarding this exam please feel free to contact a radiologist directly at [phone]. Slot Electronically signed by:  Felicity Bynum D.O.  9/30/2022 11:40 PM Mountain Time    XR Chest 1 View    Result Date: 10/4/2022  DATE OF EXAM: 10/03/2022 6:05 AM  PROCEDURE: XR CHEST 1 VIEW-  INDICATIONS: COVID-pneumonia; J96.21-Acute and chronic respiratory failure with hypoxia; J96.22-Acute and chronic respiratory failure with hypercapnia; U07.1-COVID-19  COMPARISON: 09/30/2022  TECHNIQUE: Single radiographic AP view of the chest was obtained.  FINDINGS: Heart is enlarged. Vasculature is slightly cephalized. There is minimal if any left basilar discoid atelectasis similar to prior study. No new pulmonary parenchymal disease, evidence of effusion or pneumothorax is seen. Granulomatous calcifications are again noted in the left hilar region.      Minimal remaining left basilar discoid atelectasis, interval clearing of lungs elsewhere. No new chest disease is seen.  This report was finalized on 10/4/2022 8:11 AM by Dr. Fernando Walsh MD.      XR Chest 1 View    Result Date: 9/30/2022  XR CHEST 1 VW-  Date of Exam: 9/30/2022 7:15 PM  Indication: SOA triage protocol.  Comparison Exams: 08/18/2022  Technique: Single AP chest radiograph  FINDINGS: The lungs are clear. The heart is enlarged. There is pulmonary vascular congestion. The osseous structures appear intact.      Cardiomegaly with pulmonary vascular congestion.  This report was finalized on 9/30/2022 7:56 PM by Issac Smith MD.      Duplex Venous Lower Extremity - Bilateral CAR    Result Date: 10/2/2022  · Normal bilateral lower extremity venous duplex scan.        Results for orders placed during the hospital encounter of 09/30/22    Duplex Venous Lower  Extremity - Bilateral CAR    Interpretation Summary  · Normal bilateral lower extremity venous duplex scan.      Results for orders placed during the hospital encounter of 09/30/22    Duplex Venous Lower Extremity - Bilateral CAR    Interpretation Summary  · Normal bilateral lower extremity venous duplex scan.      Results for orders placed during the hospital encounter of 09/30/22    Adult Transthoracic Echo Complete W/ Cont if Necessary Per Protocol    Interpretation Summary  · Estimated left ventricular EF = 60%  · No hemodynamically significant valvular heart disease      Plan for Follow-up of Pending Labs/Results:   Pending Labs     Order Current Status    Blood Culture - Blood, Arm, Right Preliminary result    Blood Culture - Blood, Arm, Right Preliminary result        Discharge Details        Discharge Medications      New Medications      Instructions Start Date   amLODIPine 5 MG tablet  Commonly known as: NORVASC   5 mg, Oral, Every 24 Hours Scheduled      dexamethasone 6 MG tablet  Commonly known as: DECADRON   6 mg, Oral, Daily With Breakfast   Start Date: October 5, 2022     tiotropium bromide monohydrate 2.5 MCG/ACT aerosol solution inhaler  Commonly known as: SPIRIVA RESPIMAT   2 puffs, Inhalation, Daily - RT         Changes to Medications      Instructions Start Date   budesonide-formoterol 160-4.5 MCG/ACT inhaler  Commonly known as: SYMBICORT  What changed: Another medication with the same name was removed. Continue taking this medication, and follow the directions you see here.   2 puffs, Inhalation, 2 Times Daily - RT         Continue These Medications      Instructions Start Date   albuterol (2.5 MG/3ML) 0.083% nebulizer solution  Commonly known as: PROVENTIL   2.5 mg, Nebulization, Every 4 Hours PRN      CARVEDILOL PO   3.125 mg, Oral, 2 Times Daily      cyanocobalamin 1000 MCG tablet  Commonly known as: [facility] Vitamin B-12   1,000 mcg, Oral, Daily      famotidine 20 MG tablet  Commonly known as:  PEPCID   20 mg, Oral, 2 Times Daily      ferrous sulfate 325 (65 FE) MG EC tablet   325 mg, Oral, Daily With Breakfast      NIFEdipine XL 30 MG 24 hr tablet  Commonly known as: PROCARDIA XL   30 mg, Oral, Daily      pantoprazole 40 MG EC tablet  Commonly known as: Protonix   40 mg, Oral, Daily         Stop These Medications    First Mouthwash (Magic Mouthwash) suspension     triamterene-hydrochlorothiazide 75-50 MG per tablet  Commonly known as: MAXZIDE            Allergies   Allergen Reactions   • Ace Inhibitors Angioedema     lisinopril         Discharge Disposition:  Home or Self Care    Diet:  Hospital:  Diet Order   Procedures   • Diet Regular; Cardiac       Activity:      Restrictions or Other Recommendations:         CODE STATUS:    Code Status and Medical Interventions:   Ordered at: 09/30/22 1829     Code Status (Patient has no pulse and is not breathing):    CPR (Attempt to Resuscitate)     Medical Interventions (Patient has pulse or is breathing):    Full Support       Future Appointments   Date Time Provider Department Center   11/3/2022  1:15 PM RAD TECH [facility]   11/3/2022  1:30 PM [facility], PFT LAB 1 [facility]   11/3/2022  2:00 PM Randall Christensen MD [facility]       Additional Instructions for the Follow-ups that You Need to Schedule     Ambulatory Referral to Sleep Medicine   As directed      Possible Sleep Apnea    Order Comments: Possible Sleep Apnea     Follow-up needed: Yes         Discharge Follow-up with PCP   As directed       Currently Documented PCP:    Cain Musa MD    PCP Phone Number:    [phone]     Follow Up Details: follow up PCP one week         Discharge Follow-up with Specialty: follow up [facility] 3 weeks   As directed      Specialty: follow up [facility] 3 weeks         Discharge Follow-up with Specialty: follow up with Pulmonary in 2-3 weeks, COPD   As directed      Specialty: follow up with  Pulmonary in 2-3 weeks, COPD                     John Paul Workman MD  10/04/22      Time Spent on Discharge:  I spent  40  minutes on this discharge activity which included: face-to-face encounter with the patient, reviewing the data in the system, coordination of the care with the nursing staff as well as consultants, documentation, and entering orders.

## 2022-10-05 ENCOUNTER — READMISSION MANAGEMENT (OUTPATIENT)
Dept: CALL CENTER | Facility: HOSPITAL | Age: 57
End: 2022-10-05

## 2022-10-05 LAB
BACTERIA SPEC AEROBE CULT: NORMAL
BACTERIA SPEC AEROBE CULT: NORMAL

## 2022-10-05 NOTE — OUTREACH NOTE
Prep Survey    Flowsheet Row Responses   [facility] facility patient discharged from? [place]   Is LACE score < 7 ? No   Emergency Room discharge w/ pulse ox? No   Eligibility Readm Mgmt   Discharge diagnosis COVID-19 virus infection    Does the patient have one of the following disease processes/diagnoses(primary or secondary)? COVID-19   Is there a DME ordered? Yes   What DME was ordered? home O2. per [facility]   Prep survey completed? Yes          VANNESA GARCIA - Registered Nurse

## 2022-10-05 NOTE — OUTREACH NOTE
COVID-19 Week 1 Survey    Flowsheet Row Responses   [facility] facility patient discharged from? [place]   Does the patient have one of the following disease processes/diagnoses(primary or secondary)? COVID-19   COVID-19 underlying condition? COPD   Call Number Call 1   Week 1 Call successful? No   Discharge diagnosis COVID-19 virus infection           BHARAT BEARD - Registered Nurse

## 2022-10-07 ENCOUNTER — READMISSION MANAGEMENT (OUTPATIENT)
Dept: CALL CENTER | Facility: HOSPITAL | Age: 57
End: 2022-10-07

## 2022-10-07 NOTE — OUTREACH NOTE
COVID-19 Week 1 Survey    Flowsheet Row Responses   [facility] facility patient discharged from? [place]   Does the patient have one of the following disease processes/diagnoses(primary or secondary)? COVID-19   COVID-19 underlying condition? COPD   Call Number Call 2   Week 1 Call successful? No  [daughter states pt will answer phone after 3pm today]   Discharge diagnosis COVID-19 virus infection           TAVARES GARCIA - Registered Nurse

## 2022-10-14 ENCOUNTER — READMISSION MANAGEMENT (OUTPATIENT)
Dept: CALL CENTER | Facility: HOSPITAL | Age: 57
End: 2022-10-14

## 2022-10-14 NOTE — OUTREACH NOTE
COVID-19 Week 2 Survey    Flowsheet Row Responses   [facility] facility patient discharged from? [place]   Does the patient have one of the following disease processes/diagnoses(primary or secondary)? COVID-19   COVID-19 underlying condition? COPD   Call Number Call 1   COVID-19 Week 2: Call 1 attempt successful? No   Discharge diagnosis COVID-19 virus infection           TAVARES GARCIA - Registered Nurse

## 2022-10-21 ENCOUNTER — READMISSION MANAGEMENT (OUTPATIENT)
Dept: CALL CENTER | Facility: HOSPITAL | Age: 57
End: 2022-10-21

## 2022-10-21 NOTE — OUTREACH NOTE
COVID-19 Week 3 Survey    Flowsheet Row Responses   [facility] patient discharged from? [place]   Does the patient have one of the following disease processes/diagnoses(primary or secondary)? COVID-19   COVID-19 underlying condition? COPD   Call Number Call 1   COVID-19 Week 3: Call 1 attempt successful? Yes   Call start time 1559   Call end time 1605   Discharge diagnosis COVID-19 virus infection    Meds reviewed with patient/caregiver? Yes   Is the patient having any side effects they believe may be caused by any medication additions or changes? No   Does the patient have all medications ordered at discharge? Yes   Is the patient taking all medications as directed (includes completed medication regime)? Yes   Does the patient have a primary care provider?  Yes   Does the patient have an appointment with their PCP or specialist within 7 days of discharge? No   What is preventing the patient from scheduling follow up appointments within 7 days of discharge? Waiting on return call   Nursing Interventions Advised patient to make appointment   Has the patient kept scheduled appointments due by today? N/A   Has home health visited the patient within 72 hours of discharge? N/A   Psychosocial issues? No   Comments uses O2 at night and prn in day time   Did the patient receive a copy of their discharge instructions? Yes   Did the patient receive a copy of COVID-19 specific instructions? Yes   Nursing interventions Reviewed instructions with patient   What is the patient's perception of their health status since discharge? Improving   Does the patient have any of the following symptoms? Shortness of breath   Nursing Interventions Nurse provided patient education   Pulse Ox monitoring None   Is the patient/caregiver able to teach back steps to recovery at home? Set small, achievable goals for return to baseline health, Rest and rebuild strength, gradually increase activity, Eat a well-balance diet   If the patient is  a current smoker, are they able to teach back resources for cessation? Not a smoker   Is the patient/caregiver able to teach back the hierarchy of who to call/visit for symptoms/problems? PCP, Specialist, Home health nurse, Urgent Care, ED, 911 Yes   Is the patient able to teach back COPD zones? Yes   Patient reports what zone on this call? Green Zone   Green Zone Reports doing well, Usual activity and exercise level, Usual amounts of cough and phlegm/mucous   Green Zone interventions: Take daily medications, Use oxygen as prescribed, Continue regular exercise/diet plan   COVID-19 call completed? Yes          TAVARES GARCIA - Registered Nurse

## 2022-11-14 ENCOUNTER — PREP FOR SURGERY (OUTPATIENT)
Dept: OTHER | Facility: HOSPITAL | Age: 57
End: 2022-11-14

## 2022-11-14 DIAGNOSIS — D50.9 IRON DEFICIENCY ANEMIA, UNSPECIFIED IRON DEFICIENCY ANEMIA TYPE: Primary | ICD-10-CM

## 2022-11-15 ENCOUNTER — APPOINTMENT (OUTPATIENT)
Dept: CARDIOLOGY | Facility: HOSPITAL | Age: 57
End: 2022-11-15

## 2022-11-15 NOTE — PROGRESS NOTES
"[facility]      Mode of Visit: Telephone  Location of patient: {Patient Location:537[address]::\"home\"}  You have chosen to receive care through a telehealth visit.  Does the patient consent to use a audio connection for your medical care today? {YES NO:70128::\"Yes\"}  The visit included an audio interaction only.  No technical issues occurred during this visit.     Chief Complaint  No chief complaint on file.    History of Present Illness    Sonia Bella is a 57 y.o. female with COPD, hypertension, CKD, anemia and angioedema who presents today as a hospital telemedicine referral for hypertension and COVID 19.    Patient admitted        Objective     Vital Signs:   There were no vitals filed for this visit.  There is no height or weight on file to calculate BMI.    Virtual Visit Physical Exam  Physical Exam      Result Review :{Labs  Result Review  Imaging  Med Tab  Media  Procedures :23}     {Data reviewed (Optional):24494:::1}               Assessment and Plan {CC Problem List  Visit Diagnosis  ROS  Review (Popup)  Health Maintenance  Quality  BestPractice  Medications  SmartSets  SnapShot Encounters  Media :23}   There are no diagnoses linked to this encounter.    {Time Spent (Optional):43773}    Follow Up {Instructions Charge Capture  Follow-up Communications :23}   No follow-ups on file.    Dictated Utilizing Dragon Dictation   "

## 2022-11-23 ENCOUNTER — OFFICE VISIT (OUTPATIENT)
Dept: CARDIOLOGY | Facility: HOSPITAL | Age: 57
End: 2022-11-23

## 2022-11-23 VITALS
HEART RATE: 72 BPM | SYSTOLIC BLOOD PRESSURE: 170 MMHG | WEIGHT: 293 LBS | DIASTOLIC BLOOD PRESSURE: 90 MMHG | BODY MASS INDEX: 45.99 KG/M2 | HEIGHT: 67 IN

## 2022-11-23 DIAGNOSIS — R06.02 SHORTNESS OF BREATH: ICD-10-CM

## 2022-11-23 DIAGNOSIS — E66.01 MORBID OBESITY WITH BMI OF 45.0-49.9, ADULT: ICD-10-CM

## 2022-11-23 DIAGNOSIS — I10 PRIMARY HYPERTENSION: Primary | ICD-10-CM

## 2022-11-23 PROCEDURE — 99442 PR PHYS/QHP TELEPHONE EVALUATION 11-20 MIN: CPT | Performed by: NURSE PRACTITIONER

## 2022-11-23 RX ORDER — AMLODIPINE BESYLATE 5 MG/1
5 TABLET ORAL
Qty: 30 TABLET | Refills: 2 | Status: SHIPPED | OUTPATIENT
Start: 2022-11-23 | End: 2023-02-01

## 2022-11-23 NOTE — PROGRESS NOTES
[facility], [facility]    This was an audio enabled telemedicine encounter.    You have chosen to receive care through the use of telemedicine. Telemedicine enables health care providers at different locations to provide safe, effective, and convenient care through the use of technology. As with any health care service, there are risks associated with the use of telemedicine, including equipment failure, poor connections, and  issues.    • Do you understand the risks and benefits of telemedicine as I have explained them to you? Yes  • Have your questions regarding telemedicine been answered? Yes  • Do you consent to the use of telemedicine in your medical care today? Yes    Chief Complaint  Follow-up (htn) and Hospital Follow Up Visit (HTN)    Subjective    History of Present Illness {CC  Problem List  Visit  Diagnosis   Encounters  Notes  Medications  Labs  Result Review Imaging  Media :23}     Sonia Bella presents to [facility] CARDIOLOGY for   History of Present Illness     57-year-old female with history of COPD, hypertension, chronic kidney disease stage III, anemia, angioedema, morbid obesity.  Hospitalized in September 2022 with COVID.    Patient felt to have possible BERNY with referral for sleep medicine.    Patient followed by nephrology for chronic kidney disease stage III.  ACE inhibitor had been discontinued.    She was continued on nifedipine and amlodipine was added.    Hemoglobin was 9.3 currently on iron supplement.  Followed by GI.     Plan to f/u with Pulmonary    Pt BP elevated at home.  He ran out of her norvasc and was only taking coreg daily instead of BID.     Pt report missing several appointment due to transportation issues    Pt denies CP, pressure.  Baseline dyspnea.  No dizziness, near syncope, syncope, weight gain.  Wearing home o2 at 3L          Objective     Vital Signs:   Vitals:     "11/23/22 1117   BP: 170/90   Pulse: 72   Weight: (!) 142 kg (312 lb)   Height: 170.2 cm (67\")     Body mass index is 48.87 kg/m².  Physical Exam     Patient alert and oriented.  Labored conversation.  Reports that is her normal.  Patient engaged during interview.      Result Review  Data Reviewed:{ Labs  Result Review  Imaging  Med Tab  Media :23}   Echocardiogram 10/1/2022: EF 60%, no hemodynamically significant valvular heart disease    EKG 10/4/2022: Sinus rhythm with occasional PVCs 72 bpm    Lab Results   Component Value Date    WBC 4.22 10/04/2022    HGB 9.3 (L) 10/04/2022    HCT 33.0 (L) 10/04/2022    MCV 94.6 10/04/2022     10/04/2022     Lab Results   Component Value Date    GLUCOSE 95 10/03/2022    BUN 24 (H) 10/03/2022    CREATININE 2.05 (H) 10/03/2022    BCR 11.7 10/03/2022    K 4.7 10/03/2022    CO2 27.0 10/03/2022    CALCIUM 8.8 10/03/2022    ALBUMIN 3.30 (L) 10/03/2022    AST 14 10/03/2022    ALT 12 10/03/2022     Lab Results   Component Value Date    TSH 0.338 08/19/2022     Lab Results   Component Value Date    CHOL 154 08/19/2022     Lab Results   Component Value Date    TRIG 63 08/19/2022     Lab Results   Component Value Date    HDL 64 (H) 08/19/2022     Lab Results   Component Value Date    LDL 77 08/19/2022                     Assessment and Plan {CC Problem List  Visit Diagnosis  ROS  Review (Popup)  Health Maintenance  Quality  BestPractice  Medications  SmartSets  SnapShot Encounters  Media :23}   1. Primary hypertension  Uncontrolled.  ?compliance issues    continue coreg, nifedipine.  Restart amlodipine as ordered during hospital stay.  Refill sent to pharmacy  - amLODIPine (NORVASC) 5 MG tablet; Take 1 tablet by mouth Daily.  Dispense: 30 tablet; Refill: 2    2. Shortness of breath  Patient with COPD, recent COVID, morbid obesity,    Echo acceptable.  On home O2.  Follow-up with pulmonary as scheduled    3. Morbid obesity with BMI of 45.0-49.9, adult (HCC)  Body " mass index is 48.87 kg/m².  Diet and exercise modifications for weight loss.      I spent 20 minutes (on phone with patient) caring for Sonia on this date of service. This time includes time spent by me in the following activities:preparing for the visit, reviewing tests, performing a medically appropriate examination and/or evaluation , counseling and educating the patient/family/caregiver, ordering medications, tests, or procedures and documenting information in the medical record    Follow Up {Instructions Charge Capture  Follow-up Communications :23}   Return in about 4 weeks (around 12/21/2022) for Office visit.    Patient was given instructions and counseling regarding her condition or for health maintenance advice. Please see specific information pulled into the AVS if appropriate.  Patient was instructed to call the [facility] Center with any questions, concerns, or worsening symptoms.

## 2022-12-06 ENCOUNTER — TELEPHONE (OUTPATIENT)
Dept: GASTROENTEROLOGY | Facility: CLINIC | Age: 57
End: 2022-12-06

## 2022-12-06 NOTE — TELEPHONE ENCOUNTER
Sonia Bella  [address] [place] [address]  1965        Patient will be having a a colonoscopy and EGD (Upper Endoscopy) by Dr. Dylan Collins on December 27, 2022. The patient is needing cardiac clearance due to being on blood thinners. Please complete the following and fax back to the office.     Patient's was last seen in the office on: 11/23/22    Procedure/Test Performed:    _____ Stress Test         __X___ Echocardiogram, 10/01/22:  NOrmal EF (60%, no significant VHD)     ___x__ EKG, 10/04/22:  SR with PVC        _____ Heart Cath    Based on the above test results and/or clinical evaluation it is my recommendation:    _x___ Patient may proceed with the scheduled surgical procedure with an acceptable cardiac risk.    _n/a___ Patient CAN NOT stop Plavix, Effient, Ticlid, Aspirin, Coumadin, Pradaxa, Xarelto, Eliquis, Savaysa, or Brilinta prior to procedure.     _n/a___ Patient CAN stop Plavix, Effient, Ticlid, Aspirin, Coumadin, Pradaxa, Xarelto, Eliquis, Savaysa, or Brilinta  ______ days prior to procedure.    Per med rec review pt is not taking OAC or antiplatelets that need to be held    Please sign and date below.    Signature_YASHIRA Colby  Date:___12/06/22___    Thank You    Mark I. Brunner, M.D.  BENITEZ Garcia M.D.  BENITEZ Shultz M.D.  YASHIRA Palencia APRN Katie Cecil, PA

## 2022-12-07 DIAGNOSIS — Z12.11 SCREENING FOR COLON CANCER: Primary | ICD-10-CM

## 2022-12-09 ENCOUNTER — NURSE TRIAGE (OUTPATIENT)
Dept: CALL CENTER | Facility: HOSPITAL | Age: 57
End: 2022-12-09

## 2022-12-27 RX ORDER — PANTOPRAZOLE SODIUM 40 MG/1
40 TABLET, DELAYED RELEASE ORAL DAILY
Qty: 30 TABLET | Refills: 3 | OUTPATIENT
Start: 2022-12-27 | End: 2023-12-27

## 2023-01-31 DIAGNOSIS — I10 PRIMARY HYPERTENSION: ICD-10-CM

## 2023-02-01 RX ORDER — AMLODIPINE BESYLATE 5 MG/1
5 TABLET ORAL
Qty: 30 TABLET | Refills: 1 | Status: SHIPPED | OUTPATIENT
Start: 2023-02-01 | End: 2023-02-24 | Stop reason: HOSPADM

## 2023-02-01 NOTE — TELEPHONE ENCOUNTER
Please let patient know I have refilled for 30 days with 1 refill.  For continued management she will either need to see her primary care provider or schedule a follow-up in the heart and valve center.

## 2023-02-15 ENCOUNTER — APPOINTMENT (OUTPATIENT)
Dept: GENERAL RADIOLOGY | Facility: HOSPITAL | Age: 58
DRG: 291 | End: 2023-02-15
Payer: MEDICARE

## 2023-02-15 ENCOUNTER — HOSPITAL ENCOUNTER (INPATIENT)
Facility: HOSPITAL | Age: 58
LOS: 9 days | Discharge: HOME | DRG: 291 | End: 2023-02-24
Attending: EMERGENCY MEDICINE | Admitting: INTERNAL MEDICINE
Payer: MEDICARE

## 2023-02-15 DIAGNOSIS — I50.23 ACUTE ON CHRONIC HFREF (HEART FAILURE WITH REDUCED EJECTION FRACTION): ICD-10-CM

## 2023-02-15 DIAGNOSIS — J81.0 ACUTE PULMONARY EDEMA: ICD-10-CM

## 2023-02-15 DIAGNOSIS — I48.91 ATRIAL FIBRILLATION WITH RVR: ICD-10-CM

## 2023-02-15 DIAGNOSIS — I48.91 ATRIAL FIBRILLATION WITH RAPID VENTRICULAR RESPONSE: ICD-10-CM

## 2023-02-15 DIAGNOSIS — J96.22 ACUTE ON CHRONIC RESPIRATORY FAILURE WITH HYPOXIA AND HYPERCAPNIA: Primary | ICD-10-CM

## 2023-02-15 DIAGNOSIS — J44.1 COPD WITH ACUTE EXACERBATION: ICD-10-CM

## 2023-02-15 DIAGNOSIS — I50.9 ACUTE ON CHRONIC CONGESTIVE HEART FAILURE, UNSPECIFIED HEART FAILURE TYPE: ICD-10-CM

## 2023-02-15 DIAGNOSIS — I50.33 ACUTE ON CHRONIC DIASTOLIC HEART FAILURE: ICD-10-CM

## 2023-02-15 DIAGNOSIS — B34.8 INFECTION DUE TO HUMAN METAPNEUMOVIRUS (HMPV): ICD-10-CM

## 2023-02-15 DIAGNOSIS — J96.21 ACUTE ON CHRONIC RESPIRATORY FAILURE WITH HYPOXIA AND HYPERCAPNIA: Primary | ICD-10-CM

## 2023-02-15 PROBLEM — J44.9 COPD (CHRONIC OBSTRUCTIVE PULMONARY DISEASE): Chronic | Status: ACTIVE | Noted: 2022-09-30

## 2023-02-15 PROBLEM — N18.30 CKD (CHRONIC KIDNEY DISEASE) STAGE 3, GFR 30-59 ML/MIN: Status: ACTIVE | Noted: 2023-02-15

## 2023-02-15 PROBLEM — K21.9 GERD (GASTROESOPHAGEAL REFLUX DISEASE): Status: ACTIVE | Noted: 2023-02-15

## 2023-02-15 PROBLEM — E66.01 MORBID OBESITY WITH BMI OF 50.0-59.9, ADULT: Chronic | Status: ACTIVE | Noted: 2022-09-30

## 2023-02-15 PROBLEM — I10 HTN (HYPERTENSION): Chronic | Status: ACTIVE | Noted: 2022-08-21

## 2023-02-15 PROBLEM — N18.30 CKD (CHRONIC KIDNEY DISEASE) STAGE 3, GFR 30-59 ML/MIN: Chronic | Status: ACTIVE | Noted: 2023-02-15

## 2023-02-15 PROBLEM — K21.9 GERD (GASTROESOPHAGEAL REFLUX DISEASE): Chronic | Status: ACTIVE | Noted: 2023-02-15

## 2023-02-15 LAB
ALBUMIN SERPL-MCNC: 3.8 G/DL (ref 3.5–5.2)
ALBUMIN/GLOB SERPL: 1.1 G/DL
ALP SERPL-CCNC: 144 U/L (ref 39–117)
ALT SERPL W P-5'-P-CCNC: 11 U/L (ref 1–33)
ANION GAP SERPL CALCULATED.3IONS-SCNC: 8 MMOL/L (ref 5–15)
ARTERIAL PATENCY WRIST A: ABNORMAL
AST SERPL-CCNC: 18 U/L (ref 1–32)
ATMOSPHERIC PRESS: ABNORMAL MM[HG]
B PARAPERT DNA SPEC QL NAA+PROBE: NOT DETECTED
B PERT DNA SPEC QL NAA+PROBE: NOT DETECTED
BASE EXCESS BLDA CALC-SCNC: 0.8 MMOL/L (ref 0–2)
BASE EXCESS BLDA CALC-SCNC: 1.9 MMOL/L (ref 0–2)
BASE EXCESS BLDA CALC-SCNC: 2.5 MMOL/L (ref 0–2)
BASOPHILS # BLD AUTO: 0.02 10*3/MM3 (ref 0–0.2)
BASOPHILS NFR BLD AUTO: 0.5 % (ref 0–1.5)
BDY SITE: ABNORMAL
BILIRUB SERPL-MCNC: 1.5 MG/DL (ref 0–1.2)
BODY TEMPERATURE: 37 C
BUN SERPL-MCNC: 17 MG/DL (ref 6–20)
BUN/CREAT SERPL: 8.6 (ref 7–25)
C PNEUM DNA NPH QL NAA+NON-PROBE: NOT DETECTED
CALCIUM SPEC-SCNC: 9.3 MG/DL (ref 8.6–10.5)
CHLORIDE SERPL-SCNC: 103 MMOL/L (ref 98–107)
CO2 BLDA-SCNC: 33.9 MMOL/L (ref 22–33)
CO2 BLDA-SCNC: 34.8 MMOL/L (ref 22–33)
CO2 BLDA-SCNC: 36.5 MMOL/L (ref 22–33)
CO2 SERPL-SCNC: 32 MMOL/L (ref 22–29)
COHGB MFR BLD: 1.5 % (ref 0–2)
COHGB MFR BLD: 1.7 % (ref 0–2)
COHGB MFR BLD: 1.9 % (ref 0–2)
CREAT SERPL-MCNC: 1.97 MG/DL (ref 0.57–1)
D-LACTATE SERPL-SCNC: 1.4 MMOL/L (ref 0.5–2)
DEPRECATED RDW RBC AUTO: 68.8 FL (ref 37–54)
EGFRCR SERPLBLD CKD-EPI 2021: 29 ML/MIN/1.73
EOSINOPHIL # BLD AUTO: 0.1 10*3/MM3 (ref 0–0.4)
EOSINOPHIL NFR BLD AUTO: 2.5 % (ref 0.3–6.2)
EPAP: 0
EPAP: 10
EPAP: 10
ERYTHROCYTE [DISTWIDTH] IN BLOOD BY AUTOMATED COUNT: 20.4 % (ref 12.3–15.4)
FLUAV SUBTYP SPEC NAA+PROBE: NOT DETECTED
FLUBV RNA ISLT QL NAA+PROBE: NOT DETECTED
GLOBULIN UR ELPH-MCNC: 3.6 GM/DL
GLUCOSE SERPL-MCNC: 92 MG/DL (ref 65–99)
HADV DNA SPEC NAA+PROBE: NOT DETECTED
HCO3 BLDA-SCNC: 31.1 MMOL/L (ref 20–26)
HCO3 BLDA-SCNC: 32 MMOL/L (ref 20–26)
HCO3 BLDA-SCNC: 33.4 MMOL/L (ref 20–26)
HCOV 229E RNA SPEC QL NAA+PROBE: NOT DETECTED
HCOV HKU1 RNA SPEC QL NAA+PROBE: NOT DETECTED
HCOV NL63 RNA SPEC QL NAA+PROBE: NOT DETECTED
HCOV OC43 RNA SPEC QL NAA+PROBE: NOT DETECTED
HCT VFR BLD AUTO: 35.2 % (ref 34–46.6)
HCT VFR BLD CALC: 28.2 % (ref 38–51)
HCT VFR BLD CALC: 28.6 % (ref 38–51)
HCT VFR BLD CALC: 28.7 % (ref 38–51)
HGB BLD-MCNC: 9 G/DL (ref 12–15.9)
HGB BLDA-MCNC: 9.2 G/DL (ref 14–18)
HGB BLDA-MCNC: 9.3 G/DL (ref 14–18)
HGB BLDA-MCNC: 9.4 G/DL (ref 14–18)
HMPV RNA NPH QL NAA+NON-PROBE: DETECTED
HOLD SPECIMEN: NORMAL
HOLD SPECIMEN: NORMAL
HPIV1 RNA ISLT QL NAA+PROBE: NOT DETECTED
HPIV2 RNA SPEC QL NAA+PROBE: NOT DETECTED
HPIV3 RNA NPH QL NAA+PROBE: NOT DETECTED
HPIV4 P GENE NPH QL NAA+PROBE: NOT DETECTED
HYPOCHROMIA BLD QL: NORMAL
IMM GRANULOCYTES # BLD AUTO: 0.04 10*3/MM3 (ref 0–0.05)
IMM GRANULOCYTES NFR BLD AUTO: 1 % (ref 0–0.5)
INHALED O2 CONCENTRATION: 100 %
INHALED O2 CONCENTRATION: 100 %
INHALED O2 CONCENTRATION: 90 %
IPAP: 0
IPAP: 20
IPAP: 20
LYMPHOCYTES # BLD AUTO: 0.54 10*3/MM3 (ref 0.7–3.1)
LYMPHOCYTES NFR BLD AUTO: 13.3 % (ref 19.6–45.3)
Lab: ABNORMAL
M PNEUMO IGG SER IA-ACNC: NOT DETECTED
MAGNESIUM SERPL-MCNC: 2 MG/DL (ref 1.6–2.6)
MCH RBC QN AUTO: 23.6 PG (ref 26.6–33)
MCHC RBC AUTO-ENTMCNC: 25.6 G/DL (ref 31.5–35.7)
MCV RBC AUTO: 92.4 FL (ref 79–97)
METHGB BLD QL: 0.2 % (ref 0–1.5)
METHGB BLD QL: 0.2 % (ref 0–1.5)
METHGB BLD QL: 0.4 % (ref 0–1.5)
MODALITY: ABNORMAL
MONOCYTES # BLD AUTO: 0.76 10*3/MM3 (ref 0.1–0.9)
MONOCYTES NFR BLD AUTO: 18.7 % (ref 5–12)
NEUTROPHILS NFR BLD AUTO: 2.6 10*3/MM3 (ref 1.7–7)
NEUTROPHILS NFR BLD AUTO: 64 % (ref 42.7–76)
NOTE: ABNORMAL
NOTIFIED BY: ABNORMAL
NOTIFIED WHO: ABNORMAL
NRBC BLD AUTO-RTO: 0.5 /100 WBC (ref 0–0.2)
NT-PROBNP SERPL-MCNC: ABNORMAL PG/ML (ref 0–900)
OVALOCYTES BLD QL SMEAR: NORMAL
OXYHGB MFR BLDV: 83.8 % (ref 94–99)
OXYHGB MFR BLDV: 89.6 % (ref 94–99)
OXYHGB MFR BLDV: 90.3 % (ref 94–99)
PAW @ PEAK INSP FLOW SETTING VENT: 0 CMH2O
PCO2 BLDA: 100 MM HG (ref 35–45)
PCO2 BLDA: 90.4 MM HG (ref 35–45)
PCO2 BLDA: 90.9 MM HG (ref 35–45)
PCO2 TEMP ADJ BLD: 100 MM HG (ref 35–45)
PCO2 TEMP ADJ BLD: 90.4 MM HG (ref 35–45)
PCO2 TEMP ADJ BLD: 90.9 MM HG (ref 35–45)
PH BLDA: 7.13 PH UNITS (ref 7.35–7.45)
PH BLDA: 7.14 PH UNITS (ref 7.35–7.45)
PH BLDA: 7.16 PH UNITS (ref 7.35–7.45)
PH, TEMP CORRECTED: 7.13 PH UNITS
PH, TEMP CORRECTED: 7.14 PH UNITS
PH, TEMP CORRECTED: 7.16 PH UNITS
PLAT MORPH BLD: NORMAL
PLATELET # BLD AUTO: 261 10*3/MM3 (ref 140–450)
PMV BLD AUTO: 10.5 FL (ref 6–12)
PO2 BLDA: 66.3 MM HG (ref 83–108)
PO2 BLDA: 76.1 MM HG (ref 83–108)
PO2 BLDA: 77.9 MM HG (ref 83–108)
PO2 TEMP ADJ BLD: 66.3 MM HG (ref 83–108)
PO2 TEMP ADJ BLD: 76.1 MM HG (ref 83–108)
PO2 TEMP ADJ BLD: 77.9 MM HG (ref 83–108)
POTASSIUM SERPL-SCNC: 4.8 MMOL/L (ref 3.5–5.2)
PROCALCITONIN SERPL-MCNC: 0.21 NG/ML (ref 0–0.25)
PROT SERPL-MCNC: 7.4 G/DL (ref 6–8.5)
PSV: 10 CMH2O
PSV: 10 CMH2O
RBC # BLD AUTO: 3.81 10*6/MM3 (ref 3.77–5.28)
RHINOVIRUS RNA SPEC NAA+PROBE: NOT DETECTED
RSV RNA NPH QL NAA+NON-PROBE: NOT DETECTED
SARS-COV-2 RNA NPH QL NAA+NON-PROBE: NOT DETECTED
SODIUM SERPL-SCNC: 143 MMOL/L (ref 136–145)
TARGETS BLD QL SMEAR: NORMAL
TOTAL RATE: 0 BREATHS/MINUTE
TOTAL RATE: 16 BREATHS/MINUTE
TOTAL RATE: 26 BREATHS/MINUTE
TROPONIN T SERPL HS-MCNC: 30 NG/L
TSH SERPL DL<=0.05 MIU/L-ACNC: 1.23 UIU/ML (ref 0.27–4.2)
VENTILATOR MODE: ABNORMAL
VENTILATOR MODE: ABNORMAL
WBC MORPH BLD: NORMAL
WBC NRBC COR # BLD: 4.06 10*3/MM3 (ref 3.4–10.8)
WHOLE BLOOD HOLD COAG: NORMAL
WHOLE BLOOD HOLD SPECIMEN: NORMAL

## 2023-02-15 PROCEDURE — 82805 BLOOD GASES W/O2 SATURATION: CPT

## 2023-02-15 PROCEDURE — 80053 COMPREHEN METABOLIC PANEL: CPT | Performed by: EMERGENCY MEDICINE

## 2023-02-15 PROCEDURE — 94799 UNLISTED PULMONARY SVC/PX: CPT

## 2023-02-15 PROCEDURE — 25010000002 CHLOROTHIAZIDE PER 500 MG: Performed by: INTERNAL MEDICINE

## 2023-02-15 PROCEDURE — 36600 WITHDRAWAL OF ARTERIAL BLOOD: CPT

## 2023-02-15 PROCEDURE — 84443 ASSAY THYROID STIM HORMONE: CPT | Performed by: INTERNAL MEDICINE

## 2023-02-15 PROCEDURE — 84484 ASSAY OF TROPONIN QUANT: CPT | Performed by: EMERGENCY MEDICINE

## 2023-02-15 PROCEDURE — 87040 BLOOD CULTURE FOR BACTERIA: CPT | Performed by: EMERGENCY MEDICINE

## 2023-02-15 PROCEDURE — 25010000002 AMIODARONE PER 30 MG: Performed by: INTERNAL MEDICINE

## 2023-02-15 PROCEDURE — 0202U NFCT DS 22 TRGT SARS-COV-2: CPT | Performed by: EMERGENCY MEDICINE

## 2023-02-15 PROCEDURE — 94660 CPAP INITIATION&MGMT: CPT

## 2023-02-15 PROCEDURE — 85007 BL SMEAR W/DIFF WBC COUNT: CPT | Performed by: EMERGENCY MEDICINE

## 2023-02-15 PROCEDURE — 94640 AIRWAY INHALATION TREATMENT: CPT

## 2023-02-15 PROCEDURE — 71045 X-RAY EXAM CHEST 1 VIEW: CPT

## 2023-02-15 PROCEDURE — 93005 ELECTROCARDIOGRAM TRACING: CPT | Performed by: EMERGENCY MEDICINE

## 2023-02-15 PROCEDURE — 83735 ASSAY OF MAGNESIUM: CPT | Performed by: INTERNAL MEDICINE

## 2023-02-15 PROCEDURE — 82375 ASSAY CARBOXYHB QUANT: CPT

## 2023-02-15 PROCEDURE — 83880 ASSAY OF NATRIURETIC PEPTIDE: CPT | Performed by: EMERGENCY MEDICINE

## 2023-02-15 PROCEDURE — 83050 HGB METHEMOGLOBIN QUAN: CPT

## 2023-02-15 PROCEDURE — 99291 CRITICAL CARE FIRST HOUR: CPT | Performed by: INTERNAL MEDICINE

## 2023-02-15 PROCEDURE — 25010000002 METHYLPREDNISOLONE PER 125 MG: Performed by: EMERGENCY MEDICINE

## 2023-02-15 PROCEDURE — 36415 COLL VENOUS BLD VENIPUNCTURE: CPT

## 2023-02-15 PROCEDURE — 85025 COMPLETE CBC W/AUTO DIFF WBC: CPT | Performed by: EMERGENCY MEDICINE

## 2023-02-15 PROCEDURE — 84145 PROCALCITONIN (PCT): CPT | Performed by: EMERGENCY MEDICINE

## 2023-02-15 PROCEDURE — 25010000002 FUROSEMIDE PER 20 MG: Performed by: EMERGENCY MEDICINE

## 2023-02-15 PROCEDURE — 99285 EMERGENCY DEPT VISIT HI MDM: CPT

## 2023-02-15 PROCEDURE — 83605 ASSAY OF LACTIC ACID: CPT | Performed by: EMERGENCY MEDICINE

## 2023-02-15 RX ORDER — METHYLPREDNISOLONE SODIUM SUCCINATE 125 MG/2ML
125 INJECTION, POWDER, LYOPHILIZED, FOR SOLUTION INTRAMUSCULAR; INTRAVENOUS ONCE
Status: COMPLETED | OUTPATIENT
Start: 2023-02-15 | End: 2023-02-15

## 2023-02-15 RX ORDER — AMIODARONE HCL/D5W 450 MG/250
0.5 PLASTIC BAG, INJECTION (ML) INTRAVENOUS CONTINUOUS
Status: DISPENSED | OUTPATIENT
Start: 2023-02-16 | End: 2023-02-16

## 2023-02-15 RX ORDER — FUROSEMIDE 10 MG/ML
80 INJECTION INTRAMUSCULAR; INTRAVENOUS ONCE
Status: COMPLETED | OUTPATIENT
Start: 2023-02-15 | End: 2023-02-15

## 2023-02-15 RX ORDER — AMIODARONE HCL/D5W 450 MG/250
1 PLASTIC BAG, INJECTION (ML) INTRAVENOUS CONTINUOUS
Status: DISPENSED | OUTPATIENT
Start: 2023-02-15 | End: 2023-02-16

## 2023-02-15 RX ORDER — IPRATROPIUM BROMIDE AND ALBUTEROL SULFATE 2.5; .5 MG/3ML; MG/3ML
3 SOLUTION RESPIRATORY (INHALATION) ONCE
Status: COMPLETED | OUTPATIENT
Start: 2023-02-15 | End: 2023-02-15

## 2023-02-15 RX ORDER — SODIUM CHLORIDE 0.9 % (FLUSH) 0.9 %
10 SYRINGE (ML) INJECTION AS NEEDED
Status: DISCONTINUED | OUTPATIENT
Start: 2023-02-15 | End: 2023-02-24 | Stop reason: HOSPADM

## 2023-02-15 RX ORDER — PANTOPRAZOLE SODIUM 40 MG/10ML
40 INJECTION, POWDER, LYOPHILIZED, FOR SOLUTION INTRAVENOUS
Status: DISCONTINUED | OUTPATIENT
Start: 2023-02-16 | End: 2023-02-17

## 2023-02-15 RX ADMIN — METHYLPREDNISOLONE SODIUM SUCCINATE 125 MG: 125 INJECTION, POWDER, FOR SOLUTION INTRAMUSCULAR; INTRAVENOUS at 20:49

## 2023-02-15 RX ADMIN — FUROSEMIDE 80 MG: 10 INJECTION, SOLUTION INTRAMUSCULAR; INTRAVENOUS at 21:03

## 2023-02-15 RX ADMIN — DEXTROSE MONOHYDRATE 1 MG/MIN: 50 INJECTION, SOLUTION INTRAVENOUS at 22:14

## 2023-02-15 RX ADMIN — WATER 500 MG: 1 INJECTION INTRAMUSCULAR; INTRAVENOUS; SUBCUTANEOUS at 22:53

## 2023-02-15 RX ADMIN — IPRATROPIUM BROMIDE AND ALBUTEROL SULFATE 3 ML: 2.5; .5 SOLUTION RESPIRATORY (INHALATION) at 20:53

## 2023-02-15 RX ADMIN — BUMETANIDE 1 MG/HR: 0.25 INJECTION INTRAMUSCULAR; INTRAVENOUS at 23:14

## 2023-02-16 ENCOUNTER — APPOINTMENT (OUTPATIENT)
Dept: CARDIOLOGY | Facility: HOSPITAL | Age: 58
DRG: 291 | End: 2023-02-16
Payer: MEDICARE

## 2023-02-16 PROBLEM — J96.02 ACUTE TYPE 2 RESPIRATORY FAILURE: Status: ACTIVE | Noted: 2023-02-15

## 2023-02-16 LAB
ALBUMIN SERPL-MCNC: 4.2 G/DL (ref 3.5–5.2)
ALBUMIN/GLOB SERPL: 1.3 G/DL
ALP SERPL-CCNC: 100 U/L (ref 39–117)
ALT SERPL W P-5'-P-CCNC: 10 U/L (ref 1–33)
AMMONIA BLD-SCNC: 21 UMOL/L (ref 11–51)
ANION GAP SERPL CALCULATED.3IONS-SCNC: 11 MMOL/L (ref 5–15)
ARTERIAL PATENCY WRIST A: ABNORMAL
ARTERIAL PATENCY WRIST A: POSITIVE
AST SERPL-CCNC: 19 U/L (ref 1–32)
ATMOSPHERIC PRESS: ABNORMAL MM[HG]
ATMOSPHERIC PRESS: ABNORMAL MM[HG]
BASE EXCESS BLDA CALC-SCNC: 0.7 MMOL/L (ref 0–2)
BASE EXCESS BLDA CALC-SCNC: 1.9 MMOL/L (ref 0–2)
BASOPHILS # BLD AUTO: 0.01 10*3/MM3 (ref 0–0.2)
BASOPHILS NFR BLD AUTO: 0.3 % (ref 0–1.5)
BDY SITE: ABNORMAL
BDY SITE: ABNORMAL
BH CV ECHO MEAS - AO MAX PG: 9.6 MMHG
BH CV ECHO MEAS - AO MEAN PG: 6 MMHG
BH CV ECHO MEAS - AO ROOT DIAM: 3.3 CM
BH CV ECHO MEAS - AO V2 MAX: 155 CM/SEC
BH CV ECHO MEAS - AO V2 VTI: 30.5 CM
BH CV ECHO MEAS - AVA(I,D): 2.39 CM2
BH CV ECHO MEAS - EDV(CUBED): 185.2 ML
BH CV ECHO MEAS - EDV(MOD-SP2): 95.2 ML
BH CV ECHO MEAS - EDV(MOD-SP4): 144 ML
BH CV ECHO MEAS - EF(MOD-SP2): 47.9 %
BH CV ECHO MEAS - EF(MOD-SP4): 49.8 %
BH CV ECHO MEAS - ESV(CUBED): 64 ML
BH CV ECHO MEAS - ESV(MOD-SP2): 49.6 ML
BH CV ECHO MEAS - ESV(MOD-SP4): 72.3 ML
BH CV ECHO MEAS - FS: 29.8 %
BH CV ECHO MEAS - IVS/LVPW: 0.67 CM
BH CV ECHO MEAS - IVSD: 0.8 CM
BH CV ECHO MEAS - LA DIMENSION: 4.5 CM
BH CV ECHO MEAS - LAT PEAK E' VEL: 10.9 CM/SEC
BH CV ECHO MEAS - LV MASS(C)D: 226.4 GRAMS
BH CV ECHO MEAS - LV MAX PG: 3.6 MMHG
BH CV ECHO MEAS - LV MEAN PG: 2 MMHG
BH CV ECHO MEAS - LV V1 MAX: 95 CM/SEC
BH CV ECHO MEAS - LV V1 VTI: 23.2 CM
BH CV ECHO MEAS - LVIDD: 5.7 CM
BH CV ECHO MEAS - LVIDS: 4 CM
BH CV ECHO MEAS - LVOT AREA: 3.1 CM2
BH CV ECHO MEAS - LVOT DIAM: 2 CM
BH CV ECHO MEAS - LVPWD: 1.2 CM
BH CV ECHO MEAS - MED PEAK E' VEL: 8.7 CM/SEC
BH CV ECHO MEAS - MV A MAX VEL: 102 CM/SEC
BH CV ECHO MEAS - MV DEC SLOPE: 727 CM/SEC2
BH CV ECHO MEAS - MV MAX PG: 7 MMHG
BH CV ECHO MEAS - MV MEAN PG: 2 MMHG
BH CV ECHO MEAS - MV P1/2T: 53.2 MSEC
BH CV ECHO MEAS - MV V2 VTI: 18.5 CM
BH CV ECHO MEAS - MVA(P1/2T): 4.1 CM2
BH CV ECHO MEAS - MVA(VTI): 3.9 CM2
BH CV ECHO MEAS - PA ACC TIME: 0.12 SEC
BH CV ECHO MEAS - PA PR(ACCEL): 25 MMHG
BH CV ECHO MEAS - RAP SYSTOLE: 15 MMHG
BH CV ECHO MEAS - RVSP: 67 MMHG
BH CV ECHO MEAS - SV(LVOT): 72.9 ML
BH CV ECHO MEAS - SV(MOD-SP2): 45.6 ML
BH CV ECHO MEAS - SV(MOD-SP4): 71.7 ML
BH CV ECHO MEAS - TAPSE (>1.6): 1.63 CM
BH CV ECHO MEAS - TR MAX PG: 52 MMHG
BH CV ECHO MEAS - TR MAX VEL: 361 CM/SEC
BH CV XLRA - RV BASE: 5.1 CM
BH CV XLRA - RV LENGTH: 7.2 CM
BH CV XLRA - RV MID: 4.4 CM
BH CV XLRA - TDI S': 21 CM/SEC
BILIRUB SERPL-MCNC: 1.4 MG/DL (ref 0–1.2)
BODY TEMPERATURE: 37 C
BODY TEMPERATURE: 37 C
BUN SERPL-MCNC: 17 MG/DL (ref 6–20)
BUN/CREAT SERPL: 8.9 (ref 7–25)
CALCIUM SPEC-SCNC: 9.2 MG/DL (ref 8.6–10.5)
CHLORIDE SERPL-SCNC: 100 MMOL/L (ref 98–107)
CO2 BLDA-SCNC: 32.4 MMOL/L (ref 22–33)
CO2 BLDA-SCNC: 33.6 MMOL/L (ref 22–33)
CO2 SERPL-SCNC: 31 MMOL/L (ref 22–29)
COHGB MFR BLD: 1.5 % (ref 0–2)
COHGB MFR BLD: 1.6 % (ref 0–2)
CREAT SERPL-MCNC: 1.91 MG/DL (ref 0.57–1)
DEPRECATED RDW RBC AUTO: 70.3 FL (ref 37–54)
EGFRCR SERPLBLD CKD-EPI 2021: 30.1 ML/MIN/1.73
EOSINOPHIL # BLD AUTO: 0 10*3/MM3 (ref 0–0.4)
EOSINOPHIL NFR BLD AUTO: 0 % (ref 0.3–6.2)
EPAP: 0
EPAP: 4
ERYTHROCYTE [DISTWIDTH] IN BLOOD BY AUTOMATED COUNT: 20.7 % (ref 12.3–15.4)
GEN 5 2HR TROPONIN T REFLEX: 28 NG/L
GLOBULIN UR ELPH-MCNC: 3.3 GM/DL
GLUCOSE BLDC GLUCOMTR-MCNC: 94 MG/DL (ref 70–130)
GLUCOSE BLDC GLUCOMTR-MCNC: 98 MG/DL (ref 70–130)
GLUCOSE SERPL-MCNC: 113 MG/DL (ref 65–99)
HCO3 BLDA-SCNC: 30 MMOL/L (ref 20–26)
HCO3 BLDA-SCNC: 31.2 MMOL/L (ref 20–26)
HCT VFR BLD AUTO: 37 % (ref 34–46.6)
HCT VFR BLD CALC: 28.5 % (ref 38–51)
HCT VFR BLD CALC: 28.7 % (ref 38–51)
HGB BLD-MCNC: 9.1 G/DL (ref 12–15.9)
HGB BLDA-MCNC: 9.3 G/DL (ref 14–18)
HGB BLDA-MCNC: 9.4 G/DL (ref 14–18)
HOLD SPECIMEN: NORMAL
IMM GRANULOCYTES # BLD AUTO: 0.02 10*3/MM3 (ref 0–0.05)
IMM GRANULOCYTES NFR BLD AUTO: 0.6 % (ref 0–0.5)
INHALED O2 CONCENTRATION: 100 %
INHALED O2 CONCENTRATION: 90 %
IPAP: 0
IPAP: 25
LYMPHOCYTES # BLD AUTO: 0.23 10*3/MM3 (ref 0.7–3.1)
LYMPHOCYTES NFR BLD AUTO: 6.4 % (ref 19.6–45.3)
Lab: ABNORMAL
Lab: ABNORMAL
MAGNESIUM SERPL-MCNC: 1.9 MG/DL (ref 1.6–2.6)
MAXIMAL PREDICTED HEART RATE: 162 BPM
MCH RBC QN AUTO: 23.1 PG (ref 26.6–33)
MCHC RBC AUTO-ENTMCNC: 24.6 G/DL (ref 31.5–35.7)
MCV RBC AUTO: 93.9 FL (ref 79–97)
METHGB BLD QL: 0.3 % (ref 0–1.5)
METHGB BLD QL: 0.6 % (ref 0–1.5)
MODALITY: ABNORMAL
MODALITY: ABNORMAL
MONOCYTES # BLD AUTO: 0.18 10*3/MM3 (ref 0.1–0.9)
MONOCYTES NFR BLD AUTO: 5 % (ref 5–12)
NEUTROPHILS NFR BLD AUTO: 3.13 10*3/MM3 (ref 1.7–7)
NEUTROPHILS NFR BLD AUTO: 87.7 % (ref 42.7–76)
NOTE: ABNORMAL
NOTE: ABNORMAL
NOTIFIED BY: ABNORMAL
NOTIFIED BY: ABNORMAL
NOTIFIED WHO: ABNORMAL
NOTIFIED WHO: ABNORMAL
NRBC BLD AUTO-RTO: 0 /100 WBC (ref 0–0.2)
OXYHGB MFR BLDV: 90 % (ref 94–99)
OXYHGB MFR BLDV: 95.7 % (ref 94–99)
PAW @ PEAK INSP FLOW SETTING VENT: 0 CMH2O
PAW @ PEAK INSP FLOW SETTING VENT: 0 CMH2O
PCO2 BLDA: 78.1 MM HG (ref 35–45)
PCO2 BLDA: 79.5 MM HG (ref 35–45)
PCO2 TEMP ADJ BLD: 78.1 MM HG (ref 35–45)
PCO2 TEMP ADJ BLD: 79.5 MM HG (ref 35–45)
PH BLDA: 7.19 PH UNITS (ref 7.35–7.45)
PH BLDA: 7.2 PH UNITS (ref 7.35–7.45)
PH, TEMP CORRECTED: 7.19 PH UNITS
PH, TEMP CORRECTED: 7.2 PH UNITS
PHOSPHATE SERPL-MCNC: 4.6 MG/DL (ref 2.5–4.5)
PLATELET # BLD AUTO: 256 10*3/MM3 (ref 140–450)
PMV BLD AUTO: 10.6 FL (ref 6–12)
PO2 BLDA: 107 MM HG (ref 83–108)
PO2 BLDA: 70.8 MM HG (ref 83–108)
PO2 TEMP ADJ BLD: 107 MM HG (ref 83–108)
PO2 TEMP ADJ BLD: 70.8 MM HG (ref 83–108)
POTASSIUM SERPL-SCNC: 4.9 MMOL/L (ref 3.5–5.2)
PROCALCITONIN SERPL-MCNC: 0.24 NG/ML (ref 0–0.25)
PROT SERPL-MCNC: 7.5 G/DL (ref 6–8.5)
RBC # BLD AUTO: 3.94 10*6/MM3 (ref 3.77–5.28)
SODIUM SERPL-SCNC: 142 MMOL/L (ref 136–145)
STRESS TARGET HR: 138 BPM
TOTAL RATE: 0 BREATHS/MINUTE
TOTAL RATE: 18 BREATHS/MINUTE
TROPONIN T DELTA: -2 NG/L
VENTILATOR MODE: ABNORMAL
WBC NRBC COR # BLD: 3.57 10*3/MM3 (ref 3.4–10.8)

## 2023-02-16 PROCEDURE — 25010000002 DIGOXIN PER 500 MCG: Performed by: INTERNAL MEDICINE

## 2023-02-16 PROCEDURE — 84484 ASSAY OF TROPONIN QUANT: CPT | Performed by: EMERGENCY MEDICINE

## 2023-02-16 PROCEDURE — 94761 N-INVAS EAR/PLS OXIMETRY MLT: CPT

## 2023-02-16 PROCEDURE — 94799 UNLISTED PULMONARY SVC/PX: CPT

## 2023-02-16 PROCEDURE — 84100 ASSAY OF PHOSPHORUS: CPT | Performed by: NURSE PRACTITIONER

## 2023-02-16 PROCEDURE — 99233 SBSQ HOSP IP/OBS HIGH 50: CPT | Performed by: INTERNAL MEDICINE

## 2023-02-16 PROCEDURE — 82375 ASSAY CARBOXYHB QUANT: CPT

## 2023-02-16 PROCEDURE — 82962 GLUCOSE BLOOD TEST: CPT

## 2023-02-16 PROCEDURE — 82140 ASSAY OF AMMONIA: CPT | Performed by: INTERNAL MEDICINE

## 2023-02-16 PROCEDURE — 94660 CPAP INITIATION&MGMT: CPT

## 2023-02-16 PROCEDURE — 25010000002 CEFTRIAXONE PER 250 MG: Performed by: INTERNAL MEDICINE

## 2023-02-16 PROCEDURE — 85025 COMPLETE CBC W/AUTO DIFF WBC: CPT | Performed by: NURSE PRACTITIONER

## 2023-02-16 PROCEDURE — 93306 TTE W/DOPPLER COMPLETE: CPT

## 2023-02-16 PROCEDURE — 83735 ASSAY OF MAGNESIUM: CPT | Performed by: NURSE PRACTITIONER

## 2023-02-16 PROCEDURE — 82805 BLOOD GASES W/O2 SATURATION: CPT

## 2023-02-16 PROCEDURE — 84145 PROCALCITONIN (PCT): CPT | Performed by: INTERNAL MEDICINE

## 2023-02-16 PROCEDURE — 80053 COMPREHEN METABOLIC PANEL: CPT | Performed by: INTERNAL MEDICINE

## 2023-02-16 PROCEDURE — 93306 TTE W/DOPPLER COMPLETE: CPT | Performed by: INTERNAL MEDICINE

## 2023-02-16 PROCEDURE — 25010000002 METHYLPREDNISOLONE PER 40 MG: Performed by: INTERNAL MEDICINE

## 2023-02-16 PROCEDURE — 25010000002 ENOXAPARIN PER 10 MG

## 2023-02-16 PROCEDURE — 25010000002 AMIODARONE PER 30 MG: Performed by: INTERNAL MEDICINE

## 2023-02-16 PROCEDURE — 36600 WITHDRAWAL OF ARTERIAL BLOOD: CPT

## 2023-02-16 PROCEDURE — 83050 HGB METHEMOGLOBIN QUAN: CPT

## 2023-02-16 RX ORDER — TRIAMTERENE AND HYDROCHLOROTHIAZIDE 75; 50 MG/1; MG/1
1 TABLET ORAL DAILY
COMMUNITY
End: 2023-02-24 | Stop reason: HOSPADM

## 2023-02-16 RX ORDER — ALBUTEROL SULFATE 2.5 MG/3ML
2.5 SOLUTION RESPIRATORY (INHALATION) EVERY 6 HOURS PRN
Status: DISCONTINUED | OUTPATIENT
Start: 2023-02-16 | End: 2023-02-24 | Stop reason: HOSPADM

## 2023-02-16 RX ORDER — METHYLPREDNISOLONE SODIUM SUCCINATE 40 MG/ML
40 INJECTION, POWDER, LYOPHILIZED, FOR SOLUTION INTRAMUSCULAR; INTRAVENOUS EVERY 8 HOURS
Status: DISCONTINUED | OUTPATIENT
Start: 2023-02-16 | End: 2023-02-16

## 2023-02-16 RX ORDER — AMOXICILLIN 250 MG
2 CAPSULE ORAL 2 TIMES DAILY
Status: DISCONTINUED | OUTPATIENT
Start: 2023-02-16 | End: 2023-02-24 | Stop reason: HOSPADM

## 2023-02-16 RX ORDER — AMLODIPINE BESYLATE 5 MG/1
5 TABLET ORAL
Status: DISCONTINUED | OUTPATIENT
Start: 2023-02-16 | End: 2023-02-17

## 2023-02-16 RX ORDER — DIGOXIN 0.25 MG/ML
500 INJECTION INTRAMUSCULAR; INTRAVENOUS ONCE
Status: COMPLETED | OUTPATIENT
Start: 2023-02-16 | End: 2023-02-16

## 2023-02-16 RX ORDER — DOXYCYCLINE 100 MG/1
100 CAPSULE ORAL EVERY 12 HOURS SCHEDULED
Status: COMPLETED | OUTPATIENT
Start: 2023-02-16 | End: 2023-02-23

## 2023-02-16 RX ORDER — BUDESONIDE AND FORMOTEROL FUMARATE DIHYDRATE 160; 4.5 UG/1; UG/1
2 AEROSOL RESPIRATORY (INHALATION)
Status: DISCONTINUED | OUTPATIENT
Start: 2023-02-16 | End: 2023-02-24 | Stop reason: HOSPADM

## 2023-02-16 RX ORDER — ENOXAPARIN SODIUM 150 MG/ML
1 INJECTION SUBCUTANEOUS EVERY 12 HOURS SCHEDULED
Status: DISCONTINUED | OUTPATIENT
Start: 2023-02-16 | End: 2023-02-17

## 2023-02-16 RX ORDER — METHYLPREDNISOLONE SODIUM SUCCINATE 40 MG/ML
40 INJECTION, POWDER, LYOPHILIZED, FOR SOLUTION INTRAMUSCULAR; INTRAVENOUS DAILY
Status: DISCONTINUED | OUTPATIENT
Start: 2023-02-17 | End: 2023-02-17

## 2023-02-16 RX ORDER — IPRATROPIUM BROMIDE AND ALBUTEROL SULFATE 2.5; .5 MG/3ML; MG/3ML
9 SOLUTION RESPIRATORY (INHALATION) ONCE
Status: COMPLETED | OUTPATIENT
Start: 2023-02-16 | End: 2023-02-16

## 2023-02-16 RX ORDER — IPRATROPIUM BROMIDE AND ALBUTEROL SULFATE 2.5; .5 MG/3ML; MG/3ML
3 SOLUTION RESPIRATORY (INHALATION)
Status: DISCONTINUED | OUTPATIENT
Start: 2023-02-16 | End: 2023-02-16

## 2023-02-16 RX ORDER — TRIAMTERENE AND HYDROCHLOROTHIAZIDE 37.5; 25 MG/1; MG/1
2 TABLET ORAL DAILY
Status: DISCONTINUED | OUTPATIENT
Start: 2023-02-16 | End: 2023-02-21

## 2023-02-16 RX ADMIN — TRIAMTERENE AND HYDROCHLOROTHIAZIDE 2 TABLET: 37.5; 25 TABLET ORAL at 21:28

## 2023-02-16 RX ADMIN — CEFTRIAXONE 2 G: 2 INJECTION, POWDER, FOR SOLUTION INTRAMUSCULAR; INTRAVENOUS at 04:27

## 2023-02-16 RX ADMIN — ENOXAPARIN SODIUM 140 MG: 150 INJECTION SUBCUTANEOUS at 18:17

## 2023-02-16 RX ADMIN — BUMETANIDE 2 MG/HR: 0.25 INJECTION INTRAMUSCULAR; INTRAVENOUS at 04:30

## 2023-02-16 RX ADMIN — DIGOXIN 500 MCG: 0.25 INJECTION INTRAMUSCULAR; INTRAVENOUS at 01:05

## 2023-02-16 RX ADMIN — NITROGLYCERIN 1 INCH: 20 OINTMENT TOPICAL at 03:26

## 2023-02-16 RX ADMIN — DEXTROSE MONOHYDRATE 0.5 MG/MIN: 50 INJECTION, SOLUTION INTRAVENOUS at 18:59

## 2023-02-16 RX ADMIN — DEXTROSE MONOHYDRATE 0.5 MG/MIN: 50 INJECTION, SOLUTION INTRAVENOUS at 06:23

## 2023-02-16 RX ADMIN — DOXYCYCLINE 100 MG: 100 INJECTION, POWDER, LYOPHILIZED, FOR SOLUTION INTRAVENOUS at 03:23

## 2023-02-16 RX ADMIN — IPRATROPIUM BROMIDE AND ALBUTEROL SULFATE 3 ML: 2.5; .5 SOLUTION RESPIRATORY (INHALATION) at 08:12

## 2023-02-16 RX ADMIN — IPRATROPIUM BROMIDE AND ALBUTEROL SULFATE 3 ML: 2.5; .5 SOLUTION RESPIRATORY (INHALATION) at 16:28

## 2023-02-16 RX ADMIN — BUDESONIDE AND FORMOTEROL FUMARATE DIHYDRATE 2 PUFF: 160; 4.5 AEROSOL RESPIRATORY (INHALATION) at 19:39

## 2023-02-16 RX ADMIN — IPRATROPIUM BROMIDE AND ALBUTEROL SULFATE 3 ML: 2.5; .5 SOLUTION RESPIRATORY (INHALATION) at 12:40

## 2023-02-16 RX ADMIN — AMLODIPINE BESYLATE 5 MG: 5 TABLET ORAL at 18:17

## 2023-02-16 RX ADMIN — PANTOPRAZOLE SODIUM 40 MG: 40 INJECTION, POWDER, FOR SOLUTION INTRAVENOUS at 06:23

## 2023-02-16 RX ADMIN — IPRATROPIUM BROMIDE AND ALBUTEROL SULFATE 9 ML: .5; 3 SOLUTION RESPIRATORY (INHALATION) at 01:11

## 2023-02-16 RX ADMIN — DOXYCYCLINE 100 MG: 100 CAPSULE ORAL at 18:17

## 2023-02-16 RX ADMIN — METHYLPREDNISOLONE SODIUM SUCCINATE 40 MG: 40 INJECTION, POWDER, FOR SOLUTION INTRAMUSCULAR; INTRAVENOUS at 04:26

## 2023-02-16 RX ADMIN — ENOXAPARIN SODIUM 140 MG: 150 INJECTION SUBCUTANEOUS at 03:27

## 2023-02-17 ENCOUNTER — APPOINTMENT (OUTPATIENT)
Dept: GENERAL RADIOLOGY | Facility: HOSPITAL | Age: 58
DRG: 291 | End: 2023-02-17
Payer: MEDICARE

## 2023-02-17 LAB
ANION GAP SERPL CALCULATED.3IONS-SCNC: 8 MMOL/L (ref 5–15)
BASOPHILS # BLD AUTO: 0 10*3/MM3 (ref 0–0.2)
BASOPHILS NFR BLD AUTO: 0 % (ref 0–1.5)
BUN SERPL-MCNC: 24 MG/DL (ref 6–20)
BUN/CREAT SERPL: 10.9 (ref 7–25)
CALCIUM SPEC-SCNC: 9.1 MG/DL (ref 8.6–10.5)
CHLORIDE SERPL-SCNC: 94 MMOL/L (ref 98–107)
CO2 SERPL-SCNC: 36 MMOL/L (ref 22–29)
CREAT SERPL-MCNC: 2.21 MG/DL (ref 0.57–1)
DEPRECATED RDW RBC AUTO: 66.1 FL (ref 37–54)
EGFRCR SERPLBLD CKD-EPI 2021: 25.3 ML/MIN/1.73
EOSINOPHIL # BLD AUTO: 0 10*3/MM3 (ref 0–0.4)
EOSINOPHIL NFR BLD AUTO: 0 % (ref 0.3–6.2)
ERYTHROCYTE [DISTWIDTH] IN BLOOD BY AUTOMATED COUNT: 20.6 % (ref 12.3–15.4)
GLUCOSE SERPL-MCNC: 118 MG/DL (ref 65–99)
HCT VFR BLD AUTO: 35.1 % (ref 34–46.6)
HGB BLD-MCNC: 9.3 G/DL (ref 12–15.9)
IMM GRANULOCYTES # BLD AUTO: 0.03 10*3/MM3 (ref 0–0.05)
IMM GRANULOCYTES NFR BLD AUTO: 0.6 % (ref 0–0.5)
IRON 24H UR-MRATE: 16 MCG/DL (ref 37–145)
IRON SATN MFR SERPL: 4 % (ref 20–50)
LYMPHOCYTES # BLD AUTO: 0.29 10*3/MM3 (ref 0.7–3.1)
LYMPHOCYTES NFR BLD AUTO: 5.3 % (ref 19.6–45.3)
MAGNESIUM SERPL-MCNC: 2.1 MG/DL (ref 1.6–2.6)
MCH RBC QN AUTO: 23.5 PG (ref 26.6–33)
MCHC RBC AUTO-ENTMCNC: 26.5 G/DL (ref 31.5–35.7)
MCV RBC AUTO: 88.9 FL (ref 79–97)
MONOCYTES # BLD AUTO: 0.86 10*3/MM3 (ref 0.1–0.9)
MONOCYTES NFR BLD AUTO: 15.8 % (ref 5–12)
MRSA DNA SPEC QL NAA+PROBE: NEGATIVE
NEUTROPHILS NFR BLD AUTO: 4.27 10*3/MM3 (ref 1.7–7)
NEUTROPHILS NFR BLD AUTO: 78.3 % (ref 42.7–76)
NRBC BLD AUTO-RTO: 0 /100 WBC (ref 0–0.2)
NT-PROBNP SERPL-MCNC: ABNORMAL PG/ML (ref 0–900)
PHOSPHATE SERPL-MCNC: 3.5 MG/DL (ref 2.5–4.5)
PLATELET # BLD AUTO: 257 10*3/MM3 (ref 140–450)
PMV BLD AUTO: 10.3 FL (ref 6–12)
POTASSIUM SERPL-SCNC: 3.9 MMOL/L (ref 3.5–5.2)
PROCALCITONIN SERPL-MCNC: 0.25 NG/ML (ref 0–0.25)
RBC # BLD AUTO: 3.95 10*6/MM3 (ref 3.77–5.28)
SODIUM SERPL-SCNC: 138 MMOL/L (ref 136–145)
TIBC SERPL-MCNC: 368 MCG/DL (ref 298–536)
TRANSFERRIN SERPL-MCNC: 247 MG/DL (ref 200–360)
UFH PPP CHRO-ACNC: 1.13 IU/ML
WBC NRBC COR # BLD: 5.45 10*3/MM3 (ref 3.4–10.8)

## 2023-02-17 PROCEDURE — 85025 COMPLETE CBC W/AUTO DIFF WBC: CPT | Performed by: NURSE PRACTITIONER

## 2023-02-17 PROCEDURE — 83880 ASSAY OF NATRIURETIC PEPTIDE: CPT | Performed by: NURSE PRACTITIONER

## 2023-02-17 PROCEDURE — 83735 ASSAY OF MAGNESIUM: CPT | Performed by: NURSE PRACTITIONER

## 2023-02-17 PROCEDURE — 25010000002 DIGOXIN PER 500 MCG: Performed by: INTERNAL MEDICINE

## 2023-02-17 PROCEDURE — 84145 PROCALCITONIN (PCT): CPT | Performed by: NURSE PRACTITIONER

## 2023-02-17 PROCEDURE — 25010000002 CEFTRIAXONE PER 250 MG: Performed by: INTERNAL MEDICINE

## 2023-02-17 PROCEDURE — 83540 ASSAY OF IRON: CPT | Performed by: INTERNAL MEDICINE

## 2023-02-17 PROCEDURE — 25010000002 ENOXAPARIN PER 10 MG

## 2023-02-17 PROCEDURE — 71045 X-RAY EXAM CHEST 1 VIEW: CPT

## 2023-02-17 PROCEDURE — 94799 UNLISTED PULMONARY SVC/PX: CPT

## 2023-02-17 PROCEDURE — 84100 ASSAY OF PHOSPHORUS: CPT | Performed by: NURSE PRACTITIONER

## 2023-02-17 PROCEDURE — 94664 DEMO&/EVAL PT USE INHALER: CPT

## 2023-02-17 PROCEDURE — 85520 HEPARIN ASSAY: CPT

## 2023-02-17 PROCEDURE — 99222 1ST HOSP IP/OBS MODERATE 55: CPT | Performed by: INTERNAL MEDICINE

## 2023-02-17 PROCEDURE — 84466 ASSAY OF TRANSFERRIN: CPT | Performed by: INTERNAL MEDICINE

## 2023-02-17 PROCEDURE — 80048 BASIC METABOLIC PNL TOTAL CA: CPT | Performed by: NURSE PRACTITIONER

## 2023-02-17 PROCEDURE — 25010000002 METHYLPREDNISOLONE PER 40 MG: Performed by: INTERNAL MEDICINE

## 2023-02-17 PROCEDURE — 99233 SBSQ HOSP IP/OBS HIGH 50: CPT | Performed by: INTERNAL MEDICINE

## 2023-02-17 PROCEDURE — 87641 MR-STAPH DNA AMP PROBE: CPT | Performed by: INTERNAL MEDICINE

## 2023-02-17 RX ORDER — DIGOXIN 0.25 MG/ML
250 INJECTION INTRAMUSCULAR; INTRAVENOUS ONCE
Status: COMPLETED | OUTPATIENT
Start: 2023-02-17 | End: 2023-02-17

## 2023-02-17 RX ORDER — NEBIVOLOL 5 MG/1
5 TABLET ORAL
Status: DISCONTINUED | OUTPATIENT
Start: 2023-02-17 | End: 2023-02-20

## 2023-02-17 RX ORDER — PREDNISONE 20 MG/1
40 TABLET ORAL
Status: COMPLETED | OUTPATIENT
Start: 2023-02-18 | End: 2023-02-20

## 2023-02-17 RX ORDER — PANTOPRAZOLE SODIUM 40 MG/1
40 TABLET, DELAYED RELEASE ORAL
Status: DISCONTINUED | OUTPATIENT
Start: 2023-02-18 | End: 2023-02-24 | Stop reason: HOSPADM

## 2023-02-17 RX ORDER — AMIODARONE HCL/D5W 450 MG/250
1 PLASTIC BAG, INJECTION (ML) INTRAVENOUS CONTINUOUS
Status: DISPENSED | OUTPATIENT
Start: 2023-02-17 | End: 2023-02-17

## 2023-02-17 RX ORDER — AMIODARONE HCL/D5W 450 MG/250
0.5 PLASTIC BAG, INJECTION (ML) INTRAVENOUS CONTINUOUS
Status: DISCONTINUED | OUTPATIENT
Start: 2023-02-17 | End: 2023-02-18

## 2023-02-17 RX ADMIN — BUDESONIDE AND FORMOTEROL FUMARATE DIHYDRATE 2 PUFF: 160; 4.5 AEROSOL RESPIRATORY (INHALATION) at 19:22

## 2023-02-17 RX ADMIN — AMLODIPINE BESYLATE 5 MG: 5 TABLET ORAL at 08:27

## 2023-02-17 RX ADMIN — TRIAMTERENE AND HYDROCHLOROTHIAZIDE 2 TABLET: 37.5; 25 TABLET ORAL at 08:28

## 2023-02-17 RX ADMIN — SENNOSIDES AND DOCUSATE SODIUM 2 TABLET: 50; 8.6 TABLET ORAL at 08:28

## 2023-02-17 RX ADMIN — ENOXAPARIN SODIUM 140 MG: 150 INJECTION SUBCUTANEOUS at 06:17

## 2023-02-17 RX ADMIN — DIGOXIN 250 MCG: 0.25 INJECTION INTRAMUSCULAR; INTRAVENOUS at 13:08

## 2023-02-17 RX ADMIN — PANTOPRAZOLE SODIUM 40 MG: 40 INJECTION, POWDER, FOR SOLUTION INTRAVENOUS at 06:17

## 2023-02-17 RX ADMIN — SENNOSIDES AND DOCUSATE SODIUM 2 TABLET: 50; 8.6 TABLET ORAL at 20:22

## 2023-02-17 RX ADMIN — NEBIVOLOL 5 MG: 5 TABLET ORAL at 13:09

## 2023-02-17 RX ADMIN — METHYLPREDNISOLONE SODIUM SUCCINATE 40 MG: 40 INJECTION, POWDER, FOR SOLUTION INTRAMUSCULAR; INTRAVENOUS at 08:29

## 2023-02-17 RX ADMIN — DOXYCYCLINE 100 MG: 100 CAPSULE ORAL at 08:28

## 2023-02-17 RX ADMIN — DOXYCYCLINE 100 MG: 100 CAPSULE ORAL at 20:22

## 2023-02-17 RX ADMIN — BUDESONIDE AND FORMOTEROL FUMARATE DIHYDRATE 2 PUFF: 160; 4.5 AEROSOL RESPIRATORY (INHALATION) at 08:02

## 2023-02-17 RX ADMIN — TIOTROPIUM BROMIDE INHALATION SPRAY 2 PUFF: 3.12 SPRAY, METERED RESPIRATORY (INHALATION) at 08:02

## 2023-02-17 RX ADMIN — APIXABAN 5 MG: 5 TABLET, FILM COATED ORAL at 20:22

## 2023-02-17 RX ADMIN — CEFTRIAXONE 2 G: 2 INJECTION, POWDER, FOR SOLUTION INTRAMUSCULAR; INTRAVENOUS at 06:17

## 2023-02-18 LAB
ALBUMIN SERPL-MCNC: 3.9 G/DL (ref 3.5–5.2)
ANION GAP SERPL CALCULATED.3IONS-SCNC: 6 MMOL/L (ref 5–15)
BASOPHILS # BLD AUTO: 0 10*3/MM3 (ref 0–0.2)
BASOPHILS NFR BLD AUTO: 0 % (ref 0–1.5)
BUN SERPL-MCNC: 27 MG/DL (ref 6–20)
BUN/CREAT SERPL: 13.5 (ref 7–25)
CALCIUM SPEC-SCNC: 9.5 MG/DL (ref 8.6–10.5)
CHLORIDE SERPL-SCNC: 88 MMOL/L (ref 98–107)
CO2 SERPL-SCNC: 46 MMOL/L (ref 22–29)
CREAT SERPL-MCNC: 2 MG/DL (ref 0.57–1)
DEPRECATED RDW RBC AUTO: 64.7 FL (ref 37–54)
EGFRCR SERPLBLD CKD-EPI 2021: 28.5 ML/MIN/1.73
EOSINOPHIL # BLD AUTO: 0 10*3/MM3 (ref 0–0.4)
EOSINOPHIL NFR BLD AUTO: 0 % (ref 0.3–6.2)
ERYTHROCYTE [DISTWIDTH] IN BLOOD BY AUTOMATED COUNT: 20.4 % (ref 12.3–15.4)
GLUCOSE SERPL-MCNC: 102 MG/DL (ref 65–99)
HCT VFR BLD AUTO: 35 % (ref 34–46.6)
HGB BLD-MCNC: 9.3 G/DL (ref 12–15.9)
IMM GRANULOCYTES # BLD AUTO: 0.02 10*3/MM3 (ref 0–0.05)
IMM GRANULOCYTES NFR BLD AUTO: 0.4 % (ref 0–0.5)
LYMPHOCYTES # BLD AUTO: 0.29 10*3/MM3 (ref 0.7–3.1)
LYMPHOCYTES NFR BLD AUTO: 5.8 % (ref 19.6–45.3)
MCH RBC QN AUTO: 23.3 PG (ref 26.6–33)
MCHC RBC AUTO-ENTMCNC: 26.6 G/DL (ref 31.5–35.7)
MCV RBC AUTO: 87.7 FL (ref 79–97)
MONOCYTES # BLD AUTO: 0.52 10*3/MM3 (ref 0.1–0.9)
MONOCYTES NFR BLD AUTO: 10.4 % (ref 5–12)
NEUTROPHILS NFR BLD AUTO: 4.15 10*3/MM3 (ref 1.7–7)
NEUTROPHILS NFR BLD AUTO: 83.4 % (ref 42.7–76)
NRBC BLD AUTO-RTO: 0 /100 WBC (ref 0–0.2)
NT-PROBNP SERPL-MCNC: ABNORMAL PG/ML (ref 0–900)
PHOSPHATE SERPL-MCNC: 3.1 MG/DL (ref 2.5–4.5)
PLATELET # BLD AUTO: 253 10*3/MM3 (ref 140–450)
PMV BLD AUTO: 9.8 FL (ref 6–12)
POTASSIUM SERPL-SCNC: 4 MMOL/L (ref 3.5–5.2)
QT INTERVAL: 268 MS
QTC INTERVAL: 434 MS
RBC # BLD AUTO: 3.99 10*6/MM3 (ref 3.77–5.28)
SODIUM SERPL-SCNC: 140 MMOL/L (ref 136–145)
WBC NRBC COR # BLD: 4.98 10*3/MM3 (ref 3.4–10.8)

## 2023-02-18 PROCEDURE — 94799 UNLISTED PULMONARY SVC/PX: CPT

## 2023-02-18 PROCEDURE — 99232 SBSQ HOSP IP/OBS MODERATE 35: CPT | Performed by: INTERNAL MEDICINE

## 2023-02-18 PROCEDURE — 80069 RENAL FUNCTION PANEL: CPT | Performed by: INTERNAL MEDICINE

## 2023-02-18 PROCEDURE — 25010000002 NA FERRIC GLUC CPLX PER 12.5 MG: Performed by: INTERNAL MEDICINE

## 2023-02-18 PROCEDURE — 94664 DEMO&/EVAL PT USE INHALER: CPT

## 2023-02-18 PROCEDURE — 63710000001 PREDNISONE PER 1 MG: Performed by: INTERNAL MEDICINE

## 2023-02-18 PROCEDURE — 83880 ASSAY OF NATRIURETIC PEPTIDE: CPT | Performed by: INTERNAL MEDICINE

## 2023-02-18 PROCEDURE — 85025 COMPLETE CBC W/AUTO DIFF WBC: CPT | Performed by: INTERNAL MEDICINE

## 2023-02-18 PROCEDURE — 94761 N-INVAS EAR/PLS OXIMETRY MLT: CPT

## 2023-02-18 PROCEDURE — 25010000002 AMIODARONE PER 30 MG: Performed by: INTERNAL MEDICINE

## 2023-02-18 RX ORDER — TORSEMIDE 20 MG/1
60 TABLET ORAL ONCE
Status: COMPLETED | OUTPATIENT
Start: 2023-02-18 | End: 2023-02-18

## 2023-02-18 RX ADMIN — NEBIVOLOL 5 MG: 5 TABLET ORAL at 09:10

## 2023-02-18 RX ADMIN — BUDESONIDE AND FORMOTEROL FUMARATE DIHYDRATE 2 PUFF: 160; 4.5 AEROSOL RESPIRATORY (INHALATION) at 19:04

## 2023-02-18 RX ADMIN — Medication 10 ML: at 21:09

## 2023-02-18 RX ADMIN — BUDESONIDE AND FORMOTEROL FUMARATE DIHYDRATE 2 PUFF: 160; 4.5 AEROSOL RESPIRATORY (INHALATION) at 08:53

## 2023-02-18 RX ADMIN — DOXYCYCLINE 100 MG: 100 CAPSULE ORAL at 21:09

## 2023-02-18 RX ADMIN — TRIAMTERENE AND HYDROCHLOROTHIAZIDE 2 TABLET: 37.5; 25 TABLET ORAL at 09:10

## 2023-02-18 RX ADMIN — AMIODARONE HYDROCHLORIDE 0.5 MG/MIN: 50 INJECTION, SOLUTION INTRAVENOUS at 06:29

## 2023-02-18 RX ADMIN — TORSEMIDE 60 MG: 20 TABLET ORAL at 14:09

## 2023-02-18 RX ADMIN — DOXYCYCLINE 100 MG: 100 CAPSULE ORAL at 09:10

## 2023-02-18 RX ADMIN — APIXABAN 5 MG: 5 TABLET, FILM COATED ORAL at 21:09

## 2023-02-18 RX ADMIN — APIXABAN 5 MG: 5 TABLET, FILM COATED ORAL at 09:10

## 2023-02-18 RX ADMIN — PANTOPRAZOLE SODIUM 40 MG: 40 TABLET, DELAYED RELEASE ORAL at 05:46

## 2023-02-18 RX ADMIN — SENNOSIDES AND DOCUSATE SODIUM 2 TABLET: 50; 8.6 TABLET ORAL at 09:10

## 2023-02-18 RX ADMIN — SODIUM CHLORIDE 125 MG: 9 INJECTION, SOLUTION INTRAVENOUS at 09:50

## 2023-02-18 RX ADMIN — TIOTROPIUM BROMIDE INHALATION SPRAY 2 PUFF: 3.12 SPRAY, METERED RESPIRATORY (INHALATION) at 08:53

## 2023-02-18 RX ADMIN — PREDNISONE 40 MG: 20 TABLET ORAL at 09:10

## 2023-02-19 LAB
ANION GAP SERPL CALCULATED.3IONS-SCNC: 5 MMOL/L (ref 5–15)
BASOPHILS # BLD AUTO: 0 10*3/MM3 (ref 0–0.2)
BASOPHILS NFR BLD AUTO: 0 % (ref 0–1.5)
BUN SERPL-MCNC: 31 MG/DL (ref 6–20)
BUN/CREAT SERPL: 14.6 (ref 7–25)
CALCIUM SPEC-SCNC: 9.4 MG/DL (ref 8.6–10.5)
CHLORIDE SERPL-SCNC: 82 MMOL/L (ref 98–107)
CO2 SERPL-SCNC: 49 MMOL/L (ref 22–29)
CREAT SERPL-MCNC: 2.12 MG/DL (ref 0.57–1)
DEPRECATED RDW RBC AUTO: 63.7 FL (ref 37–54)
EGFRCR SERPLBLD CKD-EPI 2021: 26.6 ML/MIN/1.73
EOSINOPHIL # BLD AUTO: 0 10*3/MM3 (ref 0–0.4)
EOSINOPHIL NFR BLD AUTO: 0 % (ref 0.3–6.2)
ERYTHROCYTE [DISTWIDTH] IN BLOOD BY AUTOMATED COUNT: 20 % (ref 12.3–15.4)
GLUCOSE SERPL-MCNC: 90 MG/DL (ref 65–99)
HCT VFR BLD AUTO: 36 % (ref 34–46.6)
HGB BLD-MCNC: 9.5 G/DL (ref 12–15.9)
IMM GRANULOCYTES # BLD AUTO: 0.03 10*3/MM3 (ref 0–0.05)
IMM GRANULOCYTES NFR BLD AUTO: 0.5 % (ref 0–0.5)
LYMPHOCYTES # BLD AUTO: 0.33 10*3/MM3 (ref 0.7–3.1)
LYMPHOCYTES NFR BLD AUTO: 5.8 % (ref 19.6–45.3)
MCH RBC QN AUTO: 23 PG (ref 26.6–33)
MCHC RBC AUTO-ENTMCNC: 26.4 G/DL (ref 31.5–35.7)
MCV RBC AUTO: 87.2 FL (ref 79–97)
MONOCYTES # BLD AUTO: 0.54 10*3/MM3 (ref 0.1–0.9)
MONOCYTES NFR BLD AUTO: 9.5 % (ref 5–12)
NEUTROPHILS NFR BLD AUTO: 4.76 10*3/MM3 (ref 1.7–7)
NEUTROPHILS NFR BLD AUTO: 84.2 % (ref 42.7–76)
NRBC BLD AUTO-RTO: 0.4 /100 WBC (ref 0–0.2)
PLATELET # BLD AUTO: 232 10*3/MM3 (ref 140–450)
PMV BLD AUTO: 10.4 FL (ref 6–12)
POTASSIUM SERPL-SCNC: 4.1 MMOL/L (ref 3.5–5.2)
RBC # BLD AUTO: 4.13 10*6/MM3 (ref 3.77–5.28)
SODIUM SERPL-SCNC: 136 MMOL/L (ref 136–145)
WBC NRBC COR # BLD: 5.66 10*3/MM3 (ref 3.4–10.8)

## 2023-02-19 PROCEDURE — 94761 N-INVAS EAR/PLS OXIMETRY MLT: CPT

## 2023-02-19 PROCEDURE — 94799 UNLISTED PULMONARY SVC/PX: CPT

## 2023-02-19 PROCEDURE — 85025 COMPLETE CBC W/AUTO DIFF WBC: CPT | Performed by: INTERNAL MEDICINE

## 2023-02-19 PROCEDURE — 80048 BASIC METABOLIC PNL TOTAL CA: CPT | Performed by: INTERNAL MEDICINE

## 2023-02-19 PROCEDURE — 99232 SBSQ HOSP IP/OBS MODERATE 35: CPT | Performed by: INTERNAL MEDICINE

## 2023-02-19 PROCEDURE — 63710000001 PREDNISONE PER 1 MG: Performed by: INTERNAL MEDICINE

## 2023-02-19 PROCEDURE — 25010000002 NA FERRIC GLUC CPLX PER 12.5 MG: Performed by: INTERNAL MEDICINE

## 2023-02-19 RX ORDER — TORSEMIDE 20 MG/1
20 TABLET ORAL DAILY
Status: DISCONTINUED | OUTPATIENT
Start: 2023-02-19 | End: 2023-02-22

## 2023-02-19 RX ADMIN — PANTOPRAZOLE SODIUM 40 MG: 40 TABLET, DELAYED RELEASE ORAL at 05:17

## 2023-02-19 RX ADMIN — NEBIVOLOL 5 MG: 5 TABLET ORAL at 10:43

## 2023-02-19 RX ADMIN — SODIUM CHLORIDE 250 MG: 9 INJECTION, SOLUTION INTRAVENOUS at 10:43

## 2023-02-19 RX ADMIN — TORSEMIDE 20 MG: 20 TABLET ORAL at 10:40

## 2023-02-19 RX ADMIN — BUDESONIDE AND FORMOTEROL FUMARATE DIHYDRATE 2 PUFF: 160; 4.5 AEROSOL RESPIRATORY (INHALATION) at 08:07

## 2023-02-19 RX ADMIN — Medication 10 ML: at 10:44

## 2023-02-19 RX ADMIN — PREDNISONE 40 MG: 20 TABLET ORAL at 10:41

## 2023-02-19 RX ADMIN — BUDESONIDE AND FORMOTEROL FUMARATE DIHYDRATE 2 PUFF: 160; 4.5 AEROSOL RESPIRATORY (INHALATION) at 20:25

## 2023-02-19 RX ADMIN — DOXYCYCLINE 100 MG: 100 CAPSULE ORAL at 10:42

## 2023-02-19 RX ADMIN — Medication 10 ML: at 21:49

## 2023-02-19 RX ADMIN — DOXYCYCLINE 100 MG: 100 CAPSULE ORAL at 21:49

## 2023-02-19 RX ADMIN — APIXABAN 5 MG: 5 TABLET, FILM COATED ORAL at 10:43

## 2023-02-19 RX ADMIN — APIXABAN 5 MG: 5 TABLET, FILM COATED ORAL at 21:49

## 2023-02-19 RX ADMIN — TIOTROPIUM BROMIDE INHALATION SPRAY 2 PUFF: 3.12 SPRAY, METERED RESPIRATORY (INHALATION) at 08:07

## 2023-02-19 RX ADMIN — TRIAMTERENE AND HYDROCHLOROTHIAZIDE 2 TABLET: 37.5; 25 TABLET ORAL at 11:55

## 2023-02-20 ENCOUNTER — APPOINTMENT (OUTPATIENT)
Dept: CARDIOLOGY | Facility: HOSPITAL | Age: 58
DRG: 291 | End: 2023-02-20
Payer: MEDICARE

## 2023-02-20 LAB
BACTERIA SPEC AEROBE CULT: NORMAL
BACTERIA SPEC AEROBE CULT: NORMAL
BH CV ECHO MEAS - EDV(CUBED): 185.2 ML
BH CV ECHO MEAS - EDV(MOD-SP4): 137 ML
BH CV ECHO MEAS - EF(MOD-SP4): 41.3 %
BH CV ECHO MEAS - ESV(CUBED): 97.3 ML
BH CV ECHO MEAS - ESV(MOD-SP4): 80.4 ML
BH CV ECHO MEAS - FS: 19.3 %
BH CV ECHO MEAS - IVS/LVPW: 0.92 CM
BH CV ECHO MEAS - IVSD: 1.1 CM
BH CV ECHO MEAS - LA DIMENSION: 4.2 CM
BH CV ECHO MEAS - LV MASS(C)D: 272.5 GRAMS
BH CV ECHO MEAS - LVIDD: 5.7 CM
BH CV ECHO MEAS - LVIDS: 4.6 CM
BH CV ECHO MEAS - LVPWD: 1.2 CM
BH CV ECHO MEAS - SV(MOD-SP4): 56.6 ML
MAXIMAL PREDICTED HEART RATE: 162 BPM
STRESS TARGET HR: 138 BPM

## 2023-02-20 PROCEDURE — 93308 TTE F-UP OR LMTD: CPT

## 2023-02-20 PROCEDURE — 63710000001 PREDNISONE PER 1 MG: Performed by: INTERNAL MEDICINE

## 2023-02-20 PROCEDURE — 94799 UNLISTED PULMONARY SVC/PX: CPT

## 2023-02-20 PROCEDURE — 99232 SBSQ HOSP IP/OBS MODERATE 35: CPT | Performed by: INTERNAL MEDICINE

## 2023-02-20 PROCEDURE — 93308 TTE F-UP OR LMTD: CPT | Performed by: INTERNAL MEDICINE

## 2023-02-20 PROCEDURE — 93325 DOPPLER ECHO COLOR FLOW MAPG: CPT | Performed by: INTERNAL MEDICINE

## 2023-02-20 PROCEDURE — 25010000002 NA FERRIC GLUC CPLX PER 12.5 MG: Performed by: INTERNAL MEDICINE

## 2023-02-20 PROCEDURE — 93325 DOPPLER ECHO COLOR FLOW MAPG: CPT

## 2023-02-20 RX ORDER — NEBIVOLOL 5 MG/1
10 TABLET ORAL
Status: DISCONTINUED | OUTPATIENT
Start: 2023-02-20 | End: 2023-02-24 | Stop reason: HOSPADM

## 2023-02-20 RX ORDER — ACETAZOLAMIDE 250 MG/1
250 TABLET ORAL 3 TIMES DAILY
Status: DISPENSED | OUTPATIENT
Start: 2023-02-20 | End: 2023-02-23

## 2023-02-20 RX ADMIN — APIXABAN 5 MG: 5 TABLET, FILM COATED ORAL at 20:49

## 2023-02-20 RX ADMIN — ACETAZOLAMIDE 250 MG: 250 TABLET ORAL at 20:49

## 2023-02-20 RX ADMIN — BUDESONIDE AND FORMOTEROL FUMARATE DIHYDRATE 2 PUFF: 160; 4.5 AEROSOL RESPIRATORY (INHALATION) at 19:22

## 2023-02-20 RX ADMIN — ACETAZOLAMIDE 250 MG: 250 TABLET ORAL at 15:32

## 2023-02-20 RX ADMIN — TORSEMIDE 20 MG: 20 TABLET ORAL at 09:30

## 2023-02-20 RX ADMIN — SODIUM CHLORIDE 250 MG: 9 INJECTION, SOLUTION INTRAVENOUS at 09:28

## 2023-02-20 RX ADMIN — DOXYCYCLINE 100 MG: 100 CAPSULE ORAL at 20:49

## 2023-02-20 RX ADMIN — NEBIVOLOL 10 MG: 5 TABLET ORAL at 09:30

## 2023-02-20 RX ADMIN — BUDESONIDE AND FORMOTEROL FUMARATE DIHYDRATE 2 PUFF: 160; 4.5 AEROSOL RESPIRATORY (INHALATION) at 07:43

## 2023-02-20 RX ADMIN — PREDNISONE 40 MG: 20 TABLET ORAL at 09:29

## 2023-02-20 RX ADMIN — TRIAMTERENE AND HYDROCHLOROTHIAZIDE 2 TABLET: 37.5; 25 TABLET ORAL at 09:29

## 2023-02-20 RX ADMIN — PANTOPRAZOLE SODIUM 40 MG: 40 TABLET, DELAYED RELEASE ORAL at 05:46

## 2023-02-20 RX ADMIN — Medication 10 ML: at 09:30

## 2023-02-20 RX ADMIN — APIXABAN 5 MG: 5 TABLET, FILM COATED ORAL at 09:29

## 2023-02-20 RX ADMIN — Medication 10 ML: at 20:49

## 2023-02-20 RX ADMIN — TIOTROPIUM BROMIDE INHALATION SPRAY 2 PUFF: 3.12 SPRAY, METERED RESPIRATORY (INHALATION) at 07:43

## 2023-02-20 RX ADMIN — DOXYCYCLINE 100 MG: 100 CAPSULE ORAL at 09:29

## 2023-02-21 ENCOUNTER — APPOINTMENT (OUTPATIENT)
Dept: CT IMAGING | Facility: HOSPITAL | Age: 58
DRG: 291 | End: 2023-02-21
Payer: MEDICARE

## 2023-02-21 LAB
ALBUMIN SERPL-MCNC: 4.1 G/DL (ref 3.5–5.2)
ALBUMIN/GLOB SERPL: 1.4 G/DL
ALP SERPL-CCNC: 185 U/L (ref 39–117)
ALT SERPL W P-5'-P-CCNC: 25 U/L (ref 1–33)
ANION GAP SERPL CALCULATED.3IONS-SCNC: 8 MMOL/L (ref 5–15)
ARTERIAL PATENCY WRIST A: ABNORMAL
AST SERPL-CCNC: 49 U/L (ref 1–32)
ATMOSPHERIC PRESS: ABNORMAL MM[HG]
BASE EXCESS BLDA CALC-SCNC: 21.8 MMOL/L (ref 0–2)
BASOPHILS # BLD AUTO: 0 10*3/MM3 (ref 0–0.2)
BASOPHILS NFR BLD AUTO: 0 % (ref 0–1.5)
BDY SITE: ABNORMAL
BILIRUB SERPL-MCNC: 1.1 MG/DL (ref 0–1.2)
BODY TEMPERATURE: 37 C
BUN SERPL-MCNC: 41 MG/DL (ref 6–20)
BUN/CREAT SERPL: 21 (ref 7–25)
CALCIUM SPEC-SCNC: 9.5 MG/DL (ref 8.6–10.5)
CHLORIDE SERPL-SCNC: 76 MMOL/L (ref 98–107)
CO2 BLDA-SCNC: 52.5 MMOL/L (ref 22–33)
CO2 SERPL-SCNC: 49 MMOL/L (ref 22–29)
COHGB MFR BLD: 1.6 % (ref 0–2)
CREAT SERPL-MCNC: 1.95 MG/DL (ref 0.57–1)
DEPRECATED RDW RBC AUTO: 65 FL (ref 37–54)
EGFRCR SERPLBLD CKD-EPI 2021: 29.4 ML/MIN/1.73
EOSINOPHIL # BLD AUTO: 0 10*3/MM3 (ref 0–0.4)
EOSINOPHIL NFR BLD AUTO: 0 % (ref 0.3–6.2)
EPAP: 0
ERYTHROCYTE [DISTWIDTH] IN BLOOD BY AUTOMATED COUNT: 20.8 % (ref 12.3–15.4)
GLOBULIN UR ELPH-MCNC: 2.9 GM/DL
GLUCOSE SERPL-MCNC: 96 MG/DL (ref 65–99)
HCO3 BLDA-SCNC: 50.1 MMOL/L (ref 20–26)
HCT VFR BLD AUTO: 37.4 % (ref 34–46.6)
HCT VFR BLD CALC: 32.9 % (ref 38–51)
HGB BLD-MCNC: 10.2 G/DL (ref 12–15.9)
HGB BLDA-MCNC: 10.7 G/DL (ref 14–18)
IMM GRANULOCYTES # BLD AUTO: 0.04 10*3/MM3 (ref 0–0.05)
IMM GRANULOCYTES NFR BLD AUTO: 0.6 % (ref 0–0.5)
INHALED O2 CONCENTRATION: 55 %
IPAP: 0
LYMPHOCYTES # BLD AUTO: 0.31 10*3/MM3 (ref 0.7–3.1)
LYMPHOCYTES NFR BLD AUTO: 4.3 % (ref 19.6–45.3)
Lab: ABNORMAL
MAGNESIUM SERPL-MCNC: 1.8 MG/DL (ref 1.6–2.6)
MCH RBC QN AUTO: 23.7 PG (ref 26.6–33)
MCHC RBC AUTO-ENTMCNC: 27.3 G/DL (ref 31.5–35.7)
MCV RBC AUTO: 87 FL (ref 79–97)
METHGB BLD QL: 0.9 % (ref 0–1.5)
MODALITY: ABNORMAL
MONOCYTES # BLD AUTO: 0.65 10*3/MM3 (ref 0.1–0.9)
MONOCYTES NFR BLD AUTO: 9 % (ref 5–12)
NEUTROPHILS NFR BLD AUTO: 6.19 10*3/MM3 (ref 1.7–7)
NEUTROPHILS NFR BLD AUTO: 86.1 % (ref 42.7–76)
NOTE: ABNORMAL
NOTIFIED BY: ABNORMAL
NOTIFIED WHO: ABNORMAL
NRBC BLD AUTO-RTO: 0.3 /100 WBC (ref 0–0.2)
OXYHGB MFR BLDV: 91.2 % (ref 94–99)
PAW @ PEAK INSP FLOW SETTING VENT: 0 CMH2O
PCO2 BLDA: 77.7 MM HG (ref 35–45)
PCO2 TEMP ADJ BLD: 77.7 MM HG (ref 35–45)
PH BLDA: 7.42 PH UNITS (ref 7.35–7.45)
PH, TEMP CORRECTED: 7.42 PH UNITS
PHOSPHATE SERPL-MCNC: 3 MG/DL (ref 2.5–4.5)
PLATELET # BLD AUTO: 271 10*3/MM3 (ref 140–450)
PMV BLD AUTO: 10.6 FL (ref 6–12)
PO2 BLDA: 69.1 MM HG (ref 83–108)
PO2 TEMP ADJ BLD: 69.1 MM HG (ref 83–108)
POTASSIUM SERPL-SCNC: 3.9 MMOL/L (ref 3.5–5.2)
PROT SERPL-MCNC: 7 G/DL (ref 6–8.5)
RBC # BLD AUTO: 4.3 10*6/MM3 (ref 3.77–5.28)
SODIUM SERPL-SCNC: 133 MMOL/L (ref 136–145)
TOTAL RATE: 0 BREATHS/MINUTE
WBC NRBC COR # BLD: 7.19 10*3/MM3 (ref 3.4–10.8)

## 2023-02-21 PROCEDURE — 99232 SBSQ HOSP IP/OBS MODERATE 35: CPT | Performed by: INTERNAL MEDICINE

## 2023-02-21 PROCEDURE — 83050 HGB METHEMOGLOBIN QUAN: CPT

## 2023-02-21 PROCEDURE — 80053 COMPREHEN METABOLIC PANEL: CPT | Performed by: INTERNAL MEDICINE

## 2023-02-21 PROCEDURE — 84100 ASSAY OF PHOSPHORUS: CPT | Performed by: INTERNAL MEDICINE

## 2023-02-21 PROCEDURE — 85025 COMPLETE CBC W/AUTO DIFF WBC: CPT | Performed by: INTERNAL MEDICINE

## 2023-02-21 PROCEDURE — 94761 N-INVAS EAR/PLS OXIMETRY MLT: CPT

## 2023-02-21 PROCEDURE — 83735 ASSAY OF MAGNESIUM: CPT | Performed by: INTERNAL MEDICINE

## 2023-02-21 PROCEDURE — 94799 UNLISTED PULMONARY SVC/PX: CPT

## 2023-02-21 PROCEDURE — 82805 BLOOD GASES W/O2 SATURATION: CPT

## 2023-02-21 PROCEDURE — 82375 ASSAY CARBOXYHB QUANT: CPT

## 2023-02-21 PROCEDURE — 36600 WITHDRAWAL OF ARTERIAL BLOOD: CPT

## 2023-02-21 PROCEDURE — 71250 CT THORAX DX C-: CPT

## 2023-02-21 RX ADMIN — APIXABAN 5 MG: 5 TABLET, FILM COATED ORAL at 20:33

## 2023-02-21 RX ADMIN — APIXABAN 5 MG: 5 TABLET, FILM COATED ORAL at 08:31

## 2023-02-21 RX ADMIN — TIOTROPIUM BROMIDE INHALATION SPRAY 2 PUFF: 3.12 SPRAY, METERED RESPIRATORY (INHALATION) at 08:26

## 2023-02-21 RX ADMIN — SENNOSIDES AND DOCUSATE SODIUM 2 TABLET: 50; 8.6 TABLET ORAL at 08:31

## 2023-02-21 RX ADMIN — DOXYCYCLINE 100 MG: 100 CAPSULE ORAL at 20:33

## 2023-02-21 RX ADMIN — BUDESONIDE AND FORMOTEROL FUMARATE DIHYDRATE 2 PUFF: 160; 4.5 AEROSOL RESPIRATORY (INHALATION) at 08:26

## 2023-02-21 RX ADMIN — TORSEMIDE 20 MG: 20 TABLET ORAL at 08:31

## 2023-02-21 RX ADMIN — ACETAZOLAMIDE 250 MG: 250 TABLET ORAL at 08:31

## 2023-02-21 RX ADMIN — TRIAMTERENE AND HYDROCHLOROTHIAZIDE 2 TABLET: 37.5; 25 TABLET ORAL at 08:31

## 2023-02-21 RX ADMIN — ACETAZOLAMIDE 250 MG: 250 TABLET ORAL at 20:37

## 2023-02-21 RX ADMIN — NEBIVOLOL 10 MG: 5 TABLET ORAL at 08:31

## 2023-02-21 RX ADMIN — BUDESONIDE AND FORMOTEROL FUMARATE DIHYDRATE 2 PUFF: 160; 4.5 AEROSOL RESPIRATORY (INHALATION) at 20:08

## 2023-02-21 RX ADMIN — DOXYCYCLINE 100 MG: 100 CAPSULE ORAL at 08:31

## 2023-02-21 RX ADMIN — PANTOPRAZOLE SODIUM 40 MG: 40 TABLET, DELAYED RELEASE ORAL at 05:41

## 2023-02-21 RX ADMIN — Medication 10 ML: at 20:33

## 2023-02-22 LAB
ALBUMIN SERPL-MCNC: 3.9 G/DL (ref 3.5–5.2)
ALBUMIN/GLOB SERPL: 1.2 G/DL
ALP SERPL-CCNC: 203 U/L (ref 39–117)
ALT SERPL W P-5'-P-CCNC: 42 U/L (ref 1–33)
ANION GAP SERPL CALCULATED.3IONS-SCNC: 7 MMOL/L (ref 5–15)
AST SERPL-CCNC: 76 U/L (ref 1–32)
BASOPHILS # BLD AUTO: 0.01 10*3/MM3 (ref 0–0.2)
BASOPHILS NFR BLD AUTO: 0.2 % (ref 0–1.5)
BILIRUB SERPL-MCNC: 1.1 MG/DL (ref 0–1.2)
BUN SERPL-MCNC: 50 MG/DL (ref 6–20)
BUN/CREAT SERPL: 21 (ref 7–25)
CALCIUM SPEC-SCNC: 9.4 MG/DL (ref 8.6–10.5)
CHLORIDE SERPL-SCNC: 78 MMOL/L (ref 98–107)
CO2 SERPL-SCNC: 48 MMOL/L (ref 22–29)
CREAT SERPL-MCNC: 2.38 MG/DL (ref 0.57–1)
CRP SERPL-MCNC: <0.3 MG/DL (ref 0–0.5)
DEPRECATED RDW RBC AUTO: 70.4 FL (ref 37–54)
EGFRCR SERPLBLD CKD-EPI 2021: 23.1 ML/MIN/1.73
EOSINOPHIL # BLD AUTO: 0.15 10*3/MM3 (ref 0–0.4)
EOSINOPHIL NFR BLD AUTO: 2.4 % (ref 0.3–6.2)
ERYTHROCYTE [DISTWIDTH] IN BLOOD BY AUTOMATED COUNT: 22.1 % (ref 12.3–15.4)
GLOBULIN UR ELPH-MCNC: 3.2 GM/DL
GLUCOSE SERPL-MCNC: 98 MG/DL (ref 65–99)
HCT VFR BLD AUTO: 39.7 % (ref 34–46.6)
HGB BLD-MCNC: 10.5 G/DL (ref 12–15.9)
IMM GRANULOCYTES # BLD AUTO: 0.01 10*3/MM3 (ref 0–0.05)
IMM GRANULOCYTES NFR BLD AUTO: 0.2 % (ref 0–0.5)
LYMPHOCYTES # BLD AUTO: 0.63 10*3/MM3 (ref 0.7–3.1)
LYMPHOCYTES NFR BLD AUTO: 10.2 % (ref 19.6–45.3)
MAGNESIUM SERPL-MCNC: 1.6 MG/DL (ref 1.6–2.6)
MCH RBC QN AUTO: 23.6 PG (ref 26.6–33)
MCHC RBC AUTO-ENTMCNC: 26.4 G/DL (ref 31.5–35.7)
MCV RBC AUTO: 89.4 FL (ref 79–97)
MONOCYTES # BLD AUTO: 0.64 10*3/MM3 (ref 0.1–0.9)
MONOCYTES NFR BLD AUTO: 10.4 % (ref 5–12)
NEUTROPHILS NFR BLD AUTO: 4.71 10*3/MM3 (ref 1.7–7)
NEUTROPHILS NFR BLD AUTO: 76.6 % (ref 42.7–76)
NRBC BLD AUTO-RTO: 0 /100 WBC (ref 0–0.2)
PHOSPHATE SERPL-MCNC: 3.4 MG/DL (ref 2.5–4.5)
PLATELET # BLD AUTO: 213 10*3/MM3 (ref 140–450)
PMV BLD AUTO: 10.3 FL (ref 6–12)
POTASSIUM SERPL-SCNC: 3.4 MMOL/L (ref 3.5–5.2)
PROT SERPL-MCNC: 7.1 G/DL (ref 6–8.5)
RBC # BLD AUTO: 4.44 10*6/MM3 (ref 3.77–5.28)
SODIUM SERPL-SCNC: 133 MMOL/L (ref 136–145)
WBC NRBC COR # BLD: 6.15 10*3/MM3 (ref 3.4–10.8)

## 2023-02-22 PROCEDURE — 86140 C-REACTIVE PROTEIN: CPT | Performed by: INTERNAL MEDICINE

## 2023-02-22 PROCEDURE — 84100 ASSAY OF PHOSPHORUS: CPT | Performed by: INTERNAL MEDICINE

## 2023-02-22 PROCEDURE — 83735 ASSAY OF MAGNESIUM: CPT | Performed by: INTERNAL MEDICINE

## 2023-02-22 PROCEDURE — 94761 N-INVAS EAR/PLS OXIMETRY MLT: CPT

## 2023-02-22 PROCEDURE — 80053 COMPREHEN METABOLIC PANEL: CPT | Performed by: INTERNAL MEDICINE

## 2023-02-22 PROCEDURE — 99232 SBSQ HOSP IP/OBS MODERATE 35: CPT | Performed by: INTERNAL MEDICINE

## 2023-02-22 PROCEDURE — 94799 UNLISTED PULMONARY SVC/PX: CPT

## 2023-02-22 PROCEDURE — 85025 COMPLETE CBC W/AUTO DIFF WBC: CPT | Performed by: INTERNAL MEDICINE

## 2023-02-22 PROCEDURE — 25010000002 MAGNESIUM SULFATE 2 GM/50ML SOLUTION: Performed by: INTERNAL MEDICINE

## 2023-02-22 RX ORDER — MAGNESIUM SULFATE HEPTAHYDRATE 40 MG/ML
2 INJECTION, SOLUTION INTRAVENOUS ONCE
Status: COMPLETED | OUTPATIENT
Start: 2023-02-22 | End: 2023-02-22

## 2023-02-22 RX ORDER — POTASSIUM CHLORIDE 750 MG/1
30 CAPSULE, EXTENDED RELEASE ORAL ONCE
Status: COMPLETED | OUTPATIENT
Start: 2023-02-22 | End: 2023-02-22

## 2023-02-22 RX ORDER — TORSEMIDE 20 MG/1
10 TABLET ORAL DAILY
Status: DISCONTINUED | OUTPATIENT
Start: 2023-02-22 | End: 2023-02-24 | Stop reason: HOSPADM

## 2023-02-22 RX ADMIN — BUDESONIDE AND FORMOTEROL FUMARATE DIHYDRATE 2 PUFF: 160; 4.5 AEROSOL RESPIRATORY (INHALATION) at 18:41

## 2023-02-22 RX ADMIN — TORSEMIDE 10 MG: 20 TABLET ORAL at 08:48

## 2023-02-22 RX ADMIN — TIOTROPIUM BROMIDE INHALATION SPRAY 2 PUFF: 3.12 SPRAY, METERED RESPIRATORY (INHALATION) at 07:51

## 2023-02-22 RX ADMIN — DOXYCYCLINE 100 MG: 100 CAPSULE ORAL at 20:02

## 2023-02-22 RX ADMIN — MAGNESIUM SULFATE HEPTAHYDRATE 2 G: 2 INJECTION, SOLUTION INTRAVENOUS at 12:25

## 2023-02-22 RX ADMIN — ACETAZOLAMIDE 250 MG: 250 TABLET ORAL at 08:57

## 2023-02-22 RX ADMIN — PANTOPRAZOLE SODIUM 40 MG: 40 TABLET, DELAYED RELEASE ORAL at 05:39

## 2023-02-22 RX ADMIN — ACETAZOLAMIDE 250 MG: 250 TABLET ORAL at 17:39

## 2023-02-22 RX ADMIN — NEBIVOLOL 10 MG: 5 TABLET ORAL at 08:48

## 2023-02-22 RX ADMIN — ACETAZOLAMIDE 250 MG: 250 TABLET ORAL at 20:02

## 2023-02-22 RX ADMIN — DOXYCYCLINE 100 MG: 100 CAPSULE ORAL at 08:48

## 2023-02-22 RX ADMIN — POTASSIUM CHLORIDE 30 MEQ: 10 CAPSULE, COATED, EXTENDED RELEASE ORAL at 08:47

## 2023-02-22 RX ADMIN — Medication 10 ML: at 08:49

## 2023-02-22 RX ADMIN — APIXABAN 5 MG: 5 TABLET, FILM COATED ORAL at 20:02

## 2023-02-22 RX ADMIN — APIXABAN 5 MG: 5 TABLET, FILM COATED ORAL at 08:48

## 2023-02-22 RX ADMIN — BUDESONIDE AND FORMOTEROL FUMARATE DIHYDRATE 2 PUFF: 160; 4.5 AEROSOL RESPIRATORY (INHALATION) at 07:51

## 2023-02-23 LAB
ANION GAP SERPL CALCULATED.3IONS-SCNC: 6 MMOL/L (ref 5–15)
BUN SERPL-MCNC: 52 MG/DL (ref 6–20)
BUN/CREAT SERPL: 24.4 (ref 7–25)
CALCIUM SPEC-SCNC: 9.3 MG/DL (ref 8.6–10.5)
CHLORIDE SERPL-SCNC: 82 MMOL/L (ref 98–107)
CO2 SERPL-SCNC: 47 MMOL/L (ref 22–29)
CREAT SERPL-MCNC: 2.13 MG/DL (ref 0.57–1)
EGFRCR SERPLBLD CKD-EPI 2021: 26.4 ML/MIN/1.73
GLUCOSE SERPL-MCNC: 86 MG/DL (ref 65–99)
MAGNESIUM SERPL-MCNC: 2.3 MG/DL (ref 1.6–2.6)
POTASSIUM SERPL-SCNC: 3.9 MMOL/L (ref 3.5–5.2)
SODIUM SERPL-SCNC: 135 MMOL/L (ref 136–145)

## 2023-02-23 PROCEDURE — 94799 UNLISTED PULMONARY SVC/PX: CPT

## 2023-02-23 PROCEDURE — 80048 BASIC METABOLIC PNL TOTAL CA: CPT | Performed by: INTERNAL MEDICINE

## 2023-02-23 PROCEDURE — 94761 N-INVAS EAR/PLS OXIMETRY MLT: CPT

## 2023-02-23 PROCEDURE — 83735 ASSAY OF MAGNESIUM: CPT | Performed by: INTERNAL MEDICINE

## 2023-02-23 PROCEDURE — 99232 SBSQ HOSP IP/OBS MODERATE 35: CPT | Performed by: INTERNAL MEDICINE

## 2023-02-23 PROCEDURE — 97535 SELF CARE MNGMENT TRAINING: CPT

## 2023-02-23 PROCEDURE — 97166 OT EVAL MOD COMPLEX 45 MIN: CPT

## 2023-02-23 PROCEDURE — 97161 PT EVAL LOW COMPLEX 20 MIN: CPT

## 2023-02-23 RX ADMIN — APIXABAN 5 MG: 5 TABLET, FILM COATED ORAL at 20:50

## 2023-02-23 RX ADMIN — ACETAZOLAMIDE 250 MG: 250 TABLET ORAL at 08:58

## 2023-02-23 RX ADMIN — SENNOSIDES AND DOCUSATE SODIUM 2 TABLET: 50; 8.6 TABLET ORAL at 08:46

## 2023-02-23 RX ADMIN — NEBIVOLOL 10 MG: 5 TABLET ORAL at 08:46

## 2023-02-23 RX ADMIN — BUDESONIDE AND FORMOTEROL FUMARATE DIHYDRATE 2 PUFF: 160; 4.5 AEROSOL RESPIRATORY (INHALATION) at 06:57

## 2023-02-23 RX ADMIN — BUDESONIDE AND FORMOTEROL FUMARATE DIHYDRATE 2 PUFF: 160; 4.5 AEROSOL RESPIRATORY (INHALATION) at 18:38

## 2023-02-23 RX ADMIN — PANTOPRAZOLE SODIUM 40 MG: 40 TABLET, DELAYED RELEASE ORAL at 06:18

## 2023-02-23 RX ADMIN — DOXYCYCLINE 100 MG: 100 CAPSULE ORAL at 08:47

## 2023-02-23 RX ADMIN — TORSEMIDE 10 MG: 20 TABLET ORAL at 08:46

## 2023-02-23 RX ADMIN — TIOTROPIUM BROMIDE INHALATION SPRAY 2 PUFF: 3.12 SPRAY, METERED RESPIRATORY (INHALATION) at 06:55

## 2023-02-23 RX ADMIN — TIOTROPIUM BROMIDE INHALATION SPRAY 2 PUFF: 3.12 SPRAY, METERED RESPIRATORY (INHALATION) at 06:57

## 2023-02-23 RX ADMIN — APIXABAN 5 MG: 5 TABLET, FILM COATED ORAL at 08:47

## 2023-02-23 RX ADMIN — Medication 10 ML: at 08:47

## 2023-02-24 ENCOUNTER — READMISSION MANAGEMENT (OUTPATIENT)
Dept: CALL CENTER | Facility: HOSPITAL | Age: 58
End: 2023-02-24
Payer: MEDICARE

## 2023-02-24 ENCOUNTER — HOME HEALTH ADMISSION (OUTPATIENT)
Dept: HOME HEALTH SERVICES | Facility: HOME HEALTHCARE | Age: 58
End: 2023-02-24
Payer: MEDICARE

## 2023-02-24 ENCOUNTER — TRANSCRIBE ORDERS (OUTPATIENT)
Dept: HOME HEALTH SERVICES | Facility: HOME HEALTHCARE | Age: 58
End: 2023-02-24
Payer: MEDICARE

## 2023-02-24 VITALS
TEMPERATURE: 97.9 F | SYSTOLIC BLOOD PRESSURE: 98 MMHG | RESPIRATION RATE: 20 BRPM | BODY MASS INDEX: 40.16 KG/M2 | HEIGHT: 68 IN | DIASTOLIC BLOOD PRESSURE: 68 MMHG | OXYGEN SATURATION: 95 % | WEIGHT: 265 LBS | HEART RATE: 80 BPM

## 2023-02-24 PROBLEM — I50.23 ACUTE ON CHRONIC HFREF (HEART FAILURE WITH REDUCED EJECTION FRACTION): Status: ACTIVE | Noted: 2023-02-24

## 2023-02-24 LAB
ALBUMIN SERPL-MCNC: 3.9 G/DL (ref 3.5–5.2)
ANION GAP SERPL CALCULATED.3IONS-SCNC: 9 MMOL/L (ref 5–15)
BUN SERPL-MCNC: 50 MG/DL (ref 6–20)
BUN/CREAT SERPL: 24.4 (ref 7–25)
CALCIUM SPEC-SCNC: 9.3 MG/DL (ref 8.6–10.5)
CHLORIDE SERPL-SCNC: 83 MMOL/L (ref 98–107)
CO2 SERPL-SCNC: 43 MMOL/L (ref 22–29)
CREAT SERPL-MCNC: 2.05 MG/DL (ref 0.57–1)
EGFRCR SERPLBLD CKD-EPI 2021: 27.7 ML/MIN/1.73
GLUCOSE SERPL-MCNC: 97 MG/DL (ref 65–99)
PHOSPHATE SERPL-MCNC: 4.3 MG/DL (ref 2.5–4.5)
POTASSIUM SERPL-SCNC: 3.4 MMOL/L (ref 3.5–5.2)
SODIUM SERPL-SCNC: 135 MMOL/L (ref 136–145)

## 2023-02-24 PROCEDURE — 80069 RENAL FUNCTION PANEL: CPT | Performed by: INTERNAL MEDICINE

## 2023-02-24 PROCEDURE — 99238 HOSP IP/OBS DSCHRG MGMT 30/<: CPT | Performed by: INTERNAL MEDICINE

## 2023-02-24 RX ORDER — NEBIVOLOL 10 MG/1
10 TABLET ORAL
Qty: 30 TABLET | Refills: 1 | Status: SHIPPED | OUTPATIENT
Start: 2023-02-24 | End: 2023-03-26

## 2023-02-24 RX ORDER — TORSEMIDE 10 MG/1
10 TABLET ORAL DAILY
Qty: 30 TABLET | Refills: 1 | Status: SHIPPED | OUTPATIENT
Start: 2023-02-24 | End: 2023-03-26

## 2023-02-24 RX ORDER — POTASSIUM CHLORIDE 750 MG/1
40 CAPSULE, EXTENDED RELEASE ORAL EVERY 4 HOURS
Status: COMPLETED | OUTPATIENT
Start: 2023-02-24 | End: 2023-02-24

## 2023-02-24 RX ADMIN — NEBIVOLOL 10 MG: 5 TABLET ORAL at 09:04

## 2023-02-24 RX ADMIN — PANTOPRAZOLE SODIUM 40 MG: 40 TABLET, DELAYED RELEASE ORAL at 06:24

## 2023-02-24 RX ADMIN — APIXABAN 5 MG: 5 TABLET, FILM COATED ORAL at 09:04

## 2023-02-24 RX ADMIN — TORSEMIDE 10 MG: 20 TABLET ORAL at 09:04

## 2023-02-24 RX ADMIN — POTASSIUM CHLORIDE 40 MEQ: 10 CAPSULE, COATED, EXTENDED RELEASE ORAL at 11:08

## 2023-02-24 RX ADMIN — SENNOSIDES AND DOCUSATE SODIUM 2 TABLET: 50; 8.6 TABLET ORAL at 09:04

## 2023-02-24 RX ADMIN — POTASSIUM CHLORIDE 40 MEQ: 10 CAPSULE, COATED, EXTENDED RELEASE ORAL at 15:26

## 2023-02-25 ENCOUNTER — HOME CARE VISIT (OUTPATIENT)
Dept: HOME HEALTH SERVICES | Facility: HOME HEALTHCARE | Age: 58
End: 2023-02-25
Payer: MEDICARE

## 2023-02-25 PROCEDURE — G0299 HHS/HOSPICE OF RN EA 15 MIN: HCPCS

## 2023-02-25 NOTE — OUTREACH NOTE
Prep Survey    Flowsheet Row Responses   [facility] facility patient discharged from? [facility]   Is LACE score < 7 ? No   Eligibility Readm Mgmt   Discharge diagnosis Acute type 2 respiratory failure., Acute CHF, A-fib with RVR   Does the patient have one of the following disease processes/diagnoses(primary or secondary)? CHF   Does the patient have Home health ordered? Yes   What is the Home health agency?  [facility]   Is there a DME ordered? Yes   What DME was ordered? O2 from [employer] increased from 2L to 4L   Prep survey completed? Yes          Tanisha GARCIA - Registered Nurse

## 2023-02-26 VITALS
BODY MASS INDEX: 44.41 KG/M2 | OXYGEN SATURATION: 96 % | RESPIRATION RATE: 18 BRPM | SYSTOLIC BLOOD PRESSURE: 130 MMHG | HEIGHT: 68 IN | WEIGHT: 293 LBS | DIASTOLIC BLOOD PRESSURE: 78 MMHG | HEART RATE: 89 BPM | TEMPERATURE: 98 F

## 2023-02-26 NOTE — HOME HEALTH
SOC Note: 56yo female admitted to hospital with hypertensive heart and chronic kideny disease with heart failure and stage 1 thru 4 chronic kidney disease, human metapneumovirus, COPD, A-fib and decreased mobility. Home with oxygen use, weakness and decreased strength. Patient alert and oriented x4. Denies pain at the time of visit. Medicaitons teaching needed. O2 wnl on 2L, VS wdl. Skin c/d/i.    Home Health ordered for: disciplines SN/PT/OT    Reason for Hosp/Primary Dx/Co-morbidities: hypertensive heart and chronic kideny disease with heart failure and stage 1 thru 4 chronic kidney disease    Focus of Care: cardiopulmonary assessment, med teaching, COPD teaching related to hypertensive heart and chronic kideny disease with heart failure and stage 1 thru 4 chronic kidney disease    Current Functional status/mobility/DME: ambulates with walker and cane    HB status/Living Arrangements: lives with family    Skin Integrity/wound status: c/d/i    Code Status: full    Fall Risk: yes    POC confirmed with patient on 02/25/23    Plan for next visit: cardiopulmonary assessment, med teaching, COPD teaching

## 2023-02-28 ENCOUNTER — HOME CARE VISIT (OUTPATIENT)
Dept: HOME HEALTH SERVICES | Facility: HOME HEALTHCARE | Age: 58
End: 2023-02-28
Payer: MEDICARE

## 2023-02-28 ENCOUNTER — READMISSION MANAGEMENT (OUTPATIENT)
Dept: CALL CENTER | Facility: HOSPITAL | Age: 58
End: 2023-02-28
Payer: MEDICARE

## 2023-02-28 VITALS
TEMPERATURE: 96.6 F | DIASTOLIC BLOOD PRESSURE: 60 MMHG | OXYGEN SATURATION: 99 % | RESPIRATION RATE: 18 BRPM | HEART RATE: 65 BPM | SYSTOLIC BLOOD PRESSURE: 102 MMHG

## 2023-02-28 PROCEDURE — G0299 HHS/HOSPICE OF RN EA 15 MIN: HCPCS

## 2023-02-28 PROCEDURE — G0151 HHCP-SERV OF PT,EA 15 MIN: HCPCS

## 2023-02-28 NOTE — OUTREACH NOTE
CHF Week 1 Survey    Flowsheet Row Responses   [facility] patient discharged from? [facility]   Does the patient have one of the following disease processes/diagnoses(primary or secondary)? CHF   CHF Week 1 attempt successful? Yes   Call start time 1120   Call end time 1126   Discharge diagnosis Acute type 2 respiratory failure., Acute CHF, A-fib with RVR   Medication alerts for this patient Pt dispenses her meds.   Meds reviewed with patient/caregiver? Yes   Is the patient having any side effects they believe may be caused by any medication additions or changes? No   Does the patient have all medications ordered at discharge? Yes   Is the patient taking all medications as directed (includes completed medication regime)? Yes   Does the patient have a primary care provider?  Yes   Does the patient have an appointment with their PCP within 7 days of discharge? Yes   Has the patient kept scheduled appointments due by today? N/A   What is the Home health agency?  [facility]   Has home health visited the patient within 72 hours of discharge? Yes   What DME was ordered? O2 from [employer] increased from 2L to 4L   DME comments wearing home O2 @4L   Pulse Ox monitoring Intermittent   Pulse Ox device source Patient   O2 Sat comments has not monitored since DC,   says today it is 98%   O2 Sat: education provided Sat levels, Monitoring frequency, When to seek care   Psychosocial issues? No   Comments Pt reports SOA improving. She is fatigued and hoped to increase her stamina thru PT.   Did the patient receive a copy of their discharge instructions? Yes   Nursing interventions Reviewed instructions with patient   What is the patient's perception of their health status since discharge? Improving   Nursing interventions Nurse provided patient education   Is the patient/caregiver able to teach back the hierarchy of who to call/visit for symptoms/problems? PCP, Specialist, Home health nurse, Urgent Care, ED, 911 Yes   CHF Zone this  Call Yellow Zone   Yellow Zone Not feeling as good as usual    CHF Week 1 call completed? Yes   Is the patient interested in additional calls from an ambulatory ?  NOTE:  applies to high risk patients requiring additional follow-up. No   Graduated/Revoked comments quick call, PT was present.   Call end time 1126          Anabell GARCIA - Registered Nurse

## 2023-03-01 VITALS
SYSTOLIC BLOOD PRESSURE: 134 MMHG | OXYGEN SATURATION: 98 % | TEMPERATURE: 97.8 F | HEART RATE: 74 BPM | RESPIRATION RATE: 16 BRPM | DIASTOLIC BLOOD PRESSURE: 71 MMHG

## 2023-03-01 NOTE — HOME HEALTH
Initial Visit Note:    Patient lives with family in 1 story home, amb with st cane and prn 4WW usage, on continuous oxygen via NC x 4 LPM with no c/o dyspnea or SOB today. She is alone during the day as her daughter works.  Patient has had several episodes of breathing difficulties throughout the last couple months.  She is now on 3 new medications.  She denies any falls/near falls recently.  She reports her PLOF modified indep/indep.     Skill/education provided: patient education regarding goals and role of home care PT services.    Patient/caregiver response: patient agreeable with initiation of PT services.     Plan for next visit: standing balance, gait technique, bilateral LE strengthening.

## 2023-03-03 ENCOUNTER — HOME CARE VISIT (OUTPATIENT)
Dept: HOME HEALTH SERVICES | Facility: HOME HEALTHCARE | Age: 58
End: 2023-03-03
Payer: MEDICARE

## 2023-03-03 NOTE — CASE COMMUNICATION
please send to:Abraham Robertson MD   [address] [place] [address] [address]   [facility] [address]   Phone: [phone]   Fax: [phone]   NPI: [id number]    [facility]/23    Reason: appt confirmed, pt cld and canceled appt at 312 pm      For your records only.   As per home health protocol, MD must be notified of missed/cancelled visits; therefore the prescribed frequency was not met.

## 2023-03-04 ENCOUNTER — HOME CARE VISIT (OUTPATIENT)
Dept: HOME HEALTH SERVICES | Facility: HOME HEALTHCARE | Age: 58
End: 2023-03-04
Payer: MEDICARE

## 2023-03-08 ENCOUNTER — HOME CARE VISIT (OUTPATIENT)
Dept: HOME HEALTH SERVICES | Facility: HOME HEALTHCARE | Age: 58
End: 2023-03-08
Payer: MEDICARE

## 2023-03-08 ENCOUNTER — READMISSION MANAGEMENT (OUTPATIENT)
Dept: CALL CENTER | Facility: HOSPITAL | Age: 58
End: 2023-03-08
Payer: MEDICARE

## 2023-03-08 NOTE — OUTREACH NOTE
CHF Week 2 Survey    Flowsheet Row Responses   [facility] facility patient discharged from? [facility]   Does the patient have one of the following disease processes/diagnoses(primary or secondary)? CHF   Week 2 attempt successful? No   Unsuccessful attempts Attempt 1          Anabell GARCIA - Registered Nurse

## 2023-03-08 NOTE — CASE COMMUNICATION
Patient missed a HHSN} visit from [facility] on 3/8/23    Reason: unable to reach by phone or at door      For your records only.   As per home health protocol, MD must be notified of missed/cancelled visits; therefore the prescribed frequency was not met.

## 2023-03-15 ENCOUNTER — READMISSION MANAGEMENT (OUTPATIENT)
Dept: CALL CENTER | Facility: HOSPITAL | Age: 58
End: 2023-03-15
Payer: MEDICARE

## 2023-03-15 NOTE — OUTREACH NOTE
CHF Week 3 Survey    Flowsheet Row Responses   [facility] facility patient discharged from? [facility]   Does the patient have one of the following disease processes/diagnoses(primary or secondary)? CHF   Week 3 attempt successful? No          Mariangel GARCIA - Registered Nurse

## 2023-03-16 ENCOUNTER — HOME CARE VISIT (OUTPATIENT)
Dept: HOME HEALTH SERVICES | Facility: HOME HEALTHCARE | Age: 58
End: 2023-03-16
Payer: MEDICARE

## 2023-03-17 NOTE — CASE COMMUNICATION
"Thanks for the update.  ----- Message -----  From: Lyla Wigginson, PTA  Sent: 3/16/2023  10:53 PM EDT  To: Laura Vargas MD, Hanna Singh RN, *      Patient missed a PTA visit from [facility] on 3/16/23.     Reason:Unable to schedule patient for therapy due to 11:00 being \"too early\" / Unable to reschedule for another time this week. Patient requests to \"just do somthing next week\".        For your records only.   As per [facility] protocol, MD must be notified of missed/cancelled visits; therefore the prescribed frequency was not met.   "

## 2023-03-20 ENCOUNTER — HOME CARE VISIT (OUTPATIENT)
Dept: HOME HEALTH SERVICES | Facility: HOME HEALTHCARE | Age: 58
End: 2023-03-20
Payer: MEDICARE

## 2023-03-20 VITALS
HEART RATE: 59 BPM | SYSTOLIC BLOOD PRESSURE: 106 MMHG | RESPIRATION RATE: 18 BRPM | TEMPERATURE: 98.3 F | OXYGEN SATURATION: 100 % | DIASTOLIC BLOOD PRESSURE: 60 MMHG

## 2023-03-20 PROCEDURE — G0299 HHS/HOSPICE OF RN EA 15 MIN: HCPCS

## 2023-03-22 ENCOUNTER — HOME CARE VISIT (OUTPATIENT)
Dept: HOME HEALTH SERVICES | Facility: HOME HEALTHCARE | Age: 58
End: 2023-03-22
Payer: MEDICARE

## 2023-03-22 VITALS
HEART RATE: 82 BPM | SYSTOLIC BLOOD PRESSURE: 140 MMHG | RESPIRATION RATE: 16 BRPM | TEMPERATURE: 97.6 F | OXYGEN SATURATION: 98 % | DIASTOLIC BLOOD PRESSURE: 95 MMHG

## 2023-03-22 PROCEDURE — G0157 HHC PT ASSISTANT EA 15: HCPCS

## 2023-03-22 NOTE — HOME HEALTH
"Routine Visit Note: greeted at door by patient at door. Patient first seen ambulating with no visable signs of distress. Patient reports \"doing okay\" and states that she has L ankle pain today.     Skill/education provided: Instructed gait training, therapeutic exercises, and balance training today. Provided with and educated patient on HEP / encouraged to complete multiple times daily.     Patient/caregiver response: Patient tolerated all treatment well today, with no visable signs of distress, excessive fatigue, or pain. Patient reports \"feeling a little better\" following treatment.     Plan for next visit: Patient would benefit from continued skilled PT to address deficits in strength/balance, and to improve overall indpendence and control with functional activities and ambulation. Progress with exercise and balance training as tolerated next visit.     Other pertinent info: HEP emailed to [email or web address]"

## 2023-03-23 NOTE — HOME HEALTH
Routine Visit Note: Patient is doing much better as her lungs arre clear and sound stronger. Patient continues to use oxygen nut has reported that she has been taking off to see how she does with ambulation. VSS. Patient has a regular hear beat. Patient has no skin breakdown. Patient reports no falls.     Skill/education provided: Instructed patient on management of acute respiratory distress. Instructed on med purpose, dose, frequency, and side effects. Instructed on fall prevention and transfer safety. Instructed on fall prevention and transfer safety.    Patient/caregiver response: Patient verbalized understanding    Plan for next visit: CP assessment and teaching on acute resp. failure    Other pertinent info: n/a

## 2023-03-29 ENCOUNTER — HOME CARE VISIT (OUTPATIENT)
Dept: HOME HEALTH SERVICES | Facility: HOME HEALTHCARE | Age: 58
End: 2023-03-29
Payer: MEDICARE

## 2023-03-29 VITALS
RESPIRATION RATE: 17 BRPM | TEMPERATURE: 98.3 F | HEART RATE: 78 BPM | SYSTOLIC BLOOD PRESSURE: 133 MMHG | DIASTOLIC BLOOD PRESSURE: 90 MMHG | OXYGEN SATURATION: 94 %

## 2023-03-29 PROCEDURE — G0157 HHC PT ASSISTANT EA 15: HCPCS

## 2023-03-29 NOTE — HOME HEALTH
"Routine Visit Note: Greeted at door by patient, who was first seen ambualting with cane, showing no visible signs of distress. Patient reports feeling “really tired today“ and states that she hasn't been completing HEP \" too much\". Patient reports foot pain has improved, but mentions that she sometimes has chest pain.     Skill/education provided: Educated patient on pain management strategies and HEP. Provided with new HEP and educated patient on walking program.     Patient/caregiver response: Patient verbalizes understanding of all provided education today, and states that she will start completing HEP / ambulating more on her own. Patient declined to progress with exercise and gait training today due to \"being too tired\"    Plan for next visit: Patient would benefit from continued skilled physical therapy to address deficits in endurance, strength, and to improve control/indepndence with ADL's and ambulation.   Progress with gait training and exercise/balance exercise  as tolerated next visit."

## 2023-03-31 ENCOUNTER — HOME CARE VISIT (OUTPATIENT)
Dept: HOME HEALTH SERVICES | Facility: HOME HEALTHCARE | Age: 58
End: 2023-03-31
Payer: MEDICARE

## 2023-04-01 NOTE — CASE COMMUNICATION
Please send to Provider: Abraham Robertson MD  Patient missed a skilled nursing visit from [facility] on 3.31.2023.     Reason: Patient requested to not have visit and to be seen next week for a nursing visit.       For your records only.   As per home health protocol, MD must be notified of missed/cancelled visits; therefore the prescribed frequency was not met.

## 2023-04-06 ENCOUNTER — HOME CARE VISIT (OUTPATIENT)
Dept: HOME HEALTH SERVICES | Facility: HOME HEALTHCARE | Age: 58
End: 2023-04-06
Payer: MEDICARE

## 2023-04-06 PROCEDURE — G0300 HHS/HOSPICE OF LPN EA 15 MIN: HCPCS

## 2023-04-06 NOTE — HOME HEALTH
Routine Visit Note: LPN    Skill/education provided: resp o2 assessment. lungs are CTA lobe v/s wdl. Pt continues with SOA and continues using inhalers as ordered. no adverse reactions reported.        Plan for next visit: check ins info    Other pertinent info:

## 2023-04-12 ENCOUNTER — HOME CARE VISIT (OUTPATIENT)
Dept: HOME HEALTH SERVICES | Facility: HOME HEALTHCARE | Age: 58
End: 2023-04-12
Payer: MEDICARE

## 2023-04-12 VITALS
TEMPERATURE: 96.5 F | RESPIRATION RATE: 16 BRPM | SYSTOLIC BLOOD PRESSURE: 140 MMHG | OXYGEN SATURATION: 97 % | DIASTOLIC BLOOD PRESSURE: 80 MMHG | HEART RATE: 114 BPM

## 2023-04-12 PROCEDURE — G0299 HHS/HOSPICE OF RN EA 15 MIN: HCPCS

## 2023-04-12 NOTE — HOME HEALTH
SOC Note:  Arrived to find patient sitting on bed watching granddaughter without oxygen on.  assessed pulse ox at 72 on RA; quickly improved with application of oxygen 4l/min via NC. HR noted to fluctuate between ; denies any symptoms.  Spoke with Mariam at Dr. Lopez office to notify of HR; request oxygen wean orders; script for torsemide and spirivia be called to [facility]; script for rollator to patient aids.  Openly discussed with patient several missed appointments in past episode; verbally agreed to not cancel any further visits. Has follow up with PCP 4/24 already planned; aware must keep appt    Home Health ordered for: disciplines SN, PT; adding MSW for alternative living options; community resources    Reason for Hosp/Primary Dx/Co-morbidities: New SOC due to insurance change.  Focus COPD management    Focus of Care: COPD Management    Skilled Need: disease process education; medication education; oxygen safety    Current Functional status/mobility/DME: ambulates limited distance with cane; spoke with Mariam at Dr. Robertson office to request script for bariatric rollator be sent to patient aids at patient request    HB status/Living Arrangements: Lives with daughter and two young children; Homebound due to limited ambulation; increased shortness of breath with ambulation    Skin Integrity/wound status: large bruise/knot under skin right inner upper arm; unknown cause    Code Status: FULL    Fall Risk: YES    POC confirmed with Mariam at  Dr. Lopez on 4/12/23    Plan for next visit: assure torsemide; spirivia in home.  respiratory assessment and education; follow up on rollator

## 2023-04-17 ENCOUNTER — HOME CARE VISIT (OUTPATIENT)
Dept: HOME HEALTH SERVICES | Facility: HOME HEALTHCARE | Age: 58
End: 2023-04-17
Payer: MEDICARE

## 2023-04-18 ENCOUNTER — HOME CARE VISIT (OUTPATIENT)
Dept: HOME HEALTH SERVICES | Facility: HOME HEALTHCARE | Age: 58
End: 2023-04-18
Payer: MEDICARE

## 2023-04-18 VITALS
SYSTOLIC BLOOD PRESSURE: 127 MMHG | DIASTOLIC BLOOD PRESSURE: 65 MMHG | HEART RATE: 70 BPM | TEMPERATURE: 98.2 F | OXYGEN SATURATION: 97 % | RESPIRATION RATE: 16 BRPM

## 2023-04-18 PROCEDURE — G0151 HHCP-SERV OF PT,EA 15 MIN: HCPCS

## 2023-04-18 PROCEDURE — G0155 HHCP-SVS OF CSW,EA 15 MIN: HCPCS

## 2023-04-18 NOTE — HOME HEALTH
Met with patient who was in the bed and her 2 year old granddaughter was present too.  She said they stay in the bedroom all day until her daughter gets off work.  Patient states she moved in with her daughter in September 2022.  She was living at [facility] for 9 years but states there were lots of young people moving in there and they were noisy.  She wants to move to her own apartment and she had a small list that her daughter had printed off the housing authority website.  I provided her with an extensive list and discussed which ones she might want to start with.  She asked if I can get an apartment for her and I explained that she needs to call to discuss the specifics but I will be glad to help her follow up any way I can.  She has my number to call if further assistance is needed.  She had a completed application for [facility] and states she has talked to the manager there.  She said she doesn't have any transportation to take the application there and it has to be dropped off.  I took the application and will drop it off for her when I am in the area this week.  She said she uses [employer] but doesn't like to because she has to wait a long time for .  She said her daugthers help with transportation when they can.  Provided other [facility] resources to her.  She denies wanting home delivered meals, stating she can still cook meals.  She does request a rollator if possible.  I will ask Elsi, office LPN, to ask the MD for an order for this.  Patient states she doesn't have medicaid (only Q[employer]).  She is on O2.  She denies further concerns or needs at this time.

## 2023-04-19 ENCOUNTER — HOME CARE VISIT (OUTPATIENT)
Dept: HOME HEALTH SERVICES | Facility: HOME HEALTHCARE | Age: 58
End: 2023-04-19
Payer: MEDICARE

## 2023-04-19 VITALS
SYSTOLIC BLOOD PRESSURE: 152 MMHG | RESPIRATION RATE: 16 BRPM | HEART RATE: 105 BPM | DIASTOLIC BLOOD PRESSURE: 82 MMHG | OXYGEN SATURATION: 97 % | TEMPERATURE: 95.6 F

## 2023-04-19 PROCEDURE — G0495 RN CARE TRAIN/EDU IN HH: HCPCS

## 2023-04-19 NOTE — HOME HEALTH
Routine Visit Note:    Skill/education provided: patient wearing oxygen today on arrival.  Called Patient aids to inquire about rollator; told they have no script.  Called PCP office who reports faxed it twice yesterday.  No other follow up to previous questions provided, requested again call back from nurse.  [facility] nurse for Dr. Lopez called  back during visit and had refaxed needed info for rollator.  States all other questions would be answered at follow up appt 4/26.  Spoke with Patient aid again who stated still don't have fax but did acknowledge behind on getting faxes off machine; requested call once fax noted as patient needs rollator for MD appt monday.  Spoke with patients daughter to facilitate transportation for appt, MSW working with patient on alternative housing.    Patient/caregiver response: patient expressed appreciation for visit and assistance to get rollator    Plan for next visit: follow up MD appt; assure all meds in home    Other pertinent info: Dr. Lopez 4/26

## 2023-04-21 ENCOUNTER — HOME CARE VISIT (OUTPATIENT)
Dept: HOME HEALTH SERVICES | Facility: HOME HEALTHCARE | Age: 58
End: 2023-04-21
Payer: MEDICARE

## 2023-04-26 ENCOUNTER — HOME CARE VISIT (OUTPATIENT)
Dept: HOME HEALTH SERVICES | Facility: HOME HEALTHCARE | Age: 58
End: 2023-04-26
Payer: MEDICARE

## 2023-04-26 VITALS
SYSTOLIC BLOOD PRESSURE: 142 MMHG | DIASTOLIC BLOOD PRESSURE: 90 MMHG | OXYGEN SATURATION: 98 % | RESPIRATION RATE: 16 BRPM | HEART RATE: 106 BPM | TEMPERATURE: 97.6 F

## 2023-04-26 VITALS
TEMPERATURE: 97.6 F | RESPIRATION RATE: 16 BRPM | DIASTOLIC BLOOD PRESSURE: 90 MMHG | SYSTOLIC BLOOD PRESSURE: 142 MMHG | HEART RATE: 106 BPM | OXYGEN SATURATION: 98 %

## 2023-04-26 PROCEDURE — G0157 HHC PT ASSISTANT EA 15: HCPCS

## 2023-04-26 PROCEDURE — G0495 RN CARE TRAIN/EDU IN HH: HCPCS

## 2023-04-26 NOTE — HOME HEALTH
Routine Visit Note:    Skill/education provided: patiens birthday today; unable to celebrate very much as still no rollator  Spoke with insurance company who reports it was never processed as told by DME provider it was denied.  Assured DME would call nurse tomorrow with outcome; if no call received will try another DME provider.  Patient reports did not use oxygen for two days and pulse ox was low.  Reinforced oxygen is 24/7; will attempt to wean from 4l once stable but it is slow process.  Bruising/knot right inner upper arm slowly resolving.  Patient reports PCP appt was changed to 5/8. Reports taking miralax but does not feel as though she is empty and had some abdominal discomfort, nausea, vomiting this am.  Will take miralax daily, assure drinking plenty of fluids, eating natural foods that would promote BM, will notify nurse if issues persist    Patient/caregiver response: verbalized understanding of teaching    Plan for next visit: disease process education    Other pertinent info:

## 2023-04-26 NOTE — HOME HEALTH
Subjective: Patient was seated at edge of bed and finishing SN visit upon PTA arrival. 4L O2 via nc donned. She denies falls or changes in meds.    Objective: Standing balance and strengthening interventions instructed. PTA established HEP including seated ther ex and hourly standing for 1-2 minutes, handout provided.    Pt/CG response to treatment and instruction: Patient voiced understanding to HEP establishment with intent to comply. Patient's HR increased to 134 bpm after standing heel raise intervention. She became very fatigued and was able to slowly sit. After 3 min rest, patient's HR decreased to baseline ~102 bpm. Seated ther ex was tolerable for patient with normal vital response.    Assessment: Patient appears to be sedentary and has limited tolerance to any activity at this time. She voiced multiple times her desire to improve her health. Further skilled PT care is needed to reach goals set in POC.    Plan: Progress standing interventions next visit if tolerable.

## 2023-05-03 ENCOUNTER — HOME CARE VISIT (OUTPATIENT)
Dept: HOME HEALTH SERVICES | Facility: HOME HEALTHCARE | Age: 58
End: 2023-05-03
Payer: MEDICARE

## 2023-05-03 VITALS
SYSTOLIC BLOOD PRESSURE: 128 MMHG | HEART RATE: 108 BPM | RESPIRATION RATE: 16 BRPM | OXYGEN SATURATION: 96 % | TEMPERATURE: 96.8 F | DIASTOLIC BLOOD PRESSURE: 80 MMHG

## 2023-05-03 PROCEDURE — G0495 RN CARE TRAIN/EDU IN HH: HCPCS

## 2023-05-03 NOTE — HOME HEALTH
"Routine Visit Note:    Skill/education provided: patient sittine without her oxygen on at arrival; pulse ox 72; oxygen placed with improvement to 96.  HR fluctuating 108-130.  Denies chest pain and does not wish to go to ER.  Patient has new bruise around right eye but denies injury.  Patient states \"it is that medication (eliquis) making me bruise and I dont want to keep taking it\"  Has appt with PCP 5/8; encouraged to speak with PCP before stopping.  Patient still has not received rollator, spoke with [facility] who did not have referral but took information and would send request to PCP.  No other medication changes.  Patient tearful at home environment; has to care for 3 year old granddaughter and do as daughter asks when she doesn't feel well enough to take care of herself.  Reports apartments require online application and she has no way to do online.  Patient to call apartments and explain to try and get assistance from office on filling out application.    Patient/caregiver response: Educated to go to ER if chest pain occurs and to please keep PCP appt monday; verbalized she would    Plan for next visit: follow up PCP appt; work toward discharge    Other pertinent info: Dr. Robertson 5/8"

## 2023-05-04 ENCOUNTER — HOME CARE VISIT (OUTPATIENT)
Dept: HOME HEALTH SERVICES | Facility: HOME HEALTHCARE | Age: 58
End: 2023-05-04
Payer: MEDICARE

## 2023-05-10 ENCOUNTER — HOME CARE VISIT (OUTPATIENT)
Dept: HOME HEALTH SERVICES | Facility: HOME HEALTHCARE | Age: 58
End: 2023-05-10
Payer: MEDICARE

## 2023-05-11 ENCOUNTER — HOME CARE VISIT (OUTPATIENT)
Dept: HOME HEALTH SERVICES | Facility: HOME HEALTHCARE | Age: 58
End: 2023-05-11
Payer: MEDICARE

## 2023-05-11 PROCEDURE — G0157 HHC PT ASSISTANT EA 15: HCPCS

## 2023-05-12 ENCOUNTER — HOME CARE VISIT (OUTPATIENT)
Dept: HOME HEALTH SERVICES | Facility: HOME HEALTHCARE | Age: 58
End: 2023-05-12
Payer: MEDICARE

## 2023-05-12 VITALS
OXYGEN SATURATION: 98 % | TEMPERATURE: 96 F | RESPIRATION RATE: 16 BRPM | DIASTOLIC BLOOD PRESSURE: 82 MMHG | HEART RATE: 116 BPM | SYSTOLIC BLOOD PRESSURE: 128 MMHG

## 2023-05-12 PROCEDURE — G0495 RN CARE TRAIN/EDU IN HH: HCPCS

## 2023-05-12 NOTE — HOME HEALTH
Routine Visit Note:    Skill/education provided: patient in bathroom on arrival; bruising right inner arm and face resolved.  Upon looking at medication bottles; MD decreased eliquis to daily.  Patient using oxygen.  ambulating with walker to increase endurance.  Rescheduled MD appt as office stated it was no show but patient states she called to cancel and rescheduled.  Aware must keep to get any further medication refills needed.    Patient/caregiver response: doing better; compliant with oxgyen    Plan for next visit: disease process education; work toward discharge    Other pertinent info: 5/19 at 145p PCP

## 2023-05-12 NOTE — CASE COMMUNICATION
Please forward to PCP: Abraham Robertson MD   Patient missed a PTA visit with [facility] on 5/4/23.    Reason: Patient refused visit this date. No reason given.      For your records only.   As per home health protocol, MD must be notified of missed/cancelled visits; therefore the prescribed frequency was not met.

## 2023-05-15 VITALS — OXYGEN SATURATION: 97 % | HEART RATE: 99 BPM

## 2023-05-15 NOTE — HOME HEALTH
Subjective: Patient had forgotten PTA was coming but was otherwise agreeable to visit. She has a new 4WW with seat and reports she has been walking more around the home.  Objective: Gait training with 4WW, fall prevention education, HEP educaftion.  Pt/CG response to treatment and instruction: Patient showed and voiced understanding to all education.  Assessment: Patient with 132 bpm HR after 15 ft ambulation. Required seated rest breaks to recover.  Plan: Progress as able with emphasis on gait, balance and strengthening.

## 2023-05-17 ENCOUNTER — HOME CARE VISIT (OUTPATIENT)
Dept: HOME HEALTH SERVICES | Facility: HOME HEALTHCARE | Age: 58
End: 2023-05-17
Payer: MEDICARE

## 2023-05-17 VITALS
RESPIRATION RATE: 16 BRPM | DIASTOLIC BLOOD PRESSURE: 87 MMHG | OXYGEN SATURATION: 96 % | HEART RATE: 103 BPM | SYSTOLIC BLOOD PRESSURE: 134 MMHG

## 2023-05-17 VITALS
TEMPERATURE: 95.8 F | DIASTOLIC BLOOD PRESSURE: 70 MMHG | SYSTOLIC BLOOD PRESSURE: 140 MMHG | HEART RATE: 105 BPM | RESPIRATION RATE: 16 BRPM | OXYGEN SATURATION: 100 %

## 2023-05-17 PROCEDURE — G0157 HHC PT ASSISTANT EA 15: HCPCS

## 2023-05-17 PROCEDURE — G0495 RN CARE TRAIN/EDU IN HH: HCPCS

## 2023-05-17 NOTE — HOME HEALTH
Subjective: Sonia reports that she has been busy and walking to the bathroom and kitchen multiple times using walker. She denies falls or changes in meds since last visit.    Objective: Gait training, HEP education, balance interventions.    Pt/CG response to treatment and instruction: Patient voiced understanding with intent to comply.    Assessment: PTA observed decreased dyspne and fatigue with activity this visit.    Plan: Reassess and discharge if appropriate.

## 2023-05-18 NOTE — HOME HEALTH
Routine Visit Note:    Skill/education provided: patient reports doing better; reports her PCP appt friday was canceled. called PCP office to discuss as MD will not renew meds without visit.  It was cancelled by provider.  Rescheduled for 5/25.  Using oxygen, compliant with medications.      Patient/caregiver response: Doing better as compliant with care needs    Plan for next visit: sign nomnc; prepare for discharge; follow up PCP appt    Other pertinent info: 5/25 at 245p

## 2023-05-26 ENCOUNTER — HOME CARE VISIT (OUTPATIENT)
Dept: HOME HEALTH SERVICES | Facility: HOME HEALTHCARE | Age: 58
End: 2023-05-26
Payer: MEDICARE

## 2023-05-26 VITALS
RESPIRATION RATE: 16 BRPM | SYSTOLIC BLOOD PRESSURE: 102 MMHG | OXYGEN SATURATION: 95 % | TEMPERATURE: 95 F | DIASTOLIC BLOOD PRESSURE: 60 MMHG | HEART RATE: 118 BPM

## 2023-05-26 PROCEDURE — G0151 HHCP-SERV OF PT,EA 15 MIN: HCPCS

## 2023-05-26 PROCEDURE — G0495 RN CARE TRAIN/EDU IN HH: HCPCS

## 2023-05-26 NOTE — HOME HEALTH
Routine Visit Note:    Skill/education provided:  patient reports Dr. Lopez has fired her as patient.  Spoke with office to request refills for torsemide and bystolic.  Discussed new pcp - Dr Mckeon not accepting patients.  Spoke and agreeable to [facility].  Aware agency discharge in two weeks.  Compliant with care now.  reports does not feel good today but refused and assessment at urgent care.  HR remains low 110's, denies chest pain.    Patient/caregiver response: verbalized understanding to go to urgent care if chest pain occurs, or symptoms worsen    Plan for next visit:  sign NOMNC, assure refills in home; prepare for discharge    Other pertinent info: received letter from Dr. Lopez that due to multiple missed appts is no longer going to care for patient.  has until 6/18

## 2023-05-27 VITALS
DIASTOLIC BLOOD PRESSURE: 78 MMHG | OXYGEN SATURATION: 96 % | HEART RATE: 72 BPM | RESPIRATION RATE: 17 BRPM | TEMPERATURE: 98.3 F | SYSTOLIC BLOOD PRESSURE: 142 MMHG

## 2023-05-27 NOTE — HOME HEALTH
Routine Visit Note:    Skill/education provided: Patient requesting information regarding portable oxygen system.    Patient/caregiver response: patient requesting to discontinue PT services.     Plan for next visit: patient discharged from home care PT services.

## 2023-05-30 ENCOUNTER — HOME CARE VISIT (OUTPATIENT)
Dept: HOME HEALTH SERVICES | Facility: HOME HEALTHCARE | Age: 58
End: 2023-05-30
Payer: MEDICARE

## 2023-05-31 ENCOUNTER — HOME CARE VISIT (OUTPATIENT)
Dept: HOME HEALTH SERVICES | Facility: HOME HEALTHCARE | Age: 58
End: 2023-05-31
Payer: MEDICARE

## 2023-05-31 VITALS
OXYGEN SATURATION: 98 % | RESPIRATION RATE: 16 BRPM | DIASTOLIC BLOOD PRESSURE: 74 MMHG | TEMPERATURE: 96.7 F | SYSTOLIC BLOOD PRESSURE: 128 MMHG | HEART RATE: 102 BPM

## 2023-05-31 PROCEDURE — G0495 RN CARE TRAIN/EDU IN HH: HCPCS

## 2023-05-31 NOTE — HOME HEALTH
Routine Visit Note:    Skill/education provided: [facility] coming 6/5.  Needed refills arriving Saturday.  Patient informed if no medication changes or issues identified would be discharged at next visit.  Compliant with oxygen.  HR lower today, aware to speak with NP about HR on monday.  No falls.  Educated on fluid intake, balance to stay hydrated but no too much to cause fluid overload.    Patient/caregiver response:     Plan for next visit: discharge/recert; follow up NP visit    Other pertinent info:

## 2023-06-07 ENCOUNTER — HOME CARE VISIT (OUTPATIENT)
Dept: HOME HEALTH SERVICES | Facility: HOME HEALTHCARE | Age: 58
End: 2023-06-07
Payer: MEDICARE

## 2023-06-07 VITALS
HEART RATE: 95 BPM | TEMPERATURE: 96 F | OXYGEN SATURATION: 98 % | RESPIRATION RATE: 16 BRPM | DIASTOLIC BLOOD PRESSURE: 78 MMHG | SYSTOLIC BLOOD PRESSURE: 118 MMHG

## 2023-06-07 PROCEDURE — G0495 RN CARE TRAIN/EDU IN HH: HCPCS

## 2023-06-07 NOTE — HOME HEALTH
Routine Visit Note:    Skill/education provided: patient saw NP monday from [facility], no changes.  Due to no changes; patient discharged from agency today.  Patient aware if that anything changes a new referral can be obtained.  Labeled each medication bottle with purpose    Patient/caregiver response: VU of discharge instructions.    Plan for next visit: agency discharge today    Other pertinent info:

## 2023-08-16 ENCOUNTER — TRANSCRIBE ORDERS (OUTPATIENT)
Dept: HOME HEALTH SERVICES | Facility: HOME HEALTHCARE | Age: 58
End: 2023-08-16
Payer: MEDICARE

## 2023-08-16 ENCOUNTER — HOME HEALTH ADMISSION (OUTPATIENT)
Dept: HOME HEALTH SERVICES | Facility: HOME HEALTHCARE | Age: 58
End: 2023-08-16
Payer: MEDICARE

## 2023-08-16 DIAGNOSIS — J90 PLEURAL EFFUSION: Primary | ICD-10-CM

## 2023-08-22 ENCOUNTER — APPOINTMENT (OUTPATIENT)
Dept: GENERAL RADIOLOGY | Facility: HOSPITAL | Age: 58
End: 2023-08-22
Payer: MEDICARE

## 2023-08-22 ENCOUNTER — HOSPITAL ENCOUNTER (EMERGENCY)
Facility: HOSPITAL | Age: 58
Discharge: HOME | End: 2023-08-22
Attending: EMERGENCY MEDICINE | Admitting: EMERGENCY MEDICINE
Payer: MEDICARE

## 2023-08-22 VITALS
HEIGHT: 68 IN | OXYGEN SATURATION: 100 % | SYSTOLIC BLOOD PRESSURE: 138 MMHG | DIASTOLIC BLOOD PRESSURE: 110 MMHG | TEMPERATURE: 97.9 F | HEART RATE: 103 BPM | WEIGHT: 293 LBS | RESPIRATION RATE: 16 BRPM | BODY MASS INDEX: 44.41 KG/M2

## 2023-08-22 DIAGNOSIS — Z86.79 HISTORY OF CORONARY ARTERY DISEASE: ICD-10-CM

## 2023-08-22 DIAGNOSIS — Z99.81 SUPPLEMENTAL OXYGEN DEPENDENT: ICD-10-CM

## 2023-08-22 DIAGNOSIS — M25.532 ACUTE PAIN OF LEFT WRIST: ICD-10-CM

## 2023-08-22 DIAGNOSIS — M79.642 LEFT HAND PAIN: ICD-10-CM

## 2023-08-22 DIAGNOSIS — Z86.2 HISTORY OF ANEMIA DUE TO CHRONIC KIDNEY DISEASE: ICD-10-CM

## 2023-08-22 DIAGNOSIS — Z79.01 CHRONIC ANTICOAGULATION: ICD-10-CM

## 2023-08-22 DIAGNOSIS — M10.9 GOUTY ARTHRITIS OF LEFT GREAT TOE: Primary | ICD-10-CM

## 2023-08-22 DIAGNOSIS — N18.9 HISTORY OF ANEMIA DUE TO CHRONIC KIDNEY DISEASE: ICD-10-CM

## 2023-08-22 DIAGNOSIS — N18.30 STAGE 3 CHRONIC KIDNEY DISEASE, UNSPECIFIED WHETHER STAGE 3A OR 3B CKD: ICD-10-CM

## 2023-08-22 DIAGNOSIS — I48.20 CHRONIC ATRIAL FIBRILLATION: ICD-10-CM

## 2023-08-22 DIAGNOSIS — Z87.09 HISTORY OF COPD: ICD-10-CM

## 2023-08-22 LAB
ALBUMIN SERPL-MCNC: 3.7 G/DL (ref 3.5–5.2)
ALBUMIN/GLOB SERPL: 0.8 G/DL
ALP SERPL-CCNC: 208 U/L (ref 39–117)
ALT SERPL W P-5'-P-CCNC: 11 U/L (ref 1–33)
ANION GAP SERPL CALCULATED.3IONS-SCNC: 14 MMOL/L (ref 5–15)
ANISOCYTOSIS BLD QL: NORMAL
AST SERPL-CCNC: 28 U/L (ref 1–32)
BASOPHILS # BLD AUTO: 0.06 10*3/MM3 (ref 0–0.2)
BASOPHILS NFR BLD AUTO: 1.1 % (ref 0–1.5)
BILIRUB SERPL-MCNC: 2.4 MG/DL (ref 0–1.2)
BUN SERPL-MCNC: 36 MG/DL (ref 6–20)
BUN/CREAT SERPL: 17.9 (ref 7–25)
CALCIUM SPEC-SCNC: 10 MG/DL (ref 8.6–10.5)
CHLORIDE SERPL-SCNC: 98 MMOL/L (ref 98–107)
CO2 SERPL-SCNC: 30 MMOL/L (ref 22–29)
CREAT SERPL-MCNC: 2.01 MG/DL (ref 0.57–1)
CRP SERPL-MCNC: 3.43 MG/DL (ref 0–0.5)
D-LACTATE SERPL-SCNC: 1.7 MMOL/L (ref 0.5–2)
DEPRECATED RDW RBC AUTO: 61.6 FL (ref 37–54)
EGFRCR SERPLBLD CKD-EPI 2021: 28.1 ML/MIN/1.73
ELLIPTOCYTES BLD QL SMEAR: NORMAL
EOSINOPHIL # BLD AUTO: 0.09 10*3/MM3 (ref 0–0.4)
EOSINOPHIL NFR BLD AUTO: 1.6 % (ref 0.3–6.2)
ERYTHROCYTE [DISTWIDTH] IN BLOOD BY AUTOMATED COUNT: 20.6 % (ref 12.3–15.4)
ERYTHROCYTE [SEDIMENTATION RATE] IN BLOOD: 82 MM/HR (ref 0–30)
GLOBULIN UR ELPH-MCNC: 4.5 GM/DL
GLUCOSE SERPL-MCNC: 86 MG/DL (ref 65–99)
HCT VFR BLD AUTO: 33.9 % (ref 34–46.6)
HGB BLD-MCNC: 9 G/DL (ref 12–15.9)
HYPOCHROMIA BLD QL: NORMAL
IMM GRANULOCYTES # BLD AUTO: 0.02 10*3/MM3 (ref 0–0.05)
IMM GRANULOCYTES NFR BLD AUTO: 0.4 % (ref 0–0.5)
LYMPHOCYTES # BLD AUTO: 0.37 10*3/MM3 (ref 0.7–3.1)
LYMPHOCYTES NFR BLD AUTO: 6.5 % (ref 19.6–45.3)
MCH RBC QN AUTO: 22.4 PG (ref 26.6–33)
MCHC RBC AUTO-ENTMCNC: 26.5 G/DL (ref 31.5–35.7)
MCV RBC AUTO: 84.3 FL (ref 79–97)
MONOCYTES # BLD AUTO: 0.78 10*3/MM3 (ref 0.1–0.9)
MONOCYTES NFR BLD AUTO: 13.7 % (ref 5–12)
NEUTROPHILS NFR BLD AUTO: 4.37 10*3/MM3 (ref 1.7–7)
NEUTROPHILS NFR BLD AUTO: 76.7 % (ref 42.7–76)
NRBC BLD AUTO-RTO: 0 /100 WBC (ref 0–0.2)
PLAT MORPH BLD: NORMAL
PLATELET # BLD AUTO: 224 10*3/MM3 (ref 140–450)
PMV BLD AUTO: 10 FL (ref 6–12)
POTASSIUM SERPL-SCNC: 4 MMOL/L (ref 3.5–5.2)
PROT SERPL-MCNC: 8.2 G/DL (ref 6–8.5)
RBC # BLD AUTO: 4.02 10*6/MM3 (ref 3.77–5.28)
SODIUM SERPL-SCNC: 142 MMOL/L (ref 136–145)
TARGETS BLD QL SMEAR: NORMAL
URATE SERPL-MCNC: 14.3 MG/DL (ref 2.4–5.7)
WBC MORPH BLD: NORMAL
WBC NRBC COR # BLD: 5.69 10*3/MM3 (ref 3.4–10.8)

## 2023-08-22 PROCEDURE — 85007 BL SMEAR W/DIFF WBC COUNT: CPT | Performed by: PHYSICIAN ASSISTANT

## 2023-08-22 PROCEDURE — 85025 COMPLETE CBC W/AUTO DIFF WBC: CPT | Performed by: PHYSICIAN ASSISTANT

## 2023-08-22 PROCEDURE — 93005 ELECTROCARDIOGRAM TRACING: CPT | Performed by: PHYSICIAN ASSISTANT

## 2023-08-22 PROCEDURE — 73110 X-RAY EXAM OF WRIST: CPT

## 2023-08-22 PROCEDURE — 73130 X-RAY EXAM OF HAND: CPT

## 2023-08-22 PROCEDURE — 73630 X-RAY EXAM OF FOOT: CPT

## 2023-08-22 PROCEDURE — 84550 ASSAY OF BLOOD/URIC ACID: CPT | Performed by: PHYSICIAN ASSISTANT

## 2023-08-22 PROCEDURE — 85652 RBC SED RATE AUTOMATED: CPT | Performed by: PHYSICIAN ASSISTANT

## 2023-08-22 PROCEDURE — 86140 C-REACTIVE PROTEIN: CPT | Performed by: PHYSICIAN ASSISTANT

## 2023-08-22 PROCEDURE — 83605 ASSAY OF LACTIC ACID: CPT | Performed by: PHYSICIAN ASSISTANT

## 2023-08-22 PROCEDURE — 99284 EMERGENCY DEPT VISIT MOD MDM: CPT

## 2023-08-22 PROCEDURE — 80053 COMPREHEN METABOLIC PANEL: CPT | Performed by: PHYSICIAN ASSISTANT

## 2023-08-22 RX ORDER — COLCHICINE 0.6 MG/1
1.2 TABLET ORAL ONCE
Status: COMPLETED | OUTPATIENT
Start: 2023-08-22 | End: 2023-08-22

## 2023-08-22 RX ORDER — COLCHICINE 0.6 MG/1
0.6 TABLET ORAL 2 TIMES DAILY
Qty: 14 TABLET | Refills: 0 | Status: SHIPPED | OUTPATIENT
Start: 2023-08-22 | End: 2023-09-01

## 2023-08-22 RX ORDER — ACETAMINOPHEN 325 MG/1
650 TABLET ORAL ONCE
Status: COMPLETED | OUTPATIENT
Start: 2023-08-22 | End: 2023-08-22

## 2023-08-22 RX ORDER — SODIUM CHLORIDE 0.9 % (FLUSH) 0.9 %
10 SYRINGE (ML) INJECTION AS NEEDED
Status: DISCONTINUED | OUTPATIENT
Start: 2023-08-22 | End: 2023-08-23 | Stop reason: HOSPADM

## 2023-08-22 RX ADMIN — COLCHICINE 1.2 MG: 0.6 TABLET, FILM COATED ORAL at 22:00

## 2023-08-22 RX ADMIN — ACETAMINOPHEN 650 MG: 325 TABLET, FILM COATED ORAL at 23:15

## 2023-08-23 LAB
QT INTERVAL: 334 MS
QTC INTERVAL: 445 MS

## 2023-08-23 NOTE — ED PROVIDER NOTES
Subjective   History of Present Illness  This is a 58-year-old female that presents the ER with left hand/wrist pain that started 4 days ago.  Patient denies any known injury, trauma, or fall.  Over the last 4 days, she continues to describe painful range of motion and some mild swelling to the fingers on the left hand and left wrist.  There is no confluent erythema, warmth, and no break in the skin.  Patient also reported that left great toe started hurting a couple of days ago.  She describes some mild warmth and painful range of motion.  She denies any known history of gout.  Patient denies any systemic symptoms of fever, chills, body aches, or malaise/fatigue.  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  No other concerns at this time.    History provided by:  Patient  Hand Pain  Location:  Left hand/wrist pain, left great toe pain  Onset quality:  Gradual  Duration:  4 days  Timing:  Constant  Progression:  Worsening  Chronicity:  New  Context:  Pt reports waking up 4 days ago with left hand pain, aching and throbbing.  No known injury.  Also, pain to left great toe.  Pt taking Ibuprofen without relief.  No know h/o gout.  Worsened by:  Range of motion of the left hand/wrist and left great toe.  Associated symptoms: no abdominal pain, no chest pain, no congestion, no cough, no diarrhea, no ear pain, no fatigue, no fever, no headaches, no myalgias, no nausea, no rash, no rhinorrhea, no shortness of breath and no vomiting      Review of Systems   Constitutional: Negative.  Negative for activity change, appetite change, chills, diaphoresis, fatigue and fever.   HENT: Negative.  Negative for congestion, ear pain and rhinorrhea.    Respiratory: Negative.  Negative for cough and shortness of  breath.         Chronic SOA secondary to COPD.  Pt on 3L per NC continuously.   Cardiovascular: Negative.  Negative for chest pain, palpitations and leg swelling.   Gastrointestinal: Negative.  Negative for abdominal pain, diarrhea, nausea and vomiting.   Genitourinary: Negative.    Musculoskeletal:  Positive for arthralgias (Left great toe pain and left hand/wrist pain.), gait problem (chronic debility with difficulty ambulating) and joint swelling (Swelling to left hand, fingers and dorsal aspect.). Negative for myalgias.   Skin:  Positive for color change (Mild erythema with warmth to the left first metatarsal). Negative for rash.        Mild soft tissue swelling to left hand; fingers and dorsal aspect.  No confluent erythema or warmth.  No break in the skin or wounds.   Neurological:  Negative for numbness and headaches.   All other systems reviewed and are negative.    Past Medical History:   Diagnosis Date    Bleeding ulcer     COPD (chronic obstructive pulmonary disease)     Coronary artery disease     GERD (gastroesophageal reflux disease)     History of stomach ulcers     Hypertension     Stage III CKD 02/15/2023       Allergies   Allergen Reactions    Ace Inhibitors Angioedema     lisinopril       No past surgical history on file.    No family history on file.    Social History     Socioeconomic History    Marital status:    Tobacco Use    Smoking status: Former     Types: Cigarettes    Smokeless tobacco: Never   Vaping Use    Vaping Use: Never used   Substance and Sexual Activity    Alcohol use: Yes    Drug use: Never    Sexual activity: Never           Objective   Physical Exam  Vitals and nursing note reviewed.   Constitutional:       General: She is not in acute distress.     Appearance: Normal appearance. She is obese. She is not ill-appearing, toxic-appearing or diaphoretic.      Comments: Physically debilitated, morbidly obese, pleasant elderly female.  Nontoxic.  No acute distress.   HENT:       Head: Normocephalic and atraumatic.      Nose: Nose normal.      Mouth/Throat:      Mouth: Mucous membranes are moist.      Pharynx: Oropharynx is clear.   Eyes:      Extraocular Movements: Extraocular movements intact.      Conjunctiva/sclera: Conjunctivae normal.      Pupils: Pupils are equal, round, and reactive to light.   Cardiovascular:      Rate and Rhythm: Tachycardia present. Rhythm irregularly irregular.      Pulses: Normal pulses.           Dorsalis pedis pulses are 2+ on the right side and 2+ on the left side.        Posterior tibial pulses are 2+ on the right side and 2+ on the left side.      Heart sounds: Normal heart sounds.      Comments: Tachycardic, irregularly irregular rhythm with chronic atrial fibrillation.  No pedal edema to lower extremities.  Pulmonary:      Effort: Pulmonary effort is normal. No tachypnea, accessory muscle usage or retractions.      Breath sounds: Decreased air movement present. No wheezing, rhonchi or rales.      Comments: No tachypnea or retractions.  Oxygen in place per nasal cannula.  Globally diminished breath sounds secondary to COPD.  No increased respiratory effort or retractions.  No rhonchi or rales.  Abdominal:      General: Bowel sounds are normal.      Palpations: Abdomen is soft.   Musculoskeletal:         General: Normal range of motion.      Cervical back: Normal range of motion and neck supple.      Right lower leg: No edema.      Left lower leg: No edema.      Comments: Tenderness to palpation to the left wrist and dorsal aspect of the left hand as well as all of the fingers.  There is mild swelling but no significant swelling and no confluent erythema or warmth.  No evidence of cellulitis.  No evidence of septic joint.  Fair range of motion, but pain elicited.  Patient also has tenderness with mild erythema and warmth to the left first metatarsal.  Full range of motion of the left great toe.  No other tenderness to the left foot or left ankle.   Painful weightbearing.   Skin:     General: Skin is warm and dry.      Findings: Erythema present.      Comments: Mild soft tissue swelling of the left wrist and left hand.  No evidence of cellulitis.  No open wound or break in the skin.  No evidence of cellulitis.  Patient has localized erythema and minimal warmth and tenderness to the left first metatarsal.   Neurological:      General: No focal deficit present.      Mental Status: She is alert and oriented to person, place, and time.      Cranial Nerves: Cranial nerves 2-12 are intact.      Sensory: Sensation is intact.      Motor: Weakness present.      Coordination: Coordination is intact.      Comments: Generally weak with overall functional decline.  No focal deficits.       Procedures           ED Course  ED Course as of 08/22/23 2140   Tue Aug 22, 2023   2132 CBC reveals normal white blood cell count of 5.69.  H&H is 9 and 33.  Patient has anemia of chronic kidney disease.  Baseline at H&H is around 10 and 37.  Chemistries reveal BUN and creatinine 36 and 2.01.  After reviewing epic, baseline creatinine is around 2.1.  Creatinine clearance is around 48.  CRP is 3.43 and sed rate is 82.  Lactic acid is normal at 1.7.  Uric acid is 14.3.  Exam is consistent with acute gout.  Patient denies known history of gout.  X-ray of the left foot reveals fifth MTP joint periarticular osteopenia.  No bony destructive changes.  X-ray of the left wrist and hand reveal mixed arthritic changes without acute osseous process.  Discussed the case with Dr. Rubi, ER attending physician.  With stage III chronic kidney disease, we have to be cautious with medications for gout.  After discussion with hospital pharmacist, we will initiate colchicine 1.2 mg by mouth here in the ER and patient may take 0.6 mg every hour for total of 1.8 mg in a 24-hour period and then we will prescribe 0.6 mg twice daily for 1 week.  Recommend close PCP follow-up for recheck.  Return to the ER if  worsening symptoms. [FC]      ED Course User Index  [FC] Sharifa Hobbs, PAVAN                                 Recent Results (from the past 24 hour(s))   Comprehensive Metabolic Panel    Collection Time: 08/22/23  8:16 PM    Specimen: Blood   Result Value Ref Range    Glucose 86 65 - 99 mg/dL    BUN 36 (H) 6 - 20 mg/dL    Creatinine 2.01 (H) 0.57 - 1.00 mg/dL    Sodium 142 136 - 145 mmol/L    Potassium 4.0 3.5 - 5.2 mmol/L    Chloride 98 98 - 107 mmol/L    CO2 30.0 (H) 22.0 - 29.0 mmol/L    Calcium 10.0 8.6 - 10.5 mg/dL    Total Protein 8.2 6.0 - 8.5 g/dL    Albumin 3.7 3.5 - 5.2 g/dL    ALT (SGPT) 11 1 - 33 U/L    AST (SGOT) 28 1 - 32 U/L    Alkaline Phosphatase 208 (H) 39 - 117 U/L    Total Bilirubin 2.4 (H) 0.0 - 1.2 mg/dL    Globulin 4.5 gm/dL    A/G Ratio 0.8 g/dL    BUN/Creatinine Ratio 17.9 7.0 - 25.0    Anion Gap 14.0 5.0 - 15.0 mmol/L    eGFR 28.1 (L) >60.0 mL/min/1.73   C-reactive Protein    Collection Time: 08/22/23  8:16 PM    Specimen: Blood   Result Value Ref Range    C-Reactive Protein 3.43 (H) 0.00 - 0.50 mg/dL   Sedimentation Rate    Collection Time: 08/22/23  8:16 PM    Specimen: Blood   Result Value Ref Range    Sed Rate 82 (H) 0 - 30 mm/hr   Lactic Acid, Plasma    Collection Time: 08/22/23  8:16 PM    Specimen: Blood   Result Value Ref Range    Lactate 1.7 0.5 - 2.0 mmol/L   Uric Acid    Collection Time: 08/22/23  8:16 PM    Specimen: Blood   Result Value Ref Range    Uric Acid 14.3 (H) 2.4 - 5.7 mg/dL   CBC Auto Differential    Collection Time: 08/22/23  8:16 PM    Specimen: Blood   Result Value Ref Range    WBC 5.69 3.40 - 10.80 10*3/mm3    RBC 4.02 3.77 - 5.28 10*6/mm3    Hemoglobin 9.0 (L) 12.0 - 15.9 g/dL    Hematocrit 33.9 (L) 34.0 - 46.6 %    MCV 84.3 79.0 - 97.0 fL    MCH 22.4 (L) 26.6 - 33.0 pg    MCHC 26.5 (L) 31.5 - 35.7 g/dL    RDW 20.6 (H) 12.3 - 15.4 %    RDW-SD 61.6 (H) 37.0 - 54.0 fl    MPV 10.0 6.0 - 12.0 fL    Platelets 224 140 - 450 10*3/mm3    Neutrophil % 76.7 (H) 42.7 - 76.0  %    Lymphocyte % 6.5 (L) 19.6 - 45.3 %    Monocyte % 13.7 (H) 5.0 - 12.0 %    Eosinophil % 1.6 0.3 - 6.2 %    Basophil % 1.1 0.0 - 1.5 %    Immature Grans % 0.4 0.0 - 0.5 %    Neutrophils, Absolute 4.37 1.70 - 7.00 10*3/mm3    Lymphocytes, Absolute 0.37 (L) 0.70 - 3.10 10*3/mm3    Monocytes, Absolute 0.78 0.10 - 0.90 10*3/mm3    Eosinophils, Absolute 0.09 0.00 - 0.40 10*3/mm3    Basophils, Absolute 0.06 0.00 - 0.20 10*3/mm3    Immature Grans, Absolute 0.02 0.00 - 0.05 10*3/mm3    nRBC 0.0 0.0 - 0.2 /100 WBC   Scan Slide    Collection Time: 08/22/23  8:16 PM    Specimen: Blood   Result Value Ref Range    Anisocytosis Mod/2+ None Seen    Elliptocytes Slight/1+ None Seen    Hypochromia Mod/2+ None Seen    Target Cells Slight/1+ None Seen    WBC Morphology Normal Normal    Platelet Morphology Normal Normal   ECG 12 Lead Other; tachycardia    Collection Time: 08/22/23  8:19 PM   Result Value Ref Range    QT Interval 334 ms    QTC Interval 445 ms     Note: In addition to lab results from this visit, the labs listed above may include labs taken at another facility or during a different encounter within the last 24 hours. Please correlate lab times with ED admission and discharge times for further clarification of the services performed during this visit.    XR Wrist 3+ View Left   Final Result   Impression:   Likely mixed arthritic changes of the hand and wrist without acute osseous process identified.         Electronically Signed: Rickey Werner MD     8/22/2023 7:53 PM CDT     Workstation ID: [id number]      XR Hand 3+ View Left   Final Result   Impression:   Likely mixed arthritic changes of the hand and wrist without acute osseous process identified.         Electronically Signed: Rickey Werner MD     8/22/2023 7:53 PM CDT     Workstation ID: [id number]      XR Foot 3+ View Left   Final Result   Impression:   Fifth MTP joint periarticular osteopenia concerning for underlying osteomyelitis. There are no shay bony  "destructive changes.         Electronically Signed: Rickey Werner MD     8/22/2023 7:55 PM CDT     Workstation ID: [id number]        Vitals:    08/22/23 1934   BP: (!) 131/112   BP Location: Left arm   Patient Position: Sitting   Pulse: (!) 135   Resp: 16   Temp: 97.9 øF (36.6 øC)   TempSrc: Oral   Weight: (!) 159 kg (350 lb)   Height: 172.7 cm (68\")     Medications   sodium chloride 0.9 % flush 10 mL (has no administration in time range)   colchicine tablet 1.2 mg (has no administration in time range)     ECG/EMG Results (last 24 hours)       ** No results found for the last 24 hours. **          ECG 12 Lead Other; tachycardia   Preliminary Result   Test Reason : Other~   Blood Pressure :   */*   mmHG   Vent. Rate : 107 BPM     Atrial Rate :   * BPM      P-R Int :   * ms          QRS Dur :  90 ms       QT Int : 334 ms       P-R-T Axes :   *  15 168 degrees      QTc Int : 445 ms      Atrial fibrillation with rapid ventricular response with premature    ventricular or aberrantly conducted complexes   ST & T wave abnormality, consider lateral ischemia   Abnormal ECG   When compared with ECG of 15-FEB-2023 20:13,   Atrial fibrillation has replaced Atrial flutter   Criteria for Inferior infarct are no longer present      Referred By:            Confirmed By:                       Medical Decision Making  Amount and/or Complexity of Data Reviewed  Labs: ordered.  Radiology: ordered.  ECG/medicine tests: ordered.    Risk  Prescription drug management.        Final diagnoses:   Gouty arthritis of left great toe   Acute pain of left wrist   Left hand pain   Chronic atrial fibrillation   Chronic anticoagulation   Stage 3 chronic kidney disease, unspecified whether stage 3a or 3b CKD   History of coronary artery disease   History of COPD   Supplemental oxygen dependent   History of anemia due to chronic kidney disease       ED Disposition  ED Disposition       ED Disposition   Discharge    Condition   Stable    Comment   --    "            No follow-up provider specified.       Medication List        New Prescriptions      colchicine 0.6 MG tablet  Take 1 tablet by mouth 2 (Two) Times a Day.            Changed      Eliquis 5 MG tablet tablet  Generic drug: apixaban  Take 1 tablet by mouth Every 12 (Twelve) Hours. Indications: Atrial Fibrillation  What changed:   when to take this  additional instructions            Stop      cyanocobalamin 1000 MCG tablet  Commonly known as: [facility] Vitamin B-12     pantoprazole 40 MG EC tablet  Commonly known as: Protonix               Where to Get Your Medications        These medications were sent to [id number] - [place], [place] - [address] [facility] - [phone]  - [phone]   [address] [place] [facility] [address]      Phone: [phone]   colchicine 0.6 MG tablet            Sharifa Hobbs PA-C  08/22/23 6583

## 2023-08-23 NOTE — DISCHARGE INSTRUCTIONS
ER evaluation reveals acute gout to the left great toe and left wrist/hand.  There is no bony abnormality other than some osteopenia to the left foot.  Blood work reveals chronic kidney disease, but inflammatory markers including sed rate and CRP are elevated and uric acid is 14.  After discussion with hospital pharmacist regarding dosage due to chronic kidney disease, Rx for colchicine.  We gave a dose of 1.2 mg here in the ER and will prescribe 0.6 mg by mouth twice daily x7 days.  Recommend first available recheck with PCP in 48 hours.  Tylenol every 4-6 hours as needed for pain.  Return to the ER if worsening symptoms.

## 2023-08-25 ENCOUNTER — PATIENT OUTREACH (OUTPATIENT)
Dept: CASE MANAGEMENT | Facility: OTHER | Age: 58
End: 2023-08-25
Payer: MEDICARE

## 2023-08-25 NOTE — OUTREACH NOTE
Patient Outreach    AMBULATORY CASE MANAGEMENT NOTE    Name and Relationship of Patient/Support Person: Sonia Bella - Self    Very brief call with Mrs Bella. Introduced self and role of ACM. Mrs Bella states she is doing well. She verifies she does have a PCP and has an appointment to follow up Ed visit.         Brenda PINO  Ambulatory Case Management    8/25/2023, 11:57 EDT

## 2023-09-01 ENCOUNTER — HOSPITAL ENCOUNTER (INPATIENT)
Facility: HOSPITAL | Age: 58
LOS: 7 days | Discharge: NURSING, REHAB OR LONG-TERM CARE | DRG: 641 | End: 2023-09-12
Attending: EMERGENCY MEDICINE | Admitting: INTERNAL MEDICINE
Payer: MEDICARE

## 2023-09-01 ENCOUNTER — APPOINTMENT (OUTPATIENT)
Dept: CT IMAGING | Facility: HOSPITAL | Age: 58
DRG: 641 | End: 2023-09-01
Payer: MEDICARE

## 2023-09-01 DIAGNOSIS — E83.42 HYPOMAGNESEMIA: ICD-10-CM

## 2023-09-01 DIAGNOSIS — R19.7 DIARRHEA, UNSPECIFIED TYPE: ICD-10-CM

## 2023-09-01 DIAGNOSIS — E87.6 HYPOKALEMIA: ICD-10-CM

## 2023-09-01 DIAGNOSIS — N18.9 CHRONIC KIDNEY DISEASE, UNSPECIFIED CKD STAGE: ICD-10-CM

## 2023-09-01 DIAGNOSIS — N39.0 ACUTE UTI: ICD-10-CM

## 2023-09-01 DIAGNOSIS — D64.9 ANEMIA, UNSPECIFIED TYPE: ICD-10-CM

## 2023-09-01 DIAGNOSIS — E16.2 HYPOGLYCEMIA: ICD-10-CM

## 2023-09-01 DIAGNOSIS — R53.1 GENERALIZED WEAKNESS: Primary | ICD-10-CM

## 2023-09-01 PROBLEM — E86.0 DEHYDRATION: Status: ACTIVE | Noted: 2023-09-01

## 2023-09-01 LAB
ABO GROUP BLD: NORMAL
ALBUMIN SERPL-MCNC: 3.6 G/DL (ref 3.5–5.2)
ALBUMIN/GLOB SERPL: 1.2 G/DL
ALP SERPL-CCNC: 133 U/L (ref 39–117)
ALT SERPL W P-5'-P-CCNC: 33 U/L (ref 1–33)
AMMONIA BLD-SCNC: 38 UMOL/L (ref 11–51)
AMPHET+METHAMPHET UR QL: NEGATIVE
AMPHETAMINES UR QL: NEGATIVE
ANION GAP SERPL CALCULATED.3IONS-SCNC: 15 MMOL/L (ref 5–15)
ANION GAP SERPL CALCULATED.3IONS-SCNC: 17 MMOL/L (ref 5–15)
ANISOCYTOSIS BLD QL: NORMAL
APAP SERPL-MCNC: <5 MCG/ML (ref 0–30)
AST SERPL-CCNC: 81 U/L (ref 1–32)
B PARAPERT DNA SPEC QL NAA+PROBE: NOT DETECTED
B PERT DNA SPEC QL NAA+PROBE: NOT DETECTED
BACTERIA UR QL AUTO: ABNORMAL /HPF
BARBITURATES UR QL SCN: NEGATIVE
BASOPHILS # BLD AUTO: 0.01 10*3/MM3 (ref 0–0.2)
BASOPHILS NFR BLD AUTO: 0.2 % (ref 0–1.5)
BENZODIAZ UR QL SCN: NEGATIVE
BILIRUB SERPL-MCNC: 3.4 MG/DL (ref 0–1.2)
BILIRUB UR QL STRIP: NEGATIVE
BLD GP AB SCN SERPL QL: NEGATIVE
BUN SERPL-MCNC: 29 MG/DL (ref 6–20)
BUN SERPL-MCNC: 30 MG/DL (ref 6–20)
BUN/CREAT SERPL: 12.5 (ref 7–25)
BUN/CREAT SERPL: 13 (ref 7–25)
BUPRENORPHINE SERPL-MCNC: NEGATIVE NG/ML
C PNEUM DNA NPH QL NAA+NON-PROBE: NOT DETECTED
CALCIUM SPEC-SCNC: 8.5 MG/DL (ref 8.6–10.5)
CALCIUM SPEC-SCNC: 8.7 MG/DL (ref 8.6–10.5)
CANNABINOIDS SERPL QL: NEGATIVE
CHLORIDE SERPL-SCNC: 89 MMOL/L (ref 98–107)
CHLORIDE SERPL-SCNC: 92 MMOL/L (ref 98–107)
CLARITY UR: CLEAR
CO2 SERPL-SCNC: 35 MMOL/L (ref 22–29)
CO2 SERPL-SCNC: 36 MMOL/L (ref 22–29)
COCAINE UR QL: NEGATIVE
COLOR UR: YELLOW
CREAT BLDA-MCNC: 2.6 MG/DL
CREAT BLDA-MCNC: 2.6 MG/DL (ref 0.6–1.3)
CREAT SERPL-MCNC: 2.23 MG/DL (ref 0.57–1)
CREAT SERPL-MCNC: 2.4 MG/DL (ref 0.57–1)
CRP SERPL-MCNC: 5.18 MG/DL (ref 0–0.5)
D-LACTATE SERPL-SCNC: 1.8 MMOL/L (ref 0.5–2)
DEPRECATED RDW RBC AUTO: 62.9 FL (ref 37–54)
EGFRCR SERPLBLD CKD-EPI 2021: 22.7 ML/MIN/1.73
EGFRCR SERPLBLD CKD-EPI 2021: 24.8 ML/MIN/1.73
ELLIPTOCYTES BLD QL SMEAR: NORMAL
EOSINOPHIL # BLD AUTO: 0.05 10*3/MM3 (ref 0–0.4)
EOSINOPHIL NFR BLD AUTO: 0.9 % (ref 0.3–6.2)
ERYTHROCYTE [DISTWIDTH] IN BLOOD BY AUTOMATED COUNT: 21.3 % (ref 12.3–15.4)
ERYTHROCYTE [SEDIMENTATION RATE] IN BLOOD: 54 MM/HR (ref 0–30)
ETHANOL BLD-MCNC: <10 MG/DL (ref 0–10)
FENTANYL UR-MCNC: NEGATIVE NG/ML
FLUAV SUBTYP SPEC NAA+PROBE: NOT DETECTED
FLUBV RNA ISLT QL NAA+PROBE: NOT DETECTED
GLOBULIN UR ELPH-MCNC: 3.1 GM/DL
GLUCOSE BLDC GLUCOMTR-MCNC: 63 MG/DL (ref 70–130)
GLUCOSE BLDC GLUCOMTR-MCNC: 92 MG/DL (ref 70–130)
GLUCOSE SERPL-MCNC: 61 MG/DL (ref 65–99)
GLUCOSE SERPL-MCNC: 81 MG/DL (ref 65–99)
GLUCOSE UR STRIP-MCNC: NEGATIVE MG/DL
HADV DNA SPEC NAA+PROBE: NOT DETECTED
HBA1C MFR BLD: 4.8 % (ref 4.8–5.6)
HCOV 229E RNA SPEC QL NAA+PROBE: NOT DETECTED
HCOV HKU1 RNA SPEC QL NAA+PROBE: NOT DETECTED
HCOV NL63 RNA SPEC QL NAA+PROBE: NOT DETECTED
HCOV OC43 RNA SPEC QL NAA+PROBE: NOT DETECTED
HCT VFR BLD AUTO: 34.6 % (ref 34–46.6)
HETEROPH AB SER QL LA: NEGATIVE
HGB BLD-MCNC: 9.5 G/DL (ref 12–15.9)
HGB UR QL STRIP.AUTO: NEGATIVE
HMPV RNA NPH QL NAA+NON-PROBE: NOT DETECTED
HPIV1 RNA ISLT QL NAA+PROBE: NOT DETECTED
HPIV2 RNA SPEC QL NAA+PROBE: NOT DETECTED
HPIV3 RNA NPH QL NAA+PROBE: NOT DETECTED
HPIV4 P GENE NPH QL NAA+PROBE: NOT DETECTED
HYALINE CASTS UR QL AUTO: ABNORMAL /LPF
HYPOCHROMIA BLD QL: NORMAL
IMM GRANULOCYTES # BLD AUTO: 0.02 10*3/MM3 (ref 0–0.05)
IMM GRANULOCYTES NFR BLD AUTO: 0.4 % (ref 0–0.5)
INR PPP: 1.97 (ref 0.89–1.12)
KETONES UR QL STRIP: ABNORMAL
LEUKOCYTE ESTERASE UR QL STRIP.AUTO: ABNORMAL
LYMPHOCYTES # BLD AUTO: 0.27 10*3/MM3 (ref 0.7–3.1)
LYMPHOCYTES NFR BLD AUTO: 4.9 % (ref 19.6–45.3)
M PNEUMO IGG SER IA-ACNC: NOT DETECTED
MAGNESIUM SERPL-MCNC: 1.1 MG/DL (ref 1.6–2.6)
MAGNESIUM SERPL-MCNC: 1.4 MG/DL (ref 1.6–2.6)
MCH RBC QN AUTO: 22.9 PG (ref 26.6–33)
MCHC RBC AUTO-ENTMCNC: 27.5 G/DL (ref 31.5–35.7)
MCV RBC AUTO: 83.4 FL (ref 79–97)
METHADONE UR QL SCN: NEGATIVE
MONOCYTES # BLD AUTO: 0.68 10*3/MM3 (ref 0.1–0.9)
MONOCYTES NFR BLD AUTO: 12.4 % (ref 5–12)
NEUTROPHILS NFR BLD AUTO: 4.44 10*3/MM3 (ref 1.7–7)
NEUTROPHILS NFR BLD AUTO: 81.2 % (ref 42.7–76)
NITRITE UR QL STRIP: NEGATIVE
NRBC BLD AUTO-RTO: 0.5 /100 WBC (ref 0–0.2)
NT-PROBNP SERPL-MCNC: ABNORMAL PG/ML (ref 0–900)
OPIATES UR QL: NEGATIVE
OXYCODONE UR QL SCN: NEGATIVE
PCP UR QL SCN: NEGATIVE
PH UR STRIP.AUTO: 6.5 [PH] (ref 5–8)
PLAT MORPH BLD: NORMAL
PLATELET # BLD AUTO: 150 10*3/MM3 (ref 140–450)
POTASSIUM SERPL-SCNC: 2.8 MMOL/L (ref 3.5–5.2)
POTASSIUM SERPL-SCNC: 2.9 MMOL/L (ref 3.5–5.2)
PROCALCITONIN SERPL-MCNC: 0.65 NG/ML (ref 0–0.25)
PROPOXYPH UR QL: NEGATIVE
PROT SERPL-MCNC: 6.7 G/DL (ref 6–8.5)
PROT UR QL STRIP: ABNORMAL
PROTHROMBIN TIME: 22.6 SECONDS (ref 12.2–14.5)
QT INTERVAL: 396 MS
QTC INTERVAL: 471 MS
RBC # BLD AUTO: 4.15 10*6/MM3 (ref 3.77–5.28)
RBC # UR STRIP: ABNORMAL /HPF
REF LAB TEST METHOD: ABNORMAL
RH BLD: POSITIVE
RHINOVIRUS RNA SPEC NAA+PROBE: NOT DETECTED
RSV RNA NPH QL NAA+NON-PROBE: NOT DETECTED
SALICYLATES SERPL-MCNC: <0.3 MG/DL
SARS-COV-2 RNA NPH QL NAA+NON-PROBE: NOT DETECTED
SODIUM SERPL-SCNC: 141 MMOL/L (ref 136–145)
SODIUM SERPL-SCNC: 143 MMOL/L (ref 136–145)
SP GR UR STRIP: 1.01 (ref 1–1.03)
SQUAMOUS #/AREA URNS HPF: ABNORMAL /HPF
T&S EXPIRATION DATE: NORMAL
T4 FREE SERPL-MCNC: 1.63 NG/DL (ref 0.93–1.7)
TRICYCLICS UR QL SCN: NEGATIVE
TROPONIN T SERPL HS-MCNC: 41 NG/L
TSH SERPL DL<=0.05 MIU/L-ACNC: 1.84 UIU/ML (ref 0.27–4.2)
UROBILINOGEN UR QL STRIP: ABNORMAL
WBC # UR STRIP: ABNORMAL /HPF
WBC MORPH BLD: NORMAL
WBC NRBC COR # BLD: 5.47 10*3/MM3 (ref 3.4–10.8)

## 2023-09-01 PROCEDURE — G0378 HOSPITAL OBSERVATION PER HR: HCPCS

## 2023-09-01 PROCEDURE — 86850 RBC ANTIBODY SCREEN: CPT | Performed by: EMERGENCY MEDICINE

## 2023-09-01 PROCEDURE — 86900 BLOOD TYPING SEROLOGIC ABO: CPT | Performed by: EMERGENCY MEDICINE

## 2023-09-01 PROCEDURE — 84443 ASSAY THYROID STIM HORMONE: CPT | Performed by: EMERGENCY MEDICINE

## 2023-09-01 PROCEDURE — 80143 DRUG ASSAY ACETAMINOPHEN: CPT | Performed by: EMERGENCY MEDICINE

## 2023-09-01 PROCEDURE — 84681 ASSAY OF C-PEPTIDE: CPT | Performed by: INTERNAL MEDICINE

## 2023-09-01 PROCEDURE — 0202U NFCT DS 22 TRGT SARS-COV-2: CPT | Performed by: EMERGENCY MEDICINE

## 2023-09-01 PROCEDURE — 87040 BLOOD CULTURE FOR BACTERIA: CPT | Performed by: EMERGENCY MEDICINE

## 2023-09-01 PROCEDURE — 82948 REAGENT STRIP/BLOOD GLUCOSE: CPT

## 2023-09-01 PROCEDURE — 80307 DRUG TEST PRSMV CHEM ANLYZR: CPT | Performed by: EMERGENCY MEDICINE

## 2023-09-01 PROCEDURE — 70450 CT HEAD/BRAIN W/O DYE: CPT

## 2023-09-01 PROCEDURE — 83880 ASSAY OF NATRIURETIC PEPTIDE: CPT | Performed by: EMERGENCY MEDICINE

## 2023-09-01 PROCEDURE — 87147 CULTURE TYPE IMMUNOLOGIC: CPT | Performed by: EMERGENCY MEDICINE

## 2023-09-01 PROCEDURE — 93005 ELECTROCARDIOGRAM TRACING: CPT | Performed by: EMERGENCY MEDICINE

## 2023-09-01 PROCEDURE — 85025 COMPLETE CBC W/AUTO DIFF WBC: CPT | Performed by: EMERGENCY MEDICINE

## 2023-09-01 PROCEDURE — 86140 C-REACTIVE PROTEIN: CPT | Performed by: INTERNAL MEDICINE

## 2023-09-01 PROCEDURE — 36415 COLL VENOUS BLD VENIPUNCTURE: CPT

## 2023-09-01 PROCEDURE — 99223 1ST HOSP IP/OBS HIGH 75: CPT | Performed by: INTERNAL MEDICINE

## 2023-09-01 PROCEDURE — 84145 PROCALCITONIN (PCT): CPT | Performed by: EMERGENCY MEDICINE

## 2023-09-01 PROCEDURE — 74176 CT ABD & PELVIS W/O CONTRAST: CPT

## 2023-09-01 PROCEDURE — 81001 URINALYSIS AUTO W/SCOPE: CPT | Performed by: EMERGENCY MEDICINE

## 2023-09-01 PROCEDURE — 86308 HETEROPHILE ANTIBODY SCREEN: CPT | Performed by: EMERGENCY MEDICINE

## 2023-09-01 PROCEDURE — 25010000002 MAGNESIUM SULFATE 2 GM/50ML SOLUTION: Performed by: EMERGENCY MEDICINE

## 2023-09-01 PROCEDURE — 80053 COMPREHEN METABOLIC PANEL: CPT | Performed by: EMERGENCY MEDICINE

## 2023-09-01 PROCEDURE — 83036 HEMOGLOBIN GLYCOSYLATED A1C: CPT | Performed by: INTERNAL MEDICINE

## 2023-09-01 PROCEDURE — 82140 ASSAY OF AMMONIA: CPT | Performed by: EMERGENCY MEDICINE

## 2023-09-01 PROCEDURE — 82565 ASSAY OF CREATININE: CPT

## 2023-09-01 PROCEDURE — 84439 ASSAY OF FREE THYROXINE: CPT | Performed by: EMERGENCY MEDICINE

## 2023-09-01 PROCEDURE — P9612 CATHETERIZE FOR URINE SPEC: HCPCS

## 2023-09-01 PROCEDURE — 25010000002 HEPARIN (PORCINE) PER 1000 UNITS: Performed by: INTERNAL MEDICINE

## 2023-09-01 PROCEDURE — 80179 DRUG ASSAY SALICYLATE: CPT | Performed by: EMERGENCY MEDICINE

## 2023-09-01 PROCEDURE — 86901 BLOOD TYPING SEROLOGIC RH(D): CPT | Performed by: EMERGENCY MEDICINE

## 2023-09-01 PROCEDURE — 83735 ASSAY OF MAGNESIUM: CPT | Performed by: INTERNAL MEDICINE

## 2023-09-01 PROCEDURE — 99285 EMERGENCY DEPT VISIT HI MDM: CPT

## 2023-09-01 PROCEDURE — 85007 BL SMEAR W/DIFF WBC COUNT: CPT | Performed by: EMERGENCY MEDICINE

## 2023-09-01 PROCEDURE — 85652 RBC SED RATE AUTOMATED: CPT | Performed by: INTERNAL MEDICINE

## 2023-09-01 PROCEDURE — 85610 PROTHROMBIN TIME: CPT | Performed by: EMERGENCY MEDICINE

## 2023-09-01 PROCEDURE — 25010000002 CEFTRIAXONE PER 250 MG: Performed by: EMERGENCY MEDICINE

## 2023-09-01 PROCEDURE — 82077 ASSAY SPEC XCP UR&BREATH IA: CPT | Performed by: EMERGENCY MEDICINE

## 2023-09-01 PROCEDURE — 83735 ASSAY OF MAGNESIUM: CPT | Performed by: EMERGENCY MEDICINE

## 2023-09-01 PROCEDURE — 87150 DNA/RNA AMPLIFIED PROBE: CPT | Performed by: EMERGENCY MEDICINE

## 2023-09-01 PROCEDURE — 84484 ASSAY OF TROPONIN QUANT: CPT | Performed by: EMERGENCY MEDICINE

## 2023-09-01 PROCEDURE — 87086 URINE CULTURE/COLONY COUNT: CPT | Performed by: EMERGENCY MEDICINE

## 2023-09-01 PROCEDURE — 83605 ASSAY OF LACTIC ACID: CPT | Performed by: EMERGENCY MEDICINE

## 2023-09-01 RX ORDER — IBUPROFEN 600 MG/1
1 TABLET ORAL
Status: DISCONTINUED | OUTPATIENT
Start: 2023-09-01 | End: 2023-09-12 | Stop reason: HOSPADM

## 2023-09-01 RX ORDER — NEBIVOLOL 5 MG/1
10 TABLET ORAL DAILY
Status: DISCONTINUED | OUTPATIENT
Start: 2023-09-01 | End: 2023-09-02

## 2023-09-01 RX ORDER — POTASSIUM CHLORIDE 1.5 G/1.58G
40 POWDER, FOR SOLUTION ORAL ONCE
Status: COMPLETED | OUTPATIENT
Start: 2023-09-01 | End: 2023-09-01

## 2023-09-01 RX ORDER — PANTOPRAZOLE SODIUM 40 MG/1
40 TABLET, DELAYED RELEASE ORAL DAILY
Status: DISCONTINUED | OUTPATIENT
Start: 2023-09-01 | End: 2023-09-12 | Stop reason: HOSPADM

## 2023-09-01 RX ORDER — BUDESONIDE AND FORMOTEROL FUMARATE DIHYDRATE 160; 4.5 UG/1; UG/1
2 AEROSOL RESPIRATORY (INHALATION)
Status: DISCONTINUED | OUTPATIENT
Start: 2023-09-01 | End: 2023-09-12 | Stop reason: HOSPADM

## 2023-09-01 RX ORDER — MAGNESIUM SULFATE HEPTAHYDRATE 40 MG/ML
2 INJECTION, SOLUTION INTRAVENOUS
Status: COMPLETED | OUTPATIENT
Start: 2023-09-01 | End: 2023-09-01

## 2023-09-01 RX ORDER — NICOTINE POLACRILEX 4 MG
15 LOZENGE BUCCAL
Status: DISCONTINUED | OUTPATIENT
Start: 2023-09-01 | End: 2023-09-12 | Stop reason: HOSPADM

## 2023-09-01 RX ORDER — IBUPROFEN 200 MG
200 TABLET ORAL EVERY 6 HOURS PRN
COMMUNITY

## 2023-09-01 RX ORDER — PANTOPRAZOLE SODIUM 40 MG/1
40 TABLET, DELAYED RELEASE ORAL DAILY
COMMUNITY

## 2023-09-01 RX ORDER — DEXTROSE MONOHYDRATE 25 G/50ML
25 INJECTION, SOLUTION INTRAVENOUS ONCE
Status: COMPLETED | OUTPATIENT
Start: 2023-09-01 | End: 2023-09-01

## 2023-09-01 RX ORDER — DEXTROSE MONOHYDRATE, SODIUM CHLORIDE, AND POTASSIUM CHLORIDE 50; 1.49; 4.5 G/1000ML; G/1000ML; G/1000ML
100 INJECTION, SOLUTION INTRAVENOUS CONTINUOUS
Status: DISCONTINUED | OUTPATIENT
Start: 2023-09-01 | End: 2023-09-02

## 2023-09-01 RX ORDER — BUDESONIDE AND FORMOTEROL FUMARATE DIHYDRATE 80; 4.5 UG/1; UG/1
2 AEROSOL RESPIRATORY (INHALATION)
COMMUNITY

## 2023-09-01 RX ORDER — POTASSIUM CHLORIDE 20 MEQ/1
40 TABLET, EXTENDED RELEASE ORAL EVERY 4 HOURS
Status: DISPENSED | OUTPATIENT
Start: 2023-09-01 | End: 2023-09-02

## 2023-09-01 RX ORDER — DEXTROSE MONOHYDRATE 25 G/50ML
25 INJECTION, SOLUTION INTRAVENOUS
Status: DISCONTINUED | OUTPATIENT
Start: 2023-09-01 | End: 2023-09-12 | Stop reason: HOSPADM

## 2023-09-01 RX ORDER — HEPARIN SODIUM 5000 [USP'U]/ML
5000 INJECTION, SOLUTION INTRAVENOUS; SUBCUTANEOUS EVERY 12 HOURS SCHEDULED
Status: DISCONTINUED | OUTPATIENT
Start: 2023-09-01 | End: 2023-09-04

## 2023-09-01 RX ADMIN — POTASSIUM CHLORIDE 40 MEQ: 1500 TABLET, EXTENDED RELEASE ORAL at 12:48

## 2023-09-01 RX ADMIN — POTASSIUM CHLORIDE 40 MEQ: 1.5 POWDER, FOR SOLUTION ORAL at 22:25

## 2023-09-01 RX ADMIN — NEBIVOLOL 10 MG: 5 TABLET ORAL at 18:46

## 2023-09-01 RX ADMIN — MAGNESIUM SULFATE HEPTAHYDRATE 2 G: 40 INJECTION, SOLUTION INTRAVENOUS at 12:51

## 2023-09-01 RX ADMIN — PANTOPRAZOLE SODIUM 40 MG: 40 TABLET, DELAYED RELEASE ORAL at 18:46

## 2023-09-01 RX ADMIN — MAGNESIUM SULFATE HEPTAHYDRATE 2 G: 40 INJECTION, SOLUTION INTRAVENOUS at 16:09

## 2023-09-01 RX ADMIN — DEXTROSE MONOHYDRATE 25 G: 25 INJECTION, SOLUTION INTRAVENOUS at 11:54

## 2023-09-01 RX ADMIN — SODIUM CHLORIDE 2000 MG: 900 INJECTION INTRAVENOUS at 16:08

## 2023-09-01 RX ADMIN — SODIUM CHLORIDE 500 ML: 9 INJECTION, SOLUTION INTRAVENOUS at 11:57

## 2023-09-01 RX ADMIN — HEPARIN SODIUM 5000 UNITS: 5000 INJECTION, SOLUTION INTRAVENOUS; SUBCUTANEOUS at 22:25

## 2023-09-01 RX ADMIN — POTASSIUM CHLORIDE 40 MEQ: 1.5 POWDER, FOR SOLUTION ORAL at 18:45

## 2023-09-01 RX ADMIN — MAGNESIUM SULFATE HEPTAHYDRATE 2 G: 40 INJECTION, SOLUTION INTRAVENOUS at 18:51

## 2023-09-01 RX ADMIN — POTASSIUM CHLORIDE, DEXTROSE MONOHYDRATE AND SODIUM CHLORIDE 100 ML/HR: 150; 5; 450 INJECTION, SOLUTION INTRAVENOUS at 16:08

## 2023-09-01 NOTE — LETTER
EMS Transport Request  For use at [facility], [place], and [place] only   Patient Name: Sonia Bella : 1965   Weight:135 kg (298 lb 6.4 oz) Pick-up Location: [facility] BLS/ALS: BLS/ALS: BLS   Insurance: [employer] MEDICARE REPLACEMENT Auth End Date:    Pre-Cert #: D/C Summary complete:    Destination: [facility], [place]   Contact Precautions: None   Equipment (O2, Fluids, etc.): O2, settings 2 l   Arrive By Date/Time:  1-5 TODAY Stretcher/WC: Stretcher   CM Requesting: Asiya Corey RN Ext: [phone]   Notes/Medical Necessity: max assist. Fall risk     ______________________________________________________________________    *Only 2 patient bags OR 1 carry-on size bag are permitted.  Wheelchairs and walkers CANNOT transported with the patient. Acknowledge: Yes

## 2023-09-01 NOTE — H&P
[facility] Medicine Services  HISTORY AND PHYSICAL    Patient Name: Sonia Bella  : 1965  MRN: [id number]  Primary Care Physician: Provider, No Known    Subjective   Subjective     Chief Complaint:diarrhea and weakness    HPI:  Sonia Bella is a 58 y.o. 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 who presented to the ED with profound weakness, confusion, hypoglycemia that was treated. She continues to feel weak and hungry but has been afraid to eat due to the diarrhea. She denies sick contacts or eating any questionable food, she denies stool in her stool.        Review of Systems   Patient denies weight loss, headaches, changes in vision, fever, chills, sore throat, shortness of breath, cough, nausea or vomiting, abdominal pain or distension, change in urine output or habits, joint pain, rash, itching, numbness/tingling, weakness or bleeding.    Otherwise complete ROS is negative except as mentioned in the HPI.    Personal History         PMH: She  has a past medical history of Bleeding ulcer, COPD (chronic obstructive pulmonary disease), Coronary artery disease, GERD (gastroesophageal reflux disease), History of stomach ulcers, Hypertension, and Stage III CKD (02/15/2023).   PSxH: She  has no past surgical history on file.         FH: Her family history is not on file.   SH: She  reports that she has quit smoking. Her smoking use included cigarettes. She has never used smokeless tobacco. She reports current alcohol use. She reports that she does not use drugs.     Medications:  O2, albuterol sulfate HFA, apixaban, budesonide-formoterol, colchicine, famotidine, nebivolol, tiotropium bromide monohydrate, and torsemide    Allergies   Allergen  Reactions    Ace Inhibitors Angioedema     lisinopril       Objective   Objective     Vital Signs:   Temp:  [98.2 °F (36.8 °C)] 98.2 °F (36.8 °C)  Heart Rate:  [78-94] 94  Resp:  [16] 16  BP: (106-130)/(71-98) 126/91  Flow (L/min):  [3] 3    Constitutional: Awake, alert, interactive and pleasant- slow to speak, miserable, weak  Eyes: clear sclerae, no conjunctival injection  HENT: NCAT, mucous membranes dry, pale  Neck: no masses or lymphadenopathy, trachea midline  Respiratory: good breath sounds bilaterally, respirations unlabored  Cardiovascular: RRR, no murmurs appreciated, palpable peripheral pulses  Abdomen:  soft, obese, tender lower abdomen  Musculoskeletal: No peripheral edema, clubbing or cyanosis  Neurologic: Oriented x 3, speech slow but clear                      Strength symmetric in all extremities                     Cranial Nerves grossly intact, speech clear  Skin: No rashes or jaundice  Psychiatric: Appropriate mood, insight      Result Review:  I have personally reviewed the results from the time of this admission   to 9/1/2023 14:03 EDT and agree with these findings:  [x]  Laboratory  [x]  Microbiology  [x]  Radiology  [x]  EKG/Telemetry   []  Cardiology/Vascular   []  Pathology  [x]  Old records  []  Other:  Most notable findings include: anemia, low K and Mag, elevated creatinine      LAB RESULTS:      Lab 09/01/23  1101   WBC 5.47   HEMOGLOBIN 9.5*   HEMATOCRIT 34.6   PLATELETS 150   NEUTROS ABS 4.44   IMMATURE GRANS (ABS) 0.02   LYMPHS ABS 0.27*   MONOS ABS 0.68   EOS ABS 0.05   MCV 83.4   PROCALCITONIN 0.65*   LACTATE 1.8   PROTIME 22.6*         Lab 09/01/23  1125 09/01/23  1108 09/01/23  1101   SODIUM  --   --  141   POTASSIUM  --   --  2.9*   CHLORIDE  --   --  89*   CO2  --   --  35.0*   ANION GAP  --   --  17.0*   BUN  --   --  30*   CREATININE 2.60 2.60* 2.40*   EGFR  --   --  22.7*   GLUCOSE  --   --  61*   CALCIUM  --   --  8.5*   MAGNESIUM  --   --  1.1*   TSH  --   --  1.840          Lab 09/01/23  1101   TOTAL PROTEIN 6.7   ALBUMIN 3.6   GLOBULIN 3.1   ALT (SGPT) 33   AST (SGOT) 81*   BILIRUBIN 3.4*   ALK PHOS 133*         Lab 09/01/23  1101   PROBNP 21,177.0*   HSTROP T 41*   PROTIME 22.6*   INR 1.97*             Lab 09/01/23  1152   ABO TYPING O   RH TYPING Positive   ANTIBODY SCREEN Negative         Brief Urine Lab Results  (Last result in the past 365 days)        Color   Clarity   Blood   Leuk Est   Nitrite   Protein   CREAT   Urine HCG        10/01/22 0434 Yellow   Clear   Trace   Negative   Negative   >=300 mg/dL (3+)                 COVID19   Date Value Ref Range Status   09/01/2023 Not Detected Not Detected - Ref. Range Final       CT Abdomen Pelvis Without Contrast    Result Date: 9/1/2023  CT ABDOMEN PELVIS WO CONTRAST Date of Exam: 9/1/2023 11:04 AM EDT Indication: gen weakness. Comparison: None available. Technique: Axial CT images were obtained of the abdomen and pelvis without the administration of contrast. Reconstructed coronal and sagittal images were also obtained. Automated exposure control and iterative construction methods were used. Findings: The lung apices demonstrate small right pleural effusion. There is notable four-chamber cardiac enlargement. The body wall soft tissues demonstrate anasarca. The osseous structures demonstrate no evidence of acute fracture or aggressive osseous lesion. The liver is enlarged. There is no suspicious focal hepatic, splenic or pancreatic lesion on limited noncontrast evaluation. There is a slightly hyperdense exophytic round finding involving the left kidney, favoring a hemorrhagic cyst. There is no evidence of hydronephrosis. Small and large bowel loops are nondilated. Diverticular changes are present without definite evidence of acute diverticulitis. Numerous hyperdense uterine findings are present, likely to represent fibroids but incompletely characterized. There is no free fluid or pneumoperitoneum. Hyperdense gallstones are  present without inflammatory signs of cholecystitis.     Impression: Impression: There is marked cardiac enlargement, with diffuse anasarca and a small right pleural effusion. There is also hepatic enlargement and findings are concerning for heart failure. No acute findings are present in the abdomen and pelvis. An exophytic left renal finding is noted, favoring a benign hemorrhagic cyst, better characterized on recent ultrasound. Nodular hyperdense appearance of the uterus suggesting underlying fibroids. Electronically Signed: Ryley Busby MD  9/1/2023 11:19 AM EDT  Workstation ID: [id number]    CT Head Without Contrast    Result Date: 9/1/2023  CT HEAD WO CONTRAST Date of Exam: 9/1/2023 11:04 AM EDT Indication: gen weakness. Comparison: None available. Technique: Axial CT images were obtained of the head without contrast administration.  Automated exposure control and iterative construction methods were used. Findings: Gray-white differentiation is maintained and there is no evidence of intracranial hemorrhage, mass or mass effect. The ventricles are normal in size and configuration. The orbits are normal. The paranasal sinuses are grossly clear. The calvarium is intact.     Impression: Impression: No acute intracranial abnormality. Electronically Signed: Ryley Busby MD  9/1/2023 11:12 AM EDT  Workstation ID: [id number]     Results for orders placed during the hospital encounter of 02/15/23    Adult Transthoracic Echo Limited W/ Cont if Necessary Per Protocol    Interpretation Summary    The right atrial cavity is dilated.    Left ventricular ejection fraction appears to be 41 - 45%. The left ventricular cavity is mildly dilated. Left ventricular wall thickness is consistent with mild concentric hypertrophy. There is left ventricular global hypokinesis noted.    Moderate tricuspid valve regurgitation is present.    The left atrial cavity is mildly dilated. Saline test results are negative.      The patient  qualifies to receive the vaccine, but they have not yet received it.    Assessment & Plan   Assessment / Plan       Dehydration    Diarrhea    HTN (hypertension)    Morbid obesity with BMI of 50.0-59.9, adult    AoC HFpEF    Stage III CKD    Hypomagnesemia    Hypokalemia      Assessment & Plan:  Sonia Bella is a 58 y.o. 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 who presented to the ED with profound weakness, confusion, hypoglycemia that was treated.    Diarrhea  -GI PCR is pending    HypoK and MAg  -replete    EF of 40% with hepatic congestion  --  NOT on ACE due to angioedema    Hypoglycemia  -hopefully just due to starvation, check c peptid  -add IVFs, check cortisol    CKD    Obesity  DVT prophylaxis:  Medical DVT prophylaxis orders are present.    CODE STATUS:Full code     Expected Discharge  Expected Discharge Date: 9/3/2023; Expected Discharge Time:     Electronically signed by Davina Samuels MD 09/01/23 14:03 EDT

## 2023-09-01 NOTE — PLAN OF CARE
Goal Outcome Evaluation:   Pt VSS, on 3L NC, A&ox4, NSR w/ frequent PVCs on telemetry. Electrolyte protocol initiated and replaced. Call light in reach, bed in low position.

## 2023-09-01 NOTE — ED PROVIDER NOTES
"Subjective   History of Present Illness  58-year-old female presents for evaluation of generalized weakness.  The patient presents from home.  She presents via EMS.  She notes that she has not felt well for the past 4 to 5 days and over that span has had gradually worsening generalized weakness and fatigue when compared to her baseline.  She also notes some loose stools over the same span.  She denies any recent antibiotic use.  She denies any known sick contacts or any known dietary triggers for her symptoms.  Today, she Tomey that she felt \"too weak to walk\" so EMS was called and she was brought to our facility to be evaluated.  She denies any cough or fever.  She denies any known exposures to anyone with COVID-19.    Review of Systems   Constitutional:  Positive for fatigue.   Gastrointestinal:  Positive for diarrhea.   Neurological:  Positive for weakness.   All other systems reviewed and are negative.    Past Medical History:   Diagnosis Date    Bleeding ulcer     COPD (chronic obstructive pulmonary disease)     Coronary artery disease     GERD (gastroesophageal reflux disease)     History of stomach ulcers     Hypertension     Stage III CKD 02/15/2023       Allergies   Allergen Reactions    Ace Inhibitors Angioedema     lisinopril       History reviewed. No pertinent surgical history.    History reviewed. No pertinent family history.    Social History     Socioeconomic History    Marital status:    Tobacco Use    Smoking status: Former     Types: Cigarettes    Smokeless tobacco: Never   Vaping Use    Vaping Use: Never used   Substance and Sexual Activity    Alcohol use: Yes    Drug use: Never    Sexual activity: Never           Objective   Physical Exam  Vitals and nursing note reviewed.   Constitutional:       General: She is not in acute distress.     Appearance: She is well-developed. She is not diaphoretic.      Comments: Chronically ill-appearing elderly female   HENT:      Head: Normocephalic and " "atraumatic.   Eyes:      Pupils: Pupils are equal, round, and reactive to light.   Neck:      Comments: No meningeal signs or nuchal rigidity  Cardiovascular:      Rate and Rhythm: Normal rate and regular rhythm.      Heart sounds: Normal heart sounds. No murmur heard.    No friction rub. No gallop.   Pulmonary:      Effort: Pulmonary effort is normal. No respiratory distress.      Breath sounds: Normal breath sounds. No wheezing or rales.   Abdominal:      General: Bowel sounds are normal. There is no distension.      Palpations: Abdomen is soft. There is no mass.      Tenderness: There is no abdominal tenderness. There is no guarding or rebound.      Comments: No focal abdominal tenderness, no peritoneal signs, no pain out of proportion to exam   Musculoskeletal:         General: No signs of injury.   Skin:     General: Skin is warm and dry.      Findings: No erythema or rash.   Neurological:      Mental Status: She is alert and oriented to person, place, and time.   Psychiatric:         Mood and Affect: Mood normal.         Thought Content: Thought content normal.         Judgment: Judgment normal.       Procedures           ED Course  ED Course as of 09/01/23 1535   Fri Sep 01, 2023   1153 58-year-old female presents for evaluation of generalized weakness.  The patient presents from home.  She presents via EMS.  She notes that she has not felt well for the past 4 to 5 days and over that span has had gradually worsening generalized weakness and fatigue when compared to baseline.  She also notes loose stools.  She notes that she is \"too weak to walk.\"  On arrival to the ED, the patient is chronically ill-appearing.  Nonsurgical abdomen.  Broad differential diagnosis. [DD]   1154 Labs remarkable for significant hypokalemia and hypomagnesemia.  Electrolyte replacement initiated per hospital protocol. [DD]   1154 I personally and independently reviewed the patient's CT images and findings, and I am in agreement with " the radiologist regarding CT interpretation--particularly, there is no emergent intracranial process noted. [DD]   1154 The patient's initial glucose for EMS was 66.  She was given oral glucose and blood glucose improved to the 70s.  Initial blood sugar for us is 61.  D50 given. [DD]   1324 Given the patient's electrolyte derangements and inability to walk, I feel that she warrants admission to the hospital at this point. [DD]   1325 I discussed the patient's case with our hospitalist, Dr. Samuels, and the patient be admitted under her care for further evaluation and treatment.  Urinalysis results are pending at this time but should not change the patient's ultimate disposition. [DD]   1325 I personally and independently reviewed the patient's CT images and findings, and I am in agreement with the radiologist regarding CT interpretation--particularly, there is no emergent intracranial process noted. [DD]   1520 Urinalysis is somewhat equivocal for infection.  Given the patient's elevated procalcitonin and overall clinical picture, we will treat conservatively with Rocephin.  Blood and urine cultures pending. [DD]      ED Course User Index  [DD] Issac Rashid MD                                 Recent Results (from the past 24 hour(s))   POC Glucose Once    Collection Time: 09/01/23 10:43 AM    Specimen: Blood   Result Value Ref Range    Glucose 63 (L) 70 - 130 mg/dL   Comprehensive Metabolic Panel    Collection Time: 09/01/23 11:01 AM    Specimen: Blood   Result Value Ref Range    Glucose 61 (L) 65 - 99 mg/dL    BUN 30 (H) 6 - 20 mg/dL    Creatinine 2.40 (H) 0.57 - 1.00 mg/dL    Sodium 141 136 - 145 mmol/L    Potassium 2.9 (L) 3.5 - 5.2 mmol/L    Chloride 89 (L) 98 - 107 mmol/L    CO2 35.0 (H) 22.0 - 29.0 mmol/L    Calcium 8.5 (L) 8.6 - 10.5 mg/dL    Total Protein 6.7 6.0 - 8.5 g/dL    Albumin 3.6 3.5 - 5.2 g/dL    ALT (SGPT) 33 1 - 33 U/L    AST (SGOT) 81 (H) 1 - 32 U/L    Alkaline Phosphatase 133 (H) 39 - 117  U/L    Total Bilirubin 3.4 (H) 0.0 - 1.2 mg/dL    Globulin 3.1 gm/dL    A/G Ratio 1.2 g/dL    BUN/Creatinine Ratio 12.5 7.0 - 25.0    Anion Gap 17.0 (H) 5.0 - 15.0 mmol/L    eGFR 22.7 (L) >60.0 mL/min/1.73   Protime-INR    Collection Time: 09/01/23 11:01 AM    Specimen: Blood   Result Value Ref Range    Protime 22.6 (H) 12.2 - 14.5 Seconds    INR 1.97 (H) 0.89 - 1.12   BNP    Collection Time: 09/01/23 11:01 AM    Specimen: Blood   Result Value Ref Range    proBNP 21,177.0 (H) 0.0 - 900.0 pg/mL   Single High Sensitivity Troponin T    Collection Time: 09/01/23 11:01 AM    Specimen: Blood   Result Value Ref Range    HS Troponin T 41 (H) <10 ng/L   Lactic Acid, Plasma    Collection Time: 09/01/23 11:01 AM    Specimen: Blood   Result Value Ref Range    Lactate 1.8 0.5 - 2.0 mmol/L   Procalcitonin    Collection Time: 09/01/23 11:01 AM    Specimen: Blood   Result Value Ref Range    Procalcitonin 0.65 (H) 0.00 - 0.25 ng/mL   Mononucleosis Screen    Collection Time: 09/01/23 11:01 AM    Specimen: Blood   Result Value Ref Range    Monospot Negative Negative   Acetaminophen Level    Collection Time: 09/01/23 11:01 AM    Specimen: Blood   Result Value Ref Range    Acetaminophen <5.0 0.0 - 30.0 mcg/mL   Ethanol    Collection Time: 09/01/23 11:01 AM    Specimen: Blood   Result Value Ref Range    Ethanol <10 0 - 10 mg/dL   Salicylate Level    Collection Time: 09/01/23 11:01 AM    Specimen: Blood   Result Value Ref Range    Salicylate <0.3 <=30.0 mg/dL   Magnesium    Collection Time: 09/01/23 11:01 AM    Specimen: Blood   Result Value Ref Range    Magnesium 1.1 (L) 1.6 - 2.6 mg/dL   T4, Free    Collection Time: 09/01/23 11:01 AM    Specimen: Blood   Result Value Ref Range    Free T4 1.63 0.93 - 1.70 ng/dL   TSH    Collection Time: 09/01/23 11:01 AM    Specimen: Blood   Result Value Ref Range    TSH 1.840 0.270 - 4.200 uIU/mL   CBC Auto Differential    Collection Time: 09/01/23 11:01 AM    Specimen: Blood   Result Value Ref Range     WBC 5.47 3.40 - 10.80 10*3/mm3    RBC 4.15 3.77 - 5.28 10*6/mm3    Hemoglobin 9.5 (L) 12.0 - 15.9 g/dL    Hematocrit 34.6 34.0 - 46.6 %    MCV 83.4 79.0 - 97.0 fL    MCH 22.9 (L) 26.6 - 33.0 pg    MCHC 27.5 (L) 31.5 - 35.7 g/dL    RDW 21.3 (H) 12.3 - 15.4 %    RDW-SD 62.9 (H) 37.0 - 54.0 fl    Platelets 150 140 - 450 10*3/mm3    Neutrophil % 81.2 (H) 42.7 - 76.0 %    Lymphocyte % 4.9 (L) 19.6 - 45.3 %    Monocyte % 12.4 (H) 5.0 - 12.0 %    Eosinophil % 0.9 0.3 - 6.2 %    Basophil % 0.2 0.0 - 1.5 %    Immature Grans % 0.4 0.0 - 0.5 %    Neutrophils, Absolute 4.44 1.70 - 7.00 10*3/mm3    Lymphocytes, Absolute 0.27 (L) 0.70 - 3.10 10*3/mm3    Monocytes, Absolute 0.68 0.10 - 0.90 10*3/mm3    Eosinophils, Absolute 0.05 0.00 - 0.40 10*3/mm3    Basophils, Absolute 0.01 0.00 - 0.20 10*3/mm3    Immature Grans, Absolute 0.02 0.00 - 0.05 10*3/mm3    nRBC 0.5 (H) 0.0 - 0.2 /100 WBC   Scan Slide    Collection Time: 09/01/23 11:01 AM    Specimen: Blood   Result Value Ref Range    Anisocytosis Slight/1+ None Seen    Elliptocytes Slight/1+ None Seen    Hypochromia Slight/1+ None Seen    WBC Morphology Normal Normal    Platelet Morphology Normal Normal   POC Creatinine    Collection Time: 09/01/23 11:08 AM    Specimen: Blood   Result Value Ref Range    Creatinine 2.60 (H) 0.60 - 1.30 mg/dL   POC Creatinine    Collection Time: 09/01/23 11:25 AM    Specimen: Blood   Result Value Ref Range    Creatinine 2.60 mg/dL   ECG 12 Lead Other; gen weakness    Collection Time: 09/01/23 11:38 AM   Result Value Ref Range    QT Interval 396 ms    QTC Interval 471 ms   Respiratory Panel PCR w/COVID-19(SARS-CoV-2) [facility]/ELISABETH/[facility] In-House, NP Swab in UTM/VTM, 3-4 HR TAT - Swab, Nasopharynx    Collection Time: 09/01/23 11:51 AM    Specimen: Nasopharynx; Swab   Result Value Ref Range    ADENOVIRUS, PCR Not Detected Not Detected    Coronavirus 229E Not Detected Not Detected    Coronavirus HKU1 Not Detected Not Detected    Coronavirus NL63  Not Detected Not Detected    Coronavirus OC43 Not Detected Not Detected    COVID19 Not Detected Not Detected - Ref. Range    Human Metapneumovirus Not Detected Not Detected    Human Rhinovirus/Enterovirus Not Detected Not Detected    Influenza A PCR Not Detected Not Detected    Influenza B PCR Not Detected Not Detected    Parainfluenza Virus 1 Not Detected Not Detected    Parainfluenza Virus 2 Not Detected Not Detected    Parainfluenza Virus 3 Not Detected Not Detected    Parainfluenza Virus 4 Not Detected Not Detected    RSV, PCR Not Detected Not Detected    Bordetella pertussis pcr Not Detected Not Detected    Bordetella parapertussis PCR Not Detected Not Detected    Chlamydophila pneumoniae PCR Not Detected Not Detected    Mycoplasma pneumo by PCR Not Detected Not Detected   Type & Screen    Collection Time: 09/01/23 11:52 AM    Specimen: Blood   Result Value Ref Range    ABO Type O     RH type Positive     Antibody Screen Negative     T&S Expiration Date 9/4/2023 11:59:59 PM    Ammonia    Collection Time: 09/01/23 11:52 AM    Specimen: Blood   Result Value Ref Range    Ammonia 38 11 - 51 umol/L   Urinalysis With Culture If Indicated - Urine, Catheter    Collection Time: 09/01/23  2:44 PM    Specimen: Urine, Catheter   Result Value Ref Range    Color, UA Yellow Yellow, Straw    Appearance, UA Clear Clear    pH, UA 6.5 5.0 - 8.0    Specific Gravity, UA 1.011 1.001 - 1.030    Glucose, UA Negative Negative    Ketones, UA Trace (A) Negative    Bilirubin, UA Negative Negative    Blood, UA Negative Negative    Protein, UA 30 mg/dL (1+) (A) Negative    Leuk Esterase, UA Trace (A) Negative    Nitrite, UA Negative Negative    Urobilinogen, UA 0.2 E.U./dL 0.2 - 1.0 E.U./dL   Urinalysis, Microscopic Only - Urine, Catheter    Collection Time: 09/01/23  2:44 PM    Specimen: Urine, Catheter   Result Value Ref Range    RBC, UA 0-2 None Seen, 0-2 /HPF    WBC, UA 6-12 (A) None Seen, 0-2 /HPF    Bacteria, UA 1+ (A) None Seen,  Trace /HPF    Squamous Epithelial Cells, UA 0-2 None Seen, 0-2 /HPF    Hyaline Casts, UA 0-6 0 - 6 /LPF    Methodology Automated Microscopy      Note: In addition to lab results from this visit, the labs listed above may include labs taken at another facility or during a different encounter within the last 24 hours. Please correlate lab times with ED admission and discharge times for further clarification of the services performed during this visit.    CT Head Without Contrast   Final Result   Impression:   No acute intracranial abnormality.            Electronically Signed: Ryley Busby MD     9/1/2023 11:12 AM EDT     Workstation ID: [id number]      CT Abdomen Pelvis Without Contrast   Final Result   Impression:   There is marked cardiac enlargement, with diffuse anasarca and a small right pleural effusion. There is also hepatic enlargement and findings are concerning for heart failure. No acute findings are present in the abdomen and pelvis. An exophytic left    renal finding is noted, favoring a benign hemorrhagic cyst, better characterized on recent ultrasound. Nodular hyperdense appearance of the uterus suggesting underlying fibroids.                  Electronically Signed: Ryley Busby MD     9/1/2023 11:19 AM EDT     Workstation ID: [id number]        Vitals:    09/01/23 1229 09/01/23 1259 09/01/23 1330 09/01/23 1400   BP: 117/81 126/91 129/95 125/88   BP Location:       Patient Position:       Pulse: 78 94 103 85   Resp:       Temp:       TempSrc:       SpO2: 96% 100% 99% 98%   Weight:       Height:         Medications   Potassium Replacement - Follow Nurse / BPA Driven Protocol (has no administration in time range)   Magnesium Standard Dose Replacement - Follow Nurse / BPA Driven Protocol (has no administration in time range)   Phosphorus Replacement - Follow Nurse / BPA Driven Protocol (has no administration in time range)   Calcium Replacement - Follow Nurse / BPA Driven Protocol (has no administration in  time range)   magnesium sulfate 2g/50 mL (PREMIX) infusion (0 g Intravenous Stopped 9/1/23 1429)   potassium chloride (K-DUR,KLOR-CON) CR tablet 40 mEq (40 mEq Oral Given 9/1/23 1248)   dextrose (GLUTOSE) oral gel 15 g (has no administration in time range)   dextrose (D50W) (25 g/50 mL) IV injection 25 g (has no administration in time range)   glucagon (GLUCAGEN) injection 1 mg (has no administration in time range)   dextrose 5 % and sodium chloride 0.45 % with KCl 20 mEq/L infusion (has no administration in time range)   cefTRIAXone (ROCEPHIN) 2000 mg/100 mL 0.9% NS IVPB (MBP) (has no administration in time range)   sodium chloride 0.9 % bolus 500 mL (0 mL Intravenous Stopped 9/1/23 1300)   dextrose (D50W) (25 g/50 mL) IV injection 25 g (25 g Intravenous Given 9/1/23 1154)     ECG/EMG Results (last 24 hours)       Procedure Component Value Units Date/Time    ECG 12 Lead Other; gen weakness [[id number]] Collected: 09/01/23 1138     Updated: 09/01/23 1407     QT Interval 396 ms      QTC Interval 471 ms     Narrative:      Test Reason : Other~  Blood Pressure :   */*   mmHG  Vent. Rate :  85 BPM     Atrial Rate :  81 BPM     P-R Int : 254 ms          QRS Dur : 102 ms      QT Int : 396 ms       P-R-T Axes :  93  26 229 degrees     QTc Int : 471 ms    atrial fibrillation with occasional PVCs  Low voltage QRS  Nonspecific ST and T wave abnormality  Abnormal ECG  Confirmed by MD Rashid Michael ([id number]) on 9/1/2023 2:06:10 PM    Referred By: TAWNY           Confirmed By: Deni Rashid MD          ECG 12 Lead Other; gen weakness   Final Result   Test Reason : Other~   Blood Pressure :   */*   mmHG   Vent. Rate :  85 BPM     Atrial Rate :  81 BPM      P-R Int : 254 ms          QRS Dur : 102 ms       QT Int : 396 ms       P-R-T Axes :  93  26 229 degrees      QTc Int : 471 ms      atrial fibrillation with occasional PVCs   Low voltage QRS   Nonspecific ST and T wave abnormality   Abnormal ECG   Confirmed by MD Rashid Michael ([id number])  on 9/1/2023 2:06:10 PM      Referred By: EDMD           Confirmed By: Deni Rashid MD                    Medical Decision Making  Amount and/or Complexity of Data Reviewed  Labs: ordered.  Radiology: ordered.  ECG/medicine tests: ordered.    Risk  OTC drugs.  Prescription drug management.  Decision regarding hospitalization.        Final diagnoses:   Generalized weakness   Diarrhea, unspecified type   Acute UTI   Hypokalemia   Hypomagnesemia   Hypoglycemia   Anemia, unspecified type   Chronic kidney disease, unspecified CKD stage       ED Disposition  ED Disposition       ED Disposition   Decision to Admit    Condition   --    Comment   Level of Care: Telemetry [5]   Diagnosis: Dehydration [276.51.ICD-9-CM]   Admitting Physician: FERDINAND SAHU [[id number]]   Attending Physician: FERDINAND SAHU [[id number]]                 No follow-up provider specified.       Medication List      No changes were made to your prescriptions during this visit.            Issac Rashid MD  09/01/23 1538

## 2023-09-01 NOTE — Clinical Note
Level of Care: Telemetry [5]   Diagnosis: Dehydration [276.51.ICD-9-CM]   Admitting Physician: FERDINAND SAHU [[id number]]   Attending Physician: FERDINAND SAHU [[id number]]

## 2023-09-02 LAB
ADV 40+41 DNA STL QL NAA+NON-PROBE: NOT DETECTED
ANION GAP SERPL CALCULATED.3IONS-SCNC: 16 MMOL/L (ref 5–15)
ASTRO TYP 1-8 RNA STL QL NAA+NON-PROBE: NOT DETECTED
BACTERIA BLD CULT: ABNORMAL
BACTERIA SPEC AEROBE CULT: ABNORMAL
BASOPHILS # BLD AUTO: 0.02 10*3/MM3 (ref 0–0.2)
BASOPHILS NFR BLD AUTO: 0.4 % (ref 0–1.5)
BOTTLE TYPE: ABNORMAL
BUN SERPL-MCNC: 31 MG/DL (ref 6–20)
BUN/CREAT SERPL: 12.6 (ref 7–25)
C CAYETANENSIS DNA STL QL NAA+NON-PROBE: NOT DETECTED
C COLI+JEJ+UPSA DNA STL QL NAA+NON-PROBE: NOT DETECTED
CALCIUM SPEC-SCNC: 8.3 MG/DL (ref 8.6–10.5)
CHLORIDE SERPL-SCNC: 93 MMOL/L (ref 98–107)
CO2 SERPL-SCNC: 33 MMOL/L (ref 22–29)
CORTIS SERPL-MCNC: 23.11 MCG/DL
CREAT SERPL-MCNC: 2.47 MG/DL (ref 0.57–1)
CRYPTOSP DNA STL QL NAA+NON-PROBE: NOT DETECTED
DEPRECATED RDW RBC AUTO: 59.7 FL (ref 37–54)
E HISTOLYT DNA STL QL NAA+NON-PROBE: NOT DETECTED
EAEC PAA PLAS AGGR+AATA ST NAA+NON-PRB: NOT DETECTED
EC STX1+STX2 GENES STL QL NAA+NON-PROBE: NOT DETECTED
EGFRCR SERPLBLD CKD-EPI 2021: 22 ML/MIN/1.73
EOSINOPHIL # BLD AUTO: 0.05 10*3/MM3 (ref 0–0.4)
EOSINOPHIL NFR BLD AUTO: 0.9 % (ref 0.3–6.2)
EPEC EAE GENE STL QL NAA+NON-PROBE: NOT DETECTED
ERYTHROCYTE [DISTWIDTH] IN BLOOD BY AUTOMATED COUNT: 21.1 % (ref 12.3–15.4)
ETEC LTA+ST1A+ST1B TOX ST NAA+NON-PROBE: NOT DETECTED
G LAMBLIA DNA STL QL NAA+NON-PROBE: NOT DETECTED
GEN 5 2HR TROPONIN T REFLEX: 65 NG/L
GLUCOSE BLDC GLUCOMTR-MCNC: 101 MG/DL (ref 70–130)
GLUCOSE BLDC GLUCOMTR-MCNC: 105 MG/DL (ref 70–130)
GLUCOSE BLDC GLUCOMTR-MCNC: 109 MG/DL (ref 70–130)
GLUCOSE BLDC GLUCOMTR-MCNC: 110 MG/DL (ref 70–130)
GLUCOSE BLDC GLUCOMTR-MCNC: 98 MG/DL (ref 70–130)
GLUCOSE SERPL-MCNC: 97 MG/DL (ref 65–99)
HCT VFR BLD AUTO: 30.7 % (ref 34–46.6)
HGB BLD-MCNC: 8.6 G/DL (ref 12–15.9)
IMM GRANULOCYTES # BLD AUTO: 0.03 10*3/MM3 (ref 0–0.05)
IMM GRANULOCYTES NFR BLD AUTO: 0.5 % (ref 0–0.5)
LYMPHOCYTES # BLD AUTO: 0.21 10*3/MM3 (ref 0.7–3.1)
LYMPHOCYTES NFR BLD AUTO: 3.8 % (ref 19.6–45.3)
MAGNESIUM SERPL-MCNC: 2.2 MG/DL (ref 1.6–2.6)
MAGNESIUM SERPL-MCNC: 2.3 MG/DL (ref 1.6–2.6)
MCH RBC QN AUTO: 22.6 PG (ref 26.6–33)
MCHC RBC AUTO-ENTMCNC: 28 G/DL (ref 31.5–35.7)
MCV RBC AUTO: 80.6 FL (ref 79–97)
MONOCYTES # BLD AUTO: 0.82 10*3/MM3 (ref 0.1–0.9)
MONOCYTES NFR BLD AUTO: 15 % (ref 5–12)
MRSA DNA SPEC QL NAA+PROBE: NEGATIVE
NEUTROPHILS NFR BLD AUTO: 4.33 10*3/MM3 (ref 1.7–7)
NEUTROPHILS NFR BLD AUTO: 79.4 % (ref 42.7–76)
NOROVIRUS GI+II RNA STL QL NAA+NON-PROBE: NOT DETECTED
NRBC BLD AUTO-RTO: 0.4 /100 WBC (ref 0–0.2)
P SHIGELLOIDES DNA STL QL NAA+NON-PROBE: NOT DETECTED
PLATELET # BLD AUTO: 138 10*3/MM3 (ref 140–450)
POTASSIUM SERPL-SCNC: 3.2 MMOL/L (ref 3.5–5.2)
POTASSIUM SERPL-SCNC: 3.3 MMOL/L (ref 3.5–5.2)
POTASSIUM SERPL-SCNC: 3.3 MMOL/L (ref 3.5–5.2)
POTASSIUM SERPL-SCNC: 4.3 MMOL/L (ref 3.5–5.2)
RBC # BLD AUTO: 3.81 10*6/MM3 (ref 3.77–5.28)
RVA RNA STL QL NAA+NON-PROBE: NOT DETECTED
S ENT+BONG DNA STL QL NAA+NON-PROBE: NOT DETECTED
SAPO I+II+IV+V RNA STL QL NAA+NON-PROBE: NOT DETECTED
SHIGELLA SP+EIEC IPAH ST NAA+NON-PROBE: NOT DETECTED
SODIUM SERPL-SCNC: 142 MMOL/L (ref 136–145)
TROPONIN T DELTA: 14 NG/L
TROPONIN T SERPL HS-MCNC: 51 NG/L
V CHOL+PARA+VUL DNA STL QL NAA+NON-PROBE: NOT DETECTED
V CHOLERAE DNA STL QL NAA+NON-PROBE: NOT DETECTED
WBC NRBC COR # BLD: 5.46 10*3/MM3 (ref 3.4–10.8)
Y ENTEROCOL DNA STL QL NAA+NON-PROBE: NOT DETECTED

## 2023-09-02 PROCEDURE — G0378 HOSPITAL OBSERVATION PER HR: HCPCS

## 2023-09-02 PROCEDURE — 82948 REAGENT STRIP/BLOOD GLUCOSE: CPT

## 2023-09-02 PROCEDURE — 94640 AIRWAY INHALATION TREATMENT: CPT

## 2023-09-02 PROCEDURE — 93010 ELECTROCARDIOGRAM REPORT: CPT | Performed by: INTERNAL MEDICINE

## 2023-09-02 PROCEDURE — 80048 BASIC METABOLIC PNL TOTAL CA: CPT | Performed by: STUDENT IN AN ORGANIZED HEALTH CARE EDUCATION/TRAINING PROGRAM

## 2023-09-02 PROCEDURE — 94799 UNLISTED PULMONARY SVC/PX: CPT

## 2023-09-02 PROCEDURE — 84484 ASSAY OF TROPONIN QUANT: CPT | Performed by: NURSE PRACTITIONER

## 2023-09-02 PROCEDURE — 99232 SBSQ HOSP IP/OBS MODERATE 35: CPT | Performed by: STUDENT IN AN ORGANIZED HEALTH CARE EDUCATION/TRAINING PROGRAM

## 2023-09-02 PROCEDURE — 83735 ASSAY OF MAGNESIUM: CPT | Performed by: INTERNAL MEDICINE

## 2023-09-02 PROCEDURE — 87641 MR-STAPH DNA AMP PROBE: CPT | Performed by: STUDENT IN AN ORGANIZED HEALTH CARE EDUCATION/TRAINING PROGRAM

## 2023-09-02 PROCEDURE — 25010000002 HEPARIN (PORCINE) PER 1000 UNITS: Performed by: INTERNAL MEDICINE

## 2023-09-02 PROCEDURE — 85025 COMPLETE CBC W/AUTO DIFF WBC: CPT | Performed by: INTERNAL MEDICINE

## 2023-09-02 PROCEDURE — 97166 OT EVAL MOD COMPLEX 45 MIN: CPT

## 2023-09-02 PROCEDURE — 84484 ASSAY OF TROPONIN QUANT: CPT | Performed by: STUDENT IN AN ORGANIZED HEALTH CARE EDUCATION/TRAINING PROGRAM

## 2023-09-02 PROCEDURE — 97161 PT EVAL LOW COMPLEX 20 MIN: CPT

## 2023-09-02 PROCEDURE — 87040 BLOOD CULTURE FOR BACTERIA: CPT | Performed by: STUDENT IN AN ORGANIZED HEALTH CARE EDUCATION/TRAINING PROGRAM

## 2023-09-02 PROCEDURE — 84132 ASSAY OF SERUM POTASSIUM: CPT | Performed by: EMERGENCY MEDICINE

## 2023-09-02 PROCEDURE — 93005 ELECTROCARDIOGRAM TRACING: CPT | Performed by: STUDENT IN AN ORGANIZED HEALTH CARE EDUCATION/TRAINING PROGRAM

## 2023-09-02 PROCEDURE — 25010000002 VANCOMYCIN 10 G RECONSTITUTED SOLUTION

## 2023-09-02 PROCEDURE — 87507 IADNA-DNA/RNA PROBE TQ 12-25: CPT | Performed by: EMERGENCY MEDICINE

## 2023-09-02 PROCEDURE — 84132 ASSAY OF SERUM POTASSIUM: CPT | Performed by: INTERNAL MEDICINE

## 2023-09-02 PROCEDURE — 83735 ASSAY OF MAGNESIUM: CPT | Performed by: EMERGENCY MEDICINE

## 2023-09-02 PROCEDURE — 82533 TOTAL CORTISOL: CPT | Performed by: STUDENT IN AN ORGANIZED HEALTH CARE EDUCATION/TRAINING PROGRAM

## 2023-09-02 PROCEDURE — 84132 ASSAY OF SERUM POTASSIUM: CPT | Performed by: STUDENT IN AN ORGANIZED HEALTH CARE EDUCATION/TRAINING PROGRAM

## 2023-09-02 RX ORDER — NEBIVOLOL 5 MG/1
2.5 TABLET ORAL DAILY
Status: DISCONTINUED | OUTPATIENT
Start: 2023-09-03 | End: 2023-09-03

## 2023-09-02 RX ORDER — IPRATROPIUM BROMIDE AND ALBUTEROL SULFATE 2.5; .5 MG/3ML; MG/3ML
3 SOLUTION RESPIRATORY (INHALATION) EVERY 4 HOURS PRN
Status: DISCONTINUED | OUTPATIENT
Start: 2023-09-02 | End: 2023-09-12 | Stop reason: HOSPADM

## 2023-09-02 RX ORDER — POTASSIUM CHLORIDE 1.5 G/1.58G
40 POWDER, FOR SOLUTION ORAL EVERY 4 HOURS
Status: COMPLETED | OUTPATIENT
Start: 2023-09-02 | End: 2023-09-02

## 2023-09-02 RX ORDER — ACETAMINOPHEN 325 MG/1
650 TABLET ORAL EVERY 6 HOURS PRN
Status: DISCONTINUED | OUTPATIENT
Start: 2023-09-02 | End: 2023-09-12 | Stop reason: HOSPADM

## 2023-09-02 RX ORDER — ASPIRIN 81 MG/1
324 TABLET, CHEWABLE ORAL ONCE
Status: DISCONTINUED | OUTPATIENT
Start: 2023-09-02 | End: 2023-09-02

## 2023-09-02 RX ORDER — ONDANSETRON 2 MG/ML
4 INJECTION INTRAMUSCULAR; INTRAVENOUS EVERY 6 HOURS PRN
Status: DISCONTINUED | OUTPATIENT
Start: 2023-09-02 | End: 2023-09-12 | Stop reason: HOSPADM

## 2023-09-02 RX ORDER — SODIUM CHLORIDE, SODIUM LACTATE, POTASSIUM CHLORIDE, CALCIUM CHLORIDE 600; 310; 30; 20 MG/100ML; MG/100ML; MG/100ML; MG/100ML
75 INJECTION, SOLUTION INTRAVENOUS CONTINUOUS
Status: ACTIVE | OUTPATIENT
Start: 2023-09-02 | End: 2023-09-02

## 2023-09-02 RX ADMIN — HEPARIN SODIUM 5000 UNITS: 5000 INJECTION, SOLUTION INTRAVENOUS; SUBCUTANEOUS at 21:53

## 2023-09-02 RX ADMIN — VANCOMYCIN HYDROCHLORIDE 2500 MG: 10 INJECTION, POWDER, LYOPHILIZED, FOR SOLUTION INTRAVENOUS at 21:54

## 2023-09-02 RX ADMIN — TIOTROPIUM BROMIDE INHALATION SPRAY 2 PUFF: 3.12 SPRAY, METERED RESPIRATORY (INHALATION) at 07:43

## 2023-09-02 RX ADMIN — PANTOPRAZOLE SODIUM 40 MG: 40 TABLET, DELAYED RELEASE ORAL at 08:06

## 2023-09-02 RX ADMIN — NEBIVOLOL 10 MG: 5 TABLET ORAL at 08:06

## 2023-09-02 RX ADMIN — BUDESONIDE AND FORMOTEROL FUMARATE DIHYDRATE 2 PUFF: 160; 4.5 AEROSOL RESPIRATORY (INHALATION) at 19:29

## 2023-09-02 RX ADMIN — POTASSIUM CHLORIDE 40 MEQ: 1.5 POWDER, FOR SOLUTION ORAL at 08:07

## 2023-09-02 RX ADMIN — HEPARIN SODIUM 5000 UNITS: 5000 INJECTION, SOLUTION INTRAVENOUS; SUBCUTANEOUS at 08:06

## 2023-09-02 RX ADMIN — SODIUM CHLORIDE, POTASSIUM CHLORIDE, SODIUM LACTATE AND CALCIUM CHLORIDE 75 ML/HR: 600; 310; 30; 20 INJECTION, SOLUTION INTRAVENOUS at 16:35

## 2023-09-02 RX ADMIN — POTASSIUM CHLORIDE, DEXTROSE MONOHYDRATE AND SODIUM CHLORIDE 100 ML/HR: 150; 5; 450 INJECTION, SOLUTION INTRAVENOUS at 02:21

## 2023-09-02 RX ADMIN — POTASSIUM CHLORIDE 40 MEQ: 1.5 POWDER, FOR SOLUTION ORAL at 11:53

## 2023-09-02 RX ADMIN — BUDESONIDE AND FORMOTEROL FUMARATE DIHYDRATE 2 PUFF: 160; 4.5 AEROSOL RESPIRATORY (INHALATION) at 07:43

## 2023-09-02 RX ADMIN — THIAMINE HCL TAB 100 MG 100 MG: 100 TAB at 08:06

## 2023-09-02 NOTE — THERAPY EVALUATION
Patient Name: Sonia Bella  : 1965    MRN: [id number]                              Today's Date: 2023       Admit Date: 2023    Visit Dx:     ICD-10-CM ICD-9-CM   1. Generalized weakness  R53.1 780.79   2. Diarrhea, unspecified type  R19.7 787.91   3. Acute UTI  N39.0 599.0   4. Hypokalemia  E87.6 276.8   5. Hypomagnesemia  E83.42 275.2   6. Hypoglycemia  E16.2 251.2   7. Anemia, unspecified type  D64.9 285.9   8. Chronic kidney disease, unspecified CKD stage  N18.9 585.9     Patient Active Problem List   Diagnosis    Angioedema due to angiotensin converting enzyme inhibitor (ACE-I)    HTN (hypertension)    B12 deficiency anemia    Iron deficiency anemia    CKD (chronic kidney disease) stage 3, GFR 30-59 ml/min    COVID-19 virus infection    COPD on home O2 (2L)    Morbid obesity with BMI of 50.0-59.9, adult    AoC HFpEF    Acute type 2 respiratory failure    Human metapneumovirus (hMPV)    GERD    Stage III CKD    Atrial fibrillation with RVR    Acute on chronic HFrEF (heart failure with reduced ejection fraction)    Dehydration    Diarrhea    Hypomagnesemia    Hypokalemia     Past Medical History:   Diagnosis Date    Bleeding ulcer     COPD (chronic obstructive pulmonary disease)     Coronary artery disease     GERD (gastroesophageal reflux disease)     History of stomach ulcers     Hypertension     Stage III CKD 02/15/2023     History reviewed. No pertinent surgical history.   General Information       Row Name 23 1020          Physical Therapy Time and Intention    Document Type evaluation  -NS     Mode of Treatment physical therapy  -NS       Row Name 23 1020          General Information    Patient Profile Reviewed yes  -NS     Prior Level of Function independent:;all household mobility;gait;transfer;bed mobility;ADL's  Pt states she uses a rollator when she goes outside of her room. Reports recent difficulty with ADLs.  -NS     Existing Precautions/Restrictions fall;oxygen  therapy device and L/min  -NS     Barriers to Rehab medically complex;previous functional deficit  -NS       Row Name 09/02/23 1020          Living Environment    People in Home child(priya), adult  -NS       Row Name 09/02/23 1020          Home Main Entrance    Number of Stairs, Main Entrance two  -NS     Stair Railings, Main Entrance none  -NS       Row Name 09/02/23 1020          Stairs Within Home, Primary    Number of Stairs, Within Home, Primary none  -NS       Row Name 09/02/23 1020          Cognition    Orientation Status (Cognition) oriented x 4  -NS       Row Name 09/02/23 1020          Safety Issues, Functional Mobility    Safety Issues Affecting Function (Mobility) safety precaution awareness;safety precautions follow-through/compliance;sequencing abilities;judgment;problem-solving;positioning of assistive device  -NS     Impairments Affecting Function (Mobility) balance;coordination;endurance/activity tolerance;motor planning;strength;postural/trunk control  -NS     Cognitive Impairments, Mobility Safety/Performance judgment;safety precaution awareness;safety precaution follow-through;sequencing abilities  -NS     Comment, Safety Issues/Impairments (Mobility) lethargic during eval  -NS               User Key  (r) = Recorded By, (t) = Taken By, (c) = Cosigned By      Initials Name Provider Type    NS Elsi Adams, PT Physical Therapist                   Mobility       Row Name 09/02/23 1020          Transfers    Comment, (Transfers) Cues for hand placement. Pt required 2 attempts to come to standing but demonstrates flexed posture in standing. She took side steps to transfer but requires assist for safe placement of RW.  -NS       Row Name 09/02/23 1020          Bed-Chair Transfer    Bed-Chair [place] (Transfers) minimum assist (75% patient effort);2 person assist;verbal cues  -NS     Assistive Device (Bed-Chair Transfers) walker, front-wheeled  -NS       Row Name 09/02/23 1020          Sit-Stand  Transfer    Sit-Stand [place] (Transfers) moderate assist (50% patient effort);2 person assist;verbal cues  -NS     Assistive Device (Sit-Stand Transfers) walker, front-wheeled  -NS       Row Name 09/02/23 1020          Gait/Stairs (Locomotion)    [place] Level (Gait) not tested  -NS     Comment, (Gait/Stairs) Pt declined ambulation.  -NS               User Key  (r) = Recorded By, (t) = Taken By, (c) = Cosigned By      Initials Name Provider Type    Elsi Buckley PT Physical Therapist                   Obj/Interventions       Row Name 09/02/23 1020          Range of Motion Comprehensive    General Range of Motion bilateral lower extremity ROM WFL  -NS       [facility] Name 09/02/23 1020          Strength Comprehensive (MMT)    General Manual Muscle Testing (MMT) Assessment lower extremity strength deficits identified  -NS     Comment, General Manual Muscle Testing (MMT) Assessment BLEs: grossly 4/5  -NS       [facility] Name 09/02/23 1020          Balance    Balance Assessment sitting static balance;sitting dynamic balance;standing static balance;standing dynamic balance  -NS     Static Sitting Balance standby assist  -NS     Dynamic Sitting Balance standby assist  -NS     Position, Sitting Balance unsupported;sitting in chair  -NS     Static Standing Balance minimal assist;2-person assist  -NS     Dynamic Standing Balance minimal assist;2-person assist  -NS     Position/Device Used, Standing Balance supported;walker, front-wheeled  -NS       [facility] Name 09/02/23 1020          Sensory Assessment (Somatosensory)    Sensory Assessment (Somatosensory) LE sensation intact  -NS               User Key  (r) = Recorded By, (t) = Taken By, (c) = Cosigned By      Initials Name Provider Type    Elsi Buckley PT Physical Therapist                   Goals/Plan       Row Name 09/02/23 1020          Bed Mobility Goal 1 (PT)    Activity/Assistive Device (Bed Mobility Goal 1, PT) supine to sit  -NS     [place] Level/Cues  Needed (Bed Mobility Goal 1, PT) independent  -NS     Time Frame (Bed Mobility Goal 1, PT) long term goal (LTG);2 weeks  -NS       Row Name 09/02/23 1020          Transfer Goal 1 (PT)    Activity/Assistive Device (Transfer Goal 1, PT) sit-to-stand/stand-to-sit;bed-to-chair/chair-to-bed  -NS     [place] Level/Cues Needed (Transfer Goal 1, PT) contact guard required  -NS     Time Frame (Transfer Goal 1, PT) long term goal (LTG);2 weeks  -NS       Row Name 09/02/23 1020          Gait Training Goal 1 (PT)    Activity/Assistive Device (Gait Training Goal 1, PT) gait (walking locomotion);assistive device use  -NS     [place] Level (Gait Training Goal 1, PT) contact guard required  -NS     Distance (Gait Training Goal 1, PT) 30  -NS     Time Frame (Gait Training Goal 1, PT) long term goal (LTG);2 weeks  -NS       Row Name 09/02/23 1020          Therapy Assessment/Plan (PT)    Planned Therapy Interventions (PT) balance training;bed mobility training;gait training;home exercise program;neuromuscular re-education;patient/family education;strengthening;transfer training  -NS               User Key  (r) = Recorded By, (t) = Taken By, (c) = Cosigned By      Initials Name Provider Type    Elsi Buckley, PT Physical Therapist                   Clinical Impression       Row Name 09/02/23 1020          Pain    Pretreatment Pain Rating 0/10 - no pain  -NS     Posttreatment Pain Rating 0/10 - no pain  -NS       Row Name 09/02/23 1020          Plan of Care Review    Plan of Care Reviewed With patient  -NS     Progress no change  -NS     Outcome Evaluation Patient presents with weakness, balance impairments, and decreased endurance affecting functional mobility. Skilled IP PT services warranted to address deficits and support return to baseline. Recommend SNF at d/c.  -NS       Row Name 09/02/23 1020          Therapy Assessment/Plan (PT)    Patient/Family Therapy Goals Statement (PT) return to PLOF  -NS     Rehab Potential  (PT) good, to achieve stated therapy goals  -NS     Criteria for Skilled Interventions Met (PT) yes;meets criteria;skilled treatment is necessary  -NS     Therapy Frequency (PT) daily  -NS       Row Name 09/02/23 1020          Vital Signs    Pre Systolic BP Rehab 104  -NS     Pre Treatment Diastolic BP 73  -NS     Post Systolic BP Rehab 105  -NS     Post Treatment Diastolic BP 82  -NS     Pretreatment Heart Rate (beats/min) 104  -NS     Posttreatment Heart Rate (beats/min) 88  -NS     Post SpO2 (%) 96  -NS     O2 Delivery Post Treatment nasal cannula  -NS     Pre Patient Position Sitting  -NS     Intra Patient Position Standing  -NS     Post Patient Position Sitting  -NS       Row Name 09/02/23 1020          Positioning and Restraints    Pre-Treatment Position in bed  -NS     Post Treatment Position chair  -NS     In Chair notified nsg;reclined;call light within reach;encouraged to call for assist;exit alarm on;waffle cushion;heels elevated;legs elevated  -NS               User Key  (r) = Recorded By, (t) = Taken By, (c) = Cosigned By      Initials Name Provider Type    Elsi Buckley, PT Physical Therapist                   Outcome Measures       Row Name 09/02/23 1020          How much help from another person do you currently need...    Turning from your back to your side while in flat bed without using bedrails? 3  -NS     Moving from lying on back to sitting on the side of a flat bed without bedrails? 3  -NS     Moving to and from a bed to a chair (including a wheelchair)? 2  -NS     Standing up from a chair using your arms (e.g., wheelchair, bedside chair)? 2  -NS     Climbing 3-5 steps with a railing? 1  -NS     To walk in hospital room? 2  -NS     AM-PAC 6 Clicks Score (PT) 13  -NS     Highest level of mobility 4 --> Transferred to chair/commode  -NS       Row Name 09/02/23 1144 09/02/23 1020       Functional Assessment    Outcome Measure Options AM-PAC 6 Clicks Daily Activity (OT)  -AN AM-PAC 6 Clicks  Basic Mobility (PT)  -NS              User Key  (r) = Recorded By, (t) = Taken By, (c) = Cosigned By      Initials Name Provider Type    Elsi Buckley PT Physical Therapist    Maria Alejandra Chapman OT Occupational Therapist                                 Physical Therapy Education       Title: PT OT SLP Therapies (In Progress)       Topic: Physical Therapy (In Progress)       Point: Mobility training (In Progress)       Learning Progress Summary             Patient Acceptance, E, NR by NS at 9/2/2023 1257    Comment: PT POC, importance of OOB activity                         Point: Home exercise program (Not Started)       Learner Progress:  Not documented in this visit.              Point: Body mechanics (In Progress)       Learning Progress Summary             Patient Acceptance, E, NR by NS at 9/2/2023 1257    Comment: PT POC, importance of OOB activity                         Point: Precautions (In Progress)       Learning Progress Summary             Patient Acceptance, E, NR by NS at 9/2/2023 1257    Comment: PT POC, importance of OOB activity                                         User Key       Initials Effective Dates Name Provider Type Discipline    NS 06/16/21 -  Elsi Adams, PT Physical Therapist PT                  PT Recommendation and Plan  Planned Therapy Interventions (PT): balance training, bed mobility training, gait training, home exercise program, neuromuscular re-education, patient/family education, strengthening, transfer training  Plan of Care Reviewed With: patient  Progress: no change  Outcome Evaluation: Patient presents with weakness, balance impairments, and decreased endurance affecting functional mobility. Skilled IP PT services warranted to address deficits and support return to baseline. Recommend SNF at d/c.     Time Calculation:   PT Evaluation Complexity  History, PT Evaluation Complexity: 3 or more personal factors and/or comorbidities  Examination of Body Systems (PT  Eval Complexity): total of 4 or more elements  Clinical Presentation (PT Evaluation Complexity): stable  Clinical Decision Making (PT Evaluation Complexity): low complexity  Overall Complexity (PT Evaluation Complexity): low complexity     PT Charges       Row Name 09/02/23 1020             Time Calculation    Start Time 1020  -NS      PT Received On 09/02/23  -NS      PT Goal Re-Cert Due Date 09/12/23  -NS         Untimed Charges    PT Eval/Re-eval Minutes 46  -NS         Total Minutes    Untimed Charges Total Minutes 46  -NS       Total Minutes 46  -NS                User Key  (r) = Recorded By, (t) = Taken By, (c) = Cosigned By      Initials Name Provider Type    Elsi Buckley, PT Physical Therapist                  Therapy Charges for Today       Code Description Service Date Service Provider Modifiers Qty    70767875348 HC PT EVAL LOW COMPLEXITY 4 9/2/2023 Elsi Adams, PT GP 1            PT G-Codes  Outcome Measure Options: AM-PAC 6 Clicks Daily Activity (OT)  AM-PAC 6 Clicks Score (PT): 13  AM-PAC 6 Clicks Score (OT): 14  PT Discharge Summary  Anticipated Discharge Disposition (PT): skilled nursing facility    Elsi Adams PT  9/2/2023

## 2023-09-02 NOTE — PROGRESS NOTES
"Pharmacy Consult-Vancomycin Dosing  Sonia Bella is a  58 y.o. female receiving vancomycin therapy.     Indication: Bacteremia  Consulting Provider: Dr Higgins  ID Consult: No    Goal AUC: 400 - 600 mg/L*hr    Current Antimicrobial Therapy  Anti-Infectives (From admission, onward)      Ordered     Dose/Rate Route Frequency Start Stop    09/02/23 1643  vancomycin 2500 mg/500 mL 0.9% NS IVPB ([facility])        Ordering Provider: Iliana Sol RPH    2,500 mg  over 150 Minutes Intravenous Once 09/02/23 1700      09/02/23 1600  Pharmacy to dose vancomycin        Ordering Provider: Coleen Higgins MD     Does not apply Continuous PRN 09/02/23 1559 09/07/23 1558    09/01/23 1520  cefTRIAXone (ROCEPHIN) 2000 mg/100 mL 0.9% NS IVPB ([facility])        Ordering Provider: Issac Rashid MD    2,000 mg  over 30 Minutes Intravenous Once 09/01/23 1536 09/01/23 1638            Allergies  Allergies as of 09/01/2023 - Reviewed 09/01/2023   Allergen Reaction Noted    Ace inhibitors Angioedema 08/19/2022       Labs    Results from last 7 days   Lab Units 09/02/23  0444 09/01/23  1725 09/01/23  1125 09/01/23  1108 09/01/23  1101   BUN mg/dL 31* 29*  --   --  30*   CREATININE mg/dL 2.47* 2.23* 2.60   < > 2.40*    < > = values in this interval not displayed.       Results from last 7 days   Lab Units 09/02/23  0109 09/01/23  1101   WBC 10*3/mm3 5.46 5.47       Evaluation of Dosing     Last Dose Received in the ED/Outside Facility: No  Is Patient on Dialysis or Renal Replacement: No    Ht - 172.7 cm (67.99\")  Wt - 121 kg (266 lb 3.2 oz)    Estimated Creatinine Clearance: 33.6 mL/min (A) (by C-G formula based on SCr of 2.47 mg/dL (H)).    Intake & Output (last 3 days)         08/31 0701 09/01 0700 09/01 0701 09/02 0700 09/02 0701 09/03 0700    I.V. (mL/kg)  1140 (9.4)     IV Piggyback  500     Total Intake(mL/kg)  1640 (13.6)     Urine (mL/kg/hr)   350 (0.2)    Stool   0    Total Output   350    Net  +1640 -350           Stool " Unmeasured Occurrence  3 x 1 x            Microbiology and Radiology  Microbiology Results (last 10 days)       Procedure Component Value - Date/Time    Gastrointestinal Panel, PCR - Stool, Per Rectum [[id number]]  (Normal) Collected: 09/02/23 1551    Lab Status: Final result Specimen: Stool from Per Rectum Updated: 09/02/23 1735     Campylobacter Not Detected     Plesiomonas shigelloides Not Detected     Salmonella Not Detected     Vibrio Not Detected     Vibrio cholerae Not Detected     Yersinia enterocolitica Not Detected     Enteroaggregative E. coli (EAEC) Not Detected     Enteropathogenic E. coli (EPEC) Not Detected     Enterotoxigenic E. coli (ETEC) lt/st Not Detected     Shiga-like toxin-producing E. coli (STEC) stx1/stx2 Not Detected     Shigella/Enteroinvasive E. coli (EIEC) Not Detected     Cryptosporidium Not Detected     Cyclospora cayetanensis Not Detected     Entamoeba histolytica Not Detected     Giardia lamblia Not Detected     Adenovirus F40/41 Not Detected     Astrovirus Not Detected     Norovirus GI/GII Not Detected     Rotavirus A Not Detected     Sapovirus (I, II, IV or V) Not Detected    Urine Culture - Urine, Urine, Catheter [[id number]]  (Normal) Collected: 09/01/23 1444    Lab Status: Preliminary result Specimen: Urine, Catheter Updated: 09/02/23 1420     Urine Culture Culture in progress    Blood Culture - Blood, Arm, Right [[id number]]  (Normal) Collected: 09/01/23 1152    Lab Status: Preliminary result Specimen: Blood from Arm, Right Updated: 09/02/23 1301     Blood Culture No growth at 24 hours    Respiratory Panel PCR w/COVID-19(SARS-CoV-2) [facility] In-House, NP Swab in UTM/VTM, 3-4 HR TAT - Swab, Nasopharynx [[id number]]  (Normal) Collected: 09/01/23 1151    Lab Status: Final result Specimen: Swab from Nasopharynx Updated: 09/01/23 1327     ADENOVIRUS, PCR Not Detected     Coronavirus 229E Not Detected     Coronavirus HKU1 Not Detected     Coronavirus NL63 Not Detected      Coronavirus OC43 Not Detected     COVID19 Not Detected     Human Metapneumovirus Not Detected     Human Rhinovirus/Enterovirus Not Detected     Influenza A PCR Not Detected     Influenza B PCR Not Detected     Parainfluenza Virus 1 Not Detected     Parainfluenza Virus 2 Not Detected     Parainfluenza Virus 3 Not Detected     Parainfluenza Virus 4 Not Detected     RSV, PCR Not Detected     Bordetella pertussis pcr Not Detected     Bordetella parapertussis PCR Not Detected     Chlamydophila pneumoniae PCR Not Detected     Mycoplasma pneumo by PCR Not Detected    Narrative:      In the setting of a positive respiratory panel with a viral infection PLUS a negative procalcitonin without other underlying concern for bacterial infection, consider observing off antibiotics or discontinuation of antibiotics and continue supportive care. If the respiratory panel is positive for atypical bacterial infection (Bordetella pertussis, Chlamydophila pneumoniae, or Mycoplasma pneumoniae), consider antibiotic de-escalation to target atypical bacterial infection.    Blood Culture - Blood, Arm, Left [[id number]]  (Abnormal) Collected: 09/01/23 1140    Lab Status: Preliminary result Specimen: Blood from Arm, Left Updated: 09/02/23 1554     Blood Culture Abnormal Stain     Gram Stain Aerobic Bottle Gram positive cocci in clusters    Blood Culture ID, PCR - Blood, Arm, Left [[id number]]  (Abnormal) Collected: 09/01/23 1140    Lab Status: Final result Specimen: Blood from Arm, Left Updated: 09/02/23 1710     BCID, PCR Staph spp, not aureus or lugdunensis. Identification by BCID2 PCR.     BOTTLE TYPE Aerobic Bottle            Reported Vancomycin Levels                         [facility] AUC Calculation:    Current AUC:   -- mg/L*hr    Predicted Steady State AUC on Current Dose: -- mg/L*hr  _________________________________    Predicted Steady State AUC on New Dose:   -- mg/L*hr    Assessment/Plan:   Pharmacy to dose vancomycin for  Bacteremia. Goal  - 600mg/L*hr  Patient to receive a loading dose of vancomycin 2500mg (~20mg/kg) IV today.  Random level scheduled for 9/3 @1200  Will assess level and renal function tomorrow to determine appropriate maintenance dose for patient. Consider a 24 hour dosing interval  BMP ordered  Monitor renal function, cultures and sensitivities, clinical status, adjust regimen as necessary     Pharmacy will continue to follow

## 2023-09-02 NOTE — THERAPY EVALUATION
Patient Name: Sonia Bella  : 1965    MRN: [id number]                              Today's Date: 2023       Admit Date: 2023    Visit Dx:     ICD-10-CM ICD-9-CM   1. Generalized weakness  R53.1 780.79   2. Diarrhea, unspecified type  R19.7 787.91   3. Acute UTI  N39.0 599.0   4. Hypokalemia  E87.6 276.8   5. Hypomagnesemia  E83.42 275.2   6. Hypoglycemia  E16.2 251.2   7. Anemia, unspecified type  D64.9 285.9   8. Chronic kidney disease, unspecified CKD stage  N18.9 585.9     Patient Active Problem List   Diagnosis    Angioedema due to angiotensin converting enzyme inhibitor (ACE-I)    HTN (hypertension)    B12 deficiency anemia    Iron deficiency anemia    CKD (chronic kidney disease) stage 3, GFR 30-59 ml/min    COVID-19 virus infection    COPD on home O2 (2L)    Morbid obesity with BMI of 50.0-59.9, adult    AoC HFpEF    Acute type 2 respiratory failure    Human metapneumovirus (hMPV)    GERD    Stage III CKD    Atrial fibrillation with RVR    Acute on chronic HFrEF (heart failure with reduced ejection fraction)    Dehydration    Diarrhea    Hypomagnesemia    Hypokalemia     Past Medical History:   Diagnosis Date    Bleeding ulcer     COPD (chronic obstructive pulmonary disease)     Coronary artery disease     GERD (gastroesophageal reflux disease)     History of stomach ulcers     Hypertension     Stage III CKD 02/15/2023     History reviewed. No pertinent surgical history.   General Information       Row Name 23 1056          OT Time and Intention    Document Type evaluation  -AN     Mode of Treatment occupational therapy  -AN       Row Name 23 1053          General Information    Patient Profile Reviewed yes  -AN     Prior Level of Function independent:;all household mobility;transfer;ADL's  prior to admission pt reports ambulating using a rollator, does not go out in the community often, recent difficulty with showering  -AN     Existing Precautions/Restrictions fall;oxygen  therapy device and L/min  -AN     Barriers to Rehab medically complex;previous functional deficit  -AN       Row Name 09/02/23 1056          Living Environment    People in Home child(priya), adult  -AN       Row Name 09/02/23 1056          Home Main Entrance    Number of Stairs, Main Entrance two  -AN     Stair Railings, Main Entrance none  -AN       Row Name 09/02/23 1056          Stairs Within Home, Primary    Number of Stairs, Within Home, Primary none  -AN     Stairs Comment, Within Home, Primary tub shower with shower stool  -AN       Row Name 09/02/23 1056          Cognition    Orientation Status (Cognition) oriented x 4  -AN       Row Name 09/02/23 1056          Safety Issues, Functional Mobility    Safety Issues Affecting Function (Mobility) safety precaution awareness;safety precautions follow-through/compliance;judgment;sequencing abilities;awareness of need for assistance;problem-solving  -AN     Impairments Affecting Function (Mobility) balance;endurance/activity tolerance;strength  -AN     Comment, Safety Issues/Impairments (Mobility) activity limited by lethargy  -AN               User Key  (r) = Recorded By, (t) = Taken By, (c) = Cosigned By      Initials Name Provider Type    AN Maria Alejandra Johnston OT Occupational Therapist                     Mobility/ADL's       Row Name 09/02/23 1058          Bed Mobility    Bed Mobility supine-sit  -AN     Supine-Sit [place] (Bed Mobility) supervision  -AN     Bed Mobility, Safety Issues decreased use of arms for pushing/pulling;decreased use of legs for bridging/pushing  -AN     Assistive Device (Bed Mobility) head of bed elevated  -AN     Comment, (Bed Mobility) increased time and cues for sequencing of steps  -AN       Row Name 09/02/23 1058          Transfers    Transfers sit-stand transfer;stand-sit transfer  -AN     Comment, (Transfers) STS attempted from the EOB with Mod A x 2, unable to clear hips and reach upright position  -AN       Row Name 09/02/23  1058          Sit-Stand Transfer    Sit-Stand [place] (Transfers) verbal cues;nonverbal cues (demo/gesture);moderate assist (50% patient effort);2 person assist  -AN     Assistive Device (Sit-Stand Transfers) walker, front-wheeled  -AN       Row Name 09/02/23 1058          Stand-Sit Transfer    Stand-Sit [place] (Transfers) verbal cues;nonverbal cues (demo/gesture);moderate assist (50% patient effort);2 person assist  -AN     Assistive Device (Stand-Sit Transfers) walker, front-wheeled  -AN       Row Name 09/02/23 1058          Functional Mobility    Functional Mobility- Ind. Level not tested  -AN       Row Name 09/02/23 1058          Activities of Daily Living    BADL Assessment/Intervention upper body dressing;lower body dressing  -AN       Row Name 09/02/23 1058          Upper Body Dressing Assessment/Training    [place] Level (Upper Body Dressing) don;minimum assist (75% patient effort)  -AN     Position (Upper Body Dressing) edge of bed sitting  -AN       Row Name 09/02/23 1058          Lower Body Dressing Assessment/Training    [place] Level (Lower Body Dressing) don;socks;dependent (less than 25% patient effort)  -AN     Position (Lower Body Dressing) edge of bed sitting  -AN               User Key  (r) = Recorded By, (t) = Taken By, (c) = Cosigned By      Initials Name Provider Type    AN Maria Alejandra Johnston OT Occupational Therapist                   Obj/Interventions       Row Name 09/02/23 1140          Sensory Assessment (Somatosensory)    Sensory Assessment (Somatosensory) sensation intact  -AN       Row Name 09/02/23 1140          Vision Assessment/Intervention    Visual Impairment/Limitations WFL  -AN       Row Name 09/02/23 1140          Range of Motion Comprehensive    General Range of Motion bilateral upper extremity ROM WFL  -AN       Row Name 09/02/23 1140          Strength Comprehensive (MMT)    General Manual Muscle Testing (MMT) Assessment upper extremity strength deficits  identified  -AN     Comment, General Manual Muscle Testing (MMT) Assessment BUE grossly 4/5  -AN       Row Name 09/02/23 1140          Motor Skills    Motor Skills functional endurance  -AN     Functional Endurance decreased functional endurance  -AN       Row Name 09/02/23 1140          Balance    Balance Assessment sitting static balance;sitting dynamic balance;sit to stand dynamic balance;standing static balance  -AN     Static Sitting Balance standby assist  -AN     Dynamic Sitting Balance standby assist  -AN     Position, Sitting Balance sitting edge of bed  -AN     Sit to Stand Dynamic Balance verbal cues;moderate assist;2-person assist  -AN     Static Standing Balance verbal cues;minimal assist;2-person assist  -AN     Position/Device Used, Standing Balance supported;walker, front-wheeled  -AN     Balance Interventions standing;sit to stand;supported;static;moderate challenge;occupation based/functional task  -AN               User Key  (r) = Recorded By, (t) = Taken By, (c) = Cosigned By      Initials Name Provider Type    AN Maria Alejandra Johnston, OT Occupational Therapist                   Goals/Plan       Row Name 09/02/23 1144          Transfer Goal 1 (OT)    Activity/Assistive Device (Transfer Goal 1, OT) sit-to-stand/stand-to-sit;commode, bedside without drop arms;walker, rolling;bed-to-chair/chair-to-bed  -AN     [place] Level/Cues Needed (Transfer Goal 1, OT) minimum assist (75% or more patient effort)  -AN     Time Frame (Transfer Goal 1, OT) long term goal (LTG);10 days  -AN       Row Name 09/02/23 1144          Dressing Goal 1 (OT)    Activity/Device (Dressing Goal 1, OT) lower body dressing;sock-aid;reacher  -AN     [place]/Cues Needed (Dressing Goal 1, OT) minimum assist (75% or more patient effort)  -AN     Time Frame (Dressing Goal 1, OT) long term goal (LTG);10 days  -AN       Row Name 09/02/23 1144          Toileting Goal 1 (OT)    Activity/Device (Toileting Goal 1, OT) adjust/manage  clothing;perform perineal hygiene;commode, bedside without drop arms  -AN     [place] Level/Cues Needed (Toileting Goal 1, OT) minimum assist (75% or more patient effort)  -AN     Time Frame (Toileting Goal 1, OT) long term goal (LTG);10 days  -AN       Row Name 09/02/23 1144          Therapy Assessment/Plan (OT)    Planned Therapy Interventions (OT) activity tolerance training;adaptive equipment training;BADL retraining;functional balance retraining;patient/caregiver education/training;occupation/activity based interventions;transfer/mobility retraining;strengthening exercise  -AN               User Key  (r) = Recorded By, (t) = Taken By, (c) = Cosigned By      Initials Name Provider Type    AN Maria Alejandra Johnston OT Occupational Therapist                   Clinical Impression       Row Name 09/02/23 1141          Pain Assessment    Pretreatment Pain Rating 0/10 - no pain  -AN     Posttreatment Pain Rating 0/10 - no pain  -AN     Pre/Posttreatment Pain Comment asymptomatic  -AN     Pain Intervention(s) Ambulation/increased activity;Repositioned  -AN       [facility] Name 09/02/23 1141          Plan of Care Review    Plan of Care Reviewed With patient  -AN     Progress no change  -AN     Outcome Evaluation Pt presents below her functional baseline with deficits including generalized weakness, impaired balance, increased assist with ADLs, and decreased functional endurance warranting skilled OT services. Pt performed bed mobility with supervision, and required Mod A x 2 for STS using RW. Limited by weakness and fatigue. Rec SNF at [place].  -AN       Row Name 09/02/23 1141          Therapy Assessment/Plan (OT)    Patient/Family Therapy Goal Statement (OT) Return to PLOF  -AN     Rehab Potential (OT) good, to achieve stated therapy goals  -AN     Criteria for Skilled Therapeutic Interventions Met (OT) yes;skilled treatment is necessary  -AN     Therapy Frequency (OT) daily  -AN       Row Name 09/02/23 1141          Therapy  Plan Review/Discharge Plan (OT)    Anticipated Discharge Disposition (OT) skilled nursing facility  -AN       Row Name 09/02/23 1141          Vital Signs    Post Systolic BP Rehab 105  -AN     Post Treatment Diastolic BP 82  -AN     Pre Patient Position Supine  -AN     Intra Patient Position Standing  -AN     Post Patient Position Sitting  -AN       Row Name 09/02/23 1141          Positioning and Restraints    Pre-Treatment Position in bed  -AN     Post Treatment Position bed  -AN     In Bed notified nsg;sitting EOB;with PT  -AN               User Key  (r) = Recorded By, (t) = Taken By, (c) = Cosigned By      Initials Name Provider Type    Maria Alejandra Chapman OT Occupational Therapist                   Outcome Measures       Row Name 09/02/23 1144          How much help from another is currently needed...    Putting on and taking off regular lower body clothing? 1  -AN     Bathing (including washing, rinsing, and drying) 2  -AN     Toileting (which includes using toilet bed pan or urinal) 2  -AN     Putting on and taking off regular upper body clothing 3  -AN     Taking care of personal grooming (such as brushing teeth) 3  -AN     Eating meals 3  -AN     AM-PAC 6 Clicks Score (OT) 14  -AN       Row Name 09/02/23 1144          Functional Assessment    Outcome Measure Options AM-PAC 6 Clicks Daily Activity (OT)  -AN               User Key  (r) = Recorded By, (t) = Taken By, (c) = Cosigned By      Initials Name Provider Type    Maria Alejandra Chapman OT Occupational Therapist                    Occupational Therapy Education       Title: PT OT SLP Therapies (In Progress)       Topic: Occupational Therapy (In Progress)       Point: ADL training (Done)       Description:   Instruct learner(s) on proper safety adaptation and remediation techniques during self care or transfers.   Instruct in proper use of assistive devices.                  Learning Progress Summary             Patient Acceptance, E, VU by DERRICK at 9/2/2023  1145                         Point: Home exercise program (Not Started)       Description:   Instruct learner(s) on appropriate technique for monitoring, assisting and/or progressing therapeutic exercises/activities.                  Learner Progress:  Not documented in this visit.              Point: Precautions (Done)       Description:   Instruct learner(s) on prescribed precautions during self-care and functional transfers.                  Learning Progress Summary             Patient Acceptance, E, VU by AN at 9/2/2023 1145                         Point: Body mechanics (Done)       Description:   Instruct learner(s) on proper positioning and spine alignment during self-care, functional mobility activities and/or exercises.                  Learning Progress Summary             Patient Acceptance, E, VU by AN at 9/2/2023 1145                                         User Key       Initials Effective Dates Name Provider Type Discipline    AN 09/21/21 -  Maria Alejandra Johnston OT Occupational Therapist OT                  OT Recommendation and Plan  Planned Therapy Interventions (OT): activity tolerance training, adaptive equipment training, BADL retraining, functional balance retraining, patient/caregiver education/training, occupation/activity based interventions, transfer/mobility retraining, strengthening exercise  Therapy Frequency (OT): daily  Plan of Care Review  Plan of Care Reviewed With: patient  Progress: no change  Outcome Evaluation: Pt presents below her functional baseline with deficits including generalized weakness, impaired balance, increased assist with ADLs, and decreased functional endurance warranting skilled OT services. Pt performed bed mobility with supervision, and required Mod A x 2 for STS using RW. Limited by weakness and fatigue. [facility] SN[facility].     Time Calculation:   Evaluation Complexity (OT)  Review Occupational Profile/Medical/Therapy History Complexity: expanded/moderate  complexity  Assessment, Occupational Performance/Identification of Deficit Complexity: 3-5 performance deficits  Clinical Decision Making Complexity (OT): detailed assessment/moderate complexity  Overall Complexity of Evaluation (OT): moderate complexity     Time Calculation- OT       Row Name 09/02/23 1145             Time Calculation- OT    OT Start Time 1015  -AN      OT Received On 09/02/23  -AN      OT Goal Re-Cert Due Date 09/12/23  -AN         Untimed Charges    OT Eval/Re-eval Minutes 48  -AN         Total Minutes    Untimed Charges Total Minutes 48  -AN       Total Minutes 48  -AN                User Key  (r) = Recorded By, (t) = Taken By, (c) = Cosigned By      Initials Name Provider Type    AN Maria Alejandra Johnston OT Occupational Therapist                  Therapy Charges for Today       Code Description Service Date Service Provider Modifiers Qty    [id number] HC OT EVAL MOD COMPLEXITY 4 9/2/2023 Maria Alejandra Johnston OT GO 1                 Maria Alejandra Johnston OT  9/2/2023

## 2023-09-02 NOTE — PLAN OF CARE
Goal Outcome Evaluation:  Plan of Care Reviewed With: patient        Progress: no change  Outcome Evaluation: Pt presents below her functional baseline with deficits including generalized weakness, impaired balance, increased assist with ADLs, and decreased functional endurance warranting skilled OT services. Pt performed bed mobility with supervision, and required Mod A x 2 for STS using RW. Limited by weakness and fatigue. Rec SNF at [place].      Anticipated Discharge Disposition (OT): skilled nursing facility

## 2023-09-02 NOTE — PROGRESS NOTES
[facility] Medicine Services  PROGRESS NOTE    Patient Name: Sonia Bella  : 1965  MRN: [id number]    Date of Admission: 2023  Primary Care Physician: Provider, No Known    Subjective   Subjective     CC:  Follow-up dehydration    HPI:  Had 3 stools overnight.  Reports feeling tired and nauseated.  Has a headache.  No vomiting.  No abdominal pain.    ROS:  Gen- No fevers, chills  CV- No chest pain, palpitations  Resp- No cough, dyspnea  GI- No V, abd pain     Objective   Objective     Vital Signs:   Temp:  [97.5 °F (36.4 °C)-98.2 °F (36.8 °C)] 97.5 °F (36.4 °C)  Heart Rate:  [] 90  Resp:  [16-18] 18  BP: ()/(71-98) 101/80  Flow (L/min):  [3] 3     Physical Exam:  Constitutional: No acute distress, awake, alert, obese, laying in bed  HENT: NCAT, mucous membranes moist  Respiratory: Clear to auscultation bilaterally, respiratory effort normal, satting 97% on chronic 3 L  Cardiovascular: RRR, no murmurs, rubs, or gallops  Gastrointestinal: Normoactive bowel sounds, soft, nontender, nondistended  Musculoskeletal: No bilateral ankle edema  Psychiatric: Appropriate affect, cooperative  Neurologic: PERRL, symmetric facies, symmetric palate rise, tongue midline, moving all extremities well, speech clear  Skin: No rashes    Results Reviewed:  LAB RESULTS:      Lab 23  0109 23  1725 23  1101   WBC 5.46  --  5.47   HEMOGLOBIN 8.6*  --  9.5*   HEMATOCRIT 30.7*  --  34.6   PLATELETS 138*  --  150   NEUTROS ABS 4.33  --  4.44   IMMATURE GRANS (ABS) 0.03  --  0.02   LYMPHS ABS 0.21*  --  0.27*   MONOS ABS 0.82  --  0.68   EOS ABS 0.05  --  0.05   MCV 80.6  --  83.4   SED RATE  --   --  54*   CRP  --  5.18*  --    PROCALCITONIN  --   --  0.65*   LACTATE  --   --  1.8   PROTIME  --   --  22.6*         Lab 23  0444 23  0109 23  1725 23  1125 23  1108 23  1101   SODIUM  --   --  143  --   --  141   POTASSIUM 3.3* 3.2* 2.8*  --    --  2.9*   CHLORIDE  --   --  92*  --   --  89*   CO2  --   --  36.0*  --   --  35.0*   ANION GAP  --   --  15.0  --   --  17.0*   BUN  --   --  29*  --   --  30*   CREATININE  --   --  2.23* 2.60 2.60* 2.40*   EGFR  --   --  24.8*  --   --  22.7*   GLUCOSE  --   --  81  --   --  61*   CALCIUM  --   --  8.7  --   --  8.5*   MAGNESIUM 2.3 2.2 1.4*  --   --  1.1*   HEMOGLOBIN A1C  --   --   --   --   --  4.80   TSH  --   --   --   --   --  1.840         Lab 09/01/23  1101   TOTAL PROTEIN 6.7   ALBUMIN 3.6   GLOBULIN 3.1   ALT (SGPT) 33   AST (SGOT) 81*   BILIRUBIN 3.4*   ALK PHOS 133*         Lab 09/01/23  1101   PROBNP 21,177.0*   HSTROP T 41*   PROTIME 22.6*   INR 1.97*             Lab 09/01/23  1152   ABO TYPING O   RH TYPING Positive   ANTIBODY SCREEN Negative         Brief Urine Lab Results  (Last result in the past 365 days)        Color   Clarity   Blood   Leuk Est   Nitrite   Protein   CREAT   Urine HCG        09/01/23 1444 Yellow   Clear   Negative   Trace   Negative   30 mg/dL (1+)                   Microbiology Results Abnormal       Procedure Component Value - Date/Time    Respiratory Panel PCR w/COVID-19(SARS-CoV-2) ABBY/SAHRA/ELISABETH/PAD/COR/MAD/JABARI In-House, NP Swab in UTM/VTM, 3-4 HR TAT - Swab, Nasopharynx [[id number]]  (Normal) Collected: 09/01/23 1151    Lab Status: Final result Specimen: Swab from Nasopharynx Updated: 09/01/23 1327     ADENOVIRUS, PCR Not Detected     Coronavirus 229E Not Detected     Coronavirus HKU1 Not Detected     Coronavirus NL63 Not Detected     Coronavirus OC43 Not Detected     COVID19 Not Detected     Human Metapneumovirus Not Detected     Human Rhinovirus/Enterovirus Not Detected     Influenza A PCR Not Detected     Influenza B PCR Not Detected     Parainfluenza Virus 1 Not Detected     Parainfluenza Virus 2 Not Detected     Parainfluenza Virus 3 Not Detected     Parainfluenza Virus 4 Not Detected     RSV, PCR Not Detected     Bordetella pertussis pcr Not Detected     Bordetella  parapertussis PCR Not Detected     Chlamydophila pneumoniae PCR Not Detected     Mycoplasma pneumo by PCR Not Detected    Narrative:      In the setting of a positive respiratory panel with a viral infection PLUS a negative procalcitonin without other underlying concern for bacterial infection, consider observing off antibiotics or discontinuation of antibiotics and continue supportive care. If the respiratory panel is positive for atypical bacterial infection (Bordetella pertussis, Chlamydophila pneumoniae, or Mycoplasma pneumoniae), consider antibiotic de-escalation to target atypical bacterial infection.            CT Abdomen Pelvis Without Contrast    Result Date: 9/1/2023  CT ABDOMEN PELVIS WO CONTRAST Date of Exam: 9/1/2023 11:04 AM EDT Indication: gen weakness. Comparison: None available. Technique: Axial CT images were obtained of the abdomen and pelvis without the administration of contrast. Reconstructed coronal and sagittal images were also obtained. Automated exposure control and iterative construction methods were used. Findings: The lung apices demonstrate small right pleural effusion. There is notable four-chamber cardiac enlargement. The body wall soft tissues demonstrate anasarca. The osseous structures demonstrate no evidence of acute fracture or aggressive osseous lesion. The liver is enlarged. There is no suspicious focal hepatic, splenic or pancreatic lesion on limited noncontrast evaluation. There is a slightly hyperdense exophytic round finding involving the left kidney, favoring a hemorrhagic cyst. There is no evidence of hydronephrosis. Small and large bowel loops are nondilated. Diverticular changes are present without definite evidence of acute diverticulitis. Numerous hyperdense uterine findings are present, likely to represent fibroids but incompletely characterized. There is no free fluid or pneumoperitoneum. Hyperdense gallstones are present without inflammatory signs of cholecystitis.      Impression: Impression: There is marked cardiac enlargement, with diffuse anasarca and a small right pleural effusion. There is also hepatic enlargement and findings are concerning for heart failure. No acute findings are present in the abdomen and pelvis. An exophytic left renal finding is noted, favoring a benign hemorrhagic cyst, better characterized on recent ultrasound. Nodular hyperdense appearance of the uterus suggesting underlying fibroids. Electronically Signed: Ryley Busby MD  9/1/2023 11:19 AM EDT  Workstation ID: [id number]    CT Head Without Contrast    Result Date: 9/1/2023  CT HEAD WO CONTRAST Date of Exam: 9/1/2023 11:04 AM EDT Indication: gen weakness. Comparison: None available. Technique: Axial CT images were obtained of the head without contrast administration.  Automated exposure control and iterative construction methods were used. Findings: Gray-white differentiation is maintained and there is no evidence of intracranial hemorrhage, mass or mass effect. The ventricles are normal in size and configuration. The orbits are normal. The paranasal sinuses are grossly clear. The calvarium is intact.     Impression: Impression: No acute intracranial abnormality. Electronically Signed: Ryley Busby MD  9/1/2023 11:12 AM EDT  Workstation ID: [id number]     Results for orders placed during the hospital encounter of 02/15/23    Adult Transthoracic Echo Limited W/ Cont if Necessary Per Protocol    Interpretation Summary    The right atrial cavity is dilated.    Left ventricular ejection fraction appears to be 41 - 45%. The left ventricular cavity is mildly dilated. Left ventricular wall thickness is consistent with mild concentric hypertrophy. There is left ventricular global hypokinesis noted.    Moderate tricuspid valve regurgitation is present.    The left atrial cavity is mildly dilated. Saline test results are negative.      Current medications:  Scheduled Meds:budesonide-formoterol, 2 puff,  Inhalation, BID - RT  heparin (porcine), 5,000 Units, Subcutaneous, Q12H  nebivolol, 10 mg, Oral, Daily  pantoprazole, 40 mg, Oral, Daily  thiamine, 100 mg, Oral, Daily  tiotropium bromide monohydrate, 2 puff, Inhalation, Daily - RT      Continuous Infusions:dextrose 5 % and sodium chloride 0.45 % with KCl 20 mEq/L, 100 mL/hr, Last Rate: 100 mL/hr (09/02/23 0221)  O2, 3 L/min      PRN Meds:.  acetaminophen    Calcium Replacement - Follow Nurse / BPA Driven Protocol    dextrose    dextrose    glucagon (human recombinant)    Magnesium Standard Dose Replacement - Follow Nurse / BPA Driven Protocol    ondansetron    Phosphorus Replacement - Follow Nurse / BPA Driven Protocol    Potassium Replacement - Follow Nurse / BPA Driven Protocol    Assessment & Plan   Assessment & Plan     Active Hospital Problems    Diagnosis  POA    **Dehydration [E86.0]  Yes    Diarrhea [R19.7]  Yes    Hypomagnesemia [E83.42]  Yes    Hypokalemia [E87.6]  Yes    Stage III CKD [N18.30]  Yes    AoC HFpEF [I50.33]  Yes    Morbid obesity with BMI of 50.0-59.9, adult [E66.01, Z68.43]  Not Applicable    HTN (hypertension) [I10]  Yes      Resolved Hospital Problems   No resolved problems to display.        Brief Hospital Course to date:  Sonia Bella is a 58 y.o. female with COPD on 3 L chronically, A fib not on AC, HFrEF (EF of 40%), CAD, GERD, prior bleeding stomach ulcers, HTN, Obsestity, and CKD stage III, who presented to the ED with weakness, confusion, hypoglycemia that was treated.    This patient's problems and plans were partially entered by my partner and updated as appropriate by me 09/02/23.     Diarrhea  -GI PCR is pending    Hypomagnesemia  Hypokalemia  -replete    Hypoglycemia  Dehydration  -hopefully just due to starvation  -C peptide sent  -wean IVF as tolerated  -added thiamine given dextrose-containing IVF  -cortisol level added onto AM labs was 23.11     HFrEF, EF of 40%   A fib not on AC  Chest pain, resolved  --Not on ACE due  to angioedema  --Continue beta blocker at reduced dose with holding parameters  --EKG w A fib and no ST segment changes, troponin pending  --Reviewed EKG w cards  --Suspect related to lower BP at the time 85/66 w a fib with mild rvr (HR up to 120)  --Continue tele    Positive blood culture  --Blood culture 1 of 2 positive for GPC in clusters, biofire pending  --Repeat blood cultures pending  --Vancomycin, pharmacy to dose  --Could represent contamination, but given worsening hypotension, will cover for now and await biofire and repeat blood cultures     CKD  --Baseline creatinine 1.9-2.3    Deconditioning  --PT/OT consulted    COPD  Chronic hypoxia, on 3L continuously at home  --Continue home medications    Obesity - BMI 40.49    Expected Discharge Location and Transportation: home  Expected Discharge   Expected Discharge Date: 9/3/2023; Expected Discharge Time:      DVT prophylaxis:  Medical DVT prophylaxis orders are present.          CODE STATUS:   Code Status and Medical Interventions:   Ordered at: 09/01/23 1837     Code Status (Patient has no pulse and is not breathing):    CPR (Attempt to Resuscitate)     Medical Interventions (Patient has pulse or is breathing):    Full       Coleen Higgins MD  09/02/23

## 2023-09-02 NOTE — PLAN OF CARE
Goal Outcome Evaluation:  Plan of Care Reviewed With: patient        Progress: no change  Outcome Evaluation: Patient presents with weakness, balance impairments, and decreased endurance affecting functional mobility. Skilled IP PT services warranted to address deficits and support return to baseline. Recommend SNF at d/c.      Anticipated Discharge Disposition (PT): skilled nursing facility

## 2023-09-03 LAB
ANION GAP SERPL CALCULATED.3IONS-SCNC: 9 MMOL/L (ref 5–15)
BUN SERPL-MCNC: 30 MG/DL (ref 6–20)
BUN/CREAT SERPL: 12.2 (ref 7–25)
CALCIUM SPEC-SCNC: 8.5 MG/DL (ref 8.6–10.5)
CHLORIDE SERPL-SCNC: 93 MMOL/L (ref 98–107)
CO2 SERPL-SCNC: 33 MMOL/L (ref 22–29)
CREAT SERPL-MCNC: 2.46 MG/DL (ref 0.57–1)
DEPRECATED RDW RBC AUTO: 59.7 FL (ref 37–54)
EGFRCR SERPLBLD CKD-EPI 2021: 22.1 ML/MIN/1.73
ERYTHROCYTE [DISTWIDTH] IN BLOOD BY AUTOMATED COUNT: 20.9 % (ref 12.3–15.4)
GLUCOSE SERPL-MCNC: 81 MG/DL (ref 65–99)
HCT VFR BLD AUTO: 29.8 % (ref 34–46.6)
HGB BLD-MCNC: 8.3 G/DL (ref 12–15.9)
MAGNESIUM SERPL-MCNC: 2.2 MG/DL (ref 1.6–2.6)
MCH RBC QN AUTO: 22.7 PG (ref 26.6–33)
MCHC RBC AUTO-ENTMCNC: 27.9 G/DL (ref 31.5–35.7)
MCV RBC AUTO: 81.4 FL (ref 79–97)
PHOSPHATE SERPL-MCNC: 0.4 MG/DL (ref 2.5–4.5)
PLATELET # BLD AUTO: 123 10*3/MM3 (ref 140–450)
POTASSIUM SERPL-SCNC: 4.4 MMOL/L (ref 3.5–5.2)
QT INTERVAL: 300 MS
QTC INTERVAL: 415 MS
RBC # BLD AUTO: 3.66 10*6/MM3 (ref 3.77–5.28)
SODIUM SERPL-SCNC: 135 MMOL/L (ref 136–145)
TROPONIN T SERPL HS-MCNC: 49 NG/L
VANCOMYCIN SERPL-MCNC: 35.9 MCG/ML (ref 5–40)
WBC NRBC COR # BLD: 5.33 10*3/MM3 (ref 3.4–10.8)

## 2023-09-03 PROCEDURE — 80048 BASIC METABOLIC PNL TOTAL CA: CPT | Performed by: STUDENT IN AN ORGANIZED HEALTH CARE EDUCATION/TRAINING PROGRAM

## 2023-09-03 PROCEDURE — 99232 SBSQ HOSP IP/OBS MODERATE 35: CPT | Performed by: STUDENT IN AN ORGANIZED HEALTH CARE EDUCATION/TRAINING PROGRAM

## 2023-09-03 PROCEDURE — 83735 ASSAY OF MAGNESIUM: CPT | Performed by: STUDENT IN AN ORGANIZED HEALTH CARE EDUCATION/TRAINING PROGRAM

## 2023-09-03 PROCEDURE — 84100 ASSAY OF PHOSPHORUS: CPT | Performed by: STUDENT IN AN ORGANIZED HEALTH CARE EDUCATION/TRAINING PROGRAM

## 2023-09-03 PROCEDURE — 85027 COMPLETE CBC AUTOMATED: CPT | Performed by: STUDENT IN AN ORGANIZED HEALTH CARE EDUCATION/TRAINING PROGRAM

## 2023-09-03 PROCEDURE — 94799 UNLISTED PULMONARY SVC/PX: CPT

## 2023-09-03 PROCEDURE — 80202 ASSAY OF VANCOMYCIN: CPT

## 2023-09-03 PROCEDURE — 25010000002 HEPARIN (PORCINE) PER 1000 UNITS: Performed by: INTERNAL MEDICINE

## 2023-09-03 PROCEDURE — G0378 HOSPITAL OBSERVATION PER HR: HCPCS

## 2023-09-03 RX ORDER — SIMETHICONE 80 MG
80 TABLET,CHEWABLE ORAL 4 TIMES DAILY PRN
Status: DISCONTINUED | OUTPATIENT
Start: 2023-09-03 | End: 2023-09-12 | Stop reason: HOSPADM

## 2023-09-03 RX ORDER — SODIUM CHLORIDE 9 MG/ML
100 INJECTION, SOLUTION INTRAVENOUS CONTINUOUS
Status: ACTIVE | OUTPATIENT
Start: 2023-09-03 | End: 2023-09-03

## 2023-09-03 RX ORDER — NEBIVOLOL 5 MG/1
5 TABLET ORAL DAILY
Status: DISCONTINUED | OUTPATIENT
Start: 2023-09-03 | End: 2023-09-04

## 2023-09-03 RX ADMIN — TIOTROPIUM BROMIDE INHALATION SPRAY 2 PUFF: 3.12 SPRAY, METERED RESPIRATORY (INHALATION) at 07:47

## 2023-09-03 RX ADMIN — SODIUM PHOSPHATE, MONOBASIC, MONOHYDRATE AND SODIUM PHOSPHATE, DIBASIC, ANHYDROUS 15 MMOL: 142; 276 INJECTION, SOLUTION INTRAVENOUS at 17:52

## 2023-09-03 RX ADMIN — SODIUM PHOSPHATE, MONOBASIC, MONOHYDRATE AND SODIUM PHOSPHATE, DIBASIC, ANHYDROUS 15 MMOL: 142; 276 INJECTION, SOLUTION INTRAVENOUS at 10:39

## 2023-09-03 RX ADMIN — SODIUM PHOSPHATE, MONOBASIC, MONOHYDRATE AND SODIUM PHOSPHATE, DIBASIC, ANHYDROUS 15 MMOL: 142; 276 INJECTION, SOLUTION INTRAVENOUS at 13:30

## 2023-09-03 RX ADMIN — PANTOPRAZOLE SODIUM 40 MG: 40 TABLET, DELAYED RELEASE ORAL at 08:22

## 2023-09-03 RX ADMIN — SODIUM CHLORIDE 100 ML/HR: 9 INJECTION, SOLUTION INTRAVENOUS at 06:23

## 2023-09-03 RX ADMIN — SIMETHICONE 80 MG: 80 TABLET, CHEWABLE ORAL at 10:43

## 2023-09-03 RX ADMIN — NEBIVOLOL 5 MG: 5 TABLET ORAL at 11:08

## 2023-09-03 RX ADMIN — HEPARIN SODIUM 5000 UNITS: 5000 INJECTION, SOLUTION INTRAVENOUS; SUBCUTANEOUS at 20:55

## 2023-09-03 RX ADMIN — BUDESONIDE AND FORMOTEROL FUMARATE DIHYDRATE 2 PUFF: 160; 4.5 AEROSOL RESPIRATORY (INHALATION) at 07:47

## 2023-09-03 RX ADMIN — THIAMINE HCL TAB 100 MG 100 MG: 100 TAB at 08:22

## 2023-09-03 RX ADMIN — HEPARIN SODIUM 5000 UNITS: 5000 INJECTION, SOLUTION INTRAVENOUS; SUBCUTANEOUS at 08:22

## 2023-09-03 NOTE — PROGRESS NOTES
"    [facility] Medicine Services  PROGRESS NOTE    Patient Name: Sonia Bella  : 1965  MRN: [id number]    Date of Admission: 2023  Primary Care Physician: Provider, No Known    Subjective   Subjective     CC:  Follow-up dehydration    HPI:  Had 3 stools yesterday and 1 today.  No nausea or vomiting.  Has generalized abdominal discomfort.  No chest pain or shortness of breath.  Asking repeatedly why she feels \"so bad\".    ROS:  Gen- No fevers, chills  CV- No chest pain, palpitations  Resp- No cough, dyspnea  GI-as above    Objective   Objective     Vital Signs:   Temp:  [97.4 °F (36.3 °C)-98 °F (36.7 °C)] 97.4 °F (36.3 °C)  Heart Rate:  [] 134  Resp:  [18] 18  BP: (85-96)/(66-79) 95/70  Flow (L/min):  [3] 3     Physical Exam:  Constitutional: No acute distress, awake, alert, obese, laying in bed, moves and speaks slowly  HENT: NCAT, mucous membranes moist  Respiratory: Clear to auscultation bilaterally, respiratory effort normal, satting 98% on chronic 3 L  Cardiovascular: RRR, no murmurs, rubs, or gallops  Gastrointestinal: Normoactive bowel sounds, soft, nontender, nondistended  Musculoskeletal: No bilateral ankle edema  Psychiatric: Appropriate affect, cooperative  Neurologic: PERRL, symmetric facies, symmetric palate rise, tongue midline, moving all extremities well, speech clear  Skin: No rashes    Results Reviewed:  LAB RESULTS:      Lab 23  0514 23  0109 23  1725 23  1101   WBC 5.33 5.46  --  5.47   HEMOGLOBIN 8.3* 8.6*  --  9.5*   HEMATOCRIT 29.8* 30.7*  --  34.6   PLATELETS 123* 138*  --  150   NEUTROS ABS  --  4.33  --  4.44   IMMATURE GRANS (ABS)  --  0.03  --  0.02   LYMPHS ABS  --  0.21*  --  0.27*   MONOS ABS  --  0.82  --  0.68   EOS ABS  --  0.05  --  0.05   MCV 81.4 80.6  --  83.4   SED RATE  --   --   --  54*   CRP  --   --  5.18*  --    PROCALCITONIN  --   --   --  0.65*   LACTATE  --   --   --  1.8   PROTIME  --   --   --  22.6* "         Lab 09/03/23  0722 09/03/23  0514 09/02/23 1907 09/02/23  0444 09/02/23  0109 09/01/23  1725 09/01/23  1125 09/01/23  1108 09/01/23  1101   SODIUM  --  135*  --  142  --  143  --   --  141   POTASSIUM  --  4.4 4.3 3.3*  3.3* 3.2* 2.8*  --   --  2.9*   CHLORIDE  --  93*  --  93*  --  92*  --   --  89*   CO2  --  33.0*  --  33.0*  --  36.0*  --   --  35.0*   ANION GAP  --  9.0  --  16.0*  --  15.0  --   --  17.0*   BUN  --  30*  --  31*  --  29*  --   --  30*   CREATININE  --  2.46*  --  2.47*  --  2.23* 2.60 2.60* 2.40*   EGFR  --  22.1*  --  22.0*  --  24.8*  --   --  22.7*   GLUCOSE  --  81  --  97  --  81  --   --  61*   CALCIUM  --  8.5*  --  8.3*  --  8.7  --   --  8.5*   MAGNESIUM  --  2.2  --  2.3 2.2 1.4*  --   --  1.1*   PHOSPHORUS 0.4*  --   --   --   --   --   --   --   --    HEMOGLOBIN A1C  --   --   --   --   --   --   --   --  4.80   TSH  --   --   --   --   --   --   --   --  1.840         Lab 09/01/23  1101   TOTAL PROTEIN 6.7   ALBUMIN 3.6   GLOBULIN 3.1   ALT (SGPT) 33   AST (SGOT) 81*   BILIRUBIN 3.4*   ALK PHOS 133*         Lab 09/02/23 2353 09/02/23 2041 09/02/23 1907 09/01/23  1101   PROBNP  --   --   --  21,177.0*   HSTROP T 49* 65* 51* 41*   PROTIME  --   --   --  22.6*   INR  --   --   --  1.97*             Lab 09/01/23  1152   ABO TYPING O   RH TYPING Positive   ANTIBODY SCREEN Negative         Brief Urine Lab Results  (Last result in the past 365 days)        Color   Clarity   Blood   Leuk Est   Nitrite   Protein   CREAT   Urine HCG        09/01/23 1444 Yellow   Clear   Negative   Trace   Negative   30 mg/dL (1+)                   Microbiology Results Abnormal       Procedure Component Value - Date/Time    MRSA Screen, PCR (Inpatient) - Swab, Nares [[id number]]  (Normal) Collected: 09/02/23 1635    Lab Status: Final result Specimen: Swab from Nares Updated: 09/02/23 2050     MRSA PCR Negative    Narrative:      The negative predictive value of this diagnostic test is high and  should only be used to consider de-escalating anti-MRSA therapy. A positive result may indicate colonization with MRSA and must be correlated clinically.  MRSA Negative    Gastrointestinal Panel, PCR - Stool, Per Rectum [[id number]]  (Normal) Collected: 09/02/23 1551    Lab Status: Final result Specimen: Stool from Per Rectum Updated: 09/02/23 6153     Campylobacter Not Detected     Plesiomonas shigelloides Not Detected     Salmonella Not Detected     Vibrio Not Detected     Vibrio cholerae Not Detected     Yersinia enterocolitica Not Detected     Enteroaggregative E. coli (EAEC) Not Detected     Enteropathogenic E. coli (EPEC) Not Detected     Enterotoxigenic E. coli (ETEC) lt/st Not Detected     Shiga-like toxin-producing E. coli (STEC) stx1/stx2 Not Detected     Shigella/Enteroinvasive E. coli (EIEC) Not Detected     Cryptosporidium Not Detected     Cyclospora cayetanensis Not Detected     Entamoeba histolytica Not Detected     Giardia lamblia Not Detected     Adenovirus F40/41 Not Detected     Astrovirus Not Detected     Norovirus GI/GII Not Detected     Rotavirus A Not Detected     Sapovirus (I, II, IV or V) Not Detected    Blood Culture - Blood, Arm, Right [[id number]]  (Normal) Collected: 09/01/23 1152    Lab Status: Preliminary result Specimen: Blood from Arm, Right Updated: 09/02/23 1301     Blood Culture No growth at 24 hours    Respiratory Panel PCR w/COVID-19(SARS-CoV-2) ABBY/SAHRA/ELISABETH/PAD/COR/MAD/JABARI In-House, NP Swab in UTM/VTM, 3-4 HR TAT - Swab, Nasopharynx [[id number]]  (Normal) Collected: 09/01/23 1151    Lab Status: Final result Specimen: Swab from Nasopharynx Updated: 09/01/23 1327     ADENOVIRUS, PCR Not Detected     Coronavirus 229E Not Detected     Coronavirus HKU1 Not Detected     Coronavirus NL63 Not Detected     Coronavirus OC43 Not Detected     COVID19 Not Detected     Human Metapneumovirus Not Detected     Human Rhinovirus/Enterovirus Not Detected     Influenza A PCR Not Detected     Influenza  B PCR Not Detected     Parainfluenza Virus 1 Not Detected     Parainfluenza Virus 2 Not Detected     Parainfluenza Virus 3 Not Detected     Parainfluenza Virus 4 Not Detected     RSV, PCR Not Detected     Bordetella pertussis pcr Not Detected     Bordetella parapertussis PCR Not Detected     Chlamydophila pneumoniae PCR Not Detected     Mycoplasma pneumo by PCR Not Detected    Narrative:      In the setting of a positive respiratory panel with a viral infection PLUS a negative procalcitonin without other underlying concern for bacterial infection, consider observing off antibiotics or discontinuation of antibiotics and continue supportive care. If the respiratory panel is positive for atypical bacterial infection (Bordetella pertussis, Chlamydophila pneumoniae, or Mycoplasma pneumoniae), consider antibiotic de-escalation to target atypical bacterial infection.            CT Abdomen Pelvis Without Contrast    Result Date: 9/1/2023  CT ABDOMEN PELVIS WO CONTRAST Date of Exam: 9/1/2023 11:04 AM EDT Indication: gen weakness. Comparison: None available. Technique: Axial CT images were obtained of the abdomen and pelvis without the administration of contrast. Reconstructed coronal and sagittal images were also obtained. Automated exposure control and iterative construction methods were used. Findings: The lung apices demonstrate small right pleural effusion. There is notable four-chamber cardiac enlargement. The body wall soft tissues demonstrate anasarca. The osseous structures demonstrate no evidence of acute fracture or aggressive osseous lesion. The liver is enlarged. There is no suspicious focal hepatic, splenic or pancreatic lesion on limited noncontrast evaluation. There is a slightly hyperdense exophytic round finding involving the left kidney, favoring a hemorrhagic cyst. There is no evidence of hydronephrosis. Small and large bowel loops are nondilated. Diverticular changes are present without definite evidence  of acute diverticulitis. Numerous hyperdense uterine findings are present, likely to represent fibroids but incompletely characterized. There is no free fluid or pneumoperitoneum. Hyperdense gallstones are present without inflammatory signs of cholecystitis.     Impression: Impression: There is marked cardiac enlargement, with diffuse anasarca and a small right pleural effusion. There is also hepatic enlargement and findings are concerning for heart failure. No acute findings are present in the abdomen and pelvis. An exophytic left renal finding is noted, favoring a benign hemorrhagic cyst, better characterized on recent ultrasound. Nodular hyperdense appearance of the uterus suggesting underlying fibroids. Electronically Signed: Ryley Busby MD  9/1/2023 11:19 AM EDT  Workstation ID: [id number]    CT Head Without Contrast    Result Date: 9/1/2023  CT HEAD WO CONTRAST Date of Exam: 9/1/2023 11:04 AM EDT Indication: gen weakness. Comparison: None available. Technique: Axial CT images were obtained of the head without contrast administration.  Automated exposure control and iterative construction methods were used. Findings: Gray-white differentiation is maintained and there is no evidence of intracranial hemorrhage, mass or mass effect. The ventricles are normal in size and configuration. The orbits are normal. The paranasal sinuses are grossly clear. The calvarium is intact.     Impression: Impression: No acute intracranial abnormality. Electronically Signed: Ryley Busby MD  9/1/2023 11:12 AM EDT  Workstation ID: [id number]     Results for orders placed during the hospital encounter of 02/15/23    Adult Transthoracic Echo Limited W/ Cont if Necessary Per Protocol    Interpretation Summary    The right atrial cavity is dilated.    Left ventricular ejection fraction appears to be 41 - 45%. The left ventricular cavity is mildly dilated. Left ventricular wall thickness is consistent with mild concentric hypertrophy.  There is left ventricular global hypokinesis noted.    Moderate tricuspid valve regurgitation is present.    The left atrial cavity is mildly dilated. Saline test results are negative.      Current medications:  Scheduled Meds:budesonide-formoterol, 2 puff, Inhalation, BID - RT  heparin (porcine), 5,000 Units, Subcutaneous, Q12H  nebivolol, 5 mg, Oral, Daily  pantoprazole, 40 mg, Oral, Daily  sodium phosphate, 15 mmol, Intravenous, Q3H  thiamine, 100 mg, Oral, Daily  tiotropium bromide monohydrate, 2 puff, Inhalation, Daily - RT  vancomycin (dosing per levels), , Does not apply, Daily      Continuous Infusions:O2, 3 L/min  Pharmacy to dose vancomycin,   sodium chloride, 100 mL/hr, Last Rate: 100 mL/hr (09/03/23 0623)      PRN Meds:.  acetaminophen    Calcium Replacement - Follow Nurse / BPA Driven Protocol    dextrose    dextrose    glucagon (human recombinant)    ipratropium-albuterol    Magnesium Standard Dose Replacement - Follow Nurse / BPA Driven Protocol    ondansetron    Pharmacy to dose vancomycin    Phosphorus Replacement - Follow Nurse / BPA Driven Protocol    Potassium Replacement - Follow Nurse / BPA Driven Protocol    simethicone    Assessment & Plan   Assessment & Plan     Active Hospital Problems    Diagnosis  POA    **Dehydration [E86.0]  Yes    Diarrhea [R19.7]  Yes    Hypomagnesemia [E83.42]  Yes    Hypokalemia [E87.6]  Yes    Stage III CKD [N18.30]  Yes    AoC HFpEF [I50.33]  Yes    Morbid obesity with BMI of 50.0-59.9, adult [E66.01, Z68.43]  Not Applicable    HTN (hypertension) [I10]  Yes      Resolved Hospital Problems   No resolved problems to display.        Brief Hospital Course to date:  Sonia Bella is a 58 y.o. female with COPD on 3 L chronically, A fib not on AC, HFrEF (EF of 40%), CAD, GERD, prior bleeding stomach ulcers, HTN, Obsestity, and CKD stage III, who presented to the ED with weakness, confusion, hypoglycemia that was treated.    This patient's problems and plans were  partially entered by my partner and updated as appropriate by me 09/03/23.     Diarrhea  -GI PCR negative; C. difficile pending    Hypomagnesemia  Hypokalemia  Hypophosphatemia  -replete    Hypoglycemia, resolved  Dehydration  -Likely due to poor oral intake  -C peptide sent  -wean IVF as tolerated  -Thiamine  -cortisol level added onto AM labs was 23.11     HFrEF, EF of 40%   A fib not on AC  Chest pain, resolved  --Not on ACE due to angioedema  --Continue beta blocker at reduced dose with holding parameters  --EKG w A fib and no ST segment changes, troponin pending  --Reviewed EKG w cards  --Suspect related to lower BP at the time 85/66 w a fib with mild rvr (HR up to 120)  --Continue tele    Positive blood culture with staph species  --Blood culture 1 of 2 positive for GPC in clusters, biofire pending  --Repeat blood cultures pending  --Vancomycin, pharmacy to dose  --Could represent contamination, but given hypotension, will cover for now and repeat blood cultures     CKD  --Baseline creatinine 1.9-2.3    Deconditioning  --PT/OT consulted    COPD  Chronic hypoxia, on 3L continuously at home  --Continue home medications    Obesity - BMI 40.49    Expected Discharge Location and Transportation: SNF, patient amenable  Expected Discharge   Expected Discharge Date: 9/5/2023; Expected Discharge Time:      DVT prophylaxis:  Medical DVT prophylaxis orders are present.     AM-PAC 6 Clicks Score (PT): 13 (09/02/23 2000)    CODE STATUS:   Code Status and Medical Interventions:   Ordered at: 09/01/23 9808     Code Status (Patient has no pulse and is not breathing):    CPR (Attempt to Resuscitate)     Medical Interventions (Patient has pulse or is breathing):    Full       Coleen Higgins MD  09/03/23

## 2023-09-03 NOTE — PROGRESS NOTES
"Pharmacy Consult - Vancomycin Dosing    Sonia Bella is a 58 y.o. female receiving vancomycin therapy.    Indication: Positive blood culture  Consulting Provider: Hospitalist  ID Consult: No    Goal Trough: 15-20 mcg/mL    Current Antimicrobial Therapy  Anti-Infectives (From admission, onward)      Ordered     Dose/Rate Route Frequency Start Stop    09/03/23 0726  vancomycin (dosing per levels)        Ordering Provider: Rajiv Loya, PharmD     Does not apply Daily 09/03/23 1200 09/07/23 0859    09/02/23 1643  vancomycin 2500 mg/500 mL 0.9% NS IVPB ([facility])        Ordering Provider: Iliana Sol RPH    2,500 mg  over 150 Minutes Intravenous Once 09/02/23 1700 09/03/23 0024    09/02/23 1600  Pharmacy to dose vancomycin        Ordering Provider: Coleen Higgins MD     Does not apply Continuous PRN 09/02/23 1559 09/07/23 1558    09/01/23 1520  cefTRIAXone (ROCEPHIN) 2000 mg/100 mL 0.9% NS IVPB ([facility])        Ordering Provider: Issac Rashid MD    2,000 mg  over 30 Minutes Intravenous Once 09/01/23 1536 09/01/23 1638          Allergies  Allergies as of 09/01/2023 - Reviewed 09/01/2023   Allergen Reaction Noted    Ace inhibitors Angioedema 08/19/2022     Labs  Results from last 7 days   Lab Units 09/03/23  0514 09/02/23  0444 09/01/23  1725   BUN mg/dL 30* 31* 29*   CREATININE mg/dL 2.46* 2.47* 2.23*     Results from last 7 days   Lab Units 09/03/23  0514 09/02/23  0109 09/01/23  1101   WBC 10*3/mm3 5.33 5.46 5.47     Evaluation of Dosing     Last Dose Received in the ED/Outside Facility: n/a  Is Patient on Dialysis or Renal Replacement: no    Ht - 172.7 cm (67.99\")  Wt - 121 kg (267 lb 1.6 oz)    Estimated Creatinine Clearance: 33.7 mL/min (A) (by C-G formula based on SCr of 2.46 mg/dL (H)).    Intake & Output (last 3 days)         08/31 0701  09/01 0700 09/01 0701 09/02 0700 09/02 0701 09/03 0700 09/03 0701 09/04 0700    I.V. (mL/kg)  1140 (9.4)      IV Piggyback  500      Total Intake(mL/kg)  1640 " (13.6)      Urine (mL/kg/hr)   350 (0.1)     Stool   0     Total Output   350     Net  +1640 -350             Stool Unmeasured Occurrence  3 x 1 x           Microbiology and Radiology  Microbiology Results (last 10 days)       Procedure Component Value - Date/Time    MRSA Screen, PCR (Inpatient) - Swab, Nares [[id number]]  (Normal) Collected: 09/02/23 1635    Lab Status: Final result Specimen: Swab from Nares Updated: 09/02/23 2050     MRSA PCR Negative    Narrative:      The negative predictive value of this diagnostic test is high and should only be used to consider de-escalating anti-MRSA therapy. A positive result may indicate colonization with MRSA and must be correlated clinically.  MRSA Negative    Gastrointestinal Panel, PCR - Stool, Per Rectum [[id number]]  (Normal) Collected: 09/02/23 1551    Lab Status: Final result Specimen: Stool from Per Rectum Updated: 09/02/23 1735     Campylobacter Not Detected     Plesiomonas shigelloides Not Detected     Salmonella Not Detected     Vibrio Not Detected     Vibrio cholerae Not Detected     Yersinia enterocolitica Not Detected     Enteroaggregative E. coli (EAEC) Not Detected     Enteropathogenic E. coli (EPEC) Not Detected     Enterotoxigenic E. coli (ETEC) lt/st Not Detected     Shiga-like toxin-producing E. coli (STEC) stx1/stx2 Not Detected     Shigella/Enteroinvasive E. coli (EIEC) Not Detected     Cryptosporidium Not Detected     Cyclospora cayetanensis Not Detected     Entamoeba histolytica Not Detected     Giardia lamblia Not Detected     Adenovirus F40/41 Not Detected     Astrovirus Not Detected     Norovirus GI/GII Not Detected     Rotavirus A Not Detected     Sapovirus (I, II, IV or V) Not Detected    Urine Culture - Urine, Urine, Catheter [[id number]]  (Abnormal) Collected: 09/01/23 1444    Lab Status: Final result Specimen: Urine, Catheter Updated: 09/02/23 1904     Urine Culture >100,000 CFU/mL Streptococcus, Alpha Hemolytic     Comment: This organism  commonly represents colonization or contamination. No further workup unless requested by provider.    Based on laboratory diagnosis criteria, these organisms are common urogenital commensal alexsander and have not been associated with urinary tract infections; clinical correlation is recommended.       Narrative:      Colonization of the urinary tract without infection is common. Treatment is discouraged unless the patient is symptomatic, pregnant, or undergoing an invasive urologic procedure.    Blood Culture - Blood, Arm, Right [[id number]]  (Normal) Collected: 09/01/23 1152    Lab Status: Preliminary result Specimen: Blood from Arm, Right Updated: 09/03/23 1302     Blood Culture No growth at 2 days    Respiratory Panel PCR w/COVID-19(SARS-CoV-2) [facility]/SAH[facility]/ELISABETH/PAD/[facility]/JABARI In-House, NP Swab in UTM/VTM, 3-4 HR TAT - Swab, Nasopharynx [[id number]]  (Normal) Collected: 09/01/23 1151    Lab Status: Final result Specimen: Swab from Nasopharynx Updated: 09/01/23 1327     ADENOVIRUS, PCR Not Detected     Coronavirus 229E Not Detected     Coronavirus HKU1 Not Detected     Coronavirus NL63 Not Detected     Coronavirus OC43 Not Detected     COVID19 Not Detected     Human Metapneumovirus Not Detected     Human Rhinovirus/Enterovirus Not Detected     Influenza A PCR Not Detected     Influenza B PCR Not Detected     Parainfluenza Virus 1 Not Detected     Parainfluenza Virus 2 Not Detected     Parainfluenza Virus 3 Not Detected     Parainfluenza Virus 4 Not Detected     RSV, PCR Not Detected     Bordetella pertussis pcr Not Detected     Bordetella parapertussis PCR Not Detected     Chlamydophila pneumoniae PCR Not Detected     Mycoplasma pneumo by PCR Not Detected    Narrative:      In the setting of a positive respiratory panel with a viral infection PLUS a negative procalcitonin without other underlying concern for bacterial infection, consider observing off antibiotics or discontinuation of antibiotics and continue supportive  care. If the respiratory panel is positive for atypical bacterial infection (Bordetella pertussis, Chlamydophila pneumoniae, or Mycoplasma pneumoniae), consider antibiotic de-escalation to target atypical bacterial infection.    Blood Culture - Blood, Arm, Left [[id number]]  (Abnormal) Collected: 09/01/23 1140    Lab Status: Preliminary result Specimen: Blood from Arm, Left Updated: 09/02/23 1554     Blood Culture Abnormal Stain     Gram Stain Aerobic Bottle Gram positive cocci in clusters    Blood Culture ID, PCR - Blood, Arm, Left [[id number]]  (Abnormal) Collected: 09/01/23 1140    Lab Status: Final result Specimen: Blood from Arm, Left Updated: 09/02/23 1710     BCID, PCR Staph spp, not aureus or lugdunensis. Identification by BCID2 PCR.     BOTTLE TYPE Aerobic Bottle          Vancomycin Levels:  Results from last 7 days   Lab Units 09/03/23  0722   VANCOMYCIN RM mcg/mL 35.90             Assessment/Plan:   1. Pharmacy dosing vancomycin for positive blood culture (GPC in clusters in 1 of 2 bottles), goal trough 15-20.  2. Patient with slight ELIANE on CKD. Received loading dose of vancomycin 2500mg (~20 mg/kg) IV x1 last night at 2154. Random vancomycin level this morning was elevated at 35.9 mcg/mL (~9.5 hour level). Will hold vancomycin today and repeat level tomorrow morning with AM labs to reassess and re-dose if needed.  3. Pharmacy will continue to monitor and adjust vancomycin dose as necessary based on renal function, cultures, labs, and clinical status.    Thank you,  Rajiv Loya, PharmD, BCPS  9/3/2023  13:42 EDT

## 2023-09-04 ENCOUNTER — APPOINTMENT (OUTPATIENT)
Dept: GENERAL RADIOLOGY | Facility: HOSPITAL | Age: 58
DRG: 641 | End: 2023-09-04
Payer: MEDICARE

## 2023-09-04 LAB
ANION GAP SERPL CALCULATED.3IONS-SCNC: 15 MMOL/L (ref 5–15)
BACTERIA SPEC AEROBE CULT: ABNORMAL
BUN SERPL-MCNC: 29 MG/DL (ref 6–20)
BUN/CREAT SERPL: 11 (ref 7–25)
CALCIUM SPEC-SCNC: 8.5 MG/DL (ref 8.6–10.5)
CHLORIDE SERPL-SCNC: 96 MMOL/L (ref 98–107)
CO2 SERPL-SCNC: 28 MMOL/L (ref 22–29)
CREAT SERPL-MCNC: 2.63 MG/DL (ref 0.57–1)
DEPRECATED RDW RBC AUTO: 61.2 FL (ref 37–54)
EGFRCR SERPLBLD CKD-EPI 2021: 20.4 ML/MIN/1.73
ERYTHROCYTE [DISTWIDTH] IN BLOOD BY AUTOMATED COUNT: 21.5 % (ref 12.3–15.4)
GLUCOSE SERPL-MCNC: 77 MG/DL (ref 65–99)
GRAM STN SPEC: ABNORMAL
HCT VFR BLD AUTO: 28.7 % (ref 34–46.6)
HGB BLD-MCNC: 8.1 G/DL (ref 12–15.9)
ISOLATED FROM: ABNORMAL
MAGNESIUM SERPL-MCNC: 1.9 MG/DL (ref 1.6–2.6)
MCH RBC QN AUTO: 22.9 PG (ref 26.6–33)
MCHC RBC AUTO-ENTMCNC: 28.2 G/DL (ref 31.5–35.7)
MCV RBC AUTO: 81.1 FL (ref 79–97)
PHOSPHATE SERPL-MCNC: 1.7 MG/DL (ref 2.5–4.5)
PHOSPHATE SERPL-MCNC: 2.3 MG/DL (ref 2.5–4.5)
PHOSPHATE SERPL-MCNC: 2.8 MG/DL (ref 2.5–4.5)
PHOSPHATE SERPL-MCNC: 2.9 MG/DL (ref 2.5–4.5)
PLATELET # BLD AUTO: 130 10*3/MM3 (ref 140–450)
POTASSIUM SERPL-SCNC: 4.7 MMOL/L (ref 3.5–5.2)
RBC # BLD AUTO: 3.54 10*6/MM3 (ref 3.77–5.28)
SODIUM SERPL-SCNC: 139 MMOL/L (ref 136–145)
VANCOMYCIN SERPL-MCNC: 21.9 MCG/ML (ref 5–40)
WBC NRBC COR # BLD: 5.37 10*3/MM3 (ref 3.4–10.8)

## 2023-09-04 PROCEDURE — 80048 BASIC METABOLIC PNL TOTAL CA: CPT | Performed by: STUDENT IN AN ORGANIZED HEALTH CARE EDUCATION/TRAINING PROGRAM

## 2023-09-04 PROCEDURE — 94799 UNLISTED PULMONARY SVC/PX: CPT

## 2023-09-04 PROCEDURE — 25010000002 HEPARIN (PORCINE) PER 1000 UNITS: Performed by: INTERNAL MEDICINE

## 2023-09-04 PROCEDURE — 85027 COMPLETE CBC AUTOMATED: CPT | Performed by: STUDENT IN AN ORGANIZED HEALTH CARE EDUCATION/TRAINING PROGRAM

## 2023-09-04 PROCEDURE — 94664 DEMO&/EVAL PT USE INHALER: CPT

## 2023-09-04 PROCEDURE — 71045 X-RAY EXAM CHEST 1 VIEW: CPT

## 2023-09-04 PROCEDURE — 84100 ASSAY OF PHOSPHORUS: CPT | Performed by: STUDENT IN AN ORGANIZED HEALTH CARE EDUCATION/TRAINING PROGRAM

## 2023-09-04 PROCEDURE — 99232 SBSQ HOSP IP/OBS MODERATE 35: CPT | Performed by: STUDENT IN AN ORGANIZED HEALTH CARE EDUCATION/TRAINING PROGRAM

## 2023-09-04 PROCEDURE — 99222 1ST HOSP IP/OBS MODERATE 55: CPT | Performed by: INTERNAL MEDICINE

## 2023-09-04 PROCEDURE — 80202 ASSAY OF VANCOMYCIN: CPT

## 2023-09-04 PROCEDURE — 94761 N-INVAS EAR/PLS OXIMETRY MLT: CPT

## 2023-09-04 PROCEDURE — 25010000002 DIGOXIN PER 500 MCG: Performed by: PHYSICIAN ASSISTANT

## 2023-09-04 PROCEDURE — G0378 HOSPITAL OBSERVATION PER HR: HCPCS

## 2023-09-04 PROCEDURE — 83735 ASSAY OF MAGNESIUM: CPT | Performed by: STUDENT IN AN ORGANIZED HEALTH CARE EDUCATION/TRAINING PROGRAM

## 2023-09-04 RX ORDER — METOPROLOL TARTRATE 5 MG/5ML
2.5 INJECTION INTRAVENOUS EVERY 6 HOURS PRN
Status: DISCONTINUED | OUTPATIENT
Start: 2023-09-04 | End: 2023-09-12 | Stop reason: HOSPADM

## 2023-09-04 RX ORDER — DIGOXIN 0.25 MG/ML
250 INJECTION INTRAMUSCULAR; INTRAVENOUS ONCE
Status: COMPLETED | OUTPATIENT
Start: 2023-09-04 | End: 2023-09-04

## 2023-09-04 RX ORDER — NEBIVOLOL 5 MG/1
2.5 TABLET ORAL DAILY
Status: DISCONTINUED | OUTPATIENT
Start: 2023-09-04 | End: 2023-09-12 | Stop reason: HOSPADM

## 2023-09-04 RX ADMIN — THIAMINE HCL TAB 100 MG 100 MG: 100 TAB at 08:52

## 2023-09-04 RX ADMIN — DIGOXIN 250 MCG: 0.25 INJECTION INTRAMUSCULAR; INTRAVENOUS at 10:39

## 2023-09-04 RX ADMIN — BUDESONIDE AND FORMOTEROL FUMARATE DIHYDRATE 2 PUFF: 160; 4.5 AEROSOL RESPIRATORY (INHALATION) at 07:40

## 2023-09-04 RX ADMIN — SIMETHICONE 80 MG: 80 TABLET, CHEWABLE ORAL at 08:56

## 2023-09-04 RX ADMIN — SODIUM PHOSPHATE, MONOBASIC, MONOHYDRATE AND SODIUM PHOSPHATE, DIBASIC, ANHYDROUS 15 MMOL: 142; 276 INJECTION, SOLUTION INTRAVENOUS at 05:38

## 2023-09-04 RX ADMIN — TIOTROPIUM BROMIDE INHALATION SPRAY 2 PUFF: 3.12 SPRAY, METERED RESPIRATORY (INHALATION) at 07:40

## 2023-09-04 RX ADMIN — SODIUM PHOSPHATE, MONOBASIC, MONOHYDRATE AND SODIUM PHOSPHATE, DIBASIC, ANHYDROUS 15 MMOL: 142; 276 INJECTION, SOLUTION INTRAVENOUS at 12:04

## 2023-09-04 RX ADMIN — SODIUM CHLORIDE 500 ML: 9 INJECTION, SOLUTION INTRAVENOUS at 08:52

## 2023-09-04 RX ADMIN — APIXABAN 5 MG: 5 TABLET, FILM COATED ORAL at 13:30

## 2023-09-04 RX ADMIN — SODIUM CHLORIDE 1000 ML: 9 INJECTION, SOLUTION INTRAVENOUS at 10:02

## 2023-09-04 RX ADMIN — HEPARIN SODIUM 5000 UNITS: 5000 INJECTION, SOLUTION INTRAVENOUS; SUBCUTANEOUS at 08:51

## 2023-09-04 RX ADMIN — SODIUM CHLORIDE 500 ML: 9 INJECTION, SOLUTION INTRAVENOUS at 06:10

## 2023-09-04 RX ADMIN — BUDESONIDE AND FORMOTEROL FUMARATE DIHYDRATE 2 PUFF: 160; 4.5 AEROSOL RESPIRATORY (INHALATION) at 19:05

## 2023-09-04 RX ADMIN — PANTOPRAZOLE SODIUM 40 MG: 40 TABLET, DELAYED RELEASE ORAL at 08:51

## 2023-09-04 RX ADMIN — APIXABAN 5 MG: 5 TABLET, FILM COATED ORAL at 21:41

## 2023-09-04 NOTE — PROGRESS NOTES
"                  Clinical Nutrition   Nutrition Support Assessment  Reason for Visit: MST score 2+, Unintentional weight loss, Reduced oral intake      Patient Name: Sonia Bella  YOB: 1965  MRN: [id number]  Date of Encounter: 09/04/23 14:03 EDT  Admission date: 9/1/2023    Comments:    Ordered Boost+ BID; encourage po intake.     RD to complete NFPE as able.     Nutrition Assessment   Admission Diagnosis:  Dehydration [E86.0]      Problem List:    Dehydration    HTN (hypertension)    Morbid obesity with BMI of 50.0-59.9, adult    AoC HFpEF    Stage III CKD    Diarrhea    Hypomagnesemia    Hypokalemia        PMH:   She  has a past medical history of Bleeding ulcer, COPD (chronic obstructive pulmonary disease), Coronary artery disease, GERD (gastroesophageal reflux disease), History of stomach ulcers, Hypertension, and Stage III CKD (02/15/2023).    PSH:  She  has no past surgical history on file.    Applicable Nutrition Concerns:   Skin:  Oral:  GI:    Applicable Interval History:         Reported/Observed/Food/Nutrition Related History:     RD spoke w/pt, resting in bed during visit. Pt reports poor po intake, eating ~50-75% of usual x 1 month PTA 2/2 to lack of appetite. Pt reports wt loss though unsure of amount. Pt states [id number], unable to verify this wt in the past yr though pt has had non-significant wt loss. Pt tells me appetite is improving, is agreeable to ONS. NKFA.     Anthropometrics     Flowsheet Rows      Flowsheet Row First Filed Value   Admission Height 172.7 cm (68\") Documented at 09/01/2023 1045   Admission Weight 159 kg (350 lb) Documented at 09/01/2023 1045              Height: Height: 172.7 cm (67.99\")  Last Filed Weight: Weight: 128 kg (281 lb 4.8 oz) (09/04/23 0515)  Method: Weight Method: Bed scale  BMI: BMI (Calculated): 42.8  BMI classification: Obese Class III extreme obesity: > or equal to 40kg/m2    UBW:  350lbs per pt report  Weight change:   21lb/7% wt loss x 1 " month   Weight       Weight (kg) Weight (lbs) Weight Method Visit Report   9/30/2022 137.1 kg (H)  302 lb 4 oz (H)  Bed scale      147.419 kg (H)  325 lb (H)  Stated     10/1/2022 136.986 kg (H)  302 lb (H)       137 kg (H)  302 lb 0.5 oz (H)      10/4/2022 141.205 kg (H)  311 lb 4.8 oz (H)  Bed scale     11/23/2022 141.522 kg (H)  312 lb (H)   --    2/15/2023 151.955 kg (H)  335 lb (H)  Stated     2/16/2023 140.2 kg (H)  309 lb 1.4 oz (H)  Bed scale     2/18/2023 133.63 kg  294 lb 9.6 oz  Bed scale     2/19/2023 127.007 kg  280 lb  Bed scale     2/20/2023 126.1 kg  278 lb       126.145 kg  278 lb 1.6 oz      2/21/2023 124.785 kg  275 lb 1.6 oz      2/22/2023 121.428 kg  267 lb 11.2 oz  Bed scale     2/23/2023 120.203 kg  265 lb  Bed scale     2/25/2023 158.759 kg (H)  350 lb (H)      8/22/2023 158.759 kg (H)  350 lb (H)  Estimated     9/1/2023 158.759 kg (H)  350 lb (H)  Stated     9/2/2023 120.748 kg  266 lb 3.2 oz  Bed scale     9/3/2023 121.156 kg  267 lb 1.6 oz  Bed scale     9/4/2023 127.597 kg  281 lb 4.8 oz  Bed scale        (H) High  Nutrition Focused Physical Exam     Date:   9/4      Unable to perform exam due to: Patient/family declined    Current Nutrition Prescription   PO: Diet: Regular/House Diet; Texture: Regular Texture (IDDSI 7); Fluid Consistency: Thin (IDDSI 0)  Oral Nutrition Supplement:   Intake: 100%/25% x 2 meals documented    Nutrition Diagnosis   Date:  9/4            Updated:    Problem Predicted suboptimal energy intake   Etiology Clinical condition/loss of appetite   Signs/Symptoms Pt reports eating 50-75% of usual x 1 month PTA   Status:     Goal:   General: Nutrition to support treatment  PO: Increase intake  EN/PN: N/A    Nutrition Intervention      Follow treatment progress, Care plan reviewed, Encourage intake, Supplement provided    Ordered Boost+ BID (chocolate)    Monitoring/Evaluation:   Per protocol, PO intake, Supplement intake, Weight, GI status      Antonella Rodriguez RD  Time  Spent: 25

## 2023-09-04 NOTE — PROGRESS NOTES
[facility] Medicine Services  PROGRESS NOTE    Patient Name: Sonia Bella  : 1965  MRN: [id number]    Date of Admission: 2023  Primary Care Physician: Provider, No Known    Subjective   Subjective     CC:  Follow-up dehydration    HPI:  Reported shortness of breath overnight and being upset with overnight staff for not turning her up from 3 to 4 L, and because she feels like she needs 4 L all the time.  Hypotensive overnight with systolic blood pressures in the 70-80s in setting of A fib w/ RVR. Given 2L IVF.     ROS:  Gen- No fevers, chills  CV- No chest pain, +palpitations  Resp- No cough, +dyspnea  GI-as above    Objective   Objective     Vital Signs:   Temp:  [97.2 °F (36.2 °C)-98.4 °F (36.9 °C)] 98.4 °F (36.9 °C)  Heart Rate:  [100-140] 125  Resp:  [16-18] 16  BP: ()/(58-87) 85/58  Flow (L/min):  [3] 3     Physical Exam:  Constitutional: No acute distress, awake, alert, obese, laying in bed, moves and speaks slowly  HENT: NCAT, mucous membranes moist  Respiratory: Clear to auscultation bilaterally, respiratory effort normal, satting 98% on chronic 3 L  Cardiovascular: IRIR, , no murmurs, rubs, or gallops  Gastrointestinal: Normoactive bowel sounds, soft, nontender, nondistended  Musculoskeletal: No bilateral ankle edema  Psychiatric: Appropriate affect, cooperative  Neurologic: PERRL, symmetric facies, symmetric palate rise, tongue midline, moving all extremities well, speech clear, oriented to self, [facility]   Skin: No rashes    Results Reviewed:  LAB RESULTS:      Lab 23  0200 23  0514 23  0109 23  1725 23  1101   WBC 5.37 5.33 5.46  --  5.47   HEMOGLOBIN 8.1* 8.3* 8.6*  --  9.5*   HEMATOCRIT 28.7* 29.8* 30.7*  --  34.6   PLATELETS 130* 123* 138*  --  150   NEUTROS ABS  --   --  4.33  --  4.44   IMMATURE GRANS (ABS)  --   --  0.03  --  0.02   LYMPHS ABS  --   --  0.21*  --  0.27*   MONOS ABS  --   --  0.82  --   0.68   EOS ABS  --   --  0.05  --  0.05   MCV 81.1 81.4 80.6  --  83.4   SED RATE  --   --   --   --  54*   CRP  --   --   --  5.18*  --    PROCALCITONIN  --   --   --   --  0.65*   LACTATE  --   --   --   --  1.8   PROTIME  --   --   --   --  22.6*           Lab 09/04/23  0200 09/03/23  0722 09/03/23  0514 09/02/23  1907 09/02/23  0444 09/02/23  0109 09/01/23  1725 09/01/23  1125 09/01/23  1108 09/01/23  1101   SODIUM 139  --  135*  --  142  --  143  --   --  141   POTASSIUM 4.7  --  4.4 4.3 3.3*  3.3* 3.2* 2.8*  --   --  2.9*   CHLORIDE 96*  --  93*  --  93*  --  92*  --   --  89*   CO2 28.0  --  33.0*  --  33.0*  --  36.0*  --   --  35.0*   ANION GAP 15.0  --  9.0  --  16.0*  --  15.0  --   --  17.0*   BUN 29*  --  30*  --  31*  --  29*  --   --  30*   CREATININE 2.63*  --  2.46*  --  2.47*  --  2.23* 2.60   < > 2.40*   EGFR 20.4*  --  22.1*  --  22.0*  --  24.8*  --   --  22.7*   GLUCOSE 77  --  81  --  97  --  81  --   --  61*   CALCIUM 8.5*  --  8.5*  --  8.3*  --  8.7  --   --  8.5*   MAGNESIUM 1.9  --  2.2  --  2.3 2.2 1.4*  --   --  1.1*   PHOSPHORUS 1.7* 0.4*  --   --   --   --   --   --   --   --    HEMOGLOBIN A1C  --   --   --   --   --   --   --   --   --  4.80   TSH  --   --   --   --   --   --   --   --   --  1.840    < > = values in this interval not displayed.           Lab 09/01/23  1101   TOTAL PROTEIN 6.7   ALBUMIN 3.6   GLOBULIN 3.1   ALT (SGPT) 33   AST (SGOT) 81*   BILIRUBIN 3.4*   ALK PHOS 133*           Lab 09/02/23  2353 09/02/23  2041 09/02/23  1907 09/01/23  1101   PROBNP  --   --   --  21,177.0*   HSTROP T 49* 65* 51* 41*   PROTIME  --   --   --  22.6*   INR  --   --   --  1.97*               Lab 09/01/23  1152   ABO TYPING O   RH TYPING Positive   ANTIBODY SCREEN Negative           Brief Urine Lab Results  (Last result in the past 365 days)        Color   Clarity   Blood   Leuk Est   Nitrite   Protein   CREAT   Urine HCG        09/01/23 1444 Yellow   Clear   Negative   Trace    Negative   30 mg/dL (1+)                   Microbiology Results Abnormal       Procedure Component Value - Date/Time    Blood Culture - Blood, Arm, Left [[id number]]  (Normal) Collected: 09/02/23 1907    Lab Status: Preliminary result Specimen: Blood from Arm, Left Updated: 09/03/23 2045     Blood Culture No growth at 24 hours    Blood Culture - Blood, Hand, Left [[id number]]  (Normal) Collected: 09/02/23 1907    Lab Status: Preliminary result Specimen: Blood from Hand, Left Updated: 09/03/23 2045     Blood Culture No growth at 24 hours    Blood Culture - Blood, Arm, Right [[id number]]  (Normal) Collected: 09/01/23 1152    Lab Status: Preliminary result Specimen: Blood from Arm, Right Updated: 09/03/23 1302     Blood Culture No growth at 2 days    MRSA Screen, PCR (Inpatient) - Swab, Nares [[id number]]  (Normal) Collected: 09/02/23 1635    Lab Status: Final result Specimen: Swab from Nares Updated: 09/02/23 2050     MRSA PCR Negative    Narrative:      The negative predictive value of this diagnostic test is high and should only be used to consider de-escalating anti-MRSA therapy. A positive result may indicate colonization with MRSA and must be correlated clinically.  MRSA Negative    Gastrointestinal Panel, PCR - Stool, Per Rectum [[id number]]  (Normal) Collected: 09/02/23 1551    Lab Status: Final result Specimen: Stool from Per Rectum Updated: 09/02/23 1735     Campylobacter Not Detected     Plesiomonas shigelloides Not Detected     Salmonella Not Detected     Vibrio Not Detected     Vibrio cholerae Not Detected     Yersinia enterocolitica Not Detected     Enteroaggregative E. coli (EAEC) Not Detected     Enteropathogenic E. coli (EPEC) Not Detected     Enterotoxigenic E. coli (ETEC) lt/st Not Detected     Shiga-like toxin-producing E. coli (STEC) stx1/stx2 Not Detected     Shigella/Enteroinvasive E. coli (EIEC) Not Detected     Cryptosporidium Not Detected     Cyclospora cayetanensis Not Detected     Entamoeba  histolytica Not Detected     Giardia lamblia Not Detected     Adenovirus F40/41 Not Detected     Astrovirus Not Detected     Norovirus GI/GII Not Detected     Rotavirus A Not Detected     Sapovirus (I, II, IV or V) Not Detected    Respiratory Panel PCR w/COVID-19(SARS-CoV-2) ABBY/SAHRA/ELISABETH/PAD/COR/MAD/JABARI In-House, NP Swab in UTM/VTM, 3-4 HR TAT - Swab, Nasopharynx [[id number]]  (Normal) Collected: 09/01/23 1151    Lab Status: Final result Specimen: Swab from Nasopharynx Updated: 09/01/23 1327     ADENOVIRUS, PCR Not Detected     Coronavirus 229E Not Detected     Coronavirus HKU1 Not Detected     Coronavirus NL63 Not Detected     Coronavirus OC43 Not Detected     COVID19 Not Detected     Human Metapneumovirus Not Detected     Human Rhinovirus/Enterovirus Not Detected     Influenza A PCR Not Detected     Influenza B PCR Not Detected     Parainfluenza Virus 1 Not Detected     Parainfluenza Virus 2 Not Detected     Parainfluenza Virus 3 Not Detected     Parainfluenza Virus 4 Not Detected     RSV, PCR Not Detected     Bordetella pertussis pcr Not Detected     Bordetella parapertussis PCR Not Detected     Chlamydophila pneumoniae PCR Not Detected     Mycoplasma pneumo by PCR Not Detected    Narrative:      In the setting of a positive respiratory panel with a viral infection PLUS a negative procalcitonin without other underlying concern for bacterial infection, consider observing off antibiotics or discontinuation of antibiotics and continue supportive care. If the respiratory panel is positive for atypical bacterial infection (Bordetella pertussis, Chlamydophila pneumoniae, or Mycoplasma pneumoniae), consider antibiotic de-escalation to target atypical bacterial infection.            No radiology results from the last 24 hrs    Results for orders placed during the hospital encounter of 02/15/23    Adult Transthoracic Echo Limited W/ Cont if Necessary Per Protocol    Interpretation Summary    The right atrial cavity is  dilated.    Left ventricular ejection fraction appears to be 41 - 45%. The left ventricular cavity is mildly dilated. Left ventricular wall thickness is consistent with mild concentric hypertrophy. There is left ventricular global hypokinesis noted.    Moderate tricuspid valve regurgitation is present.    The left atrial cavity is mildly dilated. Saline test results are negative.      Current medications:  Scheduled Meds:budesonide-formoterol, 2 puff, Inhalation, BID - RT  heparin (porcine), 5,000 Units, Subcutaneous, Q12H  nebivolol, 2.5 mg, Oral, Daily  pantoprazole, 40 mg, Oral, Daily  sodium chloride, 500 mL, Intravenous, Once  sodium phosphate, 15 mmol, Intravenous, Once  thiamine, 100 mg, Oral, Daily  tiotropium bromide monohydrate, 2 puff, Inhalation, Daily - RT      Continuous Infusions:O2, 3 L/min      PRN Meds:.  acetaminophen    Calcium Replacement - Follow Nurse / BPA Driven Protocol    dextrose    dextrose    glucagon (human recombinant)    ipratropium-albuterol    Magnesium Standard Dose Replacement - Follow Nurse / BPA Driven Protocol    ondansetron    Phosphorus Replacement - Follow Nurse / BPA Driven Protocol    Potassium Replacement - Follow Nurse / BPA Driven Protocol    simethicone    Assessment & Plan   Assessment & Plan     Active Hospital Problems    Diagnosis  POA    **Dehydration [E86.0]  Yes    Diarrhea [R19.7]  Yes    Hypomagnesemia [E83.42]  Yes    Hypokalemia [E87.6]  Yes    Stage III CKD [N18.30]  Yes    AoC HFpEF [I50.33]  Yes    Morbid obesity with BMI of 50.0-59.9, adult [E66.01, Z68.43]  Not Applicable    HTN (hypertension) [I10]  Yes      Resolved Hospital Problems   No resolved problems to display.        Brief Hospital Course to date:  Sonia Bella is a 58 y.o. female with COPD on 3 L chronically, A fib not on AC, HFrEF (EF of 40%), CAD, GERD, prior bleeding stomach ulcers, HTN, Obsestity, and CKD stage III, who presented to the ED with weakness, confusion, hypoglycemia that  was treated.    This patient's problems and plans were partially entered by my partner and updated as appropriate by me 09/04/23.     A fib with RVR and associated hypotension  Chest pain, resolved  HFrEF, EF of 40%   --Not on ACE due to angioedema  --TSH within normal limits  --Continue beta blocker at reduced dose with holding parameters  --Continue tele  --status post digoxin per cardiology  --Status post 2 L IV fluids  --Resumed Eliquis; had stopped outpatient for an unclear reason, denied history of bleeding    Diarrhea  -GI PCR negative; C. difficile pending (no additional stool, would likely be negative)    Hypomagnesemia  Hypokalemia  Hypophosphatemia  -replete    Hypoglycemia, resolved  Dehydration  -Likely due to poor oral intake; status post IV fluids  -C peptide   -Thiamine  -cortisol level added onto AM labs was 23.11    Staph epi in 1 of 2 blood cultures  --Blood culture 1 of 2 positive for staph epi, suspected contaminant  --Repeat blood cultures NGTD  --Stop vancomycin    CKD  --Baseline creatinine 1.9-2.3    Deconditioning  --PT/OT consulted    COPD  Chronic hypoxia, on 3L continuously at home  --Continue home medications    Obesity - BMI 40.49    Expected Discharge Location and Transportation: SNF, patient amenable  Expected Discharge   Expected Discharge Date: 9/5/2023; Expected Discharge Time:      DVT prophylaxis:  Medical DVT prophylaxis orders are present.     AM-PAC 6 Clicks Score (PT): 13 (09/03/23 2000)    CODE STATUS:   Code Status and Medical Interventions:   Ordered at: 09/01/23 9589     Code Status (Patient has no pulse and is not breathing):    CPR (Attempt to Resuscitate)     Medical Interventions (Patient has pulse or is breathing):    Full       Coleen Higgins MD  09/04/23

## 2023-09-04 NOTE — CONSULTS
Cardiology Consult    Sonia Bella  1965  [phone]  There is no work phone number on file.    09/04/23    DATE OF ADMISSION: 9/1/2023  [facility] 6A    Provider, No Known  [facility] / [facility] [id number]  Referring Provider: No ref. provider found     Chief Complaint   Patient presents with    Hypoglycemia       Problem List:   Permanent atrial fibrillation  CHADSVasc = 3 on Eliquis at home.   Diagnosed 2/2023- rate control strategy, patient declined cardioversion   Chronic systolic congestive heart failure  Echocardiogram 10/1/2022: EF 60%, no hemodynamically significant valve disease  Echocardiogram 2/20/2023: EF 41 to 45%, global hypokinesis noted, moderate TR  CAD  Hypertension  COPD on chronic home O2 3 L  History of stomach ulcer  Stage III chronic kidney disease  Dehydration  Diarrhea-GI PCR negative, C. difficile pending  Positive blood cultures with staph species-on IV vancomycin  Chronic deconditioning    Past Medical History:   Diagnosis Date    Bleeding ulcer     COPD (chronic obstructive pulmonary disease)     Coronary artery disease     GERD (gastroesophageal reflux disease)     History of stomach ulcers     Hypertension     Stage III CKD 02/15/2023         History of Present Illness:   Sonia Bella is a 58-year-old female with above-stated past medical history who cardiology is asked to see today due to atrial fibrillation with RVR and hypotension.  Patient was diagnosed with atrial fibrillation during an admission in February 2023 and rate control strategy was pursued as the patient deferred external cardioversion with KAY at that time.  She was placed on Zebeta for rate control and Eliquis for anticoagulation.  She has not followed up with cardiology since that time but states she has been taking her medications at home.  She was admitted on 9/1/2023 with diarrhea, hypomagnesemia, hypokalemia, and dehydration.  She has been treated with vancomycin due to positive  blood cultures and her C. difficile is pending.  She has been on IV fluids and thiamine due to high bone tension and dehydration.  Upon admission her heart rates were within normal limits, but in the last day she has had mild RVR up to 120 bpm.  She cannot feel her heart racing.  She denies any current dizziness or feeling like she is going to pass out.  She has been given 1 L of normal saline overnight.  Blood pressures are still in the 80s.  She is sitting up in bed.  She has chronic shortness of breath and chronic hypoxia and is on 3 L at home.  She is currently not on her Eliquis, it was held on admission for unclear reasons.  Her H&H is slightly low but she appears to have chronic anemia.  She has not had any diarrhea today but states her stomach is still hurting.    Allergies   Allergen Reactions    Ace Inhibitors Angioedema     lisinopril       Prior to Admission Medications       Prescriptions Last Dose Informant Patient Reported? Taking?    albuterol sulfate  (90 Base) MCG/ACT inhaler 8/31/2023  Yes Yes    Inhale 2 puffs 2 (Two) Times a Day. Indications: Chronic Obstructive Lung Disease    apixaban (ELIQUIS) 5 MG tablet tablet 8/31/2023  No Yes    Take 1 tablet by mouth Every 12 (Twelve) Hours. Indications: Atrial Fibrillation    Patient taking differently:  Take 1 tablet by mouth Every 12 (Twelve) Hours. new bottle in home daily  Indications: Atrial Fibrillation    budesonide-formoterol (SYMBICORT) 80-4.5 MCG/ACT inhaler 8/31/2023  Yes Yes    Inhale 2 puffs 2 (Two) Times a Day.    famotidine (PEPCID) 20 MG tablet 8/31/2023  Yes Yes    Take 1 tablet by mouth Daily. Indications: Gastroesophageal Reflux Disease, Heartburn    ibuprofen (ADVIL,MOTRIN) 200 MG tablet Past Week  Yes Yes    Take 1 tablet by mouth Every 6 (Six) Hours As Needed for Mild Pain. OTC    nebivolol (BYSTOLIC) 10 MG tablet 8/31/2023  Yes Yes    Take 1 tablet by mouth Daily. Indications: High Blood Pressure Disorder    O2 (OXYGEN)  8/31/2023  Yes Yes    Inhale 3 L/min Continuous. Indications: supplemental oxygen    pantoprazole (PROTONIX) 40 MG EC tablet 8/31/2023  Yes Yes    Take 1 tablet by mouth Daily.    tiotropium bromide monohydrate (SPIRIVA RESPIMAT) 2.5 MCG/ACT aerosol solution inhaler 8/31/2023  No Yes    Inhale 2 puffs Daily.              Current Facility-Administered Medications:     acetaminophen (TYLENOL) tablet 650 mg, 650 mg, Oral, Q6H PRN, Coleen Higgins MD    budesonide-formoterol (SYMBICORT) 160-4.5 MCG/ACT inhaler 2 puff, 2 puff, Inhalation, BID - RT, Davina Samuels MD, 2 puff at 09/04/23 0740    Calcium Replacement - Follow Nurse / BPA Driven Protocol, , Does not apply, PRN, Issac Rashid MD    dextrose (D50W) (25 g/50 mL) IV injection 25 g, 25 g, Intravenous, Q15 Min PRN, Davina Samuels MD    dextrose (GLUTOSE) oral gel 15 g, 15 g, Oral, Q15 Min PRN, Davina Samuels MD    glucagon (GLUCAGEN) injection 1 mg, 1 mg, Subcutaneous, Q15 Min PRN, Davina Samuels MD    heparin (porcine) 5000 UNIT/ML injection 5,000 Units, 5,000 Units, Subcutaneous, Q12H, Davina Samuels MD, 5,000 Units at 09/04/23 0851    ipratropium-albuterol (DUO-NEB) nebulizer solution 3 mL, 3 mL, Nebulization, Q4H PRN, Coleen Higgins MD    Magnesium Standard Dose Replacement - Follow Nurse / BPA Driven Protocol, , Does not apply, PRN, Issac Rashid MD    nebivolol (BYSTOLIC) tablet 2.5 mg, 2.5 mg, Oral, Daily, Vicky Schmid, APRMOODY    O2 (OXYGEN), 3 L/min, Inhalation, Continuous, Davina Samuels MD    ondansetron (ZOFRAN) injection 4 mg, 4 mg, Intravenous, Q6H PRN, Coleen Higgins MD    pantoprazole (PROTONIX) EC tablet 40 mg, 40 mg, Oral, Daily, Davina Samuels MD, 40 mg at 09/04/23 0851    Phosphorus Replacement - Follow Nurse / BPA Driven Protocol, , Does not apply, PRN, Coleen Higgins MD    Potassium Replacement - Follow Nurse / BPA Driven Protocol, , Does not apply, PRN, Issac Rashid MD    simethicone (MYLICON) chewable  tablet 80 mg, 80 mg, Oral, 4x Daily PRN, Coleen Higgins MD, 80 mg at 09/04/23 0856    sodium phosphates 15 mmol in sodium chloride 0.9 % 250 mL infusion, 15 mmol, Intravenous, Once, Coleen Higgins MD    thiamine (VITAMIN B-1) tablet 100 mg, 100 mg, Oral, Daily, Coleen Higgins MD, 100 mg at 09/04/23 0852    tiotropium (SPIRIVA RESPIMAT) 2.5 mcg/act aerosol solution inhaler, 2 puff, Inhalation, Daily - RT, Davina Samuels MD, 2 puff at 09/04/23 0740    Social History     Socioeconomic History    Marital status:    Tobacco Use    Smoking status: Former     Types: Cigarettes    Smokeless tobacco: Never   Vaping Use    Vaping Use: Never used   Substance and Sexual Activity    Alcohol use: Yes    Drug use: Never    Sexual activity: Never       History reviewed. No pertinent family history.    REVIEW OF SYSTEMS:   CONSTITUTIONAL:         No weight loss, fever, chills, + weakness + fatigue.   HEENT:                            No visual loss, blurred vision, double vision, yellow sclerae.                                             No hearing loss, congestion, sore throat.   SKIN:                                No rashes, urticaria, ulcers, sores.     RESPIRATORY:               + shortness of breath,-  hemoptysis, cough, sputum.   GI:                                     No anorexia, nausea, vomiting, + diarrhea. + abdominal pain, melena.   :                                   No burning on urination, hematuria or increased frequency.  ENDOCRINE:                   No diaphoresis, cold or heat intolerance. No polyuria or polydipsia.   NEURO:                            No headache, dizziness, syncope, paralysis, ataxia, or parasthesias.                                            No change in bowel or bladder control. No history of CVA/TIA  MUSCULOSKELETAL:    No muscle, back pain, joint pain or stiffness.   HEMATOLOGY:               +  anemia, -  bleeding, bruising. No history of DVT/PE.  PSYCH:                             No history of depression, anxiety    Vitals:    09/04/23 0714 09/04/23 0740 09/04/23 0841 09/04/23 0900   BP: (!) 85/58  93/73    BP Location: Left arm      Patient Position: Lying      Pulse: 118 (!) 125 (!) 126 (!) 121   Resp: 16 16     Temp: 98.4 °F (36.9 °C)      TempSrc: Oral      SpO2: 94% 96% 91% 98%   Weight:       Height:             Vital Sign Min/Max for last 24 hours  Temp  Min: 97.2 °F (36.2 °C)  Max: 98.4 °F (36.9 °C)   BP  Min: 84/64  Max: 104/87   Pulse  Min: 100  Max: 140   Resp  Min: 16  Max: 18   SpO2  Min: 91 %  Max: 98 %   Flow (L/min)  Min: 3  Max: 3      Intake/Output Summary (Last 24 hours) at 9/4/2023 0952  Last data filed at 9/4/2023 0610  Gross per 24 hour   Intake 618 ml   Output 550 ml   Net 68 ml             Physical Exam:  GEN: Well nourished, Well- developed  No acute distress. Wearing N/C O2   HEENT: Normocephalic, Atraumatic, PERRLA, moist mucous membranes  NECK: supple, NO JVD, no thyromegaly, no lymphadenopathy  CARDIAC: S1S2, irr, irr,  no murmur, gallop, rub  LUNGS: Clear to auscultation bilaterally, fair respiratory effort  ABDOMEN: Soft, nontender, normal bowel sounds  EXTREMITIES:No gross deformities,  No clubbing, cyanosis, or edema  SKIN: Warm, dry  NEURO: No focal deficits  PSYCHIATRIC: Normal affect and mood      I personally viewed and interpreted the patient's EKG/Telemetry/lab data    Data:   Results from last 7 days   Lab Units 09/04/23  0200 09/03/23  0514 09/02/23  0109   WBC 10*3/mm3 5.37 5.33 5.46   HEMOGLOBIN g/dL 8.1* 8.3* 8.6*   HEMATOCRIT % 28.7* 29.8* 30.7*   PLATELETS 10*3/mm3 130* 123* 138*     Results from last 7 days   Lab Units 09/04/23  0200 09/03/23  0514 09/02/23  1907 09/02/23  0444   SODIUM mmol/L 139 135*  --  142   POTASSIUM mmol/L 4.7 4.4 4.3 3.3*  3.3*   CHLORIDE mmol/L 96* 93*  --  93*   CO2 mmol/L 28.0 33.0*  --  33.0*   BUN mg/dL 29* 30*  --  31*   CREATININE mg/dL 2.63* 2.46*  --  2.47*   GLUCOSE mg/dL 77 81  --  97      Results from last  7 days   Lab Units 23  1101   HEMOGLOBIN A1C % 4.80     Results from last 7 days   Lab Units 23  1101   TSH uIU/mL 1.840   FREE T4 ng/dL 1.63     Results from last 7 days   Lab Units 23  1101   PROBNP pg/mL 21,177.0*     Results from last 7 days   Lab Units 23  1101   PROTIME Seconds 22.6*   INR  1.97*     Results from last 7 days   Lab Units 23  2353 23  2041 23  1907   HSTROP T ng/L 49* 65* 51*         Results from last 7 days   Lab Units 23  1101   PROBNP pg/mL 21,177.0*       Intake/Output Summary (Last 24 hours) at 2023 0952  Last data filed at 2023 0610  Gross per 24 hour   Intake 618 ml   Output 550 ml   Net 68 ml       Chest X-Ray:  Imaging Results (Last 24 Hours)       ** No results found for the last 24 hours. **            Telemetry: atrial fibrillation 100-140 bpm   EK2023: Atrial fibrillation with heart rate 115 bpm no significant ST-T change        Assessment and Plan:     Permanent atrial fibrillation:  -Patient with permanent atrial fibrillation since at least 2023 normally on Bystolic 10 mg at home for rate control.  Unfortunately, she is not able to take her beta-blocker due to hypotension in the setting of sepsis, dehydration, and diarrhea.  -She does have chronic kidney disease but we will attempt to give 1 dose of IV digoxin in an effort to control her rate to see if this will help her blood pressure.  Also recommended another liter of normal saline.  -Resume nebivolol when blood pressure more stable.  -For now I will add IV Lopressor 2.5 mg to give as needed as long as blood pressure is above 100 mmHg systolic.  -CHADSVasc = 3 restart Eliquis 5 mg twice daily if no contraindication, discussed with hospitalist    2.  Chronic systolic congestive heart failure:  -Echocardiogram 2023 EF 41 to 45%.  -On beta-blocker at home, no ACE due to angioedema  -Currently dehydrated and receiving IV fluids, will monitor closely  for fluid overload.   - check CXR in AM.         Electronically signed by ELIJAH Carr, 09/04/23, 9:50 AM EDT.    History and physical examination verified.  Agree with PAs    58-year-old black female with a history of medical noncompliance, CAD, hypertension, CHF, permanent atrial fibrillation, GERD, chronic kidney disease, and COPD on home oxygen at 3 L nasal cannula who was admitted secondary to abdominal pain and diarrhea.  She subsequently found to be in atrial fibrillation and has developed intermittent rapid ventricular rates associated with low blood pressure.  We are asked to see the patient regarding her atrial fibrillation.  The patient apparently was diagnosed with atrial fibrillation for the first time in February 2023 at which point rate control strategy was pursued as the patient refused to undergo external cardioversion with KYA.  She was treated with beta-blocker therapies.  He was also discharged on Eliquis at that time.  She has not followed up with cardiology since her discharge.  She was admitted on 9/1/2023 secondary to diarrhea and dehydration.  She was also found to have positive blood cultures which are being evaluated by the primary team.  Presently, she states that she has chronic shortness of breath with minimal exertional activity.  She denies any chest pain, lightheadedness, near-syncope or syncope.  She does complain of vague diffuse abdominal pain.    Review of systems, family history, social history as per PAs note.    Physical Exam blood pressure 94/71 pulse is 130 respiratory rate is 18 temperature is 97.7 O2 saturation 3 L 96%  Constitutional: oriented to person, place, and time.  well-developed and well-nourished. No distress.   HENT: Normocephalic.   Eyes: Conjunctivae are normal. No scleral icterus.   Neck: Normal carotid pulses, no hepatojugular reflux and no JVD present. Carotid bruit is not present. No tracheal deviation, no edema and no erythema present. No  thyromegaly present.   Cardiovascular: Tachycardic irregular rate and rhythm normal S1-S2 no murmurs appreciated  Pulses: equal and symmetrical.   Pulmonary/Chest: Decreased breath sounds bilaterally with poor respiratory effort.  Abdominal: Soft. Bowel sounds are normal. no distension and no mass.  Mild tenderness with deep palpation diffusely..  Musculoskeletal:  exhibits no edema, tenderness or deformity.   Neurological: is alert and oriented to person, place, and time.  Rest is nonfocal.    Skin: Skin is warm and dry. No rash noted. No diaphoretic. No cyanosis or erythema. No pallor. Nails show no clubbing.   Psychiatric: Normal mood and affect.Speech is normal and behavior is normal.    Laboratory data as per PAs note.  Hematocrit 29.  Creatinine at 2.63.  BNP 21 177  Troponin values noted.    Twelve-lead EKG: Atrial fibrillation heart rate 115 bpm nonspecific ST-T wave changes noted    Chest x-ray: Pending    Impression/plan:    Longstanding persistent atrial fibrillation now with breakthrough episodes of RVR most likely secondary to her underlying reason for admission including diarrhea and possible sepsis.  Agree with IV digoxin for now.  Will initiate IV Lopressor as well.   Elevated Manjinder Vasc Score of 3: Reinitiate Eliquis 5 mg twice a day.  Monitor creatinine.  Chronic systolic congestive heart failure potentially related to her A-fib with RVR.  Patient refused external cardioversion previously.  We will rediscuss this with her in the near future.  History of angioedema on ACE inhibitor.  Consider Jardiance.  Hypotension: Probably related to multiple issues including dehydration, diarrhea, possible sepsis, and A-fib with RVR.  Treat medically.      I, Anshu Pugh MD, personally performed the services face to face as described and documented by the above named individual. I have made any necessary edits and it is both accurate and complete 9/4/2023  11:05 EDT

## 2023-09-04 NOTE — PROGRESS NOTES
"Pharmacy Consult - Vancomycin Dosing    Sonia Bella is a 58 y.o. female receiving vancomycin therapy.    Indication: Positive blood culture  Consulting Provider: Coleen Higgins MD   ID Consult: No    Goal Trough: 15-20 mcg/mL    Current Antimicrobial Therapy  Anti-Infectives (From admission, onward)      Ordered     Dose/Rate Route Frequency Start Stop    09/03/23 0726  vancomycin (dosing per levels)        Ordering Provider: Rajiv Loya, PharmD     Does not apply Daily 09/03/23 1200 09/07/23 0859    09/02/23 1643  vancomycin 2500 mg/500 mL 0.9% NS IVPB ([facility])        Ordering Provider: Iliana Sol RPH    2,500 mg  over 150 Minutes Intravenous Once 09/02/23 1700 09/03/23 0024    09/02/23 1600  Pharmacy to dose vancomycin        Ordering Provider: Coleen Higgins MD     Does not apply Continuous PRN 09/02/23 1559 09/07/23 1558    09/01/23 1520  cefTRIAXone (ROCEPHIN) 2000 mg/100 mL 0.9% NS IVPB ([facility])        Ordering Provider: Issac Rashid MD    2,000 mg  over 30 Minutes Intravenous Once 09/01/23 1536 09/01/23 1638          Allergies  Allergies as of 09/01/2023 - Reviewed 09/01/2023   Allergen Reaction Noted    Ace inhibitors Angioedema 08/19/2022     Labs  Results from last 7 days   Lab Units 09/04/23  0200 09/03/23  0514 09/02/23  0444   BUN mg/dL 29* 30* 31*   CREATININE mg/dL 2.63* 2.46* 2.47*     Results from last 7 days   Lab Units 09/04/23  0200 09/03/23  0514 09/02/23  0109   WBC 10*3/mm3 5.37 5.33 5.46     Evaluation of Dosing     Last Dose Received in the ED/Outside Facility: n/a  Is Patient on Dialysis or Renal Replacement: no    Ht - 172.7 cm (67.99\")  Wt - 128 kg (281 lb 4.8 oz)    Estimated Creatinine Clearance: 32.5 mL/min (A) (by C-G formula based on SCr of 2.63 mg/dL (H)).    Intake & Output (last 3 days)         09/01 0701 09/02 0700 09/02 0701 09/03 0700 09/03 0701 09/04 0700 09/04 0701 09/05 0700    P.O.   118     I.V. (mL/kg) 1140 (9.4)       IV Piggyback 500  500     " Total Intake(mL/kg) 1640 (13.6)  618 (4.8)     Urine (mL/kg/hr)  350 (0.1) 550 (0.2)     Stool  0      Total Output  350 550     Net +1640 -350 +68             Stool Unmeasured Occurrence 3 x 1 x            Microbiology and Radiology  Microbiology Results (last 10 days)       Procedure Component Value - Date/Time    Blood Culture - Blood, Arm, Left [[id number]]  (Normal) Collected: 09/02/23 1907    Lab Status: Preliminary result Specimen: Blood from Arm, Left Updated: 09/03/23 2045     Blood Culture No growth at 24 hours    Blood Culture - Blood, Hand, Left [[id number]]  (Normal) Collected: 09/02/23 1907    Lab Status: Preliminary result Specimen: Blood from Hand, Left Updated: 09/03/23 2045     Blood Culture No growth at 24 hours    MRSA Screen, PCR (Inpatient) - Swab, Nares [[id number]]  (Normal) Collected: 09/02/23 1635    Lab Status: Final result Specimen: Swab from Nares Updated: 09/02/23 2050     MRSA PCR Negative    Narrative:      The negative predictive value of this diagnostic test is high and should only be used to consider de-escalating anti-MRSA therapy. A positive result may indicate colonization with MRSA and must be correlated clinically.  MRSA Negative    Gastrointestinal Panel, PCR - Stool, Per Rectum [[id number]]  (Normal) Collected: 09/02/23 1551    Lab Status: Final result Specimen: Stool from Per Rectum Updated: 09/02/23 1735     Campylobacter Not Detected     Plesiomonas shigelloides Not Detected     Salmonella Not Detected     Vibrio Not Detected     Vibrio cholerae Not Detected     Yersinia enterocolitica Not Detected     Enteroaggregative E. coli (EAEC) Not Detected     Enteropathogenic E. coli (EPEC) Not Detected     Enterotoxigenic E. coli (ETEC) lt/st Not Detected     Shiga-like toxin-producing E. coli (STEC) stx1/stx2 Not Detected     Shigella/Enteroinvasive E. coli (EIEC) Not Detected     Cryptosporidium Not Detected     Cyclospora cayetanensis Not Detected     Entamoeba histolytica Not  Detected     Giardia lamblia Not Detected     Adenovirus F40/41 Not Detected     Astrovirus Not Detected     Norovirus GI/GII Not Detected     Rotavirus A Not Detected     Sapovirus (I, II, IV or V) Not Detected    Urine Culture - Urine, Urine, Catheter [[id number]]  (Abnormal) Collected: 09/01/23 1444    Lab Status: Final result Specimen: Urine, Catheter Updated: 09/02/23 1904     Urine Culture >100,000 CFU/mL Streptococcus, Alpha Hemolytic     Comment: This organism commonly represents colonization or contamination. No further workup unless requested by provider.    Based on laboratory diagnosis criteria, these organisms are common urogenital commensal alexsander and have not been associated with urinary tract infections; clinical correlation is recommended.       Narrative:      Colonization of the urinary tract without infection is common. Treatment is discouraged unless the patient is symptomatic, pregnant, or undergoing an invasive urologic procedure.    Blood Culture - Blood, Arm, Right [[id number]]  (Normal) Collected: 09/01/23 1152    Lab Status: Preliminary result Specimen: Blood from Arm, Right Updated: 09/03/23 1302     Blood Culture No growth at 2 days    Respiratory Panel PCR w/COVID-19(SARS-CoV-2) ABBY/SAHRA/ELISABETH/PAD/COR/MAD/JABARI In-House, NP Swab in UTM/VTM, 3-4 HR TAT - Swab, Nasopharynx [[id number]]  (Normal) Collected: 09/01/23 1151    Lab Status: Final result Specimen: Swab from Nasopharynx Updated: 09/01/23 1327     ADENOVIRUS, PCR Not Detected     Coronavirus 229E Not Detected     Coronavirus HKU1 Not Detected     Coronavirus NL63 Not Detected     Coronavirus OC43 Not Detected     COVID19 Not Detected     Human Metapneumovirus Not Detected     Human Rhinovirus/Enterovirus Not Detected     Influenza A PCR Not Detected     Influenza B PCR Not Detected     Parainfluenza Virus 1 Not Detected     Parainfluenza Virus 2 Not Detected     Parainfluenza Virus 3 Not Detected     Parainfluenza Virus 4 Not Detected      RSV, PCR Not Detected     Bordetella pertussis pcr Not Detected     Bordetella parapertussis PCR Not Detected     Chlamydophila pneumoniae PCR Not Detected     Mycoplasma pneumo by PCR Not Detected    Narrative:      In the setting of a positive respiratory panel with a viral infection PLUS a negative procalcitonin without other underlying concern for bacterial infection, consider observing off antibiotics or discontinuation of antibiotics and continue supportive care. If the respiratory panel is positive for atypical bacterial infection (Bordetella pertussis, Chlamydophila pneumoniae, or Mycoplasma pneumoniae), consider antibiotic de-escalation to target atypical bacterial infection.    Blood Culture - Blood, Arm, Left [[id number]]  (Abnormal) Collected: 09/01/23 1140    Lab Status: Final result Specimen: Blood from Arm, Left Updated: 09/04/23 0604     Blood Culture Staphylococcus, coagulase negative     Isolated from Aerobic Bottle     Gram Stain Aerobic Bottle Gram positive cocci in clusters    Narrative:      Probable contaminant requires clinical correlation, susceptibility not performed unless requested by physician.      Blood Culture ID, PCR - Blood, Arm, Left [[id number]]  (Abnormal) Collected: 09/01/23 1140    Lab Status: Final result Specimen: Blood from Arm, Left Updated: 09/02/23 1710     BCID, PCR Staph spp, not aureus or lugdunensis. Identification by BCID2 PCR.     BOTTLE TYPE Aerobic Bottle          Vancomycin Levels:  Results from last 7 days   Lab Units 09/04/23  0200 09/03/23  0722   VANCOMYCIN RM mcg/mL 21.90 35.90             Assessment/Plan:   Pharmacy dosing vancomycin for positive blood culture (GPC in clusters in 1 of 2 bottles)  Goal trough 15-20 mcg/ml  9/4 SCr - 2.63, increased from 2.46  9/4 WBC - 5.37  24hr tmax - 97.6  9/4 vancomycin random - 21.9 mcg/ml @0200  Vancomycin level drawn 25 hours following loading dose    Will re-dose with vancomycin 1750mg 9/4 @1200  Obtain vancomycin  level 9/5 with am labs   BMP x3 days  Monitor renal function, clinical status and infusion related reactions  Follow vancomycin levels and adjust dose accordingly    Thanks  Tim Spann [facility]  9/4/2023  07:36 EDT

## 2023-09-05 ENCOUNTER — APPOINTMENT (OUTPATIENT)
Dept: CARDIOLOGY | Facility: HOSPITAL | Age: 58
DRG: 641 | End: 2023-09-05
Payer: MEDICARE

## 2023-09-05 ENCOUNTER — APPOINTMENT (OUTPATIENT)
Dept: GENERAL RADIOLOGY | Facility: HOSPITAL | Age: 58
DRG: 641 | End: 2023-09-05
Payer: MEDICARE

## 2023-09-05 LAB
ANION GAP SERPL CALCULATED.3IONS-SCNC: 16 MMOL/L (ref 5–15)
BH CV ECHO MEAS - AO MAX PG: 7.4 MMHG
BH CV ECHO MEAS - AO MEAN PG: 4 MMHG
BH CV ECHO MEAS - AO ROOT DIAM: 3.1 CM
BH CV ECHO MEAS - AO V2 MAX: 136 CM/SEC
BH CV ECHO MEAS - AO V2 VTI: 23.4 CM
BH CV ECHO MEAS - AVA(I,D): 1.83 CM2
BH CV ECHO MEAS - EDV(CUBED): 164.6 ML
BH CV ECHO MEAS - EDV(MOD-SP2): 156 ML
BH CV ECHO MEAS - EDV(MOD-SP4): 111 ML
BH CV ECHO MEAS - EF(MOD-BP): 42 %
BH CV ECHO MEAS - EF(MOD-SP2): 53.7 %
BH CV ECHO MEAS - EF(MOD-SP4): 39.9 %
BH CV ECHO MEAS - ESV(CUBED): 43.1 ML
BH CV ECHO MEAS - ESV(MOD-SP2): 72.3 ML
BH CV ECHO MEAS - ESV(MOD-SP4): 66.7 ML
BH CV ECHO MEAS - FS: 36 %
BH CV ECHO MEAS - IVSD: 1.8 CM
BH CV ECHO MEAS - LA DIMENSION: 5.2 CM
BH CV ECHO MEAS - LAT PEAK E' VEL: 14.1 CM/SEC
BH CV ECHO MEAS - LV DIASTOLIC VOL/BSA (35-75): 48.7 CM2
BH CV ECHO MEAS - LV MAX PG: 4.6 MMHG
BH CV ECHO MEAS - LV MEAN PG: 2 MMHG
BH CV ECHO MEAS - LV SYSTOLIC VOL/BSA (12-30): 29.3 CM2
BH CV ECHO MEAS - LV V1 MAX: 107 CM/SEC
BH CV ECHO MEAS - LV V1 VTI: 15.1 CM
BH CV ECHO MEAS - LVIDD: 5.5 CM
BH CV ECHO MEAS - LVIDS: 3.5 CM
BH CV ECHO MEAS - LVOT AREA: 2.8 CM2
BH CV ECHO MEAS - LVOT DIAM: 1.9 CM
BH CV ECHO MEAS - MED PEAK E' VEL: 15.7 CM/SEC
BH CV ECHO MEAS - MV A MAX VEL: 34.1 CM/SEC
BH CV ECHO MEAS - MV DEC SLOPE: 837 CM/SEC2
BH CV ECHO MEAS - MV E MAX VEL: 84.4 CM/SEC
BH CV ECHO MEAS - MV E/A: 2.48
BH CV ECHO MEAS - MV MAX PG: 5 MMHG
BH CV ECHO MEAS - MV MEAN PG: 2 MMHG
BH CV ECHO MEAS - MV P1/2T: 42.7 MSEC
BH CV ECHO MEAS - MV V2 VTI: 21.8 CM
BH CV ECHO MEAS - MVA(P1/2T): 5.2 CM2
BH CV ECHO MEAS - MVA(VTI): 1.96 CM2
BH CV ECHO MEAS - PA ACC TIME: 0.07 SEC
BH CV ECHO MEAS - PI END-D VEL: 146 CM/SEC
BH CV ECHO MEAS - RAP SYSTOLE: 8 MMHG
BH CV ECHO MEAS - RVSP: 29 MMHG
BH CV ECHO MEAS - SI(MOD-SP2): 36.7 ML/M2
BH CV ECHO MEAS - SI(MOD-SP4): 19.4 ML/M2
BH CV ECHO MEAS - SV(LVOT): 42.8 ML
BH CV ECHO MEAS - SV(MOD-SP2): 83.7 ML
BH CV ECHO MEAS - SV(MOD-SP4): 44.3 ML
BH CV ECHO MEAS - TAPSE (>1.6): 0.96 CM
BH CV ECHO MEAS - TR MAX PG: 21.1 MMHG
BH CV ECHO MEAS - TR MAX VEL: 228.8 CM/SEC
BH CV ECHO MEASUREMENTS AVERAGE E/E' RATIO: 5.66
BH CV XLRA - RV BASE: 5.3 CM
BH CV XLRA - RV LENGTH: 8.5 CM
BH CV XLRA - RV MID: 4.2 CM
BH CV XLRA - TDI S': 8.9 CM/SEC
BUN SERPL-MCNC: 30 MG/DL (ref 6–20)
BUN/CREAT SERPL: 12.3 (ref 7–25)
C PEPTIDE SERPL-MCNC: 4.3 NG/ML (ref 1.1–4.4)
CALCIUM SPEC-SCNC: 8.7 MG/DL (ref 8.6–10.5)
CHLORIDE SERPL-SCNC: 92 MMOL/L (ref 98–107)
CO2 SERPL-SCNC: 26 MMOL/L (ref 22–29)
CREAT SERPL-MCNC: 2.44 MG/DL (ref 0.57–1)
DEPRECATED RDW RBC AUTO: 62 FL (ref 37–54)
EGFRCR SERPLBLD CKD-EPI 2021: 22.3 ML/MIN/1.73
ERYTHROCYTE [DISTWIDTH] IN BLOOD BY AUTOMATED COUNT: 22.2 % (ref 12.3–15.4)
FERRITIN SERPL-MCNC: 117 NG/ML (ref 13–150)
GLUCOSE SERPL-MCNC: 67 MG/DL (ref 65–99)
HCT VFR BLD AUTO: 28.9 % (ref 34–46.6)
HGB BLD-MCNC: 8.2 G/DL (ref 12–15.9)
IRON 24H UR-MRATE: 63 MCG/DL (ref 37–145)
IRON SATN MFR SERPL: 19 % (ref 20–50)
LEFT ATRIUM VOLUME INDEX: 66.2 ML/M2
MAGNESIUM SERPL-MCNC: 1.8 MG/DL (ref 1.6–2.6)
MCH RBC QN AUTO: 22.8 PG (ref 26.6–33)
MCHC RBC AUTO-ENTMCNC: 28.4 G/DL (ref 31.5–35.7)
MCV RBC AUTO: 80.3 FL (ref 79–97)
PLATELET # BLD AUTO: 144 10*3/MM3 (ref 140–450)
PMV BLD AUTO: 0 FL (ref 6–12)
POTASSIUM SERPL-SCNC: 4.3 MMOL/L (ref 3.5–5.2)
RBC # BLD AUTO: 3.6 10*6/MM3 (ref 3.77–5.28)
SODIUM SERPL-SCNC: 134 MMOL/L (ref 136–145)
TIBC SERPL-MCNC: 332 MCG/DL (ref 298–536)
TRANSFERRIN SERPL-MCNC: 223 MG/DL (ref 200–360)
WBC NRBC COR # BLD: 5.46 10*3/MM3 (ref 3.4–10.8)

## 2023-09-05 PROCEDURE — 82728 ASSAY OF FERRITIN: CPT | Performed by: STUDENT IN AN ORGANIZED HEALTH CARE EDUCATION/TRAINING PROGRAM

## 2023-09-05 PROCEDURE — 25010000002 DIGOXIN PER 500 MCG: Performed by: INTERNAL MEDICINE

## 2023-09-05 PROCEDURE — 80069 RENAL FUNCTION PANEL: CPT | Performed by: INTERNAL MEDICINE

## 2023-09-05 PROCEDURE — 93306 TTE W/DOPPLER COMPLETE: CPT | Performed by: INTERNAL MEDICINE

## 2023-09-05 PROCEDURE — 99232 SBSQ HOSP IP/OBS MODERATE 35: CPT | Performed by: INTERNAL MEDICINE

## 2023-09-05 PROCEDURE — 97530 THERAPEUTIC ACTIVITIES: CPT

## 2023-09-05 PROCEDURE — 80048 BASIC METABOLIC PNL TOTAL CA: CPT

## 2023-09-05 PROCEDURE — 99232 SBSQ HOSP IP/OBS MODERATE 35: CPT | Performed by: STUDENT IN AN ORGANIZED HEALTH CARE EDUCATION/TRAINING PROGRAM

## 2023-09-05 PROCEDURE — 71045 X-RAY EXAM CHEST 1 VIEW: CPT

## 2023-09-05 PROCEDURE — 83540 ASSAY OF IRON: CPT | Performed by: STUDENT IN AN ORGANIZED HEALTH CARE EDUCATION/TRAINING PROGRAM

## 2023-09-05 PROCEDURE — 93306 TTE W/DOPPLER COMPLETE: CPT

## 2023-09-05 PROCEDURE — 94761 N-INVAS EAR/PLS OXIMETRY MLT: CPT

## 2023-09-05 PROCEDURE — 94799 UNLISTED PULMONARY SVC/PX: CPT

## 2023-09-05 PROCEDURE — 84466 ASSAY OF TRANSFERRIN: CPT | Performed by: STUDENT IN AN ORGANIZED HEALTH CARE EDUCATION/TRAINING PROGRAM

## 2023-09-05 PROCEDURE — 97110 THERAPEUTIC EXERCISES: CPT

## 2023-09-05 PROCEDURE — 85027 COMPLETE CBC AUTOMATED: CPT | Performed by: STUDENT IN AN ORGANIZED HEALTH CARE EDUCATION/TRAINING PROGRAM

## 2023-09-05 PROCEDURE — 94664 DEMO&/EVAL PT USE INHALER: CPT

## 2023-09-05 PROCEDURE — 97535 SELF CARE MNGMENT TRAINING: CPT

## 2023-09-05 PROCEDURE — 83735 ASSAY OF MAGNESIUM: CPT | Performed by: STUDENT IN AN ORGANIZED HEALTH CARE EDUCATION/TRAINING PROGRAM

## 2023-09-05 PROCEDURE — 0 MAGNESIUM SULFATE 4 GM/100ML SOLUTION: Performed by: INTERNAL MEDICINE

## 2023-09-05 RX ORDER — DIGOXIN 0.25 MG/ML
500 INJECTION INTRAMUSCULAR; INTRAVENOUS ONCE
Status: COMPLETED | OUTPATIENT
Start: 2023-09-05 | End: 2023-09-05

## 2023-09-05 RX ORDER — MAGNESIUM SULFATE HEPTAHYDRATE 40 MG/ML
4 INJECTION, SOLUTION INTRAVENOUS ONCE
Status: COMPLETED | OUTPATIENT
Start: 2023-09-05 | End: 2023-09-05

## 2023-09-05 RX ADMIN — DIGOXIN 500 MCG: 0.25 INJECTION INTRAMUSCULAR; INTRAVENOUS at 08:31

## 2023-09-05 RX ADMIN — PANTOPRAZOLE SODIUM 40 MG: 40 TABLET, DELAYED RELEASE ORAL at 08:30

## 2023-09-05 RX ADMIN — NEBIVOLOL 2.5 MG: 5 TABLET ORAL at 08:29

## 2023-09-05 RX ADMIN — BUDESONIDE AND FORMOTEROL FUMARATE DIHYDRATE 2 PUFF: 160; 4.5 AEROSOL RESPIRATORY (INHALATION) at 21:12

## 2023-09-05 RX ADMIN — APIXABAN 5 MG: 5 TABLET, FILM COATED ORAL at 08:30

## 2023-09-05 RX ADMIN — TIOTROPIUM BROMIDE INHALATION SPRAY 2 PUFF: 3.12 SPRAY, METERED RESPIRATORY (INHALATION) at 09:22

## 2023-09-05 RX ADMIN — MAGNESIUM SULFATE HEPTAHYDRATE 4 G: 40 INJECTION, SOLUTION INTRAVENOUS at 08:28

## 2023-09-05 RX ADMIN — THIAMINE HCL TAB 100 MG 100 MG: 100 TAB at 08:29

## 2023-09-05 RX ADMIN — APIXABAN 5 MG: 5 TABLET, FILM COATED ORAL at 21:22

## 2023-09-05 RX ADMIN — BUDESONIDE AND FORMOTEROL FUMARATE DIHYDRATE 2 PUFF: 160; 4.5 AEROSOL RESPIRATORY (INHALATION) at 09:22

## 2023-09-05 NOTE — PLAN OF CARE
Goal Outcome Evaluation:  Plan of Care Reviewed With: patient        Progress: no change  Outcome Evaluation: Pt continues to demo deficits in strength and endurance caffecting ADL indp. when compared to baseline. Completed grooming task while elevated in bed d/t reports of lethargy. Recc. continued IPOT per POC and DC to SNF.      Anticipated Discharge Disposition (OT): skilled nursing facility

## 2023-09-05 NOTE — PROGRESS NOTES
"Sonia Bella  [id number]  1965   LOS: 0 days   Patient Care Team:  Provider, No Known as PCP - General    Chief Complaint:  MORBID OBESITY / ELEVATED TROPONINS / SEVERE ANEMIA / ELIANE / CHRONIC AFIF / HFmEF / SEPSIS / SEVERE COPD & CHRONIC RESPIRATORY FAILURE    Subjective     Sitting semiupright in bed on 3 L/min nasal cannula (oximetry 99%) without current cardiopulmonary complaints.  Nevertheless, the patient is moderately confused and states \"I am doing awful, awful, awful and the nurses came and took everything out of my room.\"  She is oriented to person place and time at this time.  Patient states she has suboptimal appetite but less notable diarrhea.    Objective     Vital Sign Min/Max for last 24 hours  Temp  Min: 97.4 °F (36.3 °C)  Max: 97.8 °F (36.6 °C)   BP  Min: 72/60  Max: 115/95   Pulse  Min: 93  Max: 137   Resp  Min: 16  Max: 18   SpO2  Min: 90 %  Max: 100 %      Weight  Min: 120 kg (265 lb)  Max: 120 kg (265 lb)         09/04/23  0515 09/05/23  0348   Weight: 128 kg (281 lb 4.8 oz) 120 kg (265 lb)       No intake or output data in the 24 hours ending 09/05/23 0728    Physical Exam:     General Appearance:    Alert, cooperative, in no acute distress   Lungs:     Clear to auscultation,respirations regular, even and                unlabored    Heart:    Irregular and normal rate, variable S1 and S2, no            murmur, no gallop, no rub, no click   Abdomen:  Extremities:   Soft, nontender, bowel sounds audible x4  Trace-1+ bipedal edema, normal range of motion   Pulses:   Pulses palpable and equal bilaterally      Results Review:   Results from last 7 days   Lab Units 09/05/23  0445 09/04/23  0200 09/03/23  0514   SODIUM mmol/L 134* 139 135*   POTASSIUM mmol/L 4.3 4.7 4.4   CHLORIDE mmol/L 92* 96* 93*   CO2 mmol/L 26.0 28.0 33.0*   BUN mg/dL 30* 29* 30*   CREATININE mg/dL 2.44* 2.63* 2.46*   GLUCOSE mg/dL 67 77 81   CALCIUM mg/dL 8.7 8.5* 8.5*     Results from last 7 days   Lab Units " 09/05/23  0445 09/04/23  0200 09/03/23  0514   WBC 10*3/mm3 5.46 5.37 5.33   HEMOGLOBIN g/dL 8.2* 8.1* 8.3*   HEMATOCRIT % 28.9* 28.7* 29.8*   PLATELETS 10*3/mm3 144 130* 123*       Results from last 7 days   Lab Units 09/01/23  1101   HEMOGLOBIN A1C % 4.80     Results from last 7 days   Lab Units 09/02/23  2353 09/02/23  2041 09/02/23  1907   HSTROP T ng/L 49* 65* 51*     NO EKG.    CXR: Marked cardiomegaly with generally clear lungs in the absence of infiltrate, effusion, or pulmonary vascular congestion with formal official report pending.    Medication Review: REVIEWED; NO DRIPS.    Assessment & Plan     With labile hypotension and enlarging cardiac silhouette and anticoagulation therapy needs limited echocardiogram to exclude significant pericardial effusion and reassess LV function.  If the patient has persistent recurrent hypotension would consider adding midodrine 2.5-5 mg TID.  Anemia needs work-up and treatment.  May need to consider erythropoietin.  We will give a single recurrent dose of IV digoxin today.  Additionally give a single dose of IV magnesium.  Would defer MPS/LHC at this time.      Dehydration    HTN (hypertension)    Morbid obesity with BMI of 50.0-59.9, adult    AoC HFpEF    Stage III CKD    Diarrhea    Hypomagnesemia    Hypokalemia    09/05/23  07:28 EDT

## 2023-09-05 NOTE — THERAPY TREATMENT NOTE
Patient Name: Sonia Bella  : 1965    MRN: [id number]                              Today's Date: 2023       Admit Date: 2023    Visit Dx:     ICD-10-CM ICD-9-CM   1. Generalized weakness  R53.1 780.79   2. Diarrhea, unspecified type  R19.7 787.91   3. Acute UTI  N39.0 599.0   4. Hypokalemia  E87.6 276.8   5. Hypomagnesemia  E83.42 275.2   6. Hypoglycemia  E16.2 251.2   7. Anemia, unspecified type  D64.9 285.9   8. Chronic kidney disease, unspecified CKD stage  N18.9 585.9     Patient Active Problem List   Diagnosis    Angioedema due to angiotensin converting enzyme inhibitor (ACE-I)    HTN (hypertension)    B12 deficiency anemia    Iron deficiency anemia    CKD (chronic kidney disease) stage 3, GFR 30-59 ml/min    COVID-19 virus infection    COPD on home O2 (2L)    Morbid obesity with BMI of 50.0-59.9, adult    AoC HFpEF    Acute type 2 respiratory failure    Human metapneumovirus (hMPV)    GERD    Stage III CKD    Atrial fibrillation with RVR    Acute on chronic HFrEF (heart failure with reduced ejection fraction)    Dehydration    Diarrhea    Hypomagnesemia    Hypokalemia     Past Medical History:   Diagnosis Date    Bleeding ulcer     COPD (chronic obstructive pulmonary disease)     Coronary artery disease     GERD (gastroesophageal reflux disease)     History of stomach ulcers     Hypertension     Stage III CKD 02/15/2023     History reviewed. No pertinent surgical history.   General Information       Row Name 23 1314          Physical Therapy Time and Intention    Document Type therapy note (daily note)  -KG     Mode of Treatment physical therapy  -KG       Row Name 23 131          General Information    Patient Profile Reviewed yes  -KG     Existing Precautions/Restrictions fall;oxygen therapy device and L/min  -KG       Row Name 23 131          Cognition    Orientation Status (Cognition) oriented x 4  -KG       Row Name 23          Safety Issues, Functional  Mobility    Impairments Affecting Function (Mobility) balance;endurance/activity tolerance;strength  -KG               User Key  (r) = Recorded By, (t) = Taken By, (c) = Cosigned By      Initials Name Provider Type    Sapna Colin Physical Therapist                   Mobility       Row Name 09/05/23 1318          Bed Mobility    Bed Mobility supine-sit  -KG     Supine-Sit Hansen (Bed Mobility) supervision  -KG     Assistive Device (Bed Mobility) head of bed elevated  -KG       Row Name 09/05/23 1318          Transfers    Comment, (Transfers) STS from EOB, min-A x2, FWW, 1st attempt knees began buckling 30 sec after standing and needed to sit back down, 2nd attempt pt able to remain standing and take a couple side steps toward HOB  -KG       Row Name 09/05/23 1318          Sit-Stand Transfer    Sit-Stand Hansen (Transfers) verbal cues;nonverbal cues (demo/gesture);2 person assist;minimum assist (75% patient effort)  -KG     Assistive Device (Sit-Stand Transfers) walker, front-wheeled  -KG               User Key  (r) = Recorded By, (t) = Taken By, (c) = Cosigned By      Initials Name Provider Type    Sapna Colin Physical Therapist                   Obj/Interventions       Row Name 09/05/23 1320          Motor Skills    Therapeutic Exercise other (see comments)  heel slides, ankle pumps, quad sets x10  -KG       Row Name 09/05/23 1320          Balance    Dynamic Standing Balance minimal assist;2-person assist  -KG     Position/Device Used, Standing Balance supported;walker, front-wheeled  -KG     Balance Interventions standing;sit to stand  -KG               User Key  (r) = Recorded By, (t) = Taken By, (c) = Cosigned By      Initials Name Provider Type    Sapna Colin Physical Therapist                   Goals/Plan    No documentation.                  Clinical Impression       Row Name 09/05/23 1320          Pain    Pretreatment Pain Rating 0/10 - no pain  -KG     Posttreatment  Pain Rating 0/10 - no pain  -KG       Row Name 09/05/23 1320          Plan of Care Review    Plan of Care Reviewed With patient  -KG     Progress no change  -KG     Outcome Evaluation STS from EOB, min-A x2, FWW, 1st attempt knees began buckling 30 sec after standing and needed to sit back down, 2nd attempt pt able to remain standing and take a couple side steps toward HOB.  recommend SNF  -KG       Row Name 09/05/23 1320          Vital Signs    Pre Systolic BP Rehab 102  -KG     Pre Treatment Diastolic BP 71  -KG     Post Systolic BP Rehab 105  -KG     Post Treatment Diastolic BP 76  -KG       Row Name 09/05/23 1320          Positioning and Restraints    Pre-Treatment Position in bed  -KG     Post Treatment Position bed  -KG     In Bed notified nsg;fowlers;call light within reach;encouraged to call for assist;exit alarm on  -KG               User Key  (r) = Recorded By, (t) = Taken By, (c) = Cosigned By      Initials Name Provider Type    Sapna Colin Physical Therapist                   Outcome Measures       Row Name 09/05/23 1321          How much help from another person do you currently need...    Turning from your back to your side while in flat bed without using bedrails? 3  -KG     Moving from lying on back to sitting on the side of a flat bed without bedrails? 3  -KG     Moving to and from a bed to a chair (including a wheelchair)? 2  -KG     Standing up from a chair using your arms (e.g., wheelchair, bedside chair)? 3  -KG     Climbing 3-5 steps with a railing? 2  -KG     To walk in hospital room? 2  -KG     AM-PAC 6 Clicks Score (PT) 15  -KG     Highest level of mobility 4 --> Transferred to chair/commode  -KG       Row Name 09/05/23 1321          Functional Assessment    Outcome Measure Options AM-PAC 6 Clicks Basic Mobility (PT)  -KG               User Key  (r) = Recorded By, (t) = Taken By, (c) = Cosigned By      Initials Name Provider Type    Sapna Colin Physical Therapist                                  Physical Therapy Education       Title: PT OT SLP Therapies (In Progress)       Topic: Physical Therapy (In Progress)       Point: Mobility training (In Progress)       Learning Progress Summary             Patient Acceptance, E, NR by NS at 9/2/2023 1257    Comment: PT POC, importance of OOB activity                         Point: Home exercise program (Not Started)       Learner Progress:  Not documented in this visit.              Point: Body mechanics (In Progress)       Learning Progress Summary             Patient Acceptance, E, NR by NS at 9/2/2023 1257    Comment: PT POC, importance of OOB activity                         Point: Precautions (In Progress)       Learning Progress Summary             Patient Acceptance, E, NR by NS at 9/2/2023 1257    Comment: PT POC, importance of OOB activity                                         User Key       Initials Effective Dates Name Provider Type Discipline    NS 06/16/21 -  Elsi Adams PT Physical Therapist PT                  PT Recommendation and Plan     Plan of Care Reviewed With: patient  Progress: no change  Outcome Evaluation: STS from EOB, min-A x2, FWW, 1st attempt knees began buckling 30 sec after standing and needed to sit back down, 2nd attempt pt able to remain standing and take a couple side steps toward HOB.  recommend SNF     Time Calculation:         PT Charges       Row Name 09/05/23 1323             Time Calculation    Start Time 1104  -KG      PT Received On 09/05/23  -KG         Timed Charges    67523 - PT Therapeutic Exercise Minutes 8  -KG      43403 - PT Therapeutic Activity Minutes 30  -KG         Total Minutes    Timed Charges Total Minutes 38  -KG       Total Minutes 38  -KG                User Key  (r) = Recorded By, (t) = Taken By, (c) = Cosigned By      Initials Name Provider Type    KG Sapna Mcclain Physical Therapist                  Therapy Charges for Today       Code Description Service Date Service  Provider Modifiers Qty    59611966956 HC PT THER PROC EA 15 MIN 9/5/2023 Sapna Mcclain GP 1    84956155342 HC PT THERAPEUTIC ACT EA 15 MIN 9/5/2023 Sapna Mcclain GP 2    94637821698 HC PT THER SUPP EA 15 MIN 9/5/2023 Sapna Mcclain GP 2            PT G-Codes  Outcome Measure Options: AM-PAC 6 Clicks Basic Mobility (PT)  AM-PAC 6 Clicks Score (PT): 15  AM-PAC 6 Clicks Score (OT): 14       Sapna Mcclain  9/5/2023

## 2023-09-05 NOTE — PROGRESS NOTES
[facility] Medicine Services  PROGRESS NOTE    Patient Name: Sonia Bella  : 1965  MRN: [id number]    Date of Admission: 2023  Primary Care Physician: Provider, No Known    Subjective   Subjective     CC:  Follow-up dehydration    HPI:  Reported shortness of breath. On 4L NC (baseline). No chest pain.  Feels tired.    ROS:  Gen- No fevers, chills  CV- No chest pain, palpitations  Resp- No cough, +dyspnea  GI-no diarrhea    Objective   Objective     Vital Signs:   Temp:  [97.4 °F (36.3 °C)-97.8 °F (36.6 °C)] 97.4 °F (36.3 °C)  Heart Rate:  [] 108  Resp:  [18] 18  BP: ()/(44-95) 108/70  Flow (L/min):  [3-4] 4     Physical Exam:  Constitutional: No acute distress, awake, alert, obese, laying in bed, moves and speaks slowly  HENT: NCAT, mucous membranes moist  Respiratory: Clear to auscultation bilaterally, respiratory effort normal, satting 98% on chronic 3 L  Cardiovascular: IRIR, , no murmurs, rubs, or gallops  Gastrointestinal: Normoactive bowel sounds, soft, nontender, nondistended  Musculoskeletal: No bilateral ankle edema  Psychiatric: Appropriate affect, cooperative  Neurologic: PERRL, symmetric facies, symmetric palate rise, tongue midline, moving all extremities well, speech clear    Results Reviewed:  LAB RESULTS:      Lab 23  0445 23  0200 23  0514 23  0109 23  1725 23  1101   WBC 5.46 5.37 5.33 5.46  --  5.47   HEMOGLOBIN 8.2* 8.1* 8.3* 8.6*  --  9.5*   HEMATOCRIT 28.9* 28.7* 29.8* 30.7*  --  34.6   PLATELETS 144 130* 123* 138*  --  150   NEUTROS ABS  --   --   --  4.33  --  4.44   IMMATURE GRANS (ABS)  --   --   --  0.03  --  0.02   LYMPHS ABS  --   --   --  0.21*  --  0.27*   MONOS ABS  --   --   --  0.82  --  0.68   EOS ABS  --   --   --  0.05  --  0.05   MCV 80.3 81.1 81.4 80.6  --  83.4   SED RATE  --   --   --   --   --  54*   CRP  --   --   --   --  5.18*  --    PROCALCITONIN  --   --   --   --   --  0.65*    LACTATE  --   --   --   --   --  1.8   PROTIME  --   --   --   --   --  22.6*           Lab 09/05/23  0445 09/04/23  1912 09/04/23  1602 09/04/23  1020 09/04/23  0200 09/03/23  0722 09/03/23  0514 09/02/23  1907 09/02/23  0444 09/02/23  0109 09/01/23  1725 09/01/23  1108 09/01/23  1101   SODIUM 134*  --   --   --  139  --  135*  --  142  --  143  --  141   POTASSIUM 4.3  --   --   --  4.7  --  4.4 4.3 3.3*  3.3* 3.2* 2.8*  --  2.9*   CHLORIDE 92*  --   --   --  96*  --  93*  --  93*  --  92*  --  89*   CO2 26.0  --   --   --  28.0  --  33.0*  --  33.0*  --  36.0*  --  35.0*   ANION GAP 16.0*  --   --   --  15.0  --  9.0  --  16.0*  --  15.0  --  17.0*   BUN 30*  --   --   --  29*  --  30*  --  31*  --  29*  --  30*   CREATININE 2.44*  --   --   --  2.63*  --  2.46*  --  2.47*  --  2.23*   < > 2.40*   EGFR 22.3*  --   --   --  20.4*  --  22.1*  --  22.0*  --  24.8*  --  22.7*   GLUCOSE 67  --   --   --  77  --  81  --  97  --  81  --  61*   CALCIUM 8.7  --   --   --  8.5*  --  8.5*  --  8.3*  --  8.7  --  8.5*   MAGNESIUM 1.8  --   --   --  1.9  --  2.2  --  2.3 2.2 1.4*  --  1.1*   PHOSPHORUS  --  2.8 2.9 2.3* 1.7* 0.4*  --   --   --   --   --   --   --    HEMOGLOBIN A1C  --   --   --   --   --   --   --   --   --   --   --   --  4.80   TSH  --   --   --   --   --   --   --   --   --   --   --   --  1.840    < > = values in this interval not displayed.           Lab 09/01/23  1101   TOTAL PROTEIN 6.7   ALBUMIN 3.6   GLOBULIN 3.1   ALT (SGPT) 33   AST (SGOT) 81*   BILIRUBIN 3.4*   ALK PHOS 133*           Lab 09/02/23  2353 09/02/23  2041 09/02/23  1907 09/01/23  1101   PROBNP  --   --   --  21,177.0*   HSTROP T 49* 65* 51* 41*   PROTIME  --   --   --  22.6*   INR  --   --   --  1.97*               Lab 09/01/23  1152   ABO TYPING O   RH TYPING Positive   ANTIBODY SCREEN Negative           Brief Urine Lab Results  (Last result in the past 365 days)        Color   Clarity   Blood   Leuk Est   Nitrite   Protein    CREAT   Urine HCG        09/01/23 1444 Yellow   Clear   Negative   Trace   Negative   30 mg/dL (1+)                   Microbiology Results Abnormal       Procedure Component Value - Date/Time    Blood Culture - Blood, Arm, Left [[id number]]  (Normal) Collected: 09/02/23 1907    Lab Status: Preliminary result Specimen: Blood from Arm, Left Updated: 09/04/23 2045     Blood Culture No growth at 2 days    Blood Culture - Blood, Hand, Left [[id number]]  (Normal) Collected: 09/02/23 1907    Lab Status: Preliminary result Specimen: Blood from Hand, Left Updated: 09/04/23 2045     Blood Culture No growth at 2 days    Blood Culture - Blood, Arm, Right [[id number]]  (Normal) Collected: 09/01/23 1152    Lab Status: Preliminary result Specimen: Blood from Arm, Right Updated: 09/04/23 1300     Blood Culture No growth at 3 days    MRSA Screen, PCR (Inpatient) - Swab, Nares [[id number]]  (Normal) Collected: 09/02/23 1635    Lab Status: Final result Specimen: Swab from Nares Updated: 09/02/23 2050     MRSA PCR Negative    Narrative:      The negative predictive value of this diagnostic test is high and should only be used to consider de-escalating anti-MRSA therapy. A positive result may indicate colonization with MRSA and must be correlated clinically.  MRSA Negative    Gastrointestinal Panel, PCR - Stool, Per Rectum [[id number]]  (Normal) Collected: 09/02/23 1551    Lab Status: Final result Specimen: Stool from Per Rectum Updated: 09/02/23 1735     Campylobacter Not Detected     Plesiomonas shigelloides Not Detected     Salmonella Not Detected     Vibrio Not Detected     Vibrio cholerae Not Detected     Yersinia enterocolitica Not Detected     Enteroaggregative E. coli (EAEC) Not Detected     Enteropathogenic E. coli (EPEC) Not Detected     Enterotoxigenic E. coli (ETEC) lt/st Not Detected     Shiga-like toxin-producing E. coli (STEC) stx1/stx2 Not Detected     Shigella/Enteroinvasive E. coli (EIEC) Not Detected     Cryptosporidium  Not Detected     Cyclospora cayetanensis Not Detected     Entamoeba histolytica Not Detected     Giardia lamblia Not Detected     Adenovirus F40/41 Not Detected     Astrovirus Not Detected     Norovirus GI/GII Not Detected     Rotavirus A Not Detected     Sapovirus (I, II, IV or V) Not Detected    Respiratory Panel PCR w/COVID-19(SARS-CoV-2) [facility]BBY/SAHRA/ELISABETH/PAD/[facility]/M[facility]/JABARI In-House, NP Swab in UTM/VTM, 3-4 HR TAT - Swab, Nasopharynx [[id number]]  (Normal) Collected: 09/01/23 1151    Lab Status: Final result Specimen: Swab from Nasopharynx Updated: 09/01/23 1327     ADENOVIRUS, PCR Not Detected     Coronavirus 229E Not Detected     Coronavirus HKU1 Not Detected     Coronavirus NL63 Not Detected     Coronavirus OC43 Not Detected     COVID19 Not Detected     Human Metapneumovirus Not Detected     Human Rhinovirus/Enterovirus Not Detected     Influenza A PCR Not Detected     Influenza B PCR Not Detected     Parainfluenza Virus 1 Not Detected     Parainfluenza Virus 2 Not Detected     Parainfluenza Virus 3 Not Detected     Parainfluenza Virus 4 Not Detected     RSV, PCR Not Detected     Bordetella pertussis pcr Not Detected     Bordetella parapertussis PCR Not Detected     Chlamydophila pneumoniae PCR Not Detected     Mycoplasma pneumo by PCR Not Detected    Narrative:      In the setting of a positive respiratory panel with a viral infection PLUS a negative procalcitonin without other underlying concern for bacterial infection, consider observing off antibiotics or discontinuation of antibiotics and continue supportive care. If the respiratory panel is positive for atypical bacterial infection (Bordetella pertussis, Chlamydophila pneumoniae, or Mycoplasma pneumoniae), consider antibiotic de-escalation to target atypical bacterial infection.            XR Chest 1 View    Result Date: 9/4/2023  XR CHEST 1 VW Date of Exam: 9/4/2023 1:21 PM CDT Indication: SOA Comparison: 2/17/2023 Findings: Cardiomediastinal silhouette is  enlarged, more pronounced than previous exam. No airspace disease, pneumothorax, nor pleural effusion.No acute osseous abnormality identified.     Impression: Impression: Enlarging cardiac silhouette without acute pulmonary process identified. Electronically Signed: Rickey Werner MD  9/4/2023 1:48 PM CDT  Workstation ID: [id number]     Results for orders placed during the hospital encounter of 02/15/23    Adult Transthoracic Echo Limited W/ Cont if Necessary Per Protocol    Interpretation Summary    The right atrial cavity is dilated.    Left ventricular ejection fraction appears to be 41 - 45%. The left ventricular cavity is mildly dilated. Left ventricular wall thickness is consistent with mild concentric hypertrophy. There is left ventricular global hypokinesis noted.    Moderate tricuspid valve regurgitation is present.    The left atrial cavity is mildly dilated. Saline test results are negative.      Current medications:  Scheduled Meds:apixaban, 5 mg, Oral, Q12H  budesonide-formoterol, 2 puff, Inhalation, BID - RT  nebivolol, 2.5 mg, Oral, Daily  pantoprazole, 40 mg, Oral, Daily  thiamine, 100 mg, Oral, Daily  tiotropium bromide monohydrate, 2 puff, Inhalation, Daily - RT      Continuous Infusions:O2, 3 L/min      PRN Meds:.  acetaminophen    Calcium Replacement - Follow Nurse / BPA Driven Protocol    dextrose    dextrose    glucagon (human recombinant)    ipratropium-albuterol    Magnesium Standard Dose Replacement - Follow Nurse / BPA Driven Protocol    metoprolol tartrate    ondansetron    Phosphorus Replacement - Follow Nurse / BPA Driven Protocol    Potassium Replacement - Follow Nurse / BPA Driven Protocol    simethicone    Assessment & Plan   Assessment & Plan     Active Hospital Problems    Diagnosis  POA    **Dehydration [E86.0]  Yes    Diarrhea [R19.7]  Yes    Hypomagnesemia [E83.42]  Yes    Hypokalemia [E87.6]  Yes    Stage III CKD [N18.30]  Yes    AoC HFpEF [I50.33]  Yes    Morbid obesity with BMI  of 50.0-59.9, adult [E66.01, Z68.43]  Not Applicable    HTN (hypertension) [I10]  Yes      Resolved Hospital Problems   No resolved problems to display.        Brief Hospital Course to date:  Sonia Bella is a 58 y.o. female with COPD on 3 L chronically, A fib not on AC, HFrEF (EF of 40%), CAD, GERD, prior bleeding stomach ulcers, HTN, Obsestity, and CKD stage III, who presented to the ED with weakness, confusion, hypoglycemia that was treated.    This patient's problems and plans were partially entered by my partner and updated as appropriate by me 09/05/23.     A fib with RVR and associated hypotension  Chest pain, resolved  HFrEF, EF of 40%   Cardiomegaly  --Not on ACE due to angioedema  --TSH within normal limits  --Continue beta blocker at reduced dose with holding parameters  --Continue tele  --status post digoxin x2 per cardiology  --Status post 2 L IV fluids  --Resumed Eliquis; had stopped outpatient for an unclear reason, denied history of bleeding  --9/5 ECHO pending    Anemia  --Appears chronic  --Vitamin B12 level 318, folate level 7 in August 2023, will not repeat  --Iron panel, ferritin w/ ferritin/TIBC/Iron/transferrin wnl, iron saturation low    Diarrhea  -GI PCR negative; C. difficile pending (no additional stool --> cancel)    Hypomagnesemia  Hypokalemia  Hypophosphatemia  -replete    Hypoglycemia, resolved  Dehydration  -Likely due to poor oral intake; status post IV fluids  -C peptide sent  -Thiamine  -cortisol level added onto AM labs was 23.11    Staph epi in 1 of 2 blood cultures  --Blood culture 1 of 2 positive for staph epi, suspected contaminant  --Repeat blood cultures NGTD  --Stopped vancomycin    CKD  --Baseline creatinine 1.9-2.3    Deconditioning  --PT/OT consulted --> SNF    COPD  Chronic hypoxia, on 3L continuously at home  --Continue home medications    Obesity - BMI 40.49    Expected Discharge Location and Transportation: SNF, patient amenable  Expected Discharge   Expected  Discharge Date: 9/5/2023; Expected Discharge Time:      DVT prophylaxis:  Medical DVT prophylaxis orders are present.     AM-PAC 6 Clicks Score (PT): 13 (09/03/23 2000)    CODE STATUS:   Code Status and Medical Interventions:   Ordered at: 09/01/23 1831     Code Status (Patient has no pulse and is not breathing):    CPR (Attempt to Resuscitate)     Medical Interventions (Patient has pulse or is breathing):    Full       Coleen Higgins MD  09/05/23

## 2023-09-05 NOTE — THERAPY TREATMENT NOTE
Patient Name: Sonia Bella  : 1965    MRN: [id number]                              Today's Date: 2023       Admit Date: 2023    Visit Dx:     ICD-10-CM ICD-9-CM   1. Generalized weakness  R53.1 780.79   2. Diarrhea, unspecified type  R19.7 787.91   3. Acute UTI  N39.0 599.0   4. Hypokalemia  E87.6 276.8   5. Hypomagnesemia  E83.42 275.2   6. Hypoglycemia  E16.2 251.2   7. Anemia, unspecified type  D64.9 285.9   8. Chronic kidney disease, unspecified CKD stage  N18.9 585.9     Patient Active Problem List   Diagnosis    Angioedema due to angiotensin converting enzyme inhibitor (ACE-I)    HTN (hypertension)    B12 deficiency anemia    Iron deficiency anemia    CKD (chronic kidney disease) stage 3, GFR 30-59 ml/min    COVID-19 virus infection    COPD on home O2 (2L)    Morbid obesity with BMI of 50.0-59.9, adult    AoC HFpEF    Acute type 2 respiratory failure    Human metapneumovirus (hMPV)    GERD    Stage III CKD    Atrial fibrillation with RVR    Acute on chronic HFrEF (heart failure with reduced ejection fraction)    Dehydration    Diarrhea    Hypomagnesemia    Hypokalemia     Past Medical History:   Diagnosis Date    Bleeding ulcer     COPD (chronic obstructive pulmonary disease)     Coronary artery disease     GERD (gastroesophageal reflux disease)     History of stomach ulcers     Hypertension     Stage III CKD 02/15/2023     History reviewed. No pertinent surgical history.   General Information       Row Name 23 1539          OT Time and Intention    Document Type therapy note (daily note)  -AF     Mode of Treatment occupational therapy  -AF       Row Name 23 1533          General Information    Patient Profile Reviewed yes  -AF     Existing Precautions/Restrictions fall;oxygen therapy device and L/min  -AF     Barriers to Rehab medically complex;previous functional deficit  -AF       Row Name 23 6992          Cognition    Orientation Status (Cognition) oriented x 4  -AF        Row Name 09/05/23 1539          Safety Issues, Functional Mobility    Safety Issues Affecting Function (Mobility) safety precaution awareness;safety precautions follow-through/compliance;sequencing abilities  -AF     Impairments Affecting Function (Mobility) balance;endurance/activity tolerance;strength;postural/trunk control  -AF     Comment, Safety Issues/Impairments (Mobility) activity limited by lethargy  -AF               User Key  (r) = Recorded By, (t) = Taken By, (c) = Cosigned By      Initials Name Provider Type    AF Vanessa Nye OT Occupational Therapist                     Mobility/ADL's       Row Name 09/05/23 1540          Bed Mobility    Bed Mobility rolling left;rolling right  -AF     Rolling Left [place] (Bed Mobility) supervision;verbal cues  -AF     Rolling Right [place] (Bed Mobility) supervision;verbal cues  -AF     Comment, (Bed Mobility) pt declined out of bed activities on this date d/t lethargy.  -AF       Row Name 09/05/23 1540          Activities of Daily Living    BADL Assessment/Intervention upper body dressing;toileting  -AF       Row Name 09/05/23 1540          Upper Body Dressing Assessment/Training    [place] Level (Upper Body Dressing) don;doff;minimum assist (75% patient effort)  -AF     Position (Upper Body Dressing) supine  -AF       Row Name 09/05/23 1540          Toileting Assessment/Training    [place] Level (Toileting) perform perineal hygiene;dependent (less than 25% patient effort)  -AF     Position (Toileting) supine  therapist assisted NA w/ changing bed and completing hygiene.  -AF               User Key  (r) = Recorded By, (t) = Taken By, (c) = Cosigned By      Initials Name Provider Type    Vanessa Coleman OT Occupational Therapist                   Obj/Interventions       Row Name 09/05/23 1542          Motor Skills    Motor Skills functional endurance  -AF     Functional Endurance decreased functional endurance. Completed bed mobility and  BADLs while elevated in bed.  -AF       Row Name 09/05/23 1542          Balance    Balance Assessment sitting static balance;sitting dynamic balance  -AF     Static Sitting Balance supervision  -AF     Dynamic Sitting Balance supervision  -AF     Position, Sitting Balance supported;other (see comments)  in bed  -AF     Balance Interventions sitting;weight shifting activity;occupation based/functional task  -AF               User Key  (r) = Recorded By, (t) = Taken By, (c) = Cosigned By      Initials Name Provider Type    AF Vanessa Nye OT Occupational Therapist                   Goals/Plan    No documentation.                  Clinical Impression       Row Name 09/05/23 1544          Pain Assessment    Pretreatment Pain Rating 0/10 - no pain  -AF     Posttreatment Pain Rating 0/10 - no pain  -AF       Row Name 09/05/23 1544          Plan of Care Review    Plan of Care Reviewed With patient  -AF     Progress no change  -AF     Outcome Evaluation Pt continues to demo deficits in strength and endurance caffecting ADL indp. when compared to baseline. Completed grooming task while elevated in bed d/t reports of lethargy. Recc. continued IPOT per POC and DC to SNF.  -AF       Row Name 09/05/23 1544          Therapy Assessment/Plan (OT)    Rehab Potential (OT) good, to achieve stated therapy goals  -AF     Criteria for Skilled Therapeutic Interventions Met (OT) yes;skilled treatment is necessary  -AF     Therapy Frequency (OT) daily  -AF       Row Name 09/05/23 1544          Therapy Plan Review/Discharge Plan (OT)    Anticipated Discharge Disposition (OT) skilled nursing facility  -AF       Row Name 09/05/23 1542          Vital Signs    Pre Systolic BP Rehab 105  -AF     Pre Treatment Diastolic BP 76  -AF     Post Systolic BP Rehab 115  -AF     Post Treatment Diastolic BP 84  -AF     Pre SpO2 (%) 90  -AF     O2 Delivery Pre Treatment nasal cannula  -AF     Intra SpO2 (%) 87  -AF     O2 Delivery Intra Treatment nasal  cannula  -AF     Post SpO2 (%) 92  -AF     O2 Delivery Post Treatment nasal cannula  -AF     Pre Patient Position Supine  -AF     Intra Patient Position Sitting  elevated in bed; supported long sitting  -AF     Post Patient Position Supine  -AF       Row Name 09/05/23 1544          Positioning and Restraints    Pre-Treatment Position in bed  -AF     Post Treatment Position bed  -AF     In Bed notified nsg;call light within reach;supine;encouraged to call for assist;legs elevated;exit alarm on  -AF               User Key  (r) = Recorded By, (t) = Taken By, (c) = Cosigned By      Initials Name Provider Type    AF Vanessa Nye OT Occupational Therapist                   Outcome Measures       Row Name 09/05/23 1548          How much help from another is currently needed...    Putting on and taking off regular lower body clothing? 1  -AF     Bathing (including washing, rinsing, and drying) 2  -AF     Toileting (which includes using toilet bed pan or urinal) 2  -AF     Putting on and taking off regular upper body clothing 3  -AF     Taking care of personal grooming (such as brushing teeth) 3  -AF     Eating meals 3  -AF     AM-PAC 6 Clicks Score (OT) 14  -AF       Row Name 09/05/23 1321          How much help from another person do you currently need...    Turning from your back to your side while in flat bed without using bedrails? 3  -KG     Moving from lying on back to sitting on the side of a flat bed without bedrails? 3  -KG     Moving to and from a bed to a chair (including a wheelchair)? 2  -KG     Standing up from a chair using your arms (e.g., wheelchair, bedside chair)? 3  -KG     Climbing 3-5 steps with a railing? 2  -KG     To walk in hospital room? 2  -KG     AM-PAC 6 Clicks Score (PT) 15  -KG     Highest level of mobility 4 --> Transferred to chair/commode  -KG       Row Name 09/05/23 1548 09/05/23 1321       Functional Assessment    Outcome Measure Options AM-PAC 6 Clicks Daily Activity (OT)  -AF AM-PAC  6 Clicks Basic Mobility (PT)  -KG              User Key  (r) = Recorded By, (t) = Taken By, (c) = Cosigned By      Initials Name Provider Type    Sapna Colin Physical Therapist    Vanessa Coleman OT Occupational Therapist                    Occupational Therapy Education       Title: PT OT SLP Therapies (In Progress)       Topic: Occupational Therapy (In Progress)       Point: ADL training (Done)       Description:   Instruct learner(s) on proper safety adaptation and remediation techniques during self care or transfers.   Instruct in proper use of assistive devices.                  Learning Progress Summary             Patient Acceptance, E,TB, VU by AF at 9/5/2023 1548    Acceptance, E, VU by AN at 9/2/2023 1145                         Point: Home exercise program (Not Started)       Description:   Instruct learner(s) on appropriate technique for monitoring, assisting and/or progressing therapeutic exercises/activities.                  Learner Progress:  Not documented in this visit.              Point: Precautions (Done)       Description:   Instruct learner(s) on prescribed precautions during self-care and functional transfers.                  Learning Progress Summary             Patient Acceptance, E, VU by AN at 9/2/2023 1145                         Point: Body mechanics (Done)       Description:   Instruct learner(s) on proper positioning and spine alignment during self-care, functional mobility activities and/or exercises.                  Learning Progress Summary             Patient Acceptance, E,TB, VU by AF at 9/5/2023 1548    Acceptance, E, VU by AN at 9/2/2023 1145                                         User Key       Initials Effective Dates Name Provider Type Discipline     09/21/21 -  Maria Alejandra Johnston OT Occupational Therapist OT     08/15/23 -  Vanessa Nye OT Occupational Therapist OT                  OT Recommendation and Plan  Therapy Frequency (OT): daily  Plan of Care  Review  Plan of Care Reviewed With: patient  Progress: no change  Outcome Evaluation: Pt continues to demo deficits in strength and endurance caffecting ADL indp. when compared to baseline. Completed grooming task while elevated in bed d/t reports of lethargy. Recc. continued IPOT per POC and DC to SNF.     Time Calculation:         Time Calculation- OT       Row Name 09/05/23 1549             Time Calculation- OT    OT Received On 09/05/23  -AF      OT Goal Re-Cert Due Date 09/12/23  -AF         Timed Charges    60215 - OT Therapeutic Activity Minutes 17  -AF      14110 - OT Self Care/Mgmt Minutes 23  -AF         Total Minutes    Timed Charges Total Minutes 40  -AF       Total Minutes 40  -AF                User Key  (r) = Recorded By, (t) = Taken By, (c) = Cosigned By      Initials Name Provider Type    AF Vanessa Nye OT Occupational Therapist                  Therapy Charges for Today       Code Description Service Date Service Provider Modifiers Qty    01996160176  OT THERAPEUTIC ACT EA 15 MIN 9/5/2023 Vanessa Nye OT GO 1    31220378914 HC OT SELF CARE/MGMT/TRAIN EA 15 MIN 9/5/2023 Vanessa Nye OT GO 2                 Vanessa Nye OT  9/5/2023

## 2023-09-05 NOTE — CASE MANAGEMENT/SOCIAL WORK
Discharge Planning Assessment  [facility]     Patient Name: Sonia Bella  MRN: [id number]  Today's Date: 9/5/2023    Admit Date: 9/1/2023    Plan: discharge plan   Discharge Needs Assessment       Row Name 09/05/23 1638       Living Environment    People in Home child(priya), adult    Name(s) of People in Home Pt reports she was living  with Katerine but her daughter reports she cant come back home there.    Primary Care Provided by self    Provides Primary Care For no one, unable/limited ability to care for self    Family Caregiver if Needed child(priya), adult    Family Caregiver Names Katerine Jimenez and Eduardo Camargo(adult  daughters)    Quality of Family Relationships unable to assess    Able to Return to Prior Arrangements no    Living Arrangement Comments I spoke with pt at bedside with permission regarding discharge plan. Pt resides in [facility] and was living with daughter, Katerine but was told she could not come back there.  Per pt, daughter Eduardo also  states pt could not live with her. I will ask SW to see pt.       Resource/Environmental Concerns    Resource/Environmental Concerns other (see comments)  home       Transition Planning    Patient/Family Anticipates Transition to other (see comments)  TBD    Patient/Family Anticipated Services at Transition     Transportation Anticipated health plan transportation       Discharge Needs Assessment    Readmission Within the Last 30 Days no previous admission in last 30 days    Equipment Currently Used at Home cane, quad;oxygen;respiratory supplies;rollator;other (see comments)  Pt states she is suppose to be getting a Hover round. Pt reports she uses home O2 at 4 L, 24/7 provided by [facility]    Concerns to be Addressed discharge planning    Equipment Needed After Discharge cane, quad;respiratory supplies;rollator;oxygen;other (see comments)  Hoveround    Discharge Coordination/Progress Pt confirms that she has [employer] Medicare  with prescription coverage and uses [facility] at [facility]. Pt is here with dehydration. She reports she was living with her daughter, Katerine and was told that she couldnt come back there. SHe states she is able to feed and dress self and her daughter, Katerine was assisting her with a sponge bath. Pt is interested in a skilled nursing facility but wants help finding a place to live. I will ask Arlen MACDONALD to see pt. CM will cont to follow                   Discharge Plan       Row Name 09/05/23 3233       Plan    Plan discharge plan    Plan Comments Pt is interested in going to a skilled nursing facility for rehab Pt does want assistance in finding a place to stay after rehab. I will ask Arlen MACDONALD to see. CM will cont to follow    Final Discharge Disposition Code 03 - skilled nursing facility (SNF)                  Continued Care and Services - Admitted Since 9/1/2023    Coordination has not been started for this encounter.       Expected Discharge Date and Time       Expected Discharge Date Expected Discharge Time    Sep 8, 2023            Demographic Summary       Row Name 09/05/23 1636       General Information    General Information Comments Pt reports she uses [facility]       Contact Information    Permission Granted to Share Info With     Contact Information Obtained for     Contact Information Comments Eduardo Camargo(daughter) [phone]                   Functional Status    No documentation.                  Psychosocial    No documentation.                  Abuse/Neglect    No documentation.                  Legal    No documentation.                  Substance Abuse    No documentation.                  Patient Forms    No documentation.                     Asiya Corey, RN

## 2023-09-05 NOTE — PLAN OF CARE
Goal Outcome Evaluation:  Plan of Care Reviewed With: patient        Progress: no change  Outcome Evaluation: STS from EOB, min-A x2, FWW, 1st attempt knees began buckling 30 sec after standing and needed to sit back down, 2nd attempt pt able to remain standing and take a couple side steps toward HOB.  recommend SNF

## 2023-09-06 LAB
ALBUMIN SERPL-MCNC: 3 G/DL (ref 3.5–5.2)
ANION GAP SERPL CALCULATED.3IONS-SCNC: 13 MMOL/L (ref 5–15)
BACTERIA SPEC AEROBE CULT: NORMAL
BUN SERPL-MCNC: 30 MG/DL (ref 6–20)
BUN/CREAT SERPL: 12.8 (ref 7–25)
CALCIUM SPEC-SCNC: 8.9 MG/DL (ref 8.6–10.5)
CHLORIDE SERPL-SCNC: 93 MMOL/L (ref 98–107)
CO2 SERPL-SCNC: 26 MMOL/L (ref 22–29)
CREAT SERPL-MCNC: 2.35 MG/DL (ref 0.57–1)
EGFRCR SERPLBLD CKD-EPI 2021: 23.3 ML/MIN/1.73
GLUCOSE SERPL-MCNC: 83 MG/DL (ref 65–99)
MAGNESIUM SERPL-MCNC: 2.5 MG/DL (ref 1.6–2.6)
PHOSPHATE SERPL-MCNC: 2 MG/DL (ref 2.5–4.5)
PHOSPHATE SERPL-MCNC: 2.5 MG/DL (ref 2.5–4.5)
POTASSIUM SERPL-SCNC: 4.7 MMOL/L (ref 3.5–5.2)
SODIUM SERPL-SCNC: 132 MMOL/L (ref 136–145)
T3FREE SERPL-MCNC: 1.58 PG/ML (ref 2–4.4)

## 2023-09-06 PROCEDURE — 99232 SBSQ HOSP IP/OBS MODERATE 35: CPT | Performed by: STUDENT IN AN ORGANIZED HEALTH CARE EDUCATION/TRAINING PROGRAM

## 2023-09-06 PROCEDURE — 83735 ASSAY OF MAGNESIUM: CPT | Performed by: INTERNAL MEDICINE

## 2023-09-06 PROCEDURE — 84481 FREE ASSAY (FT-3): CPT | Performed by: INTERNAL MEDICINE

## 2023-09-06 PROCEDURE — 94799 UNLISTED PULMONARY SVC/PX: CPT

## 2023-09-06 PROCEDURE — 84100 ASSAY OF PHOSPHORUS: CPT | Performed by: STUDENT IN AN ORGANIZED HEALTH CARE EDUCATION/TRAINING PROGRAM

## 2023-09-06 PROCEDURE — 94664 DEMO&/EVAL PT USE INHALER: CPT

## 2023-09-06 PROCEDURE — 99231 SBSQ HOSP IP/OBS SF/LOW 25: CPT | Performed by: INTERNAL MEDICINE

## 2023-09-06 RX ADMIN — THIAMINE HCL TAB 100 MG 100 MG: 100 TAB at 08:05

## 2023-09-06 RX ADMIN — PANTOPRAZOLE SODIUM 40 MG: 40 TABLET, DELAYED RELEASE ORAL at 08:05

## 2023-09-06 RX ADMIN — APIXABAN 5 MG: 5 TABLET, FILM COATED ORAL at 08:05

## 2023-09-06 RX ADMIN — TIOTROPIUM BROMIDE INHALATION SPRAY 2 PUFF: 3.12 SPRAY, METERED RESPIRATORY (INHALATION) at 07:14

## 2023-09-06 RX ADMIN — BUDESONIDE AND FORMOTEROL FUMARATE DIHYDRATE 2 PUFF: 160; 4.5 AEROSOL RESPIRATORY (INHALATION) at 07:14

## 2023-09-06 RX ADMIN — BUDESONIDE AND FORMOTEROL FUMARATE DIHYDRATE 2 PUFF: 160; 4.5 AEROSOL RESPIRATORY (INHALATION) at 22:50

## 2023-09-06 RX ADMIN — APIXABAN 5 MG: 5 TABLET, FILM COATED ORAL at 20:47

## 2023-09-06 RX ADMIN — SODIUM PHOSPHATE, MONOBASIC, MONOHYDRATE AND SODIUM PHOSPHATE, DIBASIC, ANHYDROUS 15 MMOL: 142; 276 INJECTION, SOLUTION INTRAVENOUS at 04:47

## 2023-09-06 RX ADMIN — NEBIVOLOL 2.5 MG: 5 TABLET ORAL at 08:05

## 2023-09-06 NOTE — CASE MANAGEMENT/SOCIAL WORK
Continued Stay Note  [facility]     Patient Name: Sonia Bella  MRN: [id number]  Today's Date: 9/6/2023    Admit Date: 9/1/2023    Plan:    Discharge Plan       Row Name 09/06/23 1137       Plan    Plan SW    Plan Comments [facility]'s daughter Laverne [phone] who inquired about POA and HCS. Discussed rehab options for patient. Daughter Laverne explains patient will have a place to stay after rehab they are just make adjustments.  Daughter Laverne explained she would be back later to discuss more with her mother. [facility] a bedside. Discussed rehab options and resources. Patient is agreeable to rehab in [facility]. [facility]                   Discharge Codes    No documentation.                 Expected Discharge Date and Time       Expected Discharge Date Expected Discharge Time    Sep 9, 2023               ELVER Ko (Kay)

## 2023-09-06 NOTE — DISCHARGE PLACEMENT REQUEST
"Leanne Callahan (58 y.o. Female)       To [facility]  From [facility](CM at [facility]) [phone]      Date of Birth   1965    Social Security Number       Address   [address] [place] [address] [facility] [address]    Home Phone   [phone]    MRN   [id number]       Caodaism   None    Marital Status                               Admission Date   23    Admission Type   Emergency    Admitting Provider   Coleen Higgins MD    Attending Provider   Coleen Higgins MD    Department, Room/Bed   [facility] 6A, N604/1       Discharge Date       Discharge Disposition       Discharge Destination                                 Attending Provider: Coleen Higgins MD    Allergies: Ace Inhibitors    Isolation: None   Infection: None   Code Status: CPR    Ht: 170.2 cm (67\")   Wt: 119 kg (263 lb)    Admission Cmt: None   Principal Problem: Dehydration [E86.0]                   Active Insurance as of 2023       Primary Coverage       Payor Plan Insurance Group Employer/Plan Group    UNITED [facility] MEDICARE REPLACEMENT [facility] DUAL COMPLETE MEDICARE REPLACEMENT [id number]       Payor Plan Address Payor Plan Phone Number Payor Plan Fax Number Effective Dates    [address] [phone]  2023 - None Entered    [facility] [id number]         Subscriber Name Subscriber Birth Date Member ID       LEANNE CALLAHAN 1965 [id number]                     Emergency Contacts        (Rel.) Home Phone Work Phone Mobile Phone    Candelaria Camargo (Daughter) [phone]                 History & Physical        Davina Samuels MD at [facility]              [facility] Medicine Services  HISTORY AND PHYSICAL    Patient Name: Leanne Callahan  : 1965  MRN: [id number]  Primary Care Physician: Provider, No Known    Subjective  Subjective     Chief Complaint:diarrhea and weakness    HPI:  Leanne Calalhan is a 58 y.o. 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 who presented to the ED with profound weakness, confusion, hypoglycemia that was treated. She continues to feel weak and hungry but has been afraid to eat due to the diarrhea. She denies sick contacts or eating any questionable food, she denies stool in her stool.        Review of Systems   Patient denies weight loss, headaches, changes in vision, fever, chills, sore throat, shortness of breath, cough, nausea or vomiting, abdominal pain or distension, change in urine output or habits, joint pain, rash, itching, numbness/tingling, weakness or bleeding.    Otherwise complete ROS is negative except as mentioned in the HPI.    Personal History         PMH: She  has a past medical history of Bleeding ulcer, COPD (chronic obstructive pulmonary disease), Coronary artery disease, GERD (gastroesophageal reflux disease), History of stomach ulcers, Hypertension, and Stage III CKD (02/15/2023).   PSxH: She  has no past surgical history on file.         FH: Her family history is not on file.   SH: She  reports that she has quit smoking. Her smoking use included cigarettes. She has never used smokeless tobacco. She reports current alcohol use. She reports that she does not use drugs.     Medications:  O2, albuterol sulfate HFA, apixaban, budesonide-formoterol, colchicine, famotidine, nebivolol, tiotropium bromide monohydrate, and torsemide    Allergies   Allergen Reactions    Ace Inhibitors Angioedema     lisinopril       Objective  Objective     Vital Signs:   Temp:  [98.2 °F (36.8 °C)] 98.2 °F (36.8 °C)  Heart Rate:  [78-94] 94  Resp:  [16] 16  BP: (106-130)/(71-98) 126/91  Flow (L/min):  [3] 3    Constitutional: Awake, alert, interactive and pleasant- slow to speak, miserable,  weak  Eyes: clear sclerae, no conjunctival injection  HENT: NCAT, mucous membranes dry, pale  Neck: no masses or lymphadenopathy, trachea midline  Respiratory: good breath sounds bilaterally, respirations unlabored  Cardiovascular: RRR, no murmurs appreciated, palpable peripheral pulses  Abdomen:  soft, obese, tender lower abdomen  Musculoskeletal: No peripheral edema, clubbing or cyanosis  Neurologic: Oriented x 3, speech slow but clear                      Strength symmetric in all extremities                     Cranial Nerves grossly intact, speech clear  Skin: No rashes or jaundice  Psychiatric: Appropriate mood, insight      Result Review:  I have personally reviewed the results from the time of this admission   to 9/1/2023 14:03 EDT and agree with these findings:  [x]  Laboratory  [x]  Microbiology  [x]  Radiology  [x]  EKG/Telemetry   []  Cardiology/Vascular   []  Pathology  [x]  Old records  []  Other:  Most notable findings include: anemia, low K and Mag, elevated creatinine      LAB RESULTS:      Lab 09/01/23  1101   WBC 5.47   HEMOGLOBIN 9.5*   HEMATOCRIT 34.6   PLATELETS 150   NEUTROS ABS 4.44   IMMATURE GRANS (ABS) 0.02   LYMPHS ABS 0.27*   MONOS ABS 0.68   EOS ABS 0.05   MCV 83.4   PROCALCITONIN 0.65*   LACTATE 1.8   PROTIME 22.6*         Lab 09/01/23  1125 09/01/23  1108 09/01/23  1101   SODIUM  --   --  141   POTASSIUM  --   --  2.9*   CHLORIDE  --   --  89*   CO2  --   --  35.0*   ANION GAP  --   --  17.0*   BUN  --   --  30*   CREATININE 2.60 2.60* 2.40*   EGFR  --   --  22.7*   GLUCOSE  --   --  61*   CALCIUM  --   --  8.5*   MAGNESIUM  --   --  1.1*   TSH  --   --  1.840         Lab 09/01/23  1101   TOTAL PROTEIN 6.7   ALBUMIN 3.6   GLOBULIN 3.1   ALT (SGPT) 33   AST (SGOT) 81*   BILIRUBIN 3.4*   ALK PHOS 133*         Lab 09/01/23  1101   PROBNP 21,177.0*   HSTROP T 41*   PROTIME 22.6*   INR 1.97*             Lab 09/01/23  1152   ABO TYPING O   RH TYPING Positive   ANTIBODY SCREEN Negative          Brief Urine Lab Results  (Last result in the past 365 days)        Color   Clarity   Blood   Leuk Est   Nitrite   Protein   CREAT   Urine HCG        10/01/22 0434 Yellow   Clear   Trace   Negative   Negative   >=300 mg/dL (3+)                 COVID19   Date Value Ref Range Status   09/01/2023 Not Detected Not Detected - Ref. Range Final       CT Abdomen Pelvis Without Contrast    Result Date: 9/1/2023  CT ABDOMEN PELVIS WO CONTRAST Date of Exam: 9/1/2023 11:04 AM EDT Indication: gen weakness. Comparison: None available. Technique: Axial CT images were obtained of the abdomen and pelvis without the administration of contrast. Reconstructed coronal and sagittal images were also obtained. Automated exposure control and iterative construction methods were used. Findings: The lung apices demonstrate small right pleural effusion. There is notable four-chamber cardiac enlargement. The body wall soft tissues demonstrate anasarca. The osseous structures demonstrate no evidence of acute fracture or aggressive osseous lesion. The liver is enlarged. There is no suspicious focal hepatic, splenic or pancreatic lesion on limited noncontrast evaluation. There is a slightly hyperdense exophytic round finding involving the left kidney, favoring a hemorrhagic cyst. There is no evidence of hydronephrosis. Small and large bowel loops are nondilated. Diverticular changes are present without definite evidence of acute diverticulitis. Numerous hyperdense uterine findings are present, likely to represent fibroids but incompletely characterized. There is no free fluid or pneumoperitoneum. Hyperdense gallstones are present without inflammatory signs of cholecystitis.     Impression: Impression: There is marked cardiac enlargement, with diffuse anasarca and a small right pleural effusion. There is also hepatic enlargement and findings are concerning for heart failure. No acute findings are present in the abdomen and pelvis. An exophytic left  renal finding is noted, favoring a benign hemorrhagic cyst, better characterized on recent ultrasound. Nodular hyperdense appearance of the uterus suggesting underlying fibroids. Electronically Signed: Ryley Busby MD  9/1/2023 11:19 AM EDT  Workstation ID: [id number]    CT Head Without Contrast    Result Date: 9/1/2023  CT HEAD WO CONTRAST Date of Exam: 9/1/2023 11:04 AM EDT Indication: gen weakness. Comparison: None available. Technique: Axial CT images were obtained of the head without contrast administration.  Automated exposure control and iterative construction methods were used. Findings: Gray-white differentiation is maintained and there is no evidence of intracranial hemorrhage, mass or mass effect. The ventricles are normal in size and configuration. The orbits are normal. The paranasal sinuses are grossly clear. The calvarium is intact.     Impression: Impression: No acute intracranial abnormality. Electronically Signed: Ryley Busby MD  9/1/2023 11:12 AM EDT  Workstation ID: [id number]     Results for orders placed during the hospital encounter of 02/15/23    Adult Transthoracic Echo Limited W/ Cont if Necessary Per Protocol    Interpretation Summary    The right atrial cavity is dilated.    Left ventricular ejection fraction appears to be 41 - 45%. The left ventricular cavity is mildly dilated. Left ventricular wall thickness is consistent with mild concentric hypertrophy. There is left ventricular global hypokinesis noted.    Moderate tricuspid valve regurgitation is present.    The left atrial cavity is mildly dilated. Saline test results are negative.      The patient qualifies to receive the vaccine, but they have not yet received it.    Assessment & Plan  Assessment / Plan       Dehydration    Diarrhea    HTN (hypertension)    Morbid obesity with BMI of 50.0-59.9, adult    AoC HFpEF    Stage III CKD    Hypomagnesemia    Hypokalemia      Assessment & Plan:  Sonia Bella is a 58 y.o. female who   has a past medical history of Bleeding ulcer, COPD (chronic obstructive pulmonary disease), Cronigh HFrEF with ED of 40%, Coronary artery disease, GERD (gastroesophageal reflux disease), History of stomach ulcers, Hypertension, Obsestity, and Stage III CKD (02/15/2023). who presented to the ED with profound weakness, confusion, hypoglycemia that was treated.    Diarrhea  -GI PCR is pending    HypoK and MAg  -replete    EF of 40% with hepatic congestion  --  NOT on ACE due to angioedema    Hypoglycemia  -hopefully just due to starvation, check c peptid  -add IVFs, check cortisol    CKD    Obesity  DVT prophylaxis:  Medical DVT prophylaxis orders are present.    CODE STATUS:Full code     Expected Discharge  Expected Discharge Date: 9/3/2023; Expected Discharge Time:     Electronically signed by Davina Samuels MD 09/01/23 14:03 EDT            Electronically signed by Davina Samuels MD at 09/01/23 1814       Current Facility-Administered Medications   Medication Dose Route Frequency Provider Last Rate Last Admin    acetaminophen (TYLENOL) tablet 650 mg  650 mg Oral Q6H PRN Coleen Higgins MD        apixaban (ELIQUIS) tablet 5 mg  5 mg Oral Q12H Coleen Higgins MD   5 mg at 09/06/23 0805    budesonide-formoterol (SYMBICORT) 160-4.5 MCG/ACT inhaler 2 puff  2 puff Inhalation BID - RT Davina Samuels MD   2 puff at 09/06/23 0714    Calcium Replacement - Follow Nurse / BPA Driven Protocol   Does not apply Issac Sauceda MD        dextrose (D50W) (25 g/50 mL) IV injection 25 g  25 g Intravenous Q15 Min PRN Davina Samuels MD        dextrose (GLUTOSE) oral gel 15 g  15 g Oral Q15 Min PRN Davina Samuels MD        glucagon (GLUCAGEN) injection 1 mg  1 mg Subcutaneous Q15 Min PRN Davina Samuels MD        ipratropium-albuterol (DUO-NEB) nebulizer solution 3 mL  3 mL Nebulization Q4H PRN Coleen Higgins MD        Magnesium Standard Dose Replacement - Follow Nurse / BPA Driven Protocol   Does not apply PRN Rashid,  "Issac Cheema MD        metoprolol tartrate (LOPRESSOR) injection 2.5 mg  2.5 mg Intravenous Q6H PRN Antonella Go PA        nebivolol (BYSTOLIC) tablet 2.5 mg  2.5 mg Oral Daily Vicky Schmid, APRN   2.5 mg at 23 0805    O2 (OXYGEN)  3 L/min Inhalation Continuous Davina Samuels MD        ondansetron (ZOFRAN) injection 4 mg  4 mg Intravenous Q6H PRN Coleen Higgins MD        pantoprazole (PROTONIX) EC tablet 40 mg  40 mg Oral Daily Davina Samuels MD   40 mg at 23 0805    Phosphorus Replacement - Follow Nurse / BPA Driven Protocol   Does not apply PRN Coleen Higgins MD        Potassium Replacement - Follow Nurse / BPA Driven Protocol   Does not apply PRN Issac Rashid MD        simethicone (MYLICON) chewable tablet 80 mg  80 mg Oral 4x Daily PRN Coleen Higgins MD   80 mg at 23 0856    thiamine (VITAMIN B-1) tablet 100 mg  100 mg Oral Daily Coleen Higgins MD   100 mg at 23 0805    tiotropium (SPIRIVA RESPIMAT) 2.5 mcg/act aerosol solution inhaler  2 puff Inhalation Daily - RT Davina Samuels MD   2 puff at 23 0714        Physician Progress Notes (most recent note)        Coleen Higgins MD at 23 0813              [facility] Medicine Services  PROGRESS NOTE    Patient Name: Sonia Bella  : 1965  MRN: [id number]    Date of Admission: 2023  Primary Care Physician: Provider, No Known    Subjective   Subjective     CC:  Follow-up dehydration    HPI:  On 3 L nasal cannula.  No chest pain.  Tired.  No diarrhea.  Reports being upset that she is still in the hospital and asking to leave either to a facility or to \"go home\".  Per the patient's daughter, she patient cannot go back and live with her other daughter.  Sitting up in bedside chair.  Patient's daughter and her son-in-law are at bedside, patient is okay with them receiving updates.    ROS:  Gen- No fevers, chills  CV- No chest pain, palpitations  Resp- No cough, +chronic " dyspnea  GI-no diarrhea    Objective   Objective     Vital Signs:   Temp:  [97.5 °F (36.4 °C)-97.9 °F (36.6 °C)] 97.8 °F (36.6 °C)  Heart Rate:  [] 72  Resp:  [16-18] 18  BP: (100-116)/(76-84) 110/79  Flow (L/min):  [2.5-4] 3     Physical Exam:  Constitutional: No acute distress, awake, alert, obese, sitting up in bedside chair  HENT: NCAT, mucous membranes moist  Respiratory: Clear to auscultation bilaterally, respiratory effort normal  Cardiovascular: IRIR, HR 80s, no murmurs, rubs, or gallops  Gastrointestinal: Normoactive bowel sounds, soft, nontender, nondistended  Musculoskeletal: No bilateral ankle edema  Psychiatric: Appropriate affect, cooperative  Neurologic: PERRL, symmetric facies, symmetric palate rise, tongue midline, moving all extremities well, speech clear    Results Reviewed:  LAB RESULTS:      Lab 09/05/23  0445 09/04/23  0200 09/03/23  0514 09/02/23  0109 09/01/23  1725 09/01/23  1101   WBC 5.46 5.37 5.33 5.46  --  5.47   HEMOGLOBIN 8.2* 8.1* 8.3* 8.6*  --  9.5*   HEMATOCRIT 28.9* 28.7* 29.8* 30.7*  --  34.6   PLATELETS 144 130* 123* 138*  --  150   NEUTROS ABS  --   --   --  4.33  --  4.44   IMMATURE GRANS (ABS)  --   --   --  0.03  --  0.02   LYMPHS ABS  --   --   --  0.21*  --  0.27*   MONOS ABS  --   --   --  0.82  --  0.68   EOS ABS  --   --   --  0.05  --  0.05   MCV 80.3 81.1 81.4 80.6  --  83.4   SED RATE  --   --   --   --   --  54*   CRP  --   --   --   --  5.18*  --    PROCALCITONIN  --   --   --   --   --  0.65*   LACTATE  --   --   --   --   --  1.8   PROTIME  --   --   --   --   --  22.6*           Lab 09/05/23  2358 09/05/23  0445 09/04/23  1912 09/04/23  1602 09/04/23  1020 09/04/23  0200 09/03/23  0722 09/03/23  0514 09/02/23  1907 09/02/23  0444 09/02/23  0109 09/01/23  1108 09/01/23  1101   SODIUM 132* 134*  --   --   --  139  --  135*  --  142  --    < > 141   POTASSIUM 4.7 4.3  --   --   --  4.7  --  4.4 4.3 3.3*  3.3* 3.2*   < > 2.9*   CHLORIDE 93* 92*  --   --   --   96*  --  93*  --  93*  --    < > 89*   CO2 26.0 26.0  --   --   --  28.0  --  33.0*  --  33.0*  --    < > 35.0*   ANION GAP 13.0 16.0*  --   --   --  15.0  --  9.0  --  16.0*  --    < > 17.0*   BUN 30* 30*  --   --   --  29*  --  30*  --  31*  --    < > 30*   CREATININE 2.35* 2.44*  --   --   --  2.63*  --  2.46*  --  2.47*  --    < > 2.40*   EGFR 23.3* 22.3*  --   --   --  20.4*  --  22.1*  --  22.0*  --    < > 22.7*   GLUCOSE 83 67  --   --   --  77  --  81  --  97  --    < > 61*   CALCIUM 8.9 8.7  --   --   --  8.5*  --  8.5*  --  8.3*  --    < > 8.5*   MAGNESIUM  --  1.8  --   --   --  1.9  --  2.2  --  2.3 2.2   < > 1.1*   PHOSPHORUS 2.0*  --  2.8 2.9 2.3* 1.7*   < >  --   --   --   --   --   --    HEMOGLOBIN A1C  --   --   --   --   --   --   --   --   --   --   --   --  4.80   TSH  --   --   --   --   --   --   --   --   --   --   --   --  1.840    < > = values in this interval not displayed.           Lab 09/05/23 2358 09/01/23  1101   TOTAL PROTEIN  --  6.7   ALBUMIN 3.0* 3.6   GLOBULIN  --  3.1   ALT (SGPT)  --  33   AST (SGOT)  --  81*   BILIRUBIN  --  3.4*   ALK PHOS  --  133*           Lab 09/02/23 2353 09/02/23  2041 09/02/23  1907 09/01/23  1101   PROBNP  --   --   --  21,177.0*   HSTROP T 49* 65* 51* 41*   PROTIME  --   --   --  22.6*   INR  --   --   --  1.97*               Lab 09/05/23  0445 09/01/23  1152   IRON 63  --    IRON SATURATION (TSAT) 19*  --    TIBC 332  --    TRANSFERRIN 223  --    FERRITIN 117.00  --    ABO TYPING  --  O   RH TYPING  --  Positive   ANTIBODY SCREEN  --  Negative           Brief Urine Lab Results  (Last result in the past 365 days)        Color   Clarity   Blood   Leuk Est   Nitrite   Protein   CREAT   Urine HCG        09/01/23 1444 Yellow   Clear   Negative   Trace   Negative   30 mg/dL (1+)                   Microbiology Results Abnormal       Procedure Component Value - Date/Time    Blood Culture - Blood, Arm, Left [[id number]]  (Normal) Collected: 09/02/23 1903     Lab Status: Preliminary result Specimen: Blood from Arm, Left Updated: 09/05/23 2045     Blood Culture No growth at 3 days    Blood Culture - Blood, Hand, Left [[id number]]  (Normal) Collected: 09/02/23 1907    Lab Status: Preliminary result Specimen: Blood from Hand, Left Updated: 09/05/23 2045     Blood Culture No growth at 3 days    Blood Culture - Blood, Arm, Right [[id number]]  (Normal) Collected: 09/01/23 1152    Lab Status: Preliminary result Specimen: Blood from Arm, Right Updated: 09/05/23 1302     Blood Culture No growth at 4 days    MRSA Screen, PCR (Inpatient) - Swab, Nares [[id number]]  (Normal) Collected: 09/02/23 1635    Lab Status: Final result Specimen: Swab from Nares Updated: 09/02/23 2050     MRSA PCR Negative    Narrative:      The negative predictive value of this diagnostic test is high and should only be used to consider de-escalating anti-MRSA therapy. A positive result may indicate colonization with MRSA and must be correlated clinically.  MRSA Negative    Gastrointestinal Panel, PCR - Stool, Per Rectum [[id number]]  (Normal) Collected: 09/02/23 1551    Lab Status: Final result Specimen: Stool from Per Rectum Updated: 09/02/23 1735     Campylobacter Not Detected     Plesiomonas shigelloides Not Detected     Salmonella Not Detected     Vibrio Not Detected     Vibrio cholerae Not Detected     Yersinia enterocolitica Not Detected     Enteroaggregative E. coli (EAEC) Not Detected     Enteropathogenic E. coli (EPEC) Not Detected     Enterotoxigenic E. coli (ETEC) lt/st Not Detected     Shiga-like toxin-producing E. coli (STEC) stx1/stx2 Not Detected     Shigella/Enteroinvasive E. coli (EIEC) Not Detected     Cryptosporidium Not Detected     Cyclospora cayetanensis Not Detected     Entamoeba histolytica Not Detected     Giardia lamblia Not Detected     Adenovirus F40/41 Not Detected     Astrovirus Not Detected     Norovirus GI/GII Not Detected     Rotavirus A Not Detected     Sapovirus (I, II, IV or  V) Not Detected    Respiratory Panel PCR w/COVID-19(SARS-CoV-2) ABBY/SAHRA/ELISABETH/PAD/COR/MAD/JABARI In-House, NP Swab in UTM/VTM, 3-4 HR TAT - Swab, Nasopharynx [[id number]]  (Normal) Collected: 09/01/23 1151    Lab Status: Final result Specimen: Swab from Nasopharynx Updated: 09/01/23 1327     ADENOVIRUS, PCR Not Detected     Coronavirus 229E Not Detected     Coronavirus HKU1 Not Detected     Coronavirus NL63 Not Detected     Coronavirus OC43 Not Detected     COVID19 Not Detected     Human Metapneumovirus Not Detected     Human Rhinovirus/Enterovirus Not Detected     Influenza A PCR Not Detected     Influenza B PCR Not Detected     Parainfluenza Virus 1 Not Detected     Parainfluenza Virus 2 Not Detected     Parainfluenza Virus 3 Not Detected     Parainfluenza Virus 4 Not Detected     RSV, PCR Not Detected     Bordetella pertussis pcr Not Detected     Bordetella parapertussis PCR Not Detected     Chlamydophila pneumoniae PCR Not Detected     Mycoplasma pneumo by PCR Not Detected    Narrative:      In the setting of a positive respiratory panel with a viral infection PLUS a negative procalcitonin without other underlying concern for bacterial infection, consider observing off antibiotics or discontinuation of antibiotics and continue supportive care. If the respiratory panel is positive for atypical bacterial infection (Bordetella pertussis, Chlamydophila pneumoniae, or Mycoplasma pneumoniae), consider antibiotic de-escalation to target atypical bacterial infection.            Adult Transthoracic Echo Complete W/ Cont if Necessary Per Protocol    Result Date: 9/5/2023    Left ventricular systolic function is mildly decreased. Left ventricular ejection fraction appears to be 41 - 45%.   Left ventricular wall thickness is consistent with moderate concentric hypertrophy.   Mildly reduced right ventricular systolic function noted.   The right ventricular cavity is mild to moderately dilated.   The left atrial cavity is  moderately dilated.   Estimated right ventricular systolic pressure from tricuspid regurgitation is normal (<35 mmHg). Calculated right ventricular systolic pressure from tricuspid regurgitation is 29 mmHg.   There is a trivial pericardial effusion adjacent to the right ventricle.     XR Chest 1 View    Result Date: 9/5/2023  XR CHEST 1 VW Date of Exam: 9/5/2023 5:00 AM EDT Indication: CHF Comparison: September 4, 2023 Findings: The heart is enlarged. There is no shay consolidation. No definite effusions are confirmed.     Impression: Impression: 1.Cardiomegaly. Electronically Signed: Wilmar Ramos MD  9/5/2023 9:18 AM EDT  Workstation ID: [id number]    XR Chest 1 View    Result Date: 9/4/2023  XR CHEST 1 VW Date of Exam: 9/4/2023 1:21 PM CDT Indication: SOA Comparison: 2/17/2023 Findings: Cardiomediastinal silhouette is enlarged, more pronounced than previous exam. No airspace disease, pneumothorax, nor pleural effusion.No acute osseous abnormality identified.     Impression: Impression: Enlarging cardiac silhouette without acute pulmonary process identified. Electronically Signed: Rickey Werner MD  9/4/2023 1:48 PM CDT  Workstation ID: [id number]     Results for orders placed during the hospital encounter of 09/01/23    Adult Transthoracic Echo Complete W/ Cont if Necessary Per Protocol    Interpretation Summary    Left ventricular systolic function is mildly decreased. Left ventricular ejection fraction appears to be 41 - 45%.    Left ventricular wall thickness is consistent with moderate concentric hypertrophy.    Mildly reduced right ventricular systolic function noted.    The right ventricular cavity is mild to moderately dilated.    The left atrial cavity is moderately dilated.    Estimated right ventricular systolic pressure from tricuspid regurgitation is normal (<35 mmHg). Calculated right ventricular systolic pressure from tricuspid regurgitation is 29 mmHg.    There is a trivial pericardial effusion adjacent  to the right ventricle.      Current medications:  Scheduled Meds:apixaban, 5 mg, Oral, Q12H  budesonide-formoterol, 2 puff, Inhalation, BID - RT  nebivolol, 2.5 mg, Oral, Daily  pantoprazole, 40 mg, Oral, Daily  thiamine, 100 mg, Oral, Daily  tiotropium bromide monohydrate, 2 puff, Inhalation, Daily - RT      Continuous Infusions:O2, 3 L/min      PRN Meds:.  acetaminophen    Calcium Replacement - Follow Nurse / BPA Driven Protocol    dextrose    dextrose    glucagon (human recombinant)    ipratropium-albuterol    Magnesium Standard Dose Replacement - Follow Nurse / BPA Driven Protocol    metoprolol tartrate    ondansetron    Phosphorus Replacement - Follow Nurse / BPA Driven Protocol    Potassium Replacement - Follow Nurse / BPA Driven Protocol    simethicone    Assessment & Plan   Assessment & Plan     Active Hospital Problems    Diagnosis  POA    **Dehydration [E86.0]  Yes    Diarrhea [R19.7]  Yes    Hypomagnesemia [E83.42]  Yes    Hypokalemia [E87.6]  Yes    Stage III CKD [N18.30]  Yes    AoC HFpEF [I50.33]  Yes    Morbid obesity with BMI of 50.0-59.9, adult [E66.01, Z68.43]  Not Applicable    HTN (hypertension) [I10]  Yes      Resolved Hospital Problems   No resolved problems to display.        Brief Hospital Course to date:  Sonia Bella is a 58 y.o. female with COPD on 3 L chronically, A fib not on AC, HFrEF (EF of 40%), CAD, GERD, prior bleeding stomach ulcers, HTN, Obsestity, and CKD stage III, who presented to the ED with weakness, confusion, hypoglycemia that was treated.    This patient's problems and plans were partially entered by my partner and updated as appropriate by me 09/06/23.     A fib with RVR and associated hypotension  Chest pain, resolved  HFrEF, EF of 40%   Cardiomegaly  --Not on ACE due to angioedema  --TSH within normal limits  --Continue beta blocker at reduced dose with holding parameters  --Continue tele  --status post digoxin x2 per cardiology  --Status post IV fluids  --Resumed  Eliquis; had stopped outpatient for an unclear reason, denied history of bleeding, per daughter, may not have been taking her medications  -- ECHO: LVEF 41 to 45%, moderate concentric LVH, mildly reduced RV systolic function, RV cavity mild to moderately dilated (has been seen previously), trivial pericardial effusion    Anemia  --Appears chronic  --Vitamin B12 level 318, folate level 7 in 2023, will not repeat  --Iron panel, ferritin w/ ferritin/TIBC/Iron/transferrin wnl, iron saturation low    Diarrhea  -GI PCR negative; C. difficile pending (no additional stool --> cancelled)    Hypomagnesemia  Hypokalemia  Hypophosphatemia  -replete    Hypoglycemia, resolved  Dehydration  -Likely due to poor oral intake; status post IV fluids  -C peptide sent  -Thiamine  -AM cortisol 23.11    Staph epi in 1 of 2 blood cultures  --Blood culture 1 of 2 positive for staph epi, suspected contaminant  --Repeat blood cultures NGTD  --Stopped vancomycin    CKD  --Baseline creatinine 1.9-2.3    Deconditioning  --PT/OT consulted --> SNF    COPD  Chronic hypoxia, on 3L continuously at home  --Continue home medications    Obesity - BMI 40.49    Expected Discharge Location and Transportation: SNF, patient amenable  Expected Discharge   Expected Discharge Date: 2023; Expected Discharge Time:      DVT prophylaxis:  Medical DVT prophylaxis orders are present.     AM-PAC 6 Clicks Score (PT): 15 (23)    CODE STATUS:   Code Status and Medical Interventions:   Ordered at: 23 1837     Code Status (Patient has no pulse and is not breathing):    CPR (Attempt to Resuscitate)     Medical Interventions (Patient has pulse or is breathing):    Full       Coleen Higgins MD  23        Electronically signed by Coleen Higgins MD at 23 1124          Physical Therapy Notes (most recent note)        Sapna Mcclain at 23 1104  Version 1 of 1         Patient Name: Sonia Bella  : 1965    MRN: [id number]                               Today's Date: 9/5/2023       Admit Date: 9/1/2023    Visit Dx:     ICD-10-CM ICD-9-CM   1. Generalized weakness  R53.1 780.79   2. Diarrhea, unspecified type  R19.7 787.91   3. Acute UTI  N39.0 599.0   4. Hypokalemia  E87.6 276.8   5. Hypomagnesemia  E83.42 275.2   6. Hypoglycemia  E16.2 251.2   7. Anemia, unspecified type  D64.9 285.9   8. Chronic kidney disease, unspecified CKD stage  N18.9 585.9     Patient Active Problem List   Diagnosis    Angioedema due to angiotensin converting enzyme inhibitor (ACE-I)    HTN (hypertension)    B12 deficiency anemia    Iron deficiency anemia    CKD (chronic kidney disease) stage 3, GFR 30-59 ml/min    COVID-19 virus infection    COPD on home O2 (2L)    Morbid obesity with BMI of 50.0-59.9, adult    AoC HFpEF    Acute type 2 respiratory failure    Human metapneumovirus (hMPV)    GERD    Stage III CKD    Atrial fibrillation with RVR    Acute on chronic HFrEF (heart failure with reduced ejection fraction)    Dehydration    Diarrhea    Hypomagnesemia    Hypokalemia     Past Medical History:   Diagnosis Date    Bleeding ulcer     COPD (chronic obstructive pulmonary disease)     Coronary artery disease     GERD (gastroesophageal reflux disease)     History of stomach ulcers     Hypertension     Stage III CKD 02/15/2023     History reviewed. No pertinent surgical history.   General Information       Row Name 09/05/23 1317          Physical Therapy Time and Intention    Document Type therapy note (daily note)  -KG     Mode of Treatment physical therapy  -KG       Row Name 09/05/23 1317          General Information    Patient Profile Reviewed yes  -KG     Existing Precautions/Restrictions fall;oxygen therapy device and L/min  -KG       Row Name 09/05/23 1317          Cognition    Orientation Status (Cognition) oriented x 4  -KG       Row Name 09/05/23 1317          Safety Issues, Functional Mobility    Impairments Affecting Function (Mobility)  balance;endurance/activity tolerance;strength  -KG               User Key  (r) = Recorded By, (t) = Taken By, (c) = Cosigned By      Initials Name Provider Type    Sapna Colin Physical Therapist                   Mobility       Row Name 09/05/23 1318          Bed Mobility    Bed Mobility supine-sit  -KG     Supine-Sit [place] (Bed Mobility) supervision  -KG     Assistive Device (Bed Mobility) head of bed elevated  -KG       Row Name 09/05/23 1318          Transfers    Comment, (Transfers) STS from EOB, min-A x2, FWW, 1st attempt knees began buckling 30 sec after standing and needed to sit back down, 2nd attempt pt able to remain standing and take a couple side steps toward HOB  -KG       Row Name 09/05/23 1318          Sit-Stand Transfer    Sit-Stand [place] (Transfers) verbal cues;nonverbal cues (demo/gesture);2 person assist;minimum assist (75% patient effort)  -KG     Assistive Device (Sit-Stand Transfers) walker, front-wheeled  -KG               User Key  (r) = Recorded By, (t) = Taken By, (c) = Cosigned By      Initials Name Provider Type    Sapna Colin Physical Therapist                   Obj/Interventions       Row Name 09/05/23 1320          Motor Skills    Therapeutic Exercise other (see comments)  heel slides, ankle pumps, quad sets x10  -KG       Row Name 09/05/23 1320          Balance    Dynamic Standing Balance minimal assist;2-person assist  -KG     Position/Device Used, Standing Balance supported;walker, front-wheeled  -KG     Balance Interventions standing;sit to stand  -KG               User Key  (r) = Recorded By, (t) = Taken By, (c) = Cosigned By      Initials Name Provider Type    Sapna Colin Physical Therapist                   Goals/Plan    No documentation.                  Clinical Impression       Row Name 09/05/23 1320          Pain    Pretreatment Pain Rating 0/10 - no pain  -KG     Posttreatment Pain Rating 0/10 - no pain  -KG       Row Name 09/05/23  1320          Plan of Care Review    Plan of Care Reviewed With patient  -KG     Progress no change  -KG     Outcome Evaluation STS from EOB, min-A x2, FWW, 1st attempt knees began buckling 30 sec after standing and needed to sit back down, 2nd attempt pt able to remain standing and take a couple side steps toward HOB.  recommend SNF  -KG       Row Name 09/05/23 1320          Vital Signs    Pre Systolic BP Rehab 102  -KG     Pre Treatment Diastolic BP 71  -KG     Post Systolic BP Rehab 105  -KG     Post Treatment Diastolic BP 76  -KG       Row Name 09/05/23 1320          Positioning and Restraints    Pre-Treatment Position in bed  -KG     Post Treatment Position bed  -KG     In Bed notified nsg;fowlers;call light within reach;encouraged to call for assist;exit alarm on  -KG               User Key  (r) = Recorded By, (t) = Taken By, (c) = Cosigned By      Initials Name Provider Type    Sapna Colin Physical Therapist                   Outcome Measures       Row Name 09/05/23 1321          How much help from another person do you currently need...    Turning from your back to your side while in flat bed without using bedrails? 3  -KG     Moving from lying on back to sitting on the side of a flat bed without bedrails? 3  -KG     Moving to and from a bed to a chair (including a wheelchair)? 2  -KG     Standing up from a chair using your arms (e.g., wheelchair, bedside chair)? 3  -KG     Climbing 3-5 steps with a railing? 2  -KG     To walk in hospital room? 2  -KG     AM-PAC 6 Clicks Score (PT) 15  -KG     Highest level of mobility 4 --> Transferred to chair/commode  -KG       Row Name 09/05/23 1321          Functional Assessment    Outcome Measure Options AM-PAC 6 Clicks Basic Mobility (PT)  -KG               User Key  (r) = Recorded By, (t) = Taken By, (c) = Cosigned By      Initials Name Provider Type    Sapna Colin Physical Therapist                                 Physical Therapy Education        Title: PT OT SLP Therapies (In Progress)       Topic: Physical Therapy (In Progress)       Point: Mobility training (In Progress)       Learning Progress Summary             Patient Acceptance, E, NR by NS at 9/2/2023 1257    Comment: PT POC, importance of OOB activity                         Point: Home exercise program (Not Started)       Learner Progress:  Not documented in this visit.              Point: Body mechanics (In Progress)       Learning Progress Summary             Patient Acceptance, E, NR by NS at 9/2/2023 1257    Comment: PT POC, importance of OOB activity                         Point: Precautions (In Progress)       Learning Progress Summary             Patient Acceptance, E, NR by NS at 9/2/2023 1257    Comment: PT POC, importance of OOB activity                                         User Key       Initials Effective Dates Name Provider Type Discipline    NS 06/16/21 -  Elsi Adams PT Physical Therapist PT                  PT Recommendation and Plan     Plan of Care Reviewed With: patient  Progress: no change  Outcome Evaluation: STS from EOB, min-A x2, FWW, 1st attempt knees began buckling 30 sec after standing and needed to sit back down, 2nd attempt pt able to remain standing and take a couple side steps toward HOB.  recommend SNF     Time Calculation:         PT Charges       Row Name 09/05/23 1323             Time Calculation    Start Time 1104  -KG      PT Received On 09/05/23  -KG         Timed Charges    40720 - PT Therapeutic Exercise Minutes 8  -KG      46463 - PT Therapeutic Activity Minutes 30  -KG         Total Minutes    Timed Charges Total Minutes 38  -KG       Total Minutes 38  -KG                User Key  (r) = Recorded By, (t) = Taken By, (c) = Cosigned By      Initials Name Provider Type    KG Sapna Mcclain Physical Therapist                  Therapy Charges for Today       Code Description Service Date Service Provider Modifiers Qty    63492834701  PT THER PROC EA  15 MIN 2023 Sanpa Mcclain GP 1    91106361866 HC PT THERAPEUTIC ACT EA 15 MIN 2023 Sapna Mcclain GP 2    27795719624 HC PT THER SUPP EA 15 MIN 2023 Sapna Mcclain GP 2            PT G-Codes  Outcome Measure Options: AM-PAC 6 Clicks Basic Mobility (PT)  AM-PAC 6 Clicks Score (PT): 15  AM-PAC 6 Clicks Score (OT): 14       Sapna Mcclain  2023      Electronically signed by Sapna Mcclain at 23 1325          Occupational Therapy Notes (most recent note)        Vanessa Nye OT at 23 1400          Patient Name: Sonia Bella  : 1965    MRN: [id number]                              Today's Date: 2023       Admit Date: 2023    Visit Dx:     ICD-10-CM ICD-9-CM   1. Generalized weakness  R53.1 780.79   2. Diarrhea, unspecified type  R19.7 787.91   3. Acute UTI  N39.0 599.0   4. Hypokalemia  E87.6 276.8   5. Hypomagnesemia  E83.42 275.2   6. Hypoglycemia  E16.2 251.2   7. Anemia, unspecified type  D64.9 285.9   8. Chronic kidney disease, unspecified CKD stage  N18.9 585.9     Patient Active Problem List   Diagnosis    Angioedema due to angiotensin converting enzyme inhibitor (ACE-I)    HTN (hypertension)    B12 deficiency anemia    Iron deficiency anemia    CKD (chronic kidney disease) stage 3, GFR 30-59 ml/min    COVID-19 virus infection    COPD on home O2 (2L)    Morbid obesity with BMI of 50.0-59.9, adult    AoC HFpEF    Acute type 2 respiratory failure    Human metapneumovirus (hMPV)    GERD    Stage III CKD    Atrial fibrillation with RVR    Acute on chronic HFrEF (heart failure with reduced ejection fraction)    Dehydration    Diarrhea    Hypomagnesemia    Hypokalemia     Past Medical History:   Diagnosis Date    Bleeding ulcer     COPD (chronic obstructive pulmonary disease)     Coronary artery disease     GERD (gastroesophageal reflux disease)     History of stomach ulcers     Hypertension     Stage III CKD 02/15/2023     History reviewed. No pertinent  surgical history.   General Information       Row Name 09/05/23 1539          OT Time and Intention    Document Type therapy note (daily note)  -AF     Mode of Treatment occupational therapy  -AF       Row Name 09/05/23 1539          General Information    Patient Profile Reviewed yes  -AF     Existing Precautions/Restrictions fall;oxygen therapy device and L/min  -AF     Barriers to Rehab medically complex;previous functional deficit  -AF       Row Name 09/05/23 1539          Cognition    Orientation Status (Cognition) oriented x 4  -AF       Row Name 09/05/23 1539          Safety Issues, Functional Mobility    Safety Issues Affecting Function (Mobility) safety precaution awareness;safety precautions follow-through/compliance;sequencing abilities  -AF     Impairments Affecting Function (Mobility) balance;endurance/activity tolerance;strength;postural/trunk control  -AF     Comment, Safety Issues/Impairments (Mobility) activity limited by lethargy  -AF               User Key  (r) = Recorded By, (t) = Taken By, (c) = Cosigned By      Initials Name Provider Type    AF Vanessa Nye OT Occupational Therapist                     Mobility/ADL's       Row Name 09/05/23 1540          Bed Mobility    Bed Mobility rolling left;rolling right  -AF     Rolling Left [place] (Bed Mobility) supervision;verbal cues  -AF     Rolling Right [place] (Bed Mobility) supervision;verbal cues  -AF     Comment, (Bed Mobility) pt declined out of bed activities on this date d/t lethargy.  -AF       Row Name 09/05/23 1540          Activities of Daily Living    BADL Assessment/Intervention upper body dressing;toileting  -AF       Row Name 09/05/23 1540          Upper Body Dressing Assessment/Training    [place] Level (Upper Body Dressing) don;doff;minimum assist (75% patient effort)  -AF     Position (Upper Body Dressing) supine  -AF       Row Name 09/05/23 1540          Toileting Assessment/Training    [place] Level  (Toileting) perform perineal hygiene;dependent (less than 25% patient effort)  -AF     Position (Toileting) supine  therapist assisted NA w/ changing bed and completing hygiene.  -AF               User Key  (r) = Recorded By, (t) = Taken By, (c) = Cosigned By      Initials Name Provider Type    Vanessa Coleman OT Occupational Therapist                   Obj/Interventions       Row Name 09/05/23 1542          Motor Skills    Motor Skills functional endurance  -AF     Functional Endurance decreased functional endurance. Completed bed mobility and BADLs while elevated in bed.  -AF       Row Name 09/05/23 1542          Balance    Balance Assessment sitting static balance;sitting dynamic balance  -AF     Static Sitting Balance supervision  -AF     Dynamic Sitting Balance supervision  -AF     Position, Sitting Balance supported;other (see comments)  in bed  -AF     Balance Interventions sitting;weight shifting activity;occupation based/functional task  -AF               User Key  (r) = Recorded By, (t) = Taken By, (c) = Cosigned By      Initials Name Provider Type    Vanessa Coleman OT Occupational Therapist                   Goals/Plan    No documentation.                  Clinical Impression       Row Name 09/05/23 1544          Pain Assessment    Pretreatment Pain Rating 0/10 - no pain  -AF     Posttreatment Pain Rating 0/10 - no pain  -AF       Row Name 09/05/23 1544          Plan of Care Review    Plan of Care Reviewed With patient  -AF     Progress no change  -AF     Outcome Evaluation Pt continues to demo deficits in strength and endurance caffecting ADL indp. when compared to baseline. Completed grooming task while elevated in bed d/t reports of lethargy. Recc. continued IPOT per POC and DC to SNF.  -AF       Row Name 09/05/23 1546          Therapy Assessment/Plan (OT)    Rehab Potential (OT) good, to achieve stated therapy goals  -AF     Criteria for Skilled Therapeutic Interventions Met (OT) yes;skilled  treatment is necessary  -AF     Therapy Frequency (OT) daily  -AF       Row Name 09/05/23 1544          Therapy Plan Review/Discharge Plan (OT)    Anticipated Discharge Disposition (OT) skilled nursing facility  -AF       Row Name 09/05/23 1544          Vital Signs    Pre Systolic BP Rehab 105  -AF     Pre Treatment Diastolic BP 76  -AF     Post Systolic BP Rehab 115  -AF     Post Treatment Diastolic BP 84  -AF     Pre SpO2 (%) 90  -AF     O2 Delivery Pre Treatment nasal cannula  -AF     Intra SpO2 (%) 87  -AF     O2 Delivery Intra Treatment nasal cannula  -AF     Post SpO2 (%) 92  -AF     O2 Delivery Post Treatment nasal cannula  -AF     Pre Patient Position Supine  -AF     Intra Patient Position Sitting  elevated in bed; supported long sitting  -AF     Post Patient Position Supine  -AF       Row Name 09/05/23 1544          Positioning and Restraints    Pre-Treatment Position in bed  -AF     Post Treatment Position bed  -AF     In Bed notified nsg;call light within reach;supine;encouraged to call for assist;legs elevated;exit alarm on  -AF               User Key  (r) = Recorded By, (t) = Taken By, (c) = Cosigned By      Initials Name Provider Type    AF Vanessa Nye, OT Occupational Therapist                   Outcome Measures       Row Name 09/05/23 1548          How much help from another is currently needed...    Putting on and taking off regular lower body clothing? 1  -AF     Bathing (including washing, rinsing, and drying) 2  -AF     Toileting (which includes using toilet bed pan or urinal) 2  -AF     Putting on and taking off regular upper body clothing 3  -AF     Taking care of personal grooming (such as brushing teeth) 3  -AF     Eating meals 3  -AF     AM-PAC 6 Clicks Score (OT) 14  -AF       Row Name 09/05/23 1321          How much help from another person do you currently need...    Turning from your back to your side while in flat bed without using bedrails? 3  -KG     Moving from lying on back to  sitting on the side of a flat bed without bedrails? 3  -KG     Moving to and from a bed to a chair (including a wheelchair)? 2  -KG     Standing up from a chair using your arms (e.g., wheelchair, bedside chair)? 3  -KG     Climbing 3-5 steps with a railing? 2  -KG     To walk in hospital room? 2  -KG     AM-PAC 6 Clicks Score (PT) 15  -KG     Highest level of mobility 4 --> Transferred to chair/commode  -KG       Row Name 09/05/23 1548 09/05/23 1321       Functional Assessment    Outcome Measure Options AM-PAC 6 Clicks Daily Activity (OT)  -AF AM-PAC 6 Clicks Basic Mobility (PT)  -KG              User Key  (r) = Recorded By, (t) = Taken By, (c) = Cosigned By      Initials Name Provider Type    Sapna Colin Physical Therapist    Vanessa Colemna OT Occupational Therapist                    Occupational Therapy Education       Title: PT OT SLP Therapies (In Progress)       Topic: Occupational Therapy (In Progress)       Point: ADL training (Done)       Description:   Instruct learner(s) on proper safety adaptation and remediation techniques during self care or transfers.   Instruct in proper use of assistive devices.                  Learning Progress Summary             Patient Acceptance, E,TB, VU by AF at 9/5/2023 1548    Acceptance, E, VU by AN at 9/2/2023 1145                         Point: Home exercise program (Not Started)       Description:   Instruct learner(s) on appropriate technique for monitoring, assisting and/or progressing therapeutic exercises/activities.                  Learner Progress:  Not documented in this visit.              Point: Precautions (Done)       Description:   Instruct learner(s) on prescribed precautions during self-care and functional transfers.                  Learning Progress Summary             Patient Acceptance, E, VU by AN at 9/2/2023 1145                         Point: Body mechanics (Done)       Description:   Instruct learner(s) on proper positioning and spine  alignment during self-care, functional mobility activities and/or exercises.                  Learning Progress Summary             Patient Acceptance, E,TB, VU by AF at 9/5/2023 1548    Acceptance, E, VU by AN at 9/2/2023 1145                                         User Key       Initials Effective Dates Name Provider Type Discipline    AN 09/21/21 -  Maria Alejandra Johnston OT Occupational Therapist OT    AF 08/15/23 -  Vanessa Nye OT Occupational Therapist OT                  OT Recommendation and Plan  Therapy Frequency (OT): daily  Plan of Care Review  Plan of Care Reviewed With: patient  Progress: no change  Outcome Evaluation: Pt continues to demo deficits in strength and endurance caffecting ADL indp. when compared to baseline. Completed grooming task while elevated in bed d/t reports of lethargy. Recc. continued IPOT per POC and DC to SNF.     Time Calculation:         Time Calculation- OT       Row Name 09/05/23 1549             Time Calculation- OT    OT Received On 09/05/23  -AF      OT Goal Re-Cert Due Date 09/12/23  -AF         Timed Charges    93186 - OT Therapeutic Activity Minutes 17  -AF      50193 - OT Self Care/Mgmt Minutes 23  -AF         Total Minutes    Timed Charges Total Minutes 40  -AF       Total Minutes 40  -AF                User Key  (r) = Recorded By, (t) = Taken By, (c) = Cosigned By      Initials Name Provider Type    AF Vanessa Nye OT Occupational Therapist                  Therapy Charges for Today       Code Description Service Date Service Provider Modifiers Qty    04086242931 HC OT THERAPEUTIC ACT EA 15 MIN 9/5/2023 Vanessa Nye OT GO 1    40127340805 HC OT SELF CARE/MGMT/TRAIN EA 15 MIN 9/5/2023 Vanessa Nye OT GO 2                 Vanessa Nye OT  9/5/2023    Electronically signed by Vanessa Nye OT at 09/05/23 1472

## 2023-09-06 NOTE — PROGRESS NOTES
"    [facility] Medicine Services  PROGRESS NOTE    Patient Name: Sonia Bella  : 1965  MRN: [id number]    Date of Admission: 2023  Primary Care Physician: Provider, No Known    Subjective   Subjective     CC:  Follow-up dehydration    HPI:  On 3 L nasal cannula.  No chest pain.  Tired.  No diarrhea.  Reports being upset that she is still in the hospital and asking to leave either to a facility or to \"go home\".  Per the patient's daughter, she patient cannot go back and live with her other daughter.  Sitting up in bedside chair.  Patient's daughter and her son-in-law are at bedside, patient is okay with them receiving updates.    ROS:  Gen- No fevers, chills  CV- No chest pain, palpitations  Resp- No cough, +chronic dyspnea  GI-no diarrhea    Objective   Objective     Vital Signs:   Temp:  [97.5 °F (36.4 °C)-97.9 °F (36.6 °C)] 97.8 °F (36.6 °C)  Heart Rate:  [] 72  Resp:  [16-18] 18  BP: (100-116)/(76-84) 110/79  Flow (L/min):  [2.5-4] 3     Physical Exam:  Constitutional: No acute distress, awake, alert, obese, sitting up in bedside chair  HENT: NCAT, mucous membranes moist  Respiratory: Clear to auscultation bilaterally, respiratory effort normal  Cardiovascular: IRIR, HR 80s, no murmurs, rubs, or gallops  Gastrointestinal: Normoactive bowel sounds, soft, nontender, nondistended  Musculoskeletal: No bilateral ankle edema  Psychiatric: Appropriate affect, cooperative  Neurologic: PERRL, symmetric facies, symmetric palate rise, tongue midline, moving all extremities well, speech clear    Results Reviewed:  LAB RESULTS:      Lab 23  0445 23  0200 23  0514 23  0109 23  1725 23  1101   WBC 5.46 5.37 5.33 5.46  --  5.47   HEMOGLOBIN 8.2* 8.1* 8.3* 8.6*  --  9.5*   HEMATOCRIT 28.9* 28.7* 29.8* 30.7*  --  34.6   PLATELETS 144 130* 123* 138*  --  150   NEUTROS ABS  --   --   --  4.33  --  4.44   IMMATURE GRANS (ABS)  --   --   --  0.03  --  0.02 "   LYMPHS ABS  --   --   --  0.21*  --  0.27*   MONOS ABS  --   --   --  0.82  --  0.68   EOS ABS  --   --   --  0.05  --  0.05   MCV 80.3 81.1 81.4 80.6  --  83.4   SED RATE  --   --   --   --   --  54*   CRP  --   --   --   --  5.18*  --    PROCALCITONIN  --   --   --   --   --  0.65*   LACTATE  --   --   --   --   --  1.8   PROTIME  --   --   --   --   --  22.6*           Lab 09/05/23  2358 09/05/23  0445 09/04/23  1912 09/04/23  1602 09/04/23  1020 09/04/23  0200 09/03/23  0722 09/03/23  0514 09/02/23  1907 09/02/23  0444 09/02/23  0109 09/01/23  1108 09/01/23  1101   SODIUM 132* 134*  --   --   --  139  --  135*  --  142  --    < > 141   POTASSIUM 4.7 4.3  --   --   --  4.7  --  4.4 4.3 3.3*  3.3* 3.2*   < > 2.9*   CHLORIDE 93* 92*  --   --   --  96*  --  93*  --  93*  --    < > 89*   CO2 26.0 26.0  --   --   --  28.0  --  33.0*  --  33.0*  --    < > 35.0*   ANION GAP 13.0 16.0*  --   --   --  15.0  --  9.0  --  16.0*  --    < > 17.0*   BUN 30* 30*  --   --   --  29*  --  30*  --  31*  --    < > 30*   CREATININE 2.35* 2.44*  --   --   --  2.63*  --  2.46*  --  2.47*  --    < > 2.40*   EGFR 23.3* 22.3*  --   --   --  20.4*  --  22.1*  --  22.0*  --    < > 22.7*   GLUCOSE 83 67  --   --   --  77  --  81  --  97  --    < > 61*   CALCIUM 8.9 8.7  --   --   --  8.5*  --  8.5*  --  8.3*  --    < > 8.5*   MAGNESIUM  --  1.8  --   --   --  1.9  --  2.2  --  2.3 2.2   < > 1.1*   PHOSPHORUS 2.0*  --  2.8 2.9 2.3* 1.7*   < >  --   --   --   --   --   --    HEMOGLOBIN A1C  --   --   --   --   --   --   --   --   --   --   --   --  4.80   TSH  --   --   --   --   --   --   --   --   --   --   --   --  1.840    < > = values in this interval not displayed.           Lab 09/05/23 2358 09/01/23  1101   TOTAL PROTEIN  --  6.7   ALBUMIN 3.0* 3.6   GLOBULIN  --  3.1   ALT (SGPT)  --  33   AST (SGOT)  --  81*   BILIRUBIN  --  3.4*   ALK PHOS  --  133*           Lab 09/02/23  2353 09/02/23  2041 09/02/23  1907 09/01/23  1101    PROBNP  --   --   --  21,177.0*   HSTROP T 49* 65* 51* 41*   PROTIME  --   --   --  22.6*   INR  --   --   --  1.97*               Lab 09/05/23  0445 09/01/23  1152   IRON 63  --    IRON SATURATION (TSAT) 19*  --    TIBC 332  --    TRANSFERRIN 223  --    FERRITIN 117.00  --    ABO TYPING  --  O   RH TYPING  --  Positive   ANTIBODY SCREEN  --  Negative           Brief Urine Lab Results  (Last result in the past 365 days)        Color   Clarity   Blood   Leuk Est   Nitrite   Protein   CREAT   Urine HCG        09/01/23 1444 Yellow   Clear   Negative   Trace   Negative   30 mg/dL (1+)                   Microbiology Results Abnormal       Procedure Component Value - Date/Time    Blood Culture - Blood, Arm, Left [[id number]]  (Normal) Collected: 09/02/23 1907    Lab Status: Preliminary result Specimen: Blood from Arm, Left Updated: 09/05/23 2045     Blood Culture No growth at 3 days    Blood Culture - Blood, Hand, Left [[id number]]  (Normal) Collected: 09/02/23 1907    Lab Status: Preliminary result Specimen: Blood from Hand, Left Updated: 09/05/23 2045     Blood Culture No growth at 3 days    Blood Culture - Blood, Arm, Right [[id number]]  (Normal) Collected: 09/01/23 1152    Lab Status: Preliminary result Specimen: Blood from Arm, Right Updated: 09/05/23 1302     Blood Culture No growth at 4 days    MRSA Screen, PCR (Inpatient) - Swab, Nares [[id number]]  (Normal) Collected: 09/02/23 1635    Lab Status: Final result Specimen: Swab from Nares Updated: 09/02/23 2050     MRSA PCR Negative    Narrative:      The negative predictive value of this diagnostic test is high and should only be used to consider de-escalating anti-MRSA therapy. A positive result may indicate colonization with MRSA and must be correlated clinically.  MRSA Negative    Gastrointestinal Panel, PCR - Stool, Per Rectum [[id number]]  (Normal) Collected: 09/02/23 1551    Lab Status: Final result Specimen: Stool from Per Rectum Updated: 09/02/23 8236      Campylobacter Not Detected     Plesiomonas shigelloides Not Detected     Salmonella Not Detected     Vibrio Not Detected     Vibrio cholerae Not Detected     Yersinia enterocolitica Not Detected     Enteroaggregative E. coli (EAEC) Not Detected     Enteropathogenic E. coli (EPEC) Not Detected     Enterotoxigenic E. coli (ETEC) lt/st Not Detected     Shiga-like toxin-producing E. coli (STEC) stx1/stx2 Not Detected     Shigella/Enteroinvasive E. coli (EIEC) Not Detected     Cryptosporidium Not Detected     Cyclospora cayetanensis Not Detected     Entamoeba histolytica Not Detected     Giardia lamblia Not Detected     Adenovirus F40/41 Not Detected     Astrovirus Not Detected     Norovirus GI/GII Not Detected     Rotavirus A Not Detected     Sapovirus (I, II, IV or V) Not Detected    Respiratory Panel PCR w/COVID-19(SARS-CoV-2) [facility]Y/SAHRA/ELISABETH/PAD/[facility]ABARI In-House, NP Swab in UTM/VTM, 3-4 HR TAT - Swab, Nasopharynx [[id number]]  (Normal) Collected: 09/01/23 1151    Lab Status: Final result Specimen: Swab from Nasopharynx Updated: 09/01/23 1327     ADENOVIRUS, PCR Not Detected     Coronavirus 229E Not Detected     Coronavirus HKU1 Not Detected     Coronavirus NL63 Not Detected     Coronavirus OC43 Not Detected     COVID19 Not Detected     Human Metapneumovirus Not Detected     Human Rhinovirus/Enterovirus Not Detected     Influenza A PCR Not Detected     Influenza B PCR Not Detected     Parainfluenza Virus 1 Not Detected     Parainfluenza Virus 2 Not Detected     Parainfluenza Virus 3 Not Detected     Parainfluenza Virus 4 Not Detected     RSV, PCR Not Detected     Bordetella pertussis pcr Not Detected     Bordetella parapertussis PCR Not Detected     Chlamydophila pneumoniae PCR Not Detected     Mycoplasma pneumo by PCR Not Detected    Narrative:      In the setting of a positive respiratory panel with a viral infection PLUS a negative procalcitonin without other underlying concern for bacterial infection, consider  observing off antibiotics or discontinuation of antibiotics and continue supportive care. If the respiratory panel is positive for atypical bacterial infection (Bordetella pertussis, Chlamydophila pneumoniae, or Mycoplasma pneumoniae), consider antibiotic de-escalation to target atypical bacterial infection.            Adult Transthoracic Echo Complete W/ Cont if Necessary Per Protocol    Result Date: 9/5/2023    Left ventricular systolic function is mildly decreased. Left ventricular ejection fraction appears to be 41 - 45%.   Left ventricular wall thickness is consistent with moderate concentric hypertrophy.   Mildly reduced right ventricular systolic function noted.   The right ventricular cavity is mild to moderately dilated.   The left atrial cavity is moderately dilated.   Estimated right ventricular systolic pressure from tricuspid regurgitation is normal (<35 mmHg). Calculated right ventricular systolic pressure from tricuspid regurgitation is 29 mmHg.   There is a trivial pericardial effusion adjacent to the right ventricle.     XR Chest 1 View    Result Date: 9/5/2023  XR CHEST 1 VW Date of Exam: 9/5/2023 5:00 AM EDT Indication: CHF Comparison: September 4, 2023 Findings: The heart is enlarged. There is no shay consolidation. No definite effusions are confirmed.     Impression: Impression: 1.Cardiomegaly. Electronically Signed: Wilmar Ramos MD  9/5/2023 9:18 AM EDT  Workstation ID: [id number]    XR Chest 1 View    Result Date: 9/4/2023  XR CHEST 1 VW Date of Exam: 9/4/2023 1:21 PM CDT Indication: SOA Comparison: 2/17/2023 Findings: Cardiomediastinal silhouette is enlarged, more pronounced than previous exam. No airspace disease, pneumothorax, nor pleural effusion.No acute osseous abnormality identified.     Impression: Impression: Enlarging cardiac silhouette without acute pulmonary process identified. Electronically Signed: Rickey Werner MD  9/4/2023 1:48 PM CDT  Workstation ID: [id number]     Results for  orders placed during the hospital encounter of 09/01/23    Adult Transthoracic Echo Complete W/ Cont if Necessary Per Protocol    Interpretation Summary    Left ventricular systolic function is mildly decreased. Left ventricular ejection fraction appears to be 41 - 45%.    Left ventricular wall thickness is consistent with moderate concentric hypertrophy.    Mildly reduced right ventricular systolic function noted.    The right ventricular cavity is mild to moderately dilated.    The left atrial cavity is moderately dilated.    Estimated right ventricular systolic pressure from tricuspid regurgitation is normal (<35 mmHg). Calculated right ventricular systolic pressure from tricuspid regurgitation is 29 mmHg.    There is a trivial pericardial effusion adjacent to the right ventricle.      Current medications:  Scheduled Meds:apixaban, 5 mg, Oral, Q12H  budesonide-formoterol, 2 puff, Inhalation, BID - RT  nebivolol, 2.5 mg, Oral, Daily  pantoprazole, 40 mg, Oral, Daily  thiamine, 100 mg, Oral, Daily  tiotropium bromide monohydrate, 2 puff, Inhalation, Daily - RT      Continuous Infusions:O2, 3 L/min      PRN Meds:.  acetaminophen    Calcium Replacement - Follow Nurse / BPA Driven Protocol    dextrose    dextrose    glucagon (human recombinant)    ipratropium-albuterol    Magnesium Standard Dose Replacement - Follow Nurse / BPA Driven Protocol    metoprolol tartrate    ondansetron    Phosphorus Replacement - Follow Nurse / BPA Driven Protocol    Potassium Replacement - Follow Nurse / BPA Driven Protocol    simethicone    Assessment & Plan   Assessment & Plan     Active Hospital Problems    Diagnosis  POA    **Dehydration [E86.0]  Yes    Diarrhea [R19.7]  Yes    Hypomagnesemia [E83.42]  Yes    Hypokalemia [E87.6]  Yes    Stage III CKD [N18.30]  Yes    AoC HFpEF [I50.33]  Yes    Morbid obesity with BMI of 50.0-59.9, adult [E66.01, Z68.43]  Not Applicable    HTN (hypertension) [I10]  Yes      Resolved Hospital Problems    No resolved problems to display.        Brief Hospital Course to date:  Sonia Bella is a 58 y.o. female with COPD on 3 L chronically, A fib not on AC, HFrEF (EF of 40%), CAD, GERD, prior bleeding stomach ulcers, HTN, Obsestity, and CKD stage III, who presented to the ED with weakness, confusion, hypoglycemia that was treated.    This patient's problems and plans were partially entered by my partner and updated as appropriate by me 09/06/23.     A fib with RVR and associated hypotension  Chest pain, resolved  HFrEF, EF of 40%   Cardiomegaly  --Not on ACE due to angioedema  --TSH within normal limits  --Continue beta blocker at reduced dose with holding parameters  --Continue tele  --status post digoxin x2 per cardiology  --Status post IV fluids  --Resumed Eliquis; had stopped outpatient for an unclear reason, denied history of bleeding, per daughter, may not have been taking her medications  --9/5 ECHO: LVEF 41 to 45%, moderate concentric LVH, mildly reduced RV systolic function, RV cavity mild to moderately dilated (has been seen previously), trivial pericardial effusion    Anemia  --Appears chronic  --Vitamin B12 level 318, folate level 7 in August 2023, will not repeat  --Iron panel, ferritin w/ ferritin/TIBC/Iron/transferrin wnl, iron saturation low    Diarrhea  -GI PCR negative; C. difficile pending (no additional stool --> cancelled)    Hypomagnesemia  Hypokalemia  Hypophosphatemia  -replete    Hypoglycemia, resolved  Dehydration  -Likely due to poor oral intake; status post IV fluids  -C peptide sent  -Thiamine  -AM cortisol 23.11    Staph epi in 1 of 2 blood cultures  --Blood culture 1 of 2 positive for staph epi, suspected contaminant  --Repeat blood cultures NGTD  --Stopped vancomycin    CKD  --Baseline creatinine 1.9-2.3    Deconditioning  --PT/OT consulted --> SNF    COPD  Chronic hypoxia, on 3L continuously at home  --Continue home medications    Obesity - BMI 40.49    Expected Discharge Location  and Transportation: SNF, patient amenable  Expected Discharge   Expected Discharge Date: 9/8/2023; Expected Discharge Time:      DVT prophylaxis:  Medical DVT prophylaxis orders are present.     AM-PAC 6 Clicks Score (PT): 15 (09/05/23 2000)    CODE STATUS:   Code Status and Medical Interventions:   Ordered at: 09/01/23 1837     Code Status (Patient has no pulse and is not breathing):    CPR (Attempt to Resuscitate)     Medical Interventions (Patient has pulse or is breathing):    Full       Coleen Higgins MD  09/06/23

## 2023-09-06 NOTE — DISCHARGE PLACEMENT REQUEST
"Leanne Callahan (58 y.o. Female)     To [place]  From [facility](CM at [facility]) [phone]      Date of Birth   1965    Social Security Number       Address   [address] [place] [address] [facility] [address]    Home Phone   [phone]    MRN   [id number]       [place]   None    Marital Status                               Admission Date   23    Admission Type   Emergency    Admitting Provider   Coleen Higgins MD    Attending Provider   Coleen Higgins MD    Department, Room/Bed   [facility] 6A, N604/1       Discharge Date       Discharge Disposition       Discharge Destination                                 Attending Provider: Coleen Higgins MD    Allergies: Ace Inhibitors    Isolation: None   Infection: None   Code Status: CPR    Ht: 170.2 cm (67\")   Wt: 119 kg (263 lb)    Admission Cmt: None   Principal Problem: Dehydration [E86.0]                   Active Insurance as of 2023       Primary Coverage       Payor Plan Insurance Group Employer/Plan Group    UNITED [facility] MEDICARE REPLACEMENT [facility] DUAL COMPLETE MEDICARE REPLACEMENT [id number]       Payor Plan Address Payor Plan Phone Number Payor Plan Fax Number Effective Dates    [address] [phone]  2023 - None Entered    [facility] [id number]         Subscriber Name Subscriber Birth Date Member ID       LEANNE CALLAHAN 1965 [id number]                     Emergency Contacts        (Rel.) Home Phone Work Phone Mobile Phone    Candelaria Camargo (Daughter) [phone]                 History & Physical        Davina Samuels MD at [address] [facility]              [facility] Medicine Services  HISTORY AND PHYSICAL    Patient Name: Leanne Callahan  : 1965  MRN: [id number]  Primary Care Physician: Provider, No Known    Subjective  Subjective     Chief Complaint:diarrhea and weakness    HPI:  Leanne Callahan is a 58 y.o. 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 who presented to the ED with profound weakness, confusion, hypoglycemia that was treated. She continues to feel weak and hungry but has been afraid to eat due to the diarrhea. She denies sick contacts or eating any questionable food, she denies stool in her stool.        Review of Systems   Patient denies weight loss, headaches, changes in vision, fever, chills, sore throat, shortness of breath, cough, nausea or vomiting, abdominal pain or distension, change in urine output or habits, joint pain, rash, itching, numbness/tingling, weakness or bleeding.    Otherwise complete ROS is negative except as mentioned in the HPI.    Personal History         PMH: She  has a past medical history of Bleeding ulcer, COPD (chronic obstructive pulmonary disease), Coronary artery disease, GERD (gastroesophageal reflux disease), History of stomach ulcers, Hypertension, and Stage III CKD (02/15/2023).   PSxH: She  has no past surgical history on file.         FH: Her family history is not on file.   SH: She  reports that she has quit smoking. Her smoking use included cigarettes. She has never used smokeless tobacco. She reports current alcohol use. She reports that she does not use drugs.     Medications:  O2, albuterol sulfate HFA, apixaban, budesonide-formoterol, colchicine, famotidine, nebivolol, tiotropium bromide monohydrate, and torsemide    Allergies   Allergen Reactions    Ace Inhibitors Angioedema     lisinopril       Objective  Objective     Vital Signs:   Temp:  [98.2 °F (36.8 °C)] 98.2 °F (36.8 °C)  Heart Rate:  [78-94] 94  Resp:  [16] 16  BP: (106-130)/(71-98) 126/91  Flow (L/min):  [3] 3    Constitutional: Awake, alert, interactive and pleasant- slow to speak, miserable,  weak  Eyes: clear sclerae, no conjunctival injection  HENT: NCAT, mucous membranes dry, pale  Neck: no masses or lymphadenopathy, trachea midline  Respiratory: good breath sounds bilaterally, respirations unlabored  Cardiovascular: RRR, no murmurs appreciated, palpable peripheral pulses  Abdomen:  soft, obese, tender lower abdomen  Musculoskeletal: No peripheral edema, clubbing or cyanosis  Neurologic: Oriented x 3, speech slow but clear                      Strength symmetric in all extremities                     Cranial Nerves grossly intact, speech clear  Skin: No rashes or jaundice  Psychiatric: Appropriate mood, insight      Result Review:  I have personally reviewed the results from the time of this admission   to 9/1/2023 14:03 EDT and agree with these findings:  [x]  Laboratory  [x]  Microbiology  [x]  Radiology  [x]  EKG/Telemetry   []  Cardiology/Vascular   []  Pathology  [x]  Old records  []  Other:  Most notable findings include: anemia, low K and Mag, elevated creatinine      LAB RESULTS:      Lab 09/01/23  1101   WBC 5.47   HEMOGLOBIN 9.5*   HEMATOCRIT 34.6   PLATELETS 150   NEUTROS ABS 4.44   IMMATURE GRANS (ABS) 0.02   LYMPHS ABS 0.27*   MONOS ABS 0.68   EOS ABS 0.05   MCV 83.4   PROCALCITONIN 0.65*   LACTATE 1.8   PROTIME 22.6*         Lab 09/01/23  1125 09/01/23  1108 09/01/23  1101   SODIUM  --   --  141   POTASSIUM  --   --  2.9*   CHLORIDE  --   --  89*   CO2  --   --  35.0*   ANION GAP  --   --  17.0*   BUN  --   --  30*   CREATININE 2.60 2.60* 2.40*   EGFR  --   --  22.7*   GLUCOSE  --   --  61*   CALCIUM  --   --  8.5*   MAGNESIUM  --   --  1.1*   TSH  --   --  1.840         Lab 09/01/23  1101   TOTAL PROTEIN 6.7   ALBUMIN 3.6   GLOBULIN 3.1   ALT (SGPT) 33   AST (SGOT) 81*   BILIRUBIN 3.4*   ALK PHOS 133*         Lab 09/01/23  1101   PROBNP 21,177.0*   HSTROP T 41*   PROTIME 22.6*   INR 1.97*             Lab 09/01/23  1152   ABO TYPING O   RH TYPING Positive   ANTIBODY SCREEN Negative          Brief Urine Lab Results  (Last result in the past 365 days)        Color   Clarity   Blood   Leuk Est   Nitrite   Protein   CREAT   Urine HCG        10/01/22 0434 Yellow   Clear   Trace   Negative   Negative   >=300 mg/dL (3+)                 COVID19   Date Value Ref Range Status   09/01/2023 Not Detected Not Detected - Ref. Range Final       CT Abdomen Pelvis Without Contrast    Result Date: 9/1/2023  CT ABDOMEN PELVIS WO CONTRAST Date of Exam: 9/1/2023 11:04 AM EDT Indication: gen weakness. Comparison: None available. Technique: Axial CT images were obtained of the abdomen and pelvis without the administration of contrast. Reconstructed coronal and sagittal images were also obtained. Automated exposure control and iterative construction methods were used. Findings: The lung apices demonstrate small right pleural effusion. There is notable four-chamber cardiac enlargement. The body wall soft tissues demonstrate anasarca. The osseous structures demonstrate no evidence of acute fracture or aggressive osseous lesion. The liver is enlarged. There is no suspicious focal hepatic, splenic or pancreatic lesion on limited noncontrast evaluation. There is a slightly hyperdense exophytic round finding involving the left kidney, favoring a hemorrhagic cyst. There is no evidence of hydronephrosis. Small and large bowel loops are nondilated. Diverticular changes are present without definite evidence of acute diverticulitis. Numerous hyperdense uterine findings are present, likely to represent fibroids but incompletely characterized. There is no free fluid or pneumoperitoneum. Hyperdense gallstones are present without inflammatory signs of cholecystitis.     Impression: Impression: There is marked cardiac enlargement, with diffuse anasarca and a small right pleural effusion. There is also hepatic enlargement and findings are concerning for heart failure. No acute findings are present in the abdomen and pelvis. An exophytic left  renal finding is noted, favoring a benign hemorrhagic cyst, better characterized on recent ultrasound. Nodular hyperdense appearance of the uterus suggesting underlying fibroids. Electronically Signed: Ryley Busby MD  9/1/2023 11:19 AM EDT  Workstation ID: [id number]    CT Head Without Contrast    Result Date: 9/1/2023  CT HEAD WO CONTRAST Date of Exam: 9/1/2023 11:04 AM EDT Indication: gen weakness. Comparison: None available. Technique: Axial CT images were obtained of the head without contrast administration.  Automated exposure control and iterative construction methods were used. Findings: Gray-white differentiation is maintained and there is no evidence of intracranial hemorrhage, mass or mass effect. The ventricles are normal in size and configuration. The orbits are normal. The paranasal sinuses are grossly clear. The calvarium is intact.     Impression: Impression: No acute intracranial abnormality. Electronically Signed: Ryley Busby MD  9/1/2023 11:12 AM EDT  Workstation ID: [id number]     Results for orders placed during the hospital encounter of 02/15/23    Adult Transthoracic Echo Limited W/ Cont if Necessary Per Protocol    Interpretation Summary    The right atrial cavity is dilated.    Left ventricular ejection fraction appears to be 41 - 45%. The left ventricular cavity is mildly dilated. Left ventricular wall thickness is consistent with mild concentric hypertrophy. There is left ventricular global hypokinesis noted.    Moderate tricuspid valve regurgitation is present.    The left atrial cavity is mildly dilated. Saline test results are negative.      The patient qualifies to receive the vaccine, but they have not yet received it.    Assessment & Plan  Assessment / Plan       Dehydration    Diarrhea    HTN (hypertension)    Morbid obesity with BMI of 50.0-59.9, adult    AoC HFpEF    Stage III CKD    Hypomagnesemia    Hypokalemia      Assessment & Plan:  Sonia Bella is a 58 y.o. female who   has a past medical history of Bleeding ulcer, COPD (chronic obstructive pulmonary disease), Cronigh HFrEF with ED of 40%, Coronary artery disease, GERD (gastroesophageal reflux disease), History of stomach ulcers, Hypertension, Obsestity, and Stage III CKD (02/15/2023). who presented to the ED with profound weakness, confusion, hypoglycemia that was treated.    Diarrhea  -GI PCR is pending    HypoK and MAg  -replete    EF of 40% with hepatic congestion  --  NOT on ACE due to angioedema    Hypoglycemia  -hopefully just due to starvation, check c peptid  -add IVFs, check cortisol    CKD    Obesity  DVT prophylaxis:  Medical DVT prophylaxis orders are present.    CODE STATUS:Full code     Expected Discharge  Expected Discharge Date: 9/3/2023; Expected Discharge Time:     Electronically signed by Davina Samuels MD 09/01/23 14:03 EDT            Electronically signed by Davina Samuels MD at 09/01/23 1814       Current Facility-Administered Medications   Medication Dose Route Frequency Provider Last Rate Last Admin    acetaminophen (TYLENOL) tablet 650 mg  650 mg Oral Q6H PRN Coleen Higgins MD        apixaban (ELIQUIS) tablet 5 mg  5 mg Oral Q12H Coleen Higgins MD   5 mg at 09/06/23 0805    budesonide-formoterol (SYMBICORT) 160-4.5 MCG/ACT inhaler 2 puff  2 puff Inhalation BID - RT Davina Samuels MD   2 puff at 09/06/23 0714    Calcium Replacement - Follow Nurse / BPA Driven Protocol   Does not apply Issac Sauceda MD        dextrose (D50W) (25 g/50 mL) IV injection 25 g  25 g Intravenous Q15 Min PRN Davina Samuels MD        dextrose (GLUTOSE) oral gel 15 g  15 g Oral Q15 Min PRN Davina Samuels MD        glucagon (GLUCAGEN) injection 1 mg  1 mg Subcutaneous Q15 Min PRN Davina Samuels MD        ipratropium-albuterol (DUO-NEB) nebulizer solution 3 mL  3 mL Nebulization Q4H PRN Coleen Higgins MD        Magnesium Standard Dose Replacement - Follow Nurse / BPA Driven Protocol   Does not apply PRN Rashid,  "Issac Cheema MD        metoprolol tartrate (LOPRESSOR) injection 2.5 mg  2.5 mg Intravenous Q6H PRN Antonella Go PA        nebivolol (BYSTOLIC) tablet 2.5 mg  2.5 mg Oral Daily Vicky Schmid, APRN   2.5 mg at 23 0805    O2 (OXYGEN)  3 L/min Inhalation Continuous Davina Samuels MD        ondansetron (ZOFRAN) injection 4 mg  4 mg Intravenous Q6H PRN Coleen Higgins MD        pantoprazole (PROTONIX) EC tablet 40 mg  40 mg Oral Daily Davina Samuels MD   40 mg at 23 0805    Phosphorus Replacement - Follow Nurse / BPA Driven Protocol   Does not apply PRN Coleen Higgins MD        Potassium Replacement - Follow Nurse / BPA Driven Protocol   Does not apply PRN Issac Rashid MD        simethicone (MYLICON) chewable tablet 80 mg  80 mg Oral 4x Daily PRN Coleen Higgins MD   80 mg at 23 0856    thiamine (VITAMIN B-1) tablet 100 mg  100 mg Oral Daily Coleen Higgins MD   100 mg at 23 0805    tiotropium (SPIRIVA RESPIMAT) 2.5 mcg/act aerosol solution inhaler  2 puff Inhalation Daily - RT Davina Samuels MD   2 puff at 23 0714        Physician Progress Notes (most recent note)        Coleen Higgins MD at 23 0813              [facility] Medicine Services  PROGRESS NOTE    Patient Name: Sonia Bella  : 1965  MRN: [id number]    Date of Admission: 2023  Primary Care Physician: Provider, No Known    Subjective   Subjective     CC:  Follow-up dehydration    HPI:  On 3 L nasal cannula.  No chest pain.  Tired.  No diarrhea.  Reports being upset that she is still in the hospital and asking to leave either to a facility or to \"go home\".  Per the patient's daughter, she patient cannot go back and live with her other daughter.  Sitting up in bedside chair.  Patient's daughter and her son-in-law are at bedside, patient is okay with them receiving updates.    ROS:  Gen- No fevers, chills  CV- No chest pain, palpitations  Resp- No cough, +chronic " dyspnea  GI-no diarrhea    Objective   Objective     Vital Signs:   Temp:  [97.5 °F (36.4 °C)-97.9 °F (36.6 °C)] 97.8 °F (36.6 °C)  Heart Rate:  [] 72  Resp:  [16-18] 18  BP: (100-116)/(76-84) 110/79  Flow (L/min):  [2.5-4] 3     Physical Exam:  Constitutional: No acute distress, awake, alert, obese, sitting up in bedside chair  HENT: NCAT, mucous membranes moist  Respiratory: Clear to auscultation bilaterally, respiratory effort normal  Cardiovascular: IRIR, HR 80s, no murmurs, rubs, or gallops  Gastrointestinal: Normoactive bowel sounds, soft, nontender, nondistended  Musculoskeletal: No bilateral ankle edema  Psychiatric: Appropriate affect, cooperative  Neurologic: PERRL, symmetric facies, symmetric palate rise, tongue midline, moving all extremities well, speech clear    Results Reviewed:  LAB RESULTS:      Lab 09/05/23  0445 09/04/23  0200 09/03/23  0514 09/02/23  0109 09/01/23  1725 09/01/23  1101   WBC 5.46 5.37 5.33 5.46  --  5.47   HEMOGLOBIN 8.2* 8.1* 8.3* 8.6*  --  9.5*   HEMATOCRIT 28.9* 28.7* 29.8* 30.7*  --  34.6   PLATELETS 144 130* 123* 138*  --  150   NEUTROS ABS  --   --   --  4.33  --  4.44   IMMATURE GRANS (ABS)  --   --   --  0.03  --  0.02   LYMPHS ABS  --   --   --  0.21*  --  0.27*   MONOS ABS  --   --   --  0.82  --  0.68   EOS ABS  --   --   --  0.05  --  0.05   MCV 80.3 81.1 81.4 80.6  --  83.4   SED RATE  --   --   --   --   --  54*   CRP  --   --   --   --  5.18*  --    PROCALCITONIN  --   --   --   --   --  0.65*   LACTATE  --   --   --   --   --  1.8   PROTIME  --   --   --   --   --  22.6*           Lab 09/05/23  2358 09/05/23  0445 09/04/23  1912 09/04/23  1602 09/04/23  1020 09/04/23  0200 09/03/23  0722 09/03/23  0514 09/02/23  1907 09/02/23  0444 09/02/23  0109 09/01/23  1108 09/01/23  1101   SODIUM 132* 134*  --   --   --  139  --  135*  --  142  --    < > 141   POTASSIUM 4.7 4.3  --   --   --  4.7  --  4.4 4.3 3.3*  3.3* 3.2*   < > 2.9*   CHLORIDE 93* 92*  --   --   --   96*  --  93*  --  93*  --    < > 89*   CO2 26.0 26.0  --   --   --  28.0  --  33.0*  --  33.0*  --    < > 35.0*   ANION GAP 13.0 16.0*  --   --   --  15.0  --  9.0  --  16.0*  --    < > 17.0*   BUN 30* 30*  --   --   --  29*  --  30*  --  31*  --    < > 30*   CREATININE 2.35* 2.44*  --   --   --  2.63*  --  2.46*  --  2.47*  --    < > 2.40*   EGFR 23.3* 22.3*  --   --   --  20.4*  --  22.1*  --  22.0*  --    < > 22.7*   GLUCOSE 83 67  --   --   --  77  --  81  --  97  --    < > 61*   CALCIUM 8.9 8.7  --   --   --  8.5*  --  8.5*  --  8.3*  --    < > 8.5*   MAGNESIUM  --  1.8  --   --   --  1.9  --  2.2  --  2.3 2.2   < > 1.1*   PHOSPHORUS 2.0*  --  2.8 2.9 2.3* 1.7*   < >  --   --   --   --   --   --    HEMOGLOBIN A1C  --   --   --   --   --   --   --   --   --   --   --   --  4.80   TSH  --   --   --   --   --   --   --   --   --   --   --   --  1.840    < > = values in this interval not displayed.           Lab 09/05/23 2358 09/01/23  1101   TOTAL PROTEIN  --  6.7   ALBUMIN 3.0* 3.6   GLOBULIN  --  3.1   ALT (SGPT)  --  33   AST (SGOT)  --  81*   BILIRUBIN  --  3.4*   ALK PHOS  --  133*           Lab 09/02/23 2353 09/02/23  2041 09/02/23  1907 09/01/23  1101   PROBNP  --   --   --  21,177.0*   HSTROP T 49* 65* 51* 41*   PROTIME  --   --   --  22.6*   INR  --   --   --  1.97*               Lab 09/05/23  0445 09/01/23  1152   IRON 63  --    IRON SATURATION (TSAT) 19*  --    TIBC 332  --    TRANSFERRIN 223  --    FERRITIN 117.00  --    ABO TYPING  --  O   RH TYPING  --  Positive   ANTIBODY SCREEN  --  Negative           Brief Urine Lab Results  (Last result in the past 365 days)        Color   Clarity   Blood   Leuk Est   Nitrite   Protein   CREAT   Urine HCG        09/01/23 1444 Yellow   Clear   Negative   Trace   Negative   30 mg/dL (1+)                   Microbiology Results Abnormal       Procedure Component Value - Date/Time    Blood Culture - Blood, Arm, Left [[id number]]  (Normal) Collected: 09/02/23 1909     Lab Status: Preliminary result Specimen: Blood from Arm, Left Updated: 09/05/23 2045     Blood Culture No growth at 3 days    Blood Culture - Blood, Hand, Left [[id number]]  (Normal) Collected: 09/02/23 1907    Lab Status: Preliminary result Specimen: Blood from Hand, Left Updated: 09/05/23 2045     Blood Culture No growth at 3 days    Blood Culture - Blood, Arm, Right [[id number]]  (Normal) Collected: 09/01/23 1152    Lab Status: Preliminary result Specimen: Blood from Arm, Right Updated: 09/05/23 1302     Blood Culture No growth at 4 days    MRSA Screen, PCR (Inpatient) - Swab, Nares [[id number]]  (Normal) Collected: 09/02/23 1635    Lab Status: Final result Specimen: Swab from Nares Updated: 09/02/23 2050     MRSA PCR Negative    Narrative:      The negative predictive value of this diagnostic test is high and should only be used to consider de-escalating anti-MRSA therapy. A positive result may indicate colonization with MRSA and must be correlated clinically.  MRSA Negative    Gastrointestinal Panel, PCR - Stool, Per Rectum [[id number]]  (Normal) Collected: 09/02/23 1551    Lab Status: Final result Specimen: Stool from Per Rectum Updated: 09/02/23 1735     Campylobacter Not Detected     Plesiomonas shigelloides Not Detected     Salmonella Not Detected     Vibrio Not Detected     Vibrio cholerae Not Detected     Yersinia enterocolitica Not Detected     Enteroaggregative E. coli (EAEC) Not Detected     Enteropathogenic E. coli (EPEC) Not Detected     Enterotoxigenic E. coli (ETEC) lt/st Not Detected     Shiga-like toxin-producing E. coli (STEC) stx1/stx2 Not Detected     Shigella/Enteroinvasive E. coli (EIEC) Not Detected     Cryptosporidium Not Detected     Cyclospora cayetanensis Not Detected     Entamoeba histolytica Not Detected     Giardia lamblia Not Detected     Adenovirus F40/41 Not Detected     Astrovirus Not Detected     Norovirus GI/GII Not Detected     Rotavirus A Not Detected     Sapovirus (I, II, IV or  V) Not Detected    Respiratory Panel PCR w/COVID-19(SARS-CoV-2) ABBY/SAHRA/ELISABETH/PAD/COR/MAD/JABARI In-House, NP Swab in UTM/VTM, 3-4 HR TAT - Swab, Nasopharynx [[id number]]  (Normal) Collected: 09/01/23 1151    Lab Status: Final result Specimen: Swab from Nasopharynx Updated: 09/01/23 1327     ADENOVIRUS, PCR Not Detected     Coronavirus 229E Not Detected     Coronavirus HKU1 Not Detected     Coronavirus NL63 Not Detected     Coronavirus OC43 Not Detected     COVID19 Not Detected     Human Metapneumovirus Not Detected     Human Rhinovirus/Enterovirus Not Detected     Influenza A PCR Not Detected     Influenza B PCR Not Detected     Parainfluenza Virus 1 Not Detected     Parainfluenza Virus 2 Not Detected     Parainfluenza Virus 3 Not Detected     Parainfluenza Virus 4 Not Detected     RSV, PCR Not Detected     Bordetella pertussis pcr Not Detected     Bordetella parapertussis PCR Not Detected     Chlamydophila pneumoniae PCR Not Detected     Mycoplasma pneumo by PCR Not Detected    Narrative:      In the setting of a positive respiratory panel with a viral infection PLUS a negative procalcitonin without other underlying concern for bacterial infection, consider observing off antibiotics or discontinuation of antibiotics and continue supportive care. If the respiratory panel is positive for atypical bacterial infection (Bordetella pertussis, Chlamydophila pneumoniae, or Mycoplasma pneumoniae), consider antibiotic de-escalation to target atypical bacterial infection.            Adult Transthoracic Echo Complete W/ Cont if Necessary Per Protocol    Result Date: 9/5/2023    Left ventricular systolic function is mildly decreased. Left ventricular ejection fraction appears to be 41 - 45%.   Left ventricular wall thickness is consistent with moderate concentric hypertrophy.   Mildly reduced right ventricular systolic function noted.   The right ventricular cavity is mild to moderately dilated.   The left atrial cavity is  moderately dilated.   Estimated right ventricular systolic pressure from tricuspid regurgitation is normal (<35 mmHg). Calculated right ventricular systolic pressure from tricuspid regurgitation is 29 mmHg.   There is a trivial pericardial effusion adjacent to the right ventricle.     XR Chest 1 View    Result Date: 9/5/2023  XR CHEST 1 VW Date of Exam: 9/5/2023 5:00 AM EDT Indication: CHF Comparison: September 4, 2023 Findings: The heart is enlarged. There is no shay consolidation. No definite effusions are confirmed.     Impression: Impression: 1.Cardiomegaly. Electronically Signed: Wilmar aRmos MD  9/5/2023 9:18 AM EDT  Workstation ID: [id number]    XR Chest 1 View    Result Date: 9/4/2023  XR CHEST 1 VW Date of Exam: 9/4/2023 1:21 PM CDT Indication: SOA Comparison: 2/17/2023 Findings: Cardiomediastinal silhouette is enlarged, more pronounced than previous exam. No airspace disease, pneumothorax, nor pleural effusion.No acute osseous abnormality identified.     Impression: Impression: Enlarging cardiac silhouette without acute pulmonary process identified. Electronically Signed: Rickey Werner MD  9/4/2023 1:48 PM CDT  Workstation ID: [id number]     Results for orders placed during the hospital encounter of 09/01/23    Adult Transthoracic Echo Complete W/ Cont if Necessary Per Protocol    Interpretation Summary    Left ventricular systolic function is mildly decreased. Left ventricular ejection fraction appears to be 41 - 45%.    Left ventricular wall thickness is consistent with moderate concentric hypertrophy.    Mildly reduced right ventricular systolic function noted.    The right ventricular cavity is mild to moderately dilated.    The left atrial cavity is moderately dilated.    Estimated right ventricular systolic pressure from tricuspid regurgitation is normal (<35 mmHg). Calculated right ventricular systolic pressure from tricuspid regurgitation is 29 mmHg.    There is a trivial pericardial effusion adjacent  to the right ventricle.      Current medications:  Scheduled Meds:apixaban, 5 mg, Oral, Q12H  budesonide-formoterol, 2 puff, Inhalation, BID - RT  nebivolol, 2.5 mg, Oral, Daily  pantoprazole, 40 mg, Oral, Daily  thiamine, 100 mg, Oral, Daily  tiotropium bromide monohydrate, 2 puff, Inhalation, Daily - RT      Continuous Infusions:O2, 3 L/min      PRN Meds:.  acetaminophen    Calcium Replacement - Follow Nurse / BPA Driven Protocol    dextrose    dextrose    glucagon (human recombinant)    ipratropium-albuterol    Magnesium Standard Dose Replacement - Follow Nurse / BPA Driven Protocol    metoprolol tartrate    ondansetron    Phosphorus Replacement - Follow Nurse / BPA Driven Protocol    Potassium Replacement - Follow Nurse / BPA Driven Protocol    simethicone    Assessment & Plan   Assessment & Plan     Active Hospital Problems    Diagnosis  POA    **Dehydration [E86.0]  Yes    Diarrhea [R19.7]  Yes    Hypomagnesemia [E83.42]  Yes    Hypokalemia [E87.6]  Yes    Stage III CKD [N18.30]  Yes    AoC HFpEF [I50.33]  Yes    Morbid obesity with BMI of 50.0-59.9, adult [E66.01, Z68.43]  Not Applicable    HTN (hypertension) [I10]  Yes      Resolved Hospital Problems   No resolved problems to display.        Brief Hospital Course to date:  Sonia Bella is a 58 y.o. female with COPD on 3 L chronically, A fib not on AC, HFrEF (EF of 40%), CAD, GERD, prior bleeding stomach ulcers, HTN, Obsestity, and CKD stage III, who presented to the ED with weakness, confusion, hypoglycemia that was treated.    This patient's problems and plans were partially entered by my partner and updated as appropriate by me 09/06/23.     A fib with RVR and associated hypotension  Chest pain, resolved  HFrEF, EF of 40%   Cardiomegaly  --Not on ACE due to angioedema  --TSH within normal limits  --Continue beta blocker at reduced dose with holding parameters  --Continue tele  --status post digoxin x2 per cardiology  --Status post IV fluids  --Resumed  Eliquis; had stopped outpatient for an unclear reason, denied history of bleeding, per daughter, may not have been taking her medications  -- ECHO: LVEF 41 to 45%, moderate concentric LVH, mildly reduced RV systolic function, RV cavity mild to moderately dilated (has been seen previously), trivial pericardial effusion    Anemia  --Appears chronic  --Vitamin B12 level 318, folate level 7 in 2023, will not repeat  --Iron panel, ferritin w/ ferritin/TIBC/Iron/transferrin wnl, iron saturation low    Diarrhea  -GI PCR negative; C. difficile pending (no additional stool --> cancelled)    Hypomagnesemia  Hypokalemia  Hypophosphatemia  -replete    Hypoglycemia, resolved  Dehydration  -Likely due to poor oral intake; status post IV fluids  -C peptide sent  -Thiamine  -AM cortisol 23.11    Staph epi in 1 of 2 blood cultures  --Blood culture 1 of 2 positive for staph epi, suspected contaminant  --Repeat blood cultures NGTD  --Stopped vancomycin    CKD  --Baseline creatinine 1.9-2.3    Deconditioning  --PT/OT consulted --> SNF    COPD  Chronic hypoxia, on 3L continuously at home  --Continue home medications    Obesity - BMI 40.49    Expected Discharge Location and Transportation: SNF, patient amenable  Expected Discharge   Expected Discharge Date: 2023; Expected Discharge Time:      DVT prophylaxis:  Medical DVT prophylaxis orders are present.     AM-PAC 6 Clicks Score (PT): 15 (23)    CODE STATUS:   Code Status and Medical Interventions:   Ordered at: 23 1837     Code Status (Patient has no pulse and is not breathing):    CPR (Attempt to Resuscitate)     Medical Interventions (Patient has pulse or is breathing):    Full       Coleen Higgins MD  23        Electronically signed by Coleen Higgins MD at 23 1124          Physical Therapy Notes (most recent note)        Sapna Mcclain at 23 1104  Version 1 of 1         Patient Name: Sonia Bella  : 1965    MRN: [id number]                               Today's Date: 9/5/2023       Admit Date: 9/1/2023    Visit Dx:     ICD-10-CM ICD-9-CM   1. Generalized weakness  R53.1 780.79   2. Diarrhea, unspecified type  R19.7 787.91   3. Acute UTI  N39.0 599.0   4. Hypokalemia  E87.6 276.8   5. Hypomagnesemia  E83.42 275.2   6. Hypoglycemia  E16.2 251.2   7. Anemia, unspecified type  D64.9 285.9   8. Chronic kidney disease, unspecified CKD stage  N18.9 585.9     Patient Active Problem List   Diagnosis    Angioedema due to angiotensin converting enzyme inhibitor (ACE-I)    HTN (hypertension)    B12 deficiency anemia    Iron deficiency anemia    CKD (chronic kidney disease) stage 3, GFR 30-59 ml/min    COVID-19 virus infection    COPD on home O2 (2L)    Morbid obesity with BMI of 50.0-59.9, adult    AoC HFpEF    Acute type 2 respiratory failure    Human metapneumovirus (hMPV)    GERD    Stage III CKD    Atrial fibrillation with RVR    Acute on chronic HFrEF (heart failure with reduced ejection fraction)    Dehydration    Diarrhea    Hypomagnesemia    Hypokalemia     Past Medical History:   Diagnosis Date    Bleeding ulcer     COPD (chronic obstructive pulmonary disease)     Coronary artery disease     GERD (gastroesophageal reflux disease)     History of stomach ulcers     Hypertension     Stage III CKD 02/15/2023     History reviewed. No pertinent surgical history.   General Information       Row Name 09/05/23 1317          Physical Therapy Time and Intention    Document Type therapy note (daily note)  -KG     Mode of Treatment physical therapy  -KG       Row Name 09/05/23 1317          General Information    Patient Profile Reviewed yes  -KG     Existing Precautions/Restrictions fall;oxygen therapy device and L/min  -KG       Row Name 09/05/23 1317          Cognition    Orientation Status (Cognition) oriented x 4  -KG       Row Name 09/05/23 1317          Safety Issues, Functional Mobility    Impairments Affecting Function (Mobility)  balance;endurance/activity tolerance;strength  -KG               User Key  (r) = Recorded By, (t) = Taken By, (c) = Cosigned By      Initials Name Provider Type    Sapna Colin Physical Therapist                   Mobility       Row Name 09/05/23 1318          Bed Mobility    Bed Mobility supine-sit  -KG     Supine-Sit [place] (Bed Mobility) supervision  -KG     Assistive Device (Bed Mobility) head of bed elevated  -KG       Row Name 09/05/23 1318          Transfers    Comment, (Transfers) STS from EOB, min-A x2, FWW, 1st attempt knees began buckling 30 sec after standing and needed to sit back down, 2nd attempt pt able to remain standing and take a couple side steps toward HOB  -KG       Row Name 09/05/23 1318          Sit-Stand Transfer    Sit-Stand [place] (Transfers) verbal cues;nonverbal cues (demo/gesture);2 person assist;minimum assist (75% patient effort)  -KG     Assistive Device (Sit-Stand Transfers) walker, front-wheeled  -KG               User Key  (r) = Recorded By, (t) = Taken By, (c) = Cosigned By      Initials Name Provider Type    Sapna Colin Physical Therapist                   Obj/Interventions       Row Name 09/05/23 1320          Motor Skills    Therapeutic Exercise other (see comments)  heel slides, ankle pumps, quad sets x10  -KG       Row Name 09/05/23 1320          Balance    Dynamic Standing Balance minimal assist;2-person assist  -KG     Position/Device Used, Standing Balance supported;walker, front-wheeled  -KG     Balance Interventions standing;sit to stand  -KG               User Key  (r) = Recorded By, (t) = Taken By, (c) = Cosigned By      Initials Name Provider Type    Sapna Colin Physical Therapist                   Goals/Plan    No documentation.                  Clinical Impression       Row Name 09/05/23 1320          Pain    Pretreatment Pain Rating 0/10 - no pain  -KG     Posttreatment Pain Rating 0/10 - no pain  -KG       Row Name 09/05/23  1320          Plan of Care Review    Plan of Care Reviewed With patient  -KG     Progress no change  -KG     Outcome Evaluation STS from EOB, min-A x2, FWW, 1st attempt knees began buckling 30 sec after standing and needed to sit back down, 2nd attempt pt able to remain standing and take a couple side steps toward HOB.  recommend SNF  -KG       Row Name 09/05/23 1320          Vital Signs    Pre Systolic BP Rehab 102  -KG     Pre Treatment Diastolic BP 71  -KG     Post Systolic BP Rehab 105  -KG     Post Treatment Diastolic BP 76  -KG       Row Name 09/05/23 1320          Positioning and Restraints    Pre-Treatment Position in bed  -KG     Post Treatment Position bed  -KG     In Bed notified nsg;fowlers;call light within reach;encouraged to call for assist;exit alarm on  -KG               User Key  (r) = Recorded By, (t) = Taken By, (c) = Cosigned By      Initials Name Provider Type    Sapna Colin Physical Therapist                   Outcome Measures       Row Name 09/05/23 1321          How much help from another person do you currently need...    Turning from your back to your side while in flat bed without using bedrails? 3  -KG     Moving from lying on back to sitting on the side of a flat bed without bedrails? 3  -KG     Moving to and from a bed to a chair (including a wheelchair)? 2  -KG     Standing up from a chair using your arms (e.g., wheelchair, bedside chair)? 3  -KG     Climbing 3-5 steps with a railing? 2  -KG     To walk in hospital room? 2  -KG     AM-PAC 6 Clicks Score (PT) 15  -KG     Highest level of mobility 4 --> Transferred to chair/commode  -KG       Row Name 09/05/23 1321          Functional Assessment    Outcome Measure Options AM-PAC 6 Clicks Basic Mobility (PT)  -KG               User Key  (r) = Recorded By, (t) = Taken By, (c) = Cosigned By      Initials Name Provider Type    Sapna Colin Physical Therapist                                 Physical Therapy Education        Title: PT OT SLP Therapies (In Progress)       Topic: Physical Therapy (In Progress)       Point: Mobility training (In Progress)       Learning Progress Summary             Patient Acceptance, E, NR by NS at 9/2/2023 1257    Comment: PT POC, importance of OOB activity                         Point: Home exercise program (Not Started)       Learner Progress:  Not documented in this visit.              Point: Body mechanics (In Progress)       Learning Progress Summary             Patient Acceptance, E, NR by NS at 9/2/2023 1257    Comment: PT POC, importance of OOB activity                         Point: Precautions (In Progress)       Learning Progress Summary             Patient Acceptance, E, NR by NS at 9/2/2023 1257    Comment: PT POC, importance of OOB activity                                         User Key       Initials Effective Dates Name Provider Type Discipline    NS 06/16/21 -  Elsi Adams PT Physical Therapist PT                  PT Recommendation and Plan     Plan of Care Reviewed With: patient  Progress: no change  Outcome Evaluation: STS from EOB, min-A x2, FWW, 1st attempt knees began buckling 30 sec after standing and needed to sit back down, 2nd attempt pt able to remain standing and take a couple side steps toward HOB.  recommend SNF     Time Calculation:         PT Charges       Row Name 09/05/23 1323             Time Calculation    Start Time 1104  -KG      PT Received On 09/05/23  -KG         Timed Charges    66249 - PT Therapeutic Exercise Minutes 8  -KG      27584 - PT Therapeutic Activity Minutes 30  -KG         Total Minutes    Timed Charges Total Minutes 38  -KG       Total Minutes 38  -KG                User Key  (r) = Recorded By, (t) = Taken By, (c) = Cosigned By      Initials Name Provider Type    KG Sapna Mcclain Physical Therapist                  Therapy Charges for Today       Code Description Service Date Service Provider Modifiers Qty    18988508438  PT THER PROC EA  15 MIN 2023 Sapna Mcclain GP 1    24306502729 HC PT THERAPEUTIC ACT EA 15 MIN 2023 Sapna Mcclain GP 2    38638322738 HC PT THER SUPP EA 15 MIN 2023 Sapna Mcclain GP 2            PT G-Codes  Outcome Measure Options: AM-PAC 6 Clicks Basic Mobility (PT)  AM-PAC 6 Clicks Score (PT): 15  AM-PAC 6 Clicks Score (OT): 14       Sapna Mcclain  2023      Electronically signed by Sapna Mcclain at 23 1325          Occupational Therapy Notes (most recent note)        Vanessa Nye OT at 23 1400          Patient Name: Sonia Bella  : 1965    MRN: [id number]                              Today's Date: 2023       Admit Date: 2023    Visit Dx:     ICD-10-CM ICD-9-CM   1. Generalized weakness  R53.1 780.79   2. Diarrhea, unspecified type  R19.7 787.91   3. Acute UTI  N39.0 599.0   4. Hypokalemia  E87.6 276.8   5. Hypomagnesemia  E83.42 275.2   6. Hypoglycemia  E16.2 251.2   7. Anemia, unspecified type  D64.9 285.9   8. Chronic kidney disease, unspecified CKD stage  N18.9 585.9     Patient Active Problem List   Diagnosis    Angioedema due to angiotensin converting enzyme inhibitor (ACE-I)    HTN (hypertension)    B12 deficiency anemia    Iron deficiency anemia    CKD (chronic kidney disease) stage 3, GFR 30-59 ml/min    COVID-19 virus infection    COPD on home O2 (2L)    Morbid obesity with BMI of 50.0-59.9, adult    AoC HFpEF    Acute type 2 respiratory failure    Human metapneumovirus (hMPV)    GERD    Stage III CKD    Atrial fibrillation with RVR    Acute on chronic HFrEF (heart failure with reduced ejection fraction)    Dehydration    Diarrhea    Hypomagnesemia    Hypokalemia     Past Medical History:   Diagnosis Date    Bleeding ulcer     COPD (chronic obstructive pulmonary disease)     Coronary artery disease     GERD (gastroesophageal reflux disease)     History of stomach ulcers     Hypertension     Stage III CKD 02/15/2023     History reviewed. No pertinent  surgical history.   General Information       Row Name 09/05/23 1539          OT Time and Intention    Document Type therapy note (daily note)  -AF     Mode of Treatment occupational therapy  -AF       Row Name 09/05/23 1539          General Information    Patient Profile Reviewed yes  -AF     Existing Precautions/Restrictions fall;oxygen therapy device and L/min  -AF     Barriers to Rehab medically complex;previous functional deficit  -AF       Row Name 09/05/23 1539          Cognition    Orientation Status (Cognition) oriented x 4  -AF       Row Name 09/05/23 1539          Safety Issues, Functional Mobility    Safety Issues Affecting Function (Mobility) safety precaution awareness;safety precautions follow-through/compliance;sequencing abilities  -AF     Impairments Affecting Function (Mobility) balance;endurance/activity tolerance;strength;postural/trunk control  -AF     Comment, Safety Issues/Impairments (Mobility) activity limited by lethargy  -AF               User Key  (r) = Recorded By, (t) = Taken By, (c) = Cosigned By      Initials Name Provider Type    AF Vanessa Nye OT Occupational Therapist                     Mobility/ADL's       Row Name 09/05/23 1540          Bed Mobility    Bed Mobility rolling left;rolling right  -AF     Rolling Left [place] (Bed Mobility) supervision;verbal cues  -AF     Rolling Right [place] (Bed Mobility) supervision;verbal cues  -AF     Comment, (Bed Mobility) pt declined out of bed activities on this date d/t lethargy.  -AF       Row Name 09/05/23 1540          Activities of Daily Living    BADL Assessment/Intervention upper body dressing;toileting  -AF       Row Name 09/05/23 1540          Upper Body Dressing Assessment/Training    [place] Level (Upper Body Dressing) don;doff;minimum assist (75% patient effort)  -AF     Position (Upper Body Dressing) supine  -AF       Row Name 09/05/23 1540          Toileting Assessment/Training    [place] Level  (Toileting) perform perineal hygiene;dependent (less than 25% patient effort)  -AF     Position (Toileting) supine  therapist assisted NA w/ changing bed and completing hygiene.  -AF               User Key  (r) = Recorded By, (t) = Taken By, (c) = Cosigned By      Initials Name Provider Type    Vanessa Coleman OT Occupational Therapist                   Obj/Interventions       Row Name 09/05/23 1542          Motor Skills    Motor Skills functional endurance  -AF     Functional Endurance decreased functional endurance. Completed bed mobility and BADLs while elevated in bed.  -AF       Row Name 09/05/23 1542          Balance    Balance Assessment sitting static balance;sitting dynamic balance  -AF     Static Sitting Balance supervision  -AF     Dynamic Sitting Balance supervision  -AF     Position, Sitting Balance supported;other (see comments)  in bed  -AF     Balance Interventions sitting;weight shifting activity;occupation based/functional task  -AF               User Key  (r) = Recorded By, (t) = Taken By, (c) = Cosigned By      Initials Name Provider Type    Vanessa Coleman OT Occupational Therapist                   Goals/Plan    No documentation.                  Clinical Impression       Row Name 09/05/23 1544          Pain Assessment    Pretreatment Pain Rating 0/10 - no pain  -AF     Posttreatment Pain Rating 0/10 - no pain  -AF       Row Name 09/05/23 1544          Plan of Care Review    Plan of Care Reviewed With patient  -AF     Progress no change  -AF     Outcome Evaluation Pt continues to demo deficits in strength and endurance caffecting ADL indp. when compared to baseline. Completed grooming task while elevated in bed d/t reports of lethargy. Recc. continued IPOT per POC and DC to SNF.  -AF       Row Name 09/05/23 1547          Therapy Assessment/Plan (OT)    Rehab Potential (OT) good, to achieve stated therapy goals  -AF     Criteria for Skilled Therapeutic Interventions Met (OT) yes;skilled  treatment is necessary  -AF     Therapy Frequency (OT) daily  -AF       Row Name 09/05/23 1544          Therapy Plan Review/Discharge Plan (OT)    Anticipated Discharge Disposition (OT) skilled nursing facility  -AF       Row Name 09/05/23 1544          Vital Signs    Pre Systolic BP Rehab 105  -AF     Pre Treatment Diastolic BP 76  -AF     Post Systolic BP Rehab 115  -AF     Post Treatment Diastolic BP 84  -AF     Pre SpO2 (%) 90  -AF     O2 Delivery Pre Treatment nasal cannula  -AF     Intra SpO2 (%) 87  -AF     O2 Delivery Intra Treatment nasal cannula  -AF     Post SpO2 (%) 92  -AF     O2 Delivery Post Treatment nasal cannula  -AF     Pre Patient Position Supine  -AF     Intra Patient Position Sitting  elevated in bed; supported long sitting  -AF     Post Patient Position Supine  -AF       Row Name 09/05/23 1544          Positioning and Restraints    Pre-Treatment Position in bed  -AF     Post Treatment Position bed  -AF     In Bed notified nsg;call light within reach;supine;encouraged to call for assist;legs elevated;exit alarm on  -AF               User Key  (r) = Recorded By, (t) = Taken By, (c) = Cosigned By      Initials Name Provider Type    AF Vanessa Nye, OT Occupational Therapist                   Outcome Measures       Row Name 09/05/23 1548          How much help from another is currently needed...    Putting on and taking off regular lower body clothing? 1  -AF     Bathing (including washing, rinsing, and drying) 2  -AF     Toileting (which includes using toilet bed pan or urinal) 2  -AF     Putting on and taking off regular upper body clothing 3  -AF     Taking care of personal grooming (such as brushing teeth) 3  -AF     Eating meals 3  -AF     AM-PAC 6 Clicks Score (OT) 14  -AF       Row Name 09/05/23 1321          How much help from another person do you currently need...    Turning from your back to your side while in flat bed without using bedrails? 3  -KG     Moving from lying on back to  sitting on the side of a flat bed without bedrails? 3  -KG     Moving to and from a bed to a chair (including a wheelchair)? 2  -KG     Standing up from a chair using your arms (e.g., wheelchair, bedside chair)? 3  -KG     Climbing 3-5 steps with a railing? 2  -KG     To walk in hospital room? 2  -KG     AM-PAC 6 Clicks Score (PT) 15  -KG     Highest level of mobility 4 --> Transferred to chair/commode  -KG       Row Name 09/05/23 1548 09/05/23 1321       Functional Assessment    Outcome Measure Options AM-PAC 6 Clicks Daily Activity (OT)  -AF AM-PAC 6 Clicks Basic Mobility (PT)  -KG              User Key  (r) = Recorded By, (t) = Taken By, (c) = Cosigned By      Initials Name Provider Type    Sapna Colin Physical Therapist    Vanessa Coleman OT Occupational Therapist                    Occupational Therapy Education       Title: PT OT SLP Therapies (In Progress)       Topic: Occupational Therapy (In Progress)       Point: ADL training (Done)       Description:   Instruct learner(s) on proper safety adaptation and remediation techniques during self care or transfers.   Instruct in proper use of assistive devices.                  Learning Progress Summary             Patient Acceptance, E,TB, VU by AF at 9/5/2023 1548    Acceptance, E, VU by AN at 9/2/2023 1145                         Point: Home exercise program (Not Started)       Description:   Instruct learner(s) on appropriate technique for monitoring, assisting and/or progressing therapeutic exercises/activities.                  Learner Progress:  Not documented in this visit.              Point: Precautions (Done)       Description:   Instruct learner(s) on prescribed precautions during self-care and functional transfers.                  Learning Progress Summary             Patient Acceptance, E, VU by AN at 9/2/2023 1145                         Point: Body mechanics (Done)       Description:   Instruct learner(s) on proper positioning and spine  alignment during self-care, functional mobility activities and/or exercises.                  Learning Progress Summary             Patient Acceptance, E,TB, VU by AF at 9/5/2023 1548    Acceptance, E, VU by AN at 9/2/2023 1145                                         User Key       Initials Effective Dates Name Provider Type Discipline    AN 09/21/21 -  Maria Alejandra Johnston OT Occupational Therapist OT    AF 08/15/23 -  Vanessa Nye OT Occupational Therapist OT                  OT Recommendation and Plan  Therapy Frequency (OT): daily  Plan of Care Review  Plan of Care Reviewed With: patient  Progress: no change  Outcome Evaluation: Pt continues to demo deficits in strength and endurance caffecting ADL indp. when compared to baseline. Completed grooming task while elevated in bed d/t reports of lethargy. Recc. continued IPOT per POC and DC to SNF.     Time Calculation:         Time Calculation- OT       Row Name 09/05/23 1549             Time Calculation- OT    OT Received On 09/05/23  -AF      OT Goal Re-Cert Due Date 09/12/23  -AF         Timed Charges    55239 - OT Therapeutic Activity Minutes 17  -AF      25203 - OT Self Care/Mgmt Minutes 23  -AF         Total Minutes    Timed Charges Total Minutes 40  -AF       Total Minutes 40  -AF                User Key  (r) = Recorded By, (t) = Taken By, (c) = Cosigned By      Initials Name Provider Type    AF Vanessa Nye OT Occupational Therapist                  Therapy Charges for Today       Code Description Service Date Service Provider Modifiers Qty    96789560810 HC OT THERAPEUTIC ACT EA 15 MIN 9/5/2023 Vanessa Nye OT GO 1    59665190771 HC OT SELF CARE/MGMT/TRAIN EA 15 MIN 9/5/2023 Vanessa Nye OT GO 2                 Vanessa Nye OT  9/5/2023    Electronically signed by Vanessa Nye OT at 09/05/23 8780

## 2023-09-06 NOTE — CASE MANAGEMENT/SOCIAL WORK
Continued Stay Note  [facility]     Patient Name: Sonia Bella  MRN: [id number]  Today's Date: 9/6/2023    Admit Date: 9/1/2023    Plan: discharge plan   Discharge Plan       Row Name 09/06/23 1156       Plan    Plan discharge plan    Plan Comments Per pt request, referrals made to all skilled nursing facilities in [facility] CM will cont to follow    Final Discharge Disposition Code 03 - skilled nursing facility (SNF)      Row Name 09/06/23 1137       Plan    Plan SW    Plan Comments [facility]'s daughter Laverne [phone] who inquired about POA and HCS. Discussed rehab options for patient. Daughter Laverne explains patient will have a place to stay after rehab they are just make adjustments.  Daughter Laverne explained she would be back later to discuss more with her mother. [facility] at a bedside. Discussed rehab options and resources. Patient is agreeable to rehab in [facility]. [facility] available                   Discharge Codes    No documentation.                 Expected Discharge Date and Time       Expected Discharge Date Expected Discharge Time    Sep 9, 2023               Asiya Corey RN

## 2023-09-06 NOTE — PROGRESS NOTES
[facility]  IP Progress Note      Chief Complaint/Reason for service: A-fib RVR #2 hypotension #3 mildly reduced EF    Subjective   Subjective: Patient states when she left the hospital recently she was doing well.  But then about a week leading up to this she began to feel very weak.  She had anorexia, decreased appetite and decreased fluid intake.  She currently denies any shortness of breath..  She states she just feels bad all over.    Past medical, surgical, social and family history reviewed in the patient's electronic medical record.    Objective     Vital Sign Min/Max for last 24 hours  Temp  Min: 97.5 °F (36.4 °C)  Max: 97.9 °F (36.6 °C)   BP  Min: 100/78  Max: 116/83   Pulse  Min: 51  Max: 104   Resp  Min: 16  Max: 18   SpO2  Min: 89 %  Max: 98 %   Flow (L/min)  Min: 2.5  Max: 4      Intake/Output Summary (Last 24 hours) at 9/6/2023 0827  Last data filed at 9/5/2023 2307  Gross per 24 hour   Intake --   Output 300 ml   Net -300 ml             Current Facility-Administered Medications:     acetaminophen (TYLENOL) tablet 650 mg, 650 mg, Oral, Q6H PRN, Coleen Higgins MD    apixaban (ELIQUIS) tablet 5 mg, 5 mg, Oral, Q12H, Coleen Higgins MD, 5 mg at 09/06/23 0805    budesonide-formoterol (SYMBICORT) 160-4.5 MCG/ACT inhaler 2 puff, 2 puff, Inhalation, BID - RT, Davina Samuels MD, 2 puff at 09/06/23 0714    Calcium Replacement - Follow Nurse / BPA Driven Protocol, , Does not apply, PRN, Issac Rashid MD    dextrose (D50W) (25 g/50 mL) IV injection 25 g, 25 g, Intravenous, Q15 Min PRN, Davina Samuels MD    dextrose (GLUTOSE) oral gel 15 g, 15 g, Oral, Q15 Min PRN, Davina Samuels MD    glucagon (GLUCAGEN) injection 1 mg, 1 mg, Subcutaneous, Q15 Min PRN, Davina Samuels MD    ipratropium-albuterol (DUO-NEB) nebulizer solution 3 mL, 3 mL, Nebulization, Q4H PRN, Coleen Higgins MD    Magnesium Standard Dose Replacement - Follow Nurse / BPA Driven Protocol, , Does  not apply, PRN, Issac Rashid MD    metoprolol tartrate (LOPRESSOR) injection 2.5 mg, 2.5 mg, Intravenous, Q6H PRN, Antonella Go PA    nebivolol (BYSTOLIC) tablet 2.5 mg, 2.5 mg, Oral, Daily, Vicky Schmid, APRN, 2.5 mg at 09/06/23 0805    O2 (OXYGEN), 3 L/min, Inhalation, Continuous, Davina Samuels MD    ondansetron (ZOFRAN) injection 4 mg, 4 mg, Intravenous, Q6H PRN, Coleen Higgins MD    pantoprazole (PROTONIX) EC tablet 40 mg, 40 mg, Oral, Daily, Davina Samuels MD, 40 mg at 09/06/23 0805    Phosphorus Replacement - Follow Nurse / BPA Driven Protocol, , Does not apply, PRN, Coleen Higgins MD    Potassium Replacement - Follow Nurse / BPA Driven Protocol, , Does not apply, PRN, Issac Rashid MD    simethicone (MYLICON) chewable tablet 80 mg, 80 mg, Oral, 4x Daily PRN, Coleen Higgins MD, 80 mg at 09/04/23 0856    thiamine (VITAMIN B-1) tablet 100 mg, 100 mg, Oral, Daily, Coleen Higgins MD, 100 mg at 09/06/23 0805    tiotropium (SPIRIVA RESPIMAT) 2.5 mcg/act aerosol solution inhaler, 2 puff, Inhalation, Daily - RT, Davina Samuels MD, 2 puff at 09/06/23 0714    Physical Exam: General pleasant obese female in bed lying on her right side at 30 degree angle with minimal tachypnea but no overt dyspnea heart rate 90s        HEENT: Positive JVP, exophthalmos       Respiratory: Equal bilateral symmetrical expansion auscultation bilaterally       Cardiovascular: Irregular rate and rhythm with soft systolic murmur no edema palpation       GI: Obese and soft       Lower Extremities: No lesions       Neuro: Facial expressions are symmetrical.  The patient appears weak       Skin: Warm and dry no edema palpation       Psych: Flat affect    Results Review: Blood pressures 10 3-1 16.  Heart rate 69-81.  Patient is a net 1.8 L positive.  Hemoglobin is 8.2 with a normal iron level.  GFR is 23.    Radiology Results:  Imaging Results (Last 72 Hours)       Procedure Component Value Units Date/Time    XR Chest 1  View [[id number]] Collected: 09/05/23 0917     Updated: 09/05/23 0921    Narrative:      XR CHEST 1 VW    Date of Exam: 9/5/2023 5:00 AM EDT    Indication: CHF    Comparison: September 4, 2023    Findings:  The heart is enlarged. There is no shay consolidation. No definite effusions are confirmed.      Impression:      Impression:  1.Cardiomegaly.      Electronically Signed: Wilmar Ramos MD    9/5/2023 9:18 AM EDT    Workstation ID: [id number]    XR Chest 1 View [[id number]] Collected: 09/04/23 1447     Updated: 09/04/23 1451    Narrative:      XR CHEST 1 VW    Date of Exam: 9/4/2023 1:21 PM CDT    Indication: SOA    Comparison: 2/17/2023    Findings:  Cardiomediastinal silhouette is enlarged, more pronounced than previous exam. No airspace disease, pneumothorax, nor pleural effusion.No acute osseous abnormality identified.      Impression:      Impression:  Enlarging cardiac silhouette without acute pulmonary process identified.      Electronically Signed: Rickey Werner MD    9/4/2023 1:48 PM CDT    Workstation ID: [id number]            EKG: A-fib RVR with low voltage    ECHO: EF 41 to 45% with trivial pericardial effusion    Tele: No significant pauses or bradycardia.  Occasional short run of A-fib with aberrant conduction    Assessment   Assessment/Plan: A-fib with hypotension-initially the Bystolic was held because the patient's blood pressure was low.  Is been reinstituted and now she has a stable blood pressure.  A-fib rates are overall better  2 mild LV dysfunction-patient has received some hydration.  She has minimal tachypnea but no overt dyspnea and her lungs are clear.  3 patient has exophthalmos, thinning hair and symptoms of hypothyroidism.  TSH is normal but it is increased sequentially over the last couple of blood draws.    Akhil Iglesias MD  09/06/23  08:27 EDT

## 2023-09-06 NOTE — PLAN OF CARE
Goal Outcome Evaluation:  Plan of Care Reviewed With: patient        Progress: no change  Outcome Evaluation: VSS on 2.5L/NC, Phosphorus started. Pts mood is very withdrawn A&Ox3. Will continue to monitor at this time

## 2023-09-06 NOTE — DISCHARGE PLACEMENT REQUEST
"Leanne Callahan (58 y.o. Female)     To Valerio Lazar  From [facility](CM at [facility]) [phone]      Date of Birth   1965    Social Security Number       Address   [address] [place] [address] [address] [place] [address]    Home Phone   [phone]    MRN   [id number]       Tenriism   None    Marital Status                               Admission Date   23    Admission Type   Emergency    Admitting Provider   Coleen Higgins MD    Attending Provider   Coleen Higgins MD    Department, Room/Bed   [facility] 6A, N604/1       Discharge Date       Discharge Disposition       Discharge Destination                                 Attending Provider: Coleen Higgins MD    Allergies: Ace Inhibitors    Isolation: None   Infection: None   Code Status: CPR    Ht: 170.2 cm (67\")   Wt: 119 kg (263 lb)    Admission Cmt: None   Principal Problem: Dehydration [E86.0]                   Active Insurance as of 2023       Primary Coverage       Payor Plan Insurance Group Employer/Plan Group    [facility] MEDICARE REPLACEMENT [facility] DUAL COMPLETE MEDICARE REPLACEMENT [id number]       Payor Plan Address Payor Plan Phone Number Payor Plan Fax Number Effective Dates    [address] [phone]  2023 - None Entered    [facility] [id number]         Subscriber Name Subscriber Birth Date Member ID       LEANNE CALLAHAN 1965 [id number]                     Emergency Contacts        (Rel.) Home Phone Work Phone Mobile Phone    Candelaria Camargo (Daughter) [phone]                 History & Physical        Davina Samuels MD at 23 1403              [facility] Medicine Services  HISTORY AND PHYSICAL    Patient Name: Leanne Callahan  : 1965  MRN: [id number]  Primary Care Physician: Provider, No Known    Subjective  Subjective     Chief Complaint:diarrhea and weakness    HPI:  Leanne Callahan is a 58 y.o. female who  has a past " medical history of Bleeding ulcer, COPD (chronic obstructive pulmonary disease), Cronigh HFrEF with ED of 40%, Coronary artery disease, GERD (gastroesophageal reflux disease), History of stomach ulcers, Hypertension, Obsestity, and Stage III CKD (02/15/2023). who presented to the ED with profound weakness, confusion, hypoglycemia that was treated. She continues to feel weak and hungry but has been afraid to eat due to the diarrhea. She denies sick contacts or eating any questionable food, she denies stool in her stool.        Review of Systems   Patient denies weight loss, headaches, changes in vision, fever, chills, sore throat, shortness of breath, cough, nausea or vomiting, abdominal pain or distension, change in urine output or habits, joint pain, rash, itching, numbness/tingling, weakness or bleeding.    Otherwise complete ROS is negative except as mentioned in the HPI.    Personal History         PMH: She  has a past medical history of Bleeding ulcer, COPD (chronic obstructive pulmonary disease), Coronary artery disease, GERD (gastroesophageal reflux disease), History of stomach ulcers, Hypertension, and Stage III CKD (02/15/2023).   PSxH: She  has no past surgical history on file.         FH: Her family history is not on file.   SH: She  reports that she has quit smoking. Her smoking use included cigarettes. She has never used smokeless tobacco. She reports current alcohol use. She reports that she does not use drugs.     Medications:  O2, albuterol sulfate HFA, apixaban, budesonide-formoterol, colchicine, famotidine, nebivolol, tiotropium bromide monohydrate, and torsemide    Allergies   Allergen Reactions    Ace Inhibitors Angioedema     lisinopril       Objective  Objective     Vital Signs:   Temp:  [98.2 °F (36.8 °C)] 98.2 °F (36.8 °C)  Heart Rate:  [78-94] 94  Resp:  [16] 16  BP: (106-130)/(71-98) 126/91  Flow (L/min):  [3] 3    Constitutional: Awake, alert, interactive and pleasant- slow to speak,  miserable, weak  Eyes: clear sclerae, no conjunctival injection  HENT: NCAT, mucous membranes dry, pale  Neck: no masses or lymphadenopathy, trachea midline  Respiratory: good breath sounds bilaterally, respirations unlabored  Cardiovascular: RRR, no murmurs appreciated, palpable peripheral pulses  Abdomen:  soft, obese, tender lower abdomen  Musculoskeletal: No peripheral edema, clubbing or cyanosis  Neurologic: Oriented x 3, speech slow but clear                      Strength symmetric in all extremities                     Cranial Nerves grossly intact, speech clear  Skin: No rashes or jaundice  Psychiatric: Appropriate mood, insight      Result Review:  I have personally reviewed the results from the time of this admission   to 9/1/2023 14:03 EDT and agree with these findings:  [x]  Laboratory  [x]  Microbiology  [x]  Radiology  [x]  EKG/Telemetry   []  Cardiology/Vascular   []  Pathology  [x]  Old records  []  Other:  Most notable findings include: anemia, low K and Mag, elevated creatinine      LAB RESULTS:      Lab 09/01/23  1101   WBC 5.47   HEMOGLOBIN 9.5*   HEMATOCRIT 34.6   PLATELETS 150   NEUTROS ABS 4.44   IMMATURE GRANS (ABS) 0.02   LYMPHS ABS 0.27*   MONOS ABS 0.68   EOS ABS 0.05   MCV 83.4   PROCALCITONIN 0.65*   LACTATE 1.8   PROTIME 22.6*         Lab 09/01/23  1125 09/01/23  1108 09/01/23  1101   SODIUM  --   --  141   POTASSIUM  --   --  2.9*   CHLORIDE  --   --  89*   CO2  --   --  35.0*   ANION GAP  --   --  17.0*   BUN  --   --  30*   CREATININE 2.60 2.60* 2.40*   EGFR  --   --  22.7*   GLUCOSE  --   --  61*   CALCIUM  --   --  8.5*   MAGNESIUM  --   --  1.1*   TSH  --   --  1.840         Lab 09/01/23  1101   TOTAL PROTEIN 6.7   ALBUMIN 3.6   GLOBULIN 3.1   ALT (SGPT) 33   AST (SGOT) 81*   BILIRUBIN 3.4*   ALK PHOS 133*         Lab 09/01/23  1101   PROBNP 21,177.0*   HSTROP T 41*   PROTIME 22.6*   INR 1.97*             Lab 09/01/23  1152   ABO TYPING O   RH TYPING Positive   ANTIBODY SCREEN  Negative         Brief Urine Lab Results  (Last result in the past 365 days)        Color   Clarity   Blood   Leuk Est   Nitrite   Protein   CREAT   Urine HCG        10/01/22 0434 Yellow   Clear   Trace   Negative   Negative   >=300 mg/dL (3+)                 COVID19   Date Value Ref Range Status   09/01/2023 Not Detected Not Detected - Ref. Range Final       CT Abdomen Pelvis Without Contrast    Result Date: 9/1/2023  CT ABDOMEN PELVIS WO CONTRAST Date of Exam: 9/1/2023 11:04 AM EDT Indication: gen weakness. Comparison: None available. Technique: Axial CT images were obtained of the abdomen and pelvis without the administration of contrast. Reconstructed coronal and sagittal images were also obtained. Automated exposure control and iterative construction methods were used. Findings: The lung apices demonstrate small right pleural effusion. There is notable four-chamber cardiac enlargement. The body wall soft tissues demonstrate anasarca. The osseous structures demonstrate no evidence of acute fracture or aggressive osseous lesion. The liver is enlarged. There is no suspicious focal hepatic, splenic or pancreatic lesion on limited noncontrast evaluation. There is a slightly hyperdense exophytic round finding involving the left kidney, favoring a hemorrhagic cyst. There is no evidence of hydronephrosis. Small and large bowel loops are nondilated. Diverticular changes are present without definite evidence of acute diverticulitis. Numerous hyperdense uterine findings are present, likely to represent fibroids but incompletely characterized. There is no free fluid or pneumoperitoneum. Hyperdense gallstones are present without inflammatory signs of cholecystitis.     Impression: Impression: There is marked cardiac enlargement, with diffuse anasarca and a small right pleural effusion. There is also hepatic enlargement and findings are concerning for heart failure. No acute findings are present in the abdomen and pelvis. An  exophytic left renal finding is noted, favoring a benign hemorrhagic cyst, better characterized on recent ultrasound. Nodular hyperdense appearance of the uterus suggesting underlying fibroids. Electronically Signed: Ryley Busby MD  9/1/2023 11:19 AM EDT  Workstation ID: [id number]    CT Head Without Contrast    Result Date: 9/1/2023  CT HEAD WO CONTRAST Date of Exam: 9/1/2023 11:04 AM EDT Indication: gen weakness. Comparison: None available. Technique: Axial CT images were obtained of the head without contrast administration.  Automated exposure control and iterative construction methods were used. Findings: Gray-white differentiation is maintained and there is no evidence of intracranial hemorrhage, mass or mass effect. The ventricles are normal in size and configuration. The orbits are normal. The paranasal sinuses are grossly clear. The calvarium is intact.     Impression: Impression: No acute intracranial abnormality. Electronically Signed: Ryley Busby MD  9/1/2023 11:12 AM EDT  Workstation ID: [id number]     Results for orders placed during the hospital encounter of 02/15/23    Adult Transthoracic Echo Limited W/ Cont if Necessary Per Protocol    Interpretation Summary    The right atrial cavity is dilated.    Left ventricular ejection fraction appears to be 41 - 45%. The left ventricular cavity is mildly dilated. Left ventricular wall thickness is consistent with mild concentric hypertrophy. There is left ventricular global hypokinesis noted.    Moderate tricuspid valve regurgitation is present.    The left atrial cavity is mildly dilated. Saline test results are negative.      The patient qualifies to receive the vaccine, but they have not yet received it.    Assessment & Plan  Assessment / Plan       Dehydration    Diarrhea    HTN (hypertension)    Morbid obesity with BMI of 50.0-59.9, adult    AoC HFpEF    Stage III CKD    Hypomagnesemia    Hypokalemia      Assessment & Plan:  Sonia Bella is a 58  y.o. 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 who presented to the ED with profound weakness, confusion, hypoglycemia that was treated.    Diarrhea  -GI PCR is pending    HypoK and MAg  -replete    EF of 40% with hepatic congestion  --  NOT on ACE due to angioedema    Hypoglycemia  -hopefully just due to starvation, check c peptid  -add IVFs, check cortisol    CKD    Obesity  DVT prophylaxis:  Medical DVT prophylaxis orders are present.    CODE STATUS:Full code     Expected Discharge  Expected Discharge Date: 9/3/2023; Expected Discharge Time:     Electronically signed by Davina Samuels MD 09/01/23 14:03 EDT            Electronically signed by Davina Samuels MD at 09/01/23 1814       Current Facility-Administered Medications   Medication Dose Route Frequency Provider Last Rate Last Admin    acetaminophen (TYLENOL) tablet 650 mg  650 mg Oral Q6H PRN Coleen Higgins MD        apixaban (ELIQUIS) tablet 5 mg  5 mg Oral Q12H Coleen Higgins MD   5 mg at 09/06/23 0805    budesonide-formoterol (SYMBICORT) 160-4.5 MCG/ACT inhaler 2 puff  2 puff Inhalation BID - RT Davina Samuels MD   2 puff at 09/06/23 0714    Calcium Replacement - Follow Nurse / BPA Driven Protocol   Does not apply PRN Issac Rashid MD        dextrose (D50W) (25 g/50 mL) IV injection 25 g  25 g Intravenous Q15 Min PRN Davina Samuels MD        dextrose (GLUTOSE) oral gel 15 g  15 g Oral Q15 Min PRN Davina Samuels MD        glucagon (GLUCAGEN) injection 1 mg  1 mg Subcutaneous Q15 Min PRN Davina Samuels MD        ipratropium-albuterol (DUO-NEB) nebulizer solution 3 mL  3 mL Nebulization Q4H PRN Coleen Higgins MD        Magnesium Standard Dose Replacement - Follow Nurse / BPA Driven Protocol   Does not  "apply PRN Issac Rashid MD        metoprolol tartrate (LOPRESSOR) injection 2.5 mg  2.5 mg Intravenous Q6H PRN Antonella Go PA        nebivolol (BYSTOLIC) tablet 2.5 mg  2.5 mg Oral Daily Vicky Schmid, APRN   2.5 mg at 23 0805    O2 (OXYGEN)  3 L/min Inhalation Continuous Davina Samuels MD        ondansetron (ZOFRAN) injection 4 mg  4 mg Intravenous Q6H PRN Coleen Higgins MD        pantoprazole (PROTONIX) EC tablet 40 mg  40 mg Oral Daily Davina Samuels MD   40 mg at 23 0805    Phosphorus Replacement - Follow Nurse / BPA Driven Protocol   Does not apply PRN Coleen Higgins MD        Potassium Replacement - Follow Nurse / BPA Driven Protocol   Does not apply PRN Issac Rashid MD        simethicone (MYLICON) chewable tablet 80 mg  80 mg Oral 4x Daily PRN Coleen Higgins MD   80 mg at 23 0856    thiamine (VITAMIN B-1) tablet 100 mg  100 mg Oral Daily Coleen Higgins MD   100 mg at 23 0805    tiotropium (SPIRIVA RESPIMAT) 2.5 mcg/act aerosol solution inhaler  2 puff Inhalation Daily - RT Davina Samuels MD   2 puff at 23 0714        Physician Progress Notes (most recent note)        Coleen Higgins MD at 23 0813              [facility] Medicine Services  PROGRESS NOTE    Patient Name: Sonia Bella  : 1965  MRN: [id number]    Date of Admission: 2023  Primary Care Physician: Provider, No Known    Subjective   Subjective     CC:  Follow-up dehydration    HPI:  On 3 L nasal cannula.  No chest pain.  Tired.  No diarrhea.  Reports being upset that she is still in the hospital and asking to leave either to a facility or to \"go home\".  Per the patient's daughter, she patient cannot go back and live with her other daughter.  Sitting up in bedside chair.  Patient's daughter and her son-in-law are at bedside, patient is okay with them receiving updates.    ROS:  Gen- No fevers, chills  CV- No chest pain, palpitations  Resp- No cough, " +chronic dyspnea  GI-no diarrhea    Objective   Objective     Vital Signs:   Temp:  [97.5 °F (36.4 °C)-97.9 °F (36.6 °C)] 97.8 °F (36.6 °C)  Heart Rate:  [] 72  Resp:  [16-18] 18  BP: (100-116)/(76-84) 110/79  Flow (L/min):  [2.5-4] 3     Physical Exam:  Constitutional: No acute distress, awake, alert, obese, sitting up in bedside chair  HENT: NCAT, mucous membranes moist  Respiratory: Clear to auscultation bilaterally, respiratory effort normal  Cardiovascular: IRIR, HR 80s, no murmurs, rubs, or gallops  Gastrointestinal: Normoactive bowel sounds, soft, nontender, nondistended  Musculoskeletal: No bilateral ankle edema  Psychiatric: Appropriate affect, cooperative  Neurologic: PERRL, symmetric facies, symmetric palate rise, tongue midline, moving all extremities well, speech clear    Results Reviewed:  LAB RESULTS:      Lab 09/05/23  0445 09/04/23  0200 09/03/23  0514 09/02/23  0109 09/01/23  1725 09/01/23  1101   WBC 5.46 5.37 5.33 5.46  --  5.47   HEMOGLOBIN 8.2* 8.1* 8.3* 8.6*  --  9.5*   HEMATOCRIT 28.9* 28.7* 29.8* 30.7*  --  34.6   PLATELETS 144 130* 123* 138*  --  150   NEUTROS ABS  --   --   --  4.33  --  4.44   IMMATURE GRANS (ABS)  --   --   --  0.03  --  0.02   LYMPHS ABS  --   --   --  0.21*  --  0.27*   MONOS ABS  --   --   --  0.82  --  0.68   EOS ABS  --   --   --  0.05  --  0.05   MCV 80.3 81.1 81.4 80.6  --  83.4   SED RATE  --   --   --   --   --  54*   CRP  --   --   --   --  5.18*  --    PROCALCITONIN  --   --   --   --   --  0.65*   LACTATE  --   --   --   --   --  1.8   PROTIME  --   --   --   --   --  22.6*           Lab 09/05/23  2358 09/05/23  0445 09/04/23  1912 09/04/23  1602 09/04/23  1020 09/04/23  0200 09/03/23  0722 09/03/23  0514 09/02/23  1907 09/02/23  0444 09/02/23  0109 09/01/23  1108 09/01/23  1101   SODIUM 132* 134*  --   --   --  139  --  135*  --  142  --    < > 141   POTASSIUM 4.7 4.3  --   --   --  4.7  --  4.4 4.3 3.3*  3.3* 3.2*   < > 2.9*   CHLORIDE 93* 92*  --    --   --  96*  --  93*  --  93*  --    < > 89*   CO2 26.0 26.0  --   --   --  28.0  --  33.0*  --  33.0*  --    < > 35.0*   ANION GAP 13.0 16.0*  --   --   --  15.0  --  9.0  --  16.0*  --    < > 17.0*   BUN 30* 30*  --   --   --  29*  --  30*  --  31*  --    < > 30*   CREATININE 2.35* 2.44*  --   --   --  2.63*  --  2.46*  --  2.47*  --    < > 2.40*   EGFR 23.3* 22.3*  --   --   --  20.4*  --  22.1*  --  22.0*  --    < > 22.7*   GLUCOSE 83 67  --   --   --  77  --  81  --  97  --    < > 61*   CALCIUM 8.9 8.7  --   --   --  8.5*  --  8.5*  --  8.3*  --    < > 8.5*   MAGNESIUM  --  1.8  --   --   --  1.9  --  2.2  --  2.3 2.2   < > 1.1*   PHOSPHORUS 2.0*  --  2.8 2.9 2.3* 1.7*   < >  --   --   --   --   --   --    HEMOGLOBIN A1C  --   --   --   --   --   --   --   --   --   --   --   --  4.80   TSH  --   --   --   --   --   --   --   --   --   --   --   --  1.840    < > = values in this interval not displayed.           Lab 09/05/23 2358 09/01/23  1101   TOTAL PROTEIN  --  6.7   ALBUMIN 3.0* 3.6   GLOBULIN  --  3.1   ALT (SGPT)  --  33   AST (SGOT)  --  81*   BILIRUBIN  --  3.4*   ALK PHOS  --  133*           Lab 09/02/23  2353 09/02/23  2041 09/02/23  1907 09/01/23  1101   PROBNP  --   --   --  21,177.0*   HSTROP T 49* 65* 51* 41*   PROTIME  --   --   --  22.6*   INR  --   --   --  1.97*               Lab 09/05/23  0445 09/01/23  1152   IRON 63  --    IRON SATURATION (TSAT) 19*  --    TIBC 332  --    TRANSFERRIN 223  --    FERRITIN 117.00  --    ABO TYPING  --  O   RH TYPING  --  Positive   ANTIBODY SCREEN  --  Negative           Brief Urine Lab Results  (Last result in the past 365 days)        Color   Clarity   Blood   Leuk Est   Nitrite   Protein   CREAT   Urine HCG        09/01/23 1444 Yellow   Clear   Negative   Trace   Negative   30 mg/dL (1+)                   Microbiology Results Abnormal       Procedure Component Value - Date/Time    Blood Culture - Blood, Arm, Left [[id number]]  (Normal) Collected: 09/02/23  1907    Lab Status: Preliminary result Specimen: Blood from Arm, Left Updated: 09/05/23 2045     Blood Culture No growth at 3 days    Blood Culture - Blood, Hand, Left [[id number]]  (Normal) Collected: 09/02/23 1907    Lab Status: Preliminary result Specimen: Blood from Hand, Left Updated: 09/05/23 2045     Blood Culture No growth at 3 days    Blood Culture - Blood, Arm, Right [[id number]]  (Normal) Collected: 09/01/23 1152    Lab Status: Preliminary result Specimen: Blood from Arm, Right Updated: 09/05/23 1302     Blood Culture No growth at 4 days    MRSA Screen, PCR (Inpatient) - Swab, Nares [[id number]]  (Normal) Collected: 09/02/23 1635    Lab Status: Final result Specimen: Swab from Nares Updated: 09/02/23 2050     MRSA PCR Negative    Narrative:      The negative predictive value of this diagnostic test is high and should only be used to consider de-escalating anti-MRSA therapy. A positive result may indicate colonization with MRSA and must be correlated clinically.  MRSA Negative    Gastrointestinal Panel, PCR - Stool, Per Rectum [[id number]]  (Normal) Collected: 09/02/23 1551    Lab Status: Final result Specimen: Stool from Per Rectum Updated: 09/02/23 1735     Campylobacter Not Detected     Plesiomonas shigelloides Not Detected     Salmonella Not Detected     Vibrio Not Detected     Vibrio cholerae Not Detected     Yersinia enterocolitica Not Detected     Enteroaggregative E. coli (EAEC) Not Detected     Enteropathogenic E. coli (EPEC) Not Detected     Enterotoxigenic E. coli (ETEC) lt/st Not Detected     Shiga-like toxin-producing E. coli (STEC) stx1/stx2 Not Detected     Shigella/Enteroinvasive E. coli (EIEC) Not Detected     Cryptosporidium Not Detected     Cyclospora cayetanensis Not Detected     Entamoeba histolytica Not Detected     Giardia lamblia Not Detected     Adenovirus F40/41 Not Detected     Astrovirus Not Detected     Norovirus GI/GII Not Detected     Rotavirus A Not Detected     Sapovirus (I,  II, IV or V) Not Detected    Respiratory Panel PCR w/COVID-19(SARS-CoV-2) ABBY/SAHRA/ELISABETH/PAD/COR/MAD/JABARI In-House, NP Swab in UTM/VTM, 3-4 HR TAT - Swab, Nasopharynx [[id number]]  (Normal) Collected: 09/01/23 1151    Lab Status: Final result Specimen: Swab from Nasopharynx Updated: 09/01/23 1327     ADENOVIRUS, PCR Not Detected     Coronavirus 229E Not Detected     Coronavirus HKU1 Not Detected     Coronavirus NL63 Not Detected     Coronavirus OC43 Not Detected     COVID19 Not Detected     Human Metapneumovirus Not Detected     Human Rhinovirus/Enterovirus Not Detected     Influenza A PCR Not Detected     Influenza B PCR Not Detected     Parainfluenza Virus 1 Not Detected     Parainfluenza Virus 2 Not Detected     Parainfluenza Virus 3 Not Detected     Parainfluenza Virus 4 Not Detected     RSV, PCR Not Detected     Bordetella pertussis pcr Not Detected     Bordetella parapertussis PCR Not Detected     Chlamydophila pneumoniae PCR Not Detected     Mycoplasma pneumo by PCR Not Detected    Narrative:      In the setting of a positive respiratory panel with a viral infection PLUS a negative procalcitonin without other underlying concern for bacterial infection, consider observing off antibiotics or discontinuation of antibiotics and continue supportive care. If the respiratory panel is positive for atypical bacterial infection (Bordetella pertussis, Chlamydophila pneumoniae, or Mycoplasma pneumoniae), consider antibiotic de-escalation to target atypical bacterial infection.            Adult Transthoracic Echo Complete W/ Cont if Necessary Per Protocol    Result Date: 9/5/2023    Left ventricular systolic function is mildly decreased. Left ventricular ejection fraction appears to be 41 - 45%.   Left ventricular wall thickness is consistent with moderate concentric hypertrophy.   Mildly reduced right ventricular systolic function noted.   The right ventricular cavity is mild to moderately dilated.   The left atrial cavity is  moderately dilated.   Estimated right ventricular systolic pressure from tricuspid regurgitation is normal (<35 mmHg). Calculated right ventricular systolic pressure from tricuspid regurgitation is 29 mmHg.   There is a trivial pericardial effusion adjacent to the right ventricle.     XR Chest 1 View    Result Date: 9/5/2023  XR CHEST 1 VW Date of Exam: 9/5/2023 5:00 AM EDT Indication: CHF Comparison: September 4, 2023 Findings: The heart is enlarged. There is no shay consolidation. No definite effusions are confirmed.     Impression: Impression: 1.Cardiomegaly. Electronically Signed: Wilmar Ramos MD  9/5/2023 9:18 AM EDT  Workstation ID: [id number]    XR Chest 1 View    Result Date: 9/4/2023  XR CHEST 1 VW Date of Exam: 9/4/2023 1:21 PM CDT Indication: SOA Comparison: 2/17/2023 Findings: Cardiomediastinal silhouette is enlarged, more pronounced than previous exam. No airspace disease, pneumothorax, nor pleural effusion.No acute osseous abnormality identified.     Impression: Impression: Enlarging cardiac silhouette without acute pulmonary process identified. Electronically Signed: Rickey Werner MD  9/4/2023 1:48 PM CDT  Workstation ID: [id number]     Results for orders placed during the hospital encounter of 09/01/23    Adult Transthoracic Echo Complete W/ Cont if Necessary Per Protocol    Interpretation Summary    Left ventricular systolic function is mildly decreased. Left ventricular ejection fraction appears to be 41 - 45%.    Left ventricular wall thickness is consistent with moderate concentric hypertrophy.    Mildly reduced right ventricular systolic function noted.    The right ventricular cavity is mild to moderately dilated.    The left atrial cavity is moderately dilated.    Estimated right ventricular systolic pressure from tricuspid regurgitation is normal (<35 mmHg). Calculated right ventricular systolic pressure from tricuspid regurgitation is 29 mmHg.    There is a trivial pericardial effusion adjacent  to the right ventricle.      Current medications:  Scheduled Meds:apixaban, 5 mg, Oral, Q12H  budesonide-formoterol, 2 puff, Inhalation, BID - RT  nebivolol, 2.5 mg, Oral, Daily  pantoprazole, 40 mg, Oral, Daily  thiamine, 100 mg, Oral, Daily  tiotropium bromide monohydrate, 2 puff, Inhalation, Daily - RT      Continuous Infusions:O2, 3 L/min      PRN Meds:.  acetaminophen    Calcium Replacement - Follow Nurse / BPA Driven Protocol    dextrose    dextrose    glucagon (human recombinant)    ipratropium-albuterol    Magnesium Standard Dose Replacement - Follow Nurse / BPA Driven Protocol    metoprolol tartrate    ondansetron    Phosphorus Replacement - Follow Nurse / BPA Driven Protocol    Potassium Replacement - Follow Nurse / BPA Driven Protocol    simethicone    Assessment & Plan   Assessment & Plan     Active Hospital Problems    Diagnosis  POA    **Dehydration [E86.0]  Yes    Diarrhea [R19.7]  Yes    Hypomagnesemia [E83.42]  Yes    Hypokalemia [E87.6]  Yes    Stage III CKD [N18.30]  Yes    AoC HFpEF [I50.33]  Yes    Morbid obesity with BMI of 50.0-59.9, adult [E66.01, Z68.43]  Not Applicable    HTN (hypertension) [I10]  Yes      Resolved Hospital Problems   No resolved problems to display.        Brief Hospital Course to date:  Sonia Bella is a 58 y.o. female with COPD on 3 L chronically, A fib not on AC, HFrEF (EF of 40%), CAD, GERD, prior bleeding stomach ulcers, HTN, Obsestity, and CKD stage III, who presented to the ED with weakness, confusion, hypoglycemia that was treated.    This patient's problems and plans were partially entered by my partner and updated as appropriate by me 09/06/23.     A fib with RVR and associated hypotension  Chest pain, resolved  HFrEF, EF of 40%   Cardiomegaly  --Not on ACE due to angioedema  --TSH within normal limits  --Continue beta blocker at reduced dose with holding parameters  --Continue tele  --status post digoxin x2 per cardiology  --Status post IV fluids  --Resumed  Eliquis; had stopped outpatient for an unclear reason, denied history of bleeding, per daughter, may not have been taking her medications  -- ECHO: LVEF 41 to 45%, moderate concentric LVH, mildly reduced RV systolic function, RV cavity mild to moderately dilated (has been seen previously), trivial pericardial effusion    Anemia  --Appears chronic  --Vitamin B12 level 318, folate level 7 in 2023, will not repeat  --Iron panel, ferritin w/ ferritin/TIBC/Iron/transferrin wnl, iron saturation low    Diarrhea  -GI PCR negative; C. difficile pending (no additional stool --> cancelled)    Hypomagnesemia  Hypokalemia  Hypophosphatemia  -replete    Hypoglycemia, resolved  Dehydration  -Likely due to poor oral intake; status post IV fluids  -C peptide sent  -Thiamine  -AM cortisol 23.11    Staph epi in 1 of 2 blood cultures  --Blood culture 1 of 2 positive for staph epi, suspected contaminant  --Repeat blood cultures NGTD  --Stopped vancomycin    CKD  --Baseline creatinine 1.9-2.3    Deconditioning  --PT/OT consulted --> SNF    COPD  Chronic hypoxia, on 3L continuously at home  --Continue home medications    Obesity - BMI 40.49    Expected Discharge Location and Transportation: SNF, patient amenable  Expected Discharge   Expected Discharge Date: 2023; Expected Discharge Time:      DVT prophylaxis:  Medical DVT prophylaxis orders are present.     AM-PAC 6 Clicks Score (PT): 15 (23)    CODE STATUS:   Code Status and Medical Interventions:   Ordered at: 23 1837     Code Status (Patient has no pulse and is not breathing):    CPR (Attempt to Resuscitate)     Medical Interventions (Patient has pulse or is breathing):    Full       Coleen Higgins MD  23        Electronically signed by Coleen Higgins MD at 23 1124          Physical Therapy Notes (most recent note)        Sapna Mcclain at 23 1104  Version 1 of 1         Patient Name: Sonia Bella  : 1965    MRN: [id number]                               Today's Date: 9/5/2023       Admit Date: 9/1/2023    Visit Dx:     ICD-10-CM ICD-9-CM   1. Generalized weakness  R53.1 780.79   2. Diarrhea, unspecified type  R19.7 787.91   3. Acute UTI  N39.0 599.0   4. Hypokalemia  E87.6 276.8   5. Hypomagnesemia  E83.42 275.2   6. Hypoglycemia  E16.2 251.2   7. Anemia, unspecified type  D64.9 285.9   8. Chronic kidney disease, unspecified CKD stage  N18.9 585.9     Patient Active Problem List   Diagnosis    Angioedema due to angiotensin converting enzyme inhibitor (ACE-I)    HTN (hypertension)    B12 deficiency anemia    Iron deficiency anemia    CKD (chronic kidney disease) stage 3, GFR 30-59 ml/min    COVID-19 virus infection    COPD on home O2 (2L)    Morbid obesity with BMI of 50.0-59.9, adult    AoC HFpEF    Acute type 2 respiratory failure    Human metapneumovirus (hMPV)    GERD    Stage III CKD    Atrial fibrillation with RVR    Acute on chronic HFrEF (heart failure with reduced ejection fraction)    Dehydration    Diarrhea    Hypomagnesemia    Hypokalemia     Past Medical History:   Diagnosis Date    Bleeding ulcer     COPD (chronic obstructive pulmonary disease)     Coronary artery disease     GERD (gastroesophageal reflux disease)     History of stomach ulcers     Hypertension     Stage III CKD 02/15/2023     History reviewed. No pertinent surgical history.   General Information       Row Name 09/05/23 1317          Physical Therapy Time and Intention    Document Type therapy note (daily note)  -KG     Mode of Treatment physical therapy  -KG       Row Name 09/05/23 1317          General Information    Patient Profile Reviewed yes  -KG     Existing Precautions/Restrictions fall;oxygen therapy device and L/min  -KG       Row Name 09/05/23 1317          Cognition    Orientation Status (Cognition) oriented x 4  -KG       Row Name 09/05/23 1317          Safety Issues, Functional Mobility    Impairments Affecting Function (Mobility)  balance;endurance/activity tolerance;strength  -KG               User Key  (r) = Recorded By, (t) = Taken By, (c) = Cosigned By      Initials Name Provider Type    Sapna Colin Physical Therapist                   Mobility       Row Name 09/05/23 1318          Bed Mobility    Bed Mobility supine-sit  -KG     Supine-Sit [place] (Bed Mobility) supervision  -KG     Assistive Device (Bed Mobility) head of bed elevated  -KG       Row Name 09/05/23 1318          Transfers    Comment, (Transfers) STS from EOB, min-A x2, FWW, 1st attempt knees began buckling 30 sec after standing and needed to sit back down, 2nd attempt pt able to remain standing and take a couple side steps toward HOB  -KG       Row Name 09/05/23 1318          Sit-Stand Transfer    Sit-Stand [place] (Transfers) verbal cues;nonverbal cues (demo/gesture);2 person assist;minimum assist (75% patient effort)  -KG     Assistive Device (Sit-Stand Transfers) walker, front-wheeled  -KG               User Key  (r) = Recorded By, (t) = Taken By, (c) = Cosigned By      Initials Name Provider Type    Sapna Colin Physical Therapist                   Obj/Interventions       Row Name 09/05/23 1320          Motor Skills    Therapeutic Exercise other (see comments)  heel slides, ankle pumps, quad sets x10  -KG       Row Name 09/05/23 1320          Balance    Dynamic Standing Balance minimal assist;2-person assist  -KG     Position/Device Used, Standing Balance supported;walker, front-wheeled  -KG     Balance Interventions standing;sit to stand  -KG               User Key  (r) = Recorded By, (t) = Taken By, (c) = Cosigned By      Initials Name Provider Type    Sapna Colin Physical Therapist                   Goals/Plan    No documentation.                  Clinical Impression       Row Name 09/05/23 1320          Pain    Pretreatment Pain Rating 0/10 - no pain  -KG     Posttreatment Pain Rating 0/10 - no pain  -KG       Row Name 09/05/23  1320          Plan of Care Review    Plan of Care Reviewed With patient  -KG     Progress no change  -KG     Outcome Evaluation STS from EOB, min-A x2, FWW, 1st attempt knees began buckling 30 sec after standing and needed to sit back down, 2nd attempt pt able to remain standing and take a couple side steps toward HOB.  recommend SNF  -KG       Row Name 09/05/23 1320          Vital Signs    Pre Systolic BP Rehab 102  -KG     Pre Treatment Diastolic BP 71  -KG     Post Systolic BP Rehab 105  -KG     Post Treatment Diastolic BP 76  -KG       Row Name 09/05/23 1320          Positioning and Restraints    Pre-Treatment Position in bed  -KG     Post Treatment Position bed  -KG     In Bed notified nsg;fowlers;call light within reach;encouraged to call for assist;exit alarm on  -KG               User Key  (r) = Recorded By, (t) = Taken By, (c) = Cosigned By      Initials Name Provider Type    Sapna Colin Physical Therapist                   Outcome Measures       Row Name 09/05/23 1321          How much help from another person do you currently need...    Turning from your back to your side while in flat bed without using bedrails? 3  -KG     Moving from lying on back to sitting on the side of a flat bed without bedrails? 3  -KG     Moving to and from a bed to a chair (including a wheelchair)? 2  -KG     Standing up from a chair using your arms (e.g., wheelchair, bedside chair)? 3  -KG     Climbing 3-5 steps with a railing? 2  -KG     To walk in hospital room? 2  -KG     AM-PAC 6 Clicks Score (PT) 15  -KG     Highest level of mobility 4 --> Transferred to chair/commode  -KG       Row Name 09/05/23 1321          Functional Assessment    Outcome Measure Options AM-PAC 6 Clicks Basic Mobility (PT)  -KG               User Key  (r) = Recorded By, (t) = Taken By, (c) = Cosigned By      Initials Name Provider Type    Sapna Colin Physical Therapist                                 Physical Therapy Education        Title: PT OT SLP Therapies (In Progress)       Topic: Physical Therapy (In Progress)       Point: Mobility training (In Progress)       Learning Progress Summary             Patient Acceptance, E, NR by NS at 9/2/2023 1257    Comment: PT POC, importance of OOB activity                         Point: Home exercise program (Not Started)       Learner Progress:  Not documented in this visit.              Point: Body mechanics (In Progress)       Learning Progress Summary             Patient Acceptance, E, NR by NS at 9/2/2023 1257    Comment: PT POC, importance of OOB activity                         Point: Precautions (In Progress)       Learning Progress Summary             Patient Acceptance, E, NR by NS at 9/2/2023 1257    Comment: PT POC, importance of OOB activity                                         User Key       Initials Effective Dates Name Provider Type Discipline    NS 06/16/21 -  Elsi Adams PT Physical Therapist PT                  PT Recommendation and Plan     Plan of Care Reviewed With: patient  Progress: no change  Outcome Evaluation: STS from EOB, min-A x2, FWW, 1st attempt knees began buckling 30 sec after standing and needed to sit back down, 2nd attempt pt able to remain standing and take a couple side steps toward HOB.  recommend SNF     Time Calculation:         PT Charges       Row Name 09/05/23 1323             Time Calculation    Start Time 1104  -KG      PT Received On 09/05/23  -KG         Timed Charges    42984 - PT Therapeutic Exercise Minutes 8  -KG      36047 - PT Therapeutic Activity Minutes 30  -KG         Total Minutes    Timed Charges Total Minutes 38  -KG       Total Minutes 38  -KG                User Key  (r) = Recorded By, (t) = Taken By, (c) = Cosigned By      Initials Name Provider Type    KG Sapna Mcclain Physical Therapist                  Therapy Charges for Today       Code Description Service Date Service Provider Modifiers Qty    95457774459  PT THER PROC EA  15 MIN 2023 Sapna Mcclain GP 1    48301924099 HC PT THERAPEUTIC ACT EA 15 MIN 2023 Sapna Mcclain GP 2    27185618816 HC PT THER SUPP EA 15 MIN 2023 Sapna Mcclain GP 2            PT G-Codes  Outcome Measure Options: AM-PAC 6 Clicks Basic Mobility (PT)  AM-PAC 6 Clicks Score (PT): 15  AM-PAC 6 Clicks Score (OT): 14       Sapna Mcclain  2023      Electronically signed by Sapna Mcclain at 23 1325          Occupational Therapy Notes (most recent note)        Vanessa Nye OT at 23 1400          Patient Name: Sonia Bella  : 1965    MRN: [id number]                              Today's Date: 2023       Admit Date: 2023    Visit Dx:     ICD-10-CM ICD-9-CM   1. Generalized weakness  R53.1 780.79   2. Diarrhea, unspecified type  R19.7 787.91   3. Acute UTI  N39.0 599.0   4. Hypokalemia  E87.6 276.8   5. Hypomagnesemia  E83.42 275.2   6. Hypoglycemia  E16.2 251.2   7. Anemia, unspecified type  D64.9 285.9   8. Chronic kidney disease, unspecified CKD stage  N18.9 585.9     Patient Active Problem List   Diagnosis    Angioedema due to angiotensin converting enzyme inhibitor (ACE-I)    HTN (hypertension)    B12 deficiency anemia    Iron deficiency anemia    CKD (chronic kidney disease) stage 3, GFR 30-59 ml/min    COVID-19 virus infection    COPD on home O2 (2L)    Morbid obesity with BMI of 50.0-59.9, adult    AoC HFpEF    Acute type 2 respiratory failure    Human metapneumovirus (hMPV)    GERD    Stage III CKD    Atrial fibrillation with RVR    Acute on chronic HFrEF (heart failure with reduced ejection fraction)    Dehydration    Diarrhea    Hypomagnesemia    Hypokalemia     Past Medical History:   Diagnosis Date    Bleeding ulcer     COPD (chronic obstructive pulmonary disease)     Coronary artery disease     GERD (gastroesophageal reflux disease)     History of stomach ulcers     Hypertension     Stage III CKD 02/15/2023     History reviewed. No pertinent  surgical history.   General Information       Row Name 09/05/23 1539          OT Time and Intention    Document Type therapy note (daily note)  -AF     Mode of Treatment occupational therapy  -AF       Row Name 09/05/23 1539          General Information    Patient Profile Reviewed yes  -AF     Existing Precautions/Restrictions fall;oxygen therapy device and L/min  -AF     Barriers to Rehab medically complex;previous functional deficit  -AF       Row Name 09/05/23 1539          Cognition    Orientation Status (Cognition) oriented x 4  -AF       Row Name 09/05/23 1539          Safety Issues, Functional Mobility    Safety Issues Affecting Function (Mobility) safety precaution awareness;safety precautions follow-through/compliance;sequencing abilities  -AF     Impairments Affecting Function (Mobility) balance;endurance/activity tolerance;strength;postural/trunk control  -AF     Comment, Safety Issues/Impairments (Mobility) activity limited by lethargy  -AF               User Key  (r) = Recorded By, (t) = Taken By, (c) = Cosigned By      Initials Name Provider Type    AF Vanessa Nye OT Occupational Therapist                     Mobility/ADL's       Row Name 09/05/23 1540          Bed Mobility    Bed Mobility rolling left;rolling right  -AF     Rolling Left [place] (Bed Mobility) supervision;verbal cues  -AF     Rolling Right [place] (Bed Mobility) supervision;verbal cues  -AF     Comment, (Bed Mobility) pt declined out of bed activities on this date d/t lethargy.  -AF       Row Name 09/05/23 1540          Activities of Daily Living    BADL Assessment/Intervention upper body dressing;toileting  -AF       Row Name 09/05/23 1540          Upper Body Dressing Assessment/Training    [place] Level (Upper Body Dressing) don;doff;minimum assist (75% patient effort)  -AF     Position (Upper Body Dressing) supine  -AF       Row Name 09/05/23 1540          Toileting Assessment/Training    [place] Level  (Toileting) perform perineal hygiene;dependent (less than 25% patient effort)  -AF     Position (Toileting) supine  therapist assisted NA w/ changing bed and completing hygiene.  -AF               User Key  (r) = Recorded By, (t) = Taken By, (c) = Cosigned By      Initials Name Provider Type    Vanessa Coleman OT Occupational Therapist                   Obj/Interventions       Row Name 09/05/23 1542          Motor Skills    Motor Skills functional endurance  -AF     Functional Endurance decreased functional endurance. Completed bed mobility and BADLs while elevated in bed.  -AF       Row Name 09/05/23 1542          Balance    Balance Assessment sitting static balance;sitting dynamic balance  -AF     Static Sitting Balance supervision  -AF     Dynamic Sitting Balance supervision  -AF     Position, Sitting Balance supported;other (see comments)  in bed  -AF     Balance Interventions sitting;weight shifting activity;occupation based/functional task  -AF               User Key  (r) = Recorded By, (t) = Taken By, (c) = Cosigned By      Initials Name Provider Type    Vanessa Coleman OT Occupational Therapist                   Goals/Plan    No documentation.                  Clinical Impression       Row Name 09/05/23 1544          Pain Assessment    Pretreatment Pain Rating 0/10 - no pain  -AF     Posttreatment Pain Rating 0/10 - no pain  -AF       Row Name 09/05/23 1544          Plan of Care Review    Plan of Care Reviewed With patient  -AF     Progress no change  -AF     Outcome Evaluation Pt continues to demo deficits in strength and endurance caffecting ADL indp. when compared to baseline. Completed grooming task while elevated in bed d/t reports of lethargy. Recc. continued IPOT per POC and DC to SNF.  -AF       Row Name 09/05/23 1545          Therapy Assessment/Plan (OT)    Rehab Potential (OT) good, to achieve stated therapy goals  -AF     Criteria for Skilled Therapeutic Interventions Met (OT) yes;skilled  treatment is necessary  -AF     Therapy Frequency (OT) daily  -AF       Row Name 09/05/23 1544          Therapy Plan Review/Discharge Plan (OT)    Anticipated Discharge Disposition (OT) skilled nursing facility  -AF       Row Name 09/05/23 1544          Vital Signs    Pre Systolic BP Rehab 105  -AF     Pre Treatment Diastolic BP 76  -AF     Post Systolic BP Rehab 115  -AF     Post Treatment Diastolic BP 84  -AF     Pre SpO2 (%) 90  -AF     O2 Delivery Pre Treatment nasal cannula  -AF     Intra SpO2 (%) 87  -AF     O2 Delivery Intra Treatment nasal cannula  -AF     Post SpO2 (%) 92  -AF     O2 Delivery Post Treatment nasal cannula  -AF     Pre Patient Position Supine  -AF     Intra Patient Position Sitting  elevated in bed; supported long sitting  -AF     Post Patient Position Supine  -AF       Row Name 09/05/23 1544          Positioning and Restraints    Pre-Treatment Position in bed  -AF     Post Treatment Position bed  -AF     In Bed notified nsg;call light within reach;supine;encouraged to call for assist;legs elevated;exit alarm on  -AF               User Key  (r) = Recorded By, (t) = Taken By, (c) = Cosigned By      Initials Name Provider Type    AF Vanessa Nye, OT Occupational Therapist                   Outcome Measures       Row Name 09/05/23 1548          How much help from another is currently needed...    Putting on and taking off regular lower body clothing? 1  -AF     Bathing (including washing, rinsing, and drying) 2  -AF     Toileting (which includes using toilet bed pan or urinal) 2  -AF     Putting on and taking off regular upper body clothing 3  -AF     Taking care of personal grooming (such as brushing teeth) 3  -AF     Eating meals 3  -AF     AM-PAC 6 Clicks Score (OT) 14  -AF       Row Name 09/05/23 1321          How much help from another person do you currently need...    Turning from your back to your side while in flat bed without using bedrails? 3  -KG     Moving from lying on back to  sitting on the side of a flat bed without bedrails? 3  -KG     Moving to and from a bed to a chair (including a wheelchair)? 2  -KG     Standing up from a chair using your arms (e.g., wheelchair, bedside chair)? 3  -KG     Climbing 3-5 steps with a railing? 2  -KG     To walk in hospital room? 2  -KG     AM-PAC 6 Clicks Score (PT) 15  -KG     Highest level of mobility 4 --> Transferred to chair/commode  -KG       Row Name 09/05/23 1548 09/05/23 1321       Functional Assessment    Outcome Measure Options AM-PAC 6 Clicks Daily Activity (OT)  -AF AM-PAC 6 Clicks Basic Mobility (PT)  -KG              User Key  (r) = Recorded By, (t) = Taken By, (c) = Cosigned By      Initials Name Provider Type    Sapna Colin Physical Therapist    Vanessa Coleman OT Occupational Therapist                    Occupational Therapy Education       Title: PT OT SLP Therapies (In Progress)       Topic: Occupational Therapy (In Progress)       Point: ADL training (Done)       Description:   Instruct learner(s) on proper safety adaptation and remediation techniques during self care or transfers.   Instruct in proper use of assistive devices.                  Learning Progress Summary             Patient Acceptance, E,TB, VU by AF at 9/5/2023 1548    Acceptance, E, VU by AN at 9/2/2023 1145                         Point: Home exercise program (Not Started)       Description:   Instruct learner(s) on appropriate technique for monitoring, assisting and/or progressing therapeutic exercises/activities.                  Learner Progress:  Not documented in this visit.              Point: Precautions (Done)       Description:   Instruct learner(s) on prescribed precautions during self-care and functional transfers.                  Learning Progress Summary             Patient Acceptance, E, VU by AN at 9/2/2023 1145                         Point: Body mechanics (Done)       Description:   Instruct learner(s) on proper positioning and spine  alignment during self-care, functional mobility activities and/or exercises.                  Learning Progress Summary             Patient Acceptance, E,TB, VU by AF at 9/5/2023 1548    Acceptance, E, VU by AN at 9/2/2023 1145                                         User Key       Initials Effective Dates Name Provider Type Discipline    AN 09/21/21 -  Maria Alejandra Johnston OT Occupational Therapist OT    AF 08/15/23 -  Vanessa Nye OT Occupational Therapist OT                  OT Recommendation and Plan  Therapy Frequency (OT): daily  Plan of Care Review  Plan of Care Reviewed With: patient  Progress: no change  Outcome Evaluation: Pt continues to demo deficits in strength and endurance caffecting ADL indp. when compared to baseline. Completed grooming task while elevated in bed d/t reports of lethargy. Recc. continued IPOT per POC and DC to SNF.     Time Calculation:         Time Calculation- OT       Row Name 09/05/23 1549             Time Calculation- OT    OT Received On 09/05/23  -AF      OT Goal Re-Cert Due Date 09/12/23  -AF         Timed Charges    80017 - OT Therapeutic Activity Minutes 17  -AF      12496 - OT Self Care/Mgmt Minutes 23  -AF         Total Minutes    Timed Charges Total Minutes 40  -AF       Total Minutes 40  -AF                User Key  (r) = Recorded By, (t) = Taken By, (c) = Cosigned By      Initials Name Provider Type    AF Vanessa Nye OT Occupational Therapist                  Therapy Charges for Today       Code Description Service Date Service Provider Modifiers Qty    78534626384 HC OT THERAPEUTIC ACT EA 15 MIN 9/5/2023 Vanessa Nye OT GO 1    85661169035 HC OT SELF CARE/MGMT/TRAIN EA 15 MIN 9/5/2023 Vanessa Nye OT GO 2                 Vanessa Nye OT  9/5/2023    Electronically signed by Vanessa Nye OT at 09/05/23 0468

## 2023-09-07 LAB
ANION GAP SERPL CALCULATED.3IONS-SCNC: 13 MMOL/L (ref 5–15)
BACTERIA SPEC AEROBE CULT: NORMAL
BACTERIA SPEC AEROBE CULT: NORMAL
BUN SERPL-MCNC: 28 MG/DL (ref 6–20)
BUN/CREAT SERPL: 13.1 (ref 7–25)
CALCIUM SPEC-SCNC: 9 MG/DL (ref 8.6–10.5)
CHLORIDE SERPL-SCNC: 92 MMOL/L (ref 98–107)
CO2 SERPL-SCNC: 28 MMOL/L (ref 22–29)
CREAT SERPL-MCNC: 2.13 MG/DL (ref 0.57–1)
EGFRCR SERPLBLD CKD-EPI 2021: 26.2 ML/MIN/1.73
GLUCOSE SERPL-MCNC: 86 MG/DL (ref 65–99)
PHOSPHATE SERPL-MCNC: 1.7 MG/DL (ref 2.5–4.5)
PHOSPHATE SERPL-MCNC: 2.3 MG/DL (ref 2.5–4.5)
POTASSIUM SERPL-SCNC: 4.6 MMOL/L (ref 3.5–5.2)
SODIUM SERPL-SCNC: 133 MMOL/L (ref 136–145)

## 2023-09-07 PROCEDURE — 99232 SBSQ HOSP IP/OBS MODERATE 35: CPT | Performed by: NURSE PRACTITIONER

## 2023-09-07 PROCEDURE — 84100 ASSAY OF PHOSPHORUS: CPT | Performed by: STUDENT IN AN ORGANIZED HEALTH CARE EDUCATION/TRAINING PROGRAM

## 2023-09-07 PROCEDURE — 84100 ASSAY OF PHOSPHORUS: CPT | Performed by: FAMILY MEDICINE

## 2023-09-07 PROCEDURE — 99231 SBSQ HOSP IP/OBS SF/LOW 25: CPT | Performed by: INTERNAL MEDICINE

## 2023-09-07 PROCEDURE — 80048 BASIC METABOLIC PNL TOTAL CA: CPT | Performed by: STUDENT IN AN ORGANIZED HEALTH CARE EDUCATION/TRAINING PROGRAM

## 2023-09-07 PROCEDURE — 94799 UNLISTED PULMONARY SVC/PX: CPT

## 2023-09-07 PROCEDURE — 94664 DEMO&/EVAL PT USE INHALER: CPT

## 2023-09-07 RX ADMIN — THIAMINE HCL TAB 100 MG 100 MG: 100 TAB at 08:51

## 2023-09-07 RX ADMIN — PANTOPRAZOLE SODIUM 40 MG: 40 TABLET, DELAYED RELEASE ORAL at 08:51

## 2023-09-07 RX ADMIN — APIXABAN 5 MG: 5 TABLET, FILM COATED ORAL at 20:06

## 2023-09-07 RX ADMIN — BUDESONIDE AND FORMOTEROL FUMARATE DIHYDRATE 2 PUFF: 160; 4.5 AEROSOL RESPIRATORY (INHALATION) at 22:11

## 2023-09-07 RX ADMIN — BUDESONIDE AND FORMOTEROL FUMARATE DIHYDRATE 2 PUFF: 160; 4.5 AEROSOL RESPIRATORY (INHALATION) at 09:22

## 2023-09-07 RX ADMIN — SODIUM PHOSPHATE, MONOBASIC, MONOHYDRATE AND SODIUM PHOSPHATE, DIBASIC, ANHYDROUS 15 MMOL: 142; 276 INJECTION, SOLUTION INTRAVENOUS at 08:45

## 2023-09-07 RX ADMIN — TIOTROPIUM BROMIDE INHALATION SPRAY 2 PUFF: 3.12 SPRAY, METERED RESPIRATORY (INHALATION) at 09:22

## 2023-09-07 RX ADMIN — APIXABAN 5 MG: 5 TABLET, FILM COATED ORAL at 08:51

## 2023-09-07 NOTE — CASE MANAGEMENT/SOCIAL WORK
Continued Stay Note  [facility]     Patient Name: Sonia Bella  MRN: [id number]  Today's Date: 9/7/2023    Admit Date: 9/1/2023    Plan: discharge plan   Discharge Plan       Row Name 09/07/23 1454       Plan    Plan discharge plan    Plan Comments [place]  and [facility] have no beds. The only bed offered received is [facility] and pt is agreeable. I emailed Darya and Janet to initiate precert. CM will cont to follow    Final Discharge Disposition Code 03 - skilled nursing facility (SNF)                   Discharge Codes    No documentation.                 Expected Discharge Date and Time       Expected Discharge Date Expected Discharge Time    Sep 11, 2023               Asiya Corey RN

## 2023-09-07 NOTE — PROGRESS NOTES
[facility]  PROGRESS NOTE    Patient Name: Sonia Bella  : 1965  MRN: [id number]    Date of Admission: 2023  Primary Care Physician: Provider, No Known    Subjective   Subjective     CC:  Follow-up dehydration    HPI:  On 3LNC, baseline oxygen. No soa. Asking when she can leave, cannot sleep and bed is hurting her back.     ROS:  Gen- No fevers, chills  CV- No chest pain, palpitations  Resp- No cough, +chronic dyspnea  GI-no diarrhea    Objective   Objective     Vital Signs:   Temp:  [97.4 °F (36.3 °C)-97.8 °F (36.6 °C)] 97.8 °F (36.6 °C)  Heart Rate:  [77-98] 80  Resp:  [18-20] 18  BP: ()/(61-90) 108/73  Flow (L/min):  [3-4] 3     Physical Exam:  Constitutional: No acute distress, awake, alert  HENT: NCAT, mucous membranes moist  Respiratory: Clear to auscultation bilaterally, respiratory effort normal   Cardiovascular: Irreg/ reg rate, no murmurs, rubs, or gallops  Gastrointestinal: Positive bowel sounds, soft, nontender, nondistended  Musculoskeletal: No bilateral ankle edema  Psychiatric: Appropriate affect, cooperative  Neurologic: Oriented x 3, strength symmetric in all extremities, Cranial Nerves grossly intact to confrontation, speech clear  Skin: No rashes     Results Reviewed:  LAB RESULTS:      Lab 23  0445 23  0200 23  0514 23  0109 23  1725 23  1101   WBC 5.46 5.37 5.33 5.46  --  5.47   HEMOGLOBIN 8.2* 8.1* 8.3* 8.6*  --  9.5*   HEMATOCRIT 28.9* 28.7* 29.8* 30.7*  --  34.6   PLATELETS 144 130* 123* 138*  --  150   NEUTROS ABS  --   --   --  4.33  --  4.44   IMMATURE GRANS (ABS)  --   --   --  0.03  --  0.02   LYMPHS ABS  --   --   --  0.21*  --  0.27*   MONOS ABS  --   --   --  0.82  --  0.68   EOS ABS  --   --   --  0.05  --  0.05   MCV 80.3 81.1 81.4 80.6  --  83.4   SED RATE  --   --   --   --   --  54*   CRP  --   --   --   --  5.18*  --    PROCALCITONIN  --   --   --   --   --  0.65*   LACTATE  --    --   --   --   --  1.8   PROTIME  --   --   --   --   --  22.6*         Lab 09/07/23  0345 09/06/23  1225 09/05/23  2358 09/05/23 0445 09/04/23  1912 09/04/23  1602 09/04/23  1020 09/04/23  0200 09/03/23  0722 09/03/23  0514 09/02/23  1907 09/02/23  0444 09/01/23  1108 09/01/23  1101   SODIUM 133*  --  132* 134*  --   --   --  139  --  135*  --  142   < > 141   POTASSIUM 4.6  --  4.7 4.3  --   --   --  4.7  --  4.4   < > 3.3*  3.3*   < > 2.9*   CHLORIDE 92*  --  93* 92*  --   --   --  96*  --  93*  --  93*   < > 89*   CO2 28.0  --  26.0 26.0  --   --   --  28.0  --  33.0*  --  33.0*   < > 35.0*   ANION GAP 13.0  --  13.0 16.0*  --   --   --  15.0  --  9.0  --  16.0*   < > 17.0*   BUN 28*  --  30* 30*  --   --   --  29*  --  30*  --  31*   < > 30*   CREATININE 2.13*  --  2.35* 2.44*  --   --   --  2.63*  --  2.46*  --  2.47*   < > 2.40*   EGFR 26.2*  --  23.3* 22.3*  --   --   --  20.4*  --  22.1*  --  22.0*   < > 22.7*   GLUCOSE 86  --  83 67  --   --   --  77  --  81  --  97   < > 61*   CALCIUM 9.0  --  8.9 8.7  --   --   --  8.5*  --  8.5*  --  8.3*   < > 8.5*   MAGNESIUM  --  2.5  --  1.8  --   --   --  1.9  --  2.2  --  2.3   < > 1.1*   PHOSPHORUS 1.7* 2.5 2.0*  --  2.8 2.9   < > 1.7*   < >  --   --   --   --   --    HEMOGLOBIN A1C  --   --   --   --   --   --   --   --   --   --   --   --   --  4.80   TSH  --   --   --   --   --   --   --   --   --   --   --   --   --  1.840    < > = values in this interval not displayed.         Lab 09/05/23 2358 09/01/23  1101   TOTAL PROTEIN  --  6.7   ALBUMIN 3.0* 3.6   GLOBULIN  --  3.1   ALT (SGPT)  --  33   AST (SGOT)  --  81*   BILIRUBIN  --  3.4*   ALK PHOS  --  133*         Lab 09/02/23 2353 09/02/23  2041 09/02/23  1907 09/01/23  1101   PROBNP  --   --   --  21,177.0*   HSTROP T 49* 65* 51* 41*   PROTIME  --   --   --  22.6*   INR  --   --   --  1.97*             Lab 09/05/23  0445 09/01/23  1152   IRON 63  --    IRON SATURATION (TSAT) 19*  --    TIBC 332  --     TRANSFERRIN 223  --    FERRITIN 117.00  --    ABO TYPING  --  O   RH TYPING  --  Positive   ANTIBODY SCREEN  --  Negative         Brief Urine Lab Results  (Last result in the past 365 days)        Color   Clarity   Blood   Leuk Est   Nitrite   Protein   CREAT   Urine HCG        09/01/23 1444 Yellow   Clear   Negative   Trace   Negative   30 mg/dL (1+)                   Microbiology Results Abnormal       Procedure Component Value - Date/Time    Blood Culture - Blood, Hand, Left [[id number]]  (Normal) Collected: 09/02/23 1907    Lab Status: Preliminary result Specimen: Blood from Hand, Left Updated: 09/06/23 2045     Blood Culture No growth at 4 days    Blood Culture - Blood, Arm, Left [[id number]]  (Normal) Collected: 09/02/23 1907    Lab Status: Preliminary result Specimen: Blood from Arm, Left Updated: 09/06/23 2045     Blood Culture No growth at 4 days    Blood Culture - Blood, Arm, Right [[id number]]  (Normal) Collected: 09/01/23 1152    Lab Status: Final result Specimen: Blood from Arm, Right Updated: 09/06/23 1300     Blood Culture No growth at 5 days    MRSA Screen, PCR (Inpatient) - Swab, Nares [[id number]]  (Normal) Collected: 09/02/23 1635    Lab Status: Final result Specimen: Swab from Nares Updated: 09/02/23 2050     MRSA PCR Negative    Narrative:      The negative predictive value of this diagnostic test is high and should only be used to consider de-escalating anti-MRSA therapy. A positive result may indicate colonization with MRSA and must be correlated clinically.  MRSA Negative    Gastrointestinal Panel, PCR - Stool, Per Rectum [[id number]]  (Normal) Collected: 09/02/23 1551    Lab Status: Final result Specimen: Stool from Per Rectum Updated: 09/02/23 1735     Campylobacter Not Detected     Plesiomonas shigelloides Not Detected     Salmonella Not Detected     Vibrio Not Detected     Vibrio cholerae Not Detected     Yersinia enterocolitica Not Detected     Enteroaggregative E. coli (EAEC) Not Detected      Enteropathogenic E. coli (EPEC) Not Detected     Enterotoxigenic E. coli (ETEC) lt/st Not Detected     Shiga-like toxin-producing E. coli (STEC) stx1/stx2 Not Detected     Shigella/Enteroinvasive E. coli (EIEC) Not Detected     Cryptosporidium Not Detected     Cyclospora cayetanensis Not Detected     Entamoeba histolytica Not Detected     Giardia lamblia Not Detected     Adenovirus F40/41 Not Detected     Astrovirus Not Detected     Norovirus GI/GII Not Detected     Rotavirus A Not Detected     Sapovirus (I, II, IV or V) Not Detected    Respiratory Panel PCR w/COVID-19(SARS-CoV-2) [facility]BBY/SAHRA/ELISABETH/PAD/[facility]/MAD/JABARI In-House, NP Swab in UTM/VTM, 3-4 HR TAT - Swab, Nasopharynx [[id number]]  (Normal) Collected: 09/01/23 1151    Lab Status: Final result Specimen: Swab from Nasopharynx Updated: 09/01/23 1327     ADENOVIRUS, PCR Not Detected     Coronavirus 229E Not Detected     Coronavirus HKU1 Not Detected     Coronavirus NL63 Not Detected     Coronavirus OC43 Not Detected     COVID19 Not Detected     Human Metapneumovirus Not Detected     Human Rhinovirus/Enterovirus Not Detected     Influenza A PCR Not Detected     Influenza B PCR Not Detected     Parainfluenza Virus 1 Not Detected     Parainfluenza Virus 2 Not Detected     Parainfluenza Virus 3 Not Detected     Parainfluenza Virus 4 Not Detected     RSV, PCR Not Detected     Bordetella pertussis pcr Not Detected     Bordetella parapertussis PCR Not Detected     Chlamydophila pneumoniae PCR Not Detected     Mycoplasma pneumo by PCR Not Detected    Narrative:      In the setting of a positive respiratory panel with a viral infection PLUS a negative procalcitonin without other underlying concern for bacterial infection, consider observing off antibiotics or discontinuation of antibiotics and continue supportive care. If the respiratory panel is positive for atypical bacterial infection (Bordetella pertussis, Chlamydophila pneumoniae, or Mycoplasma pneumoniae), consider  antibiotic de-escalation to target atypical bacterial infection.            No radiology results from the last 24 hrs    Results for orders placed during the hospital encounter of 09/01/23    Adult Transthoracic Echo Complete W/ Cont if Necessary Per Protocol    Interpretation Summary    Left ventricular systolic function is mildly decreased. Left ventricular ejection fraction appears to be 41 - 45%.    Left ventricular wall thickness is consistent with moderate concentric hypertrophy.    Mildly reduced right ventricular systolic function noted.    The right ventricular cavity is mild to moderately dilated.    The left atrial cavity is moderately dilated.    Estimated right ventricular systolic pressure from tricuspid regurgitation is normal (<35 mmHg). Calculated right ventricular systolic pressure from tricuspid regurgitation is 29 mmHg.    There is a trivial pericardial effusion adjacent to the right ventricle.      Current medications:  Scheduled Meds:apixaban, 5 mg, Oral, Q12H  budesonide-formoterol, 2 puff, Inhalation, BID - RT  nebivolol, 2.5 mg, Oral, Daily  pantoprazole, 40 mg, Oral, Daily  thiamine, 100 mg, Oral, Daily  tiotropium bromide monohydrate, 2 puff, Inhalation, Daily - RT      Continuous Infusions:O2, 3 L/min      PRN Meds:.  acetaminophen    Calcium Replacement - Follow Nurse / BPA Driven Protocol    dextrose    dextrose    glucagon (human recombinant)    ipratropium-albuterol    Magnesium Standard Dose Replacement - Follow Nurse / BPA Driven Protocol    metoprolol tartrate    ondansetron    Phosphorus Replacement - Follow Nurse / BPA Driven Protocol    Potassium Replacement - Follow Nurse / BPA Driven Protocol    simethicone    Assessment & Plan   Assessment & Plan     Active Hospital Problems    Diagnosis  POA    **Dehydration [E86.0]  Yes    Diarrhea [R19.7]  Yes    Hypomagnesemia [E83.42]  Yes    Hypokalemia [E87.6]  Yes    Stage III CKD [N18.30]  Yes    AoC HFpEF [I50.33]  Yes    Morbid  obesity with BMI of 50.0-59.9, adult [E66.01, Z68.43]  Not Applicable    HTN (hypertension) [I10]  Yes      Resolved Hospital Problems   No resolved problems to display.        Brief Hospital Course to date:  Sonia Bella is a 58 y.o. female with COPD on 3 L chronically, A fib not on AC, HFrEF (EF of 40%), CAD, GERD, prior bleeding stomach ulcers, HTN, Obsestity, and CKD stage III, who presented to the ED with weakness, confusion, hypoglycemia that was treated.    This patient's problems and plans were partially entered by my partner and updated as appropriate by me 09/07/23.     A fib with RVR and associated hypotension  Chest pain, resolved  HFrEF, EF of 40%   Cardiomegaly  --Not on ACE due to angioedema  --TSH within normal limits  --Continue beta blocker at reduced dose with holding parameters  --Continue tele  --status post digoxin x2 per cardiology  --Status post IV fluids  --Resumed Eliquis; had stopped outpatient for an unclear reason, denied history of bleeding, per daughter, may not have been taking her medications  --9/5 ECHO: LVEF 41 to 45%, moderate concentric LVH, mildly reduced RV systolic function, RV cavity mild to moderately dilated (has been seen previously), trivial pericardial effusion    Anemia  --Appears chronic  --Vitamin B12 level 318, folate level 7 in August 2023, will not repeat  --Iron panel, ferritin w/ ferritin/TIBC/Iron/transferrin wnl, iron saturation low    Diarrhea  -GI PCR negative; C. difficile pending (no additional stool --> cancelled)    Hypomagnesemia  Hypokalemia  Hypophosphatemia  -replete    Hypoglycemia, resolved  Dehydration  -Likely due to poor oral intake; status post IV fluids  -C peptide sent  -Thiamine  -AM cortisol 23.11    Staph epi in 1 of 2 blood cultures  --Blood culture 1 of 2 positive for staph epi, suspected contaminant  --Repeat blood cultures NGTD  --Stopped vancomycin    CKD  --Baseline creatinine 1.9-2.3    Deconditioning  --PT/OT consulted -->  SNF    COPD  Chronic hypoxia, on 3L continuously at home  --Continue home medications    Obesity - BMI 40.49    Expected Discharge Location and Transportation: SNF, patient amenable  Expected Discharge   Expected Discharge Date: 9/11/2023; Expected Discharge Time:      DVT prophylaxis:  Medical DVT prophylaxis orders are present.     AM-PAC 6 Clicks Score (PT): 15 (09/06/23 2000)    CODE STATUS:   Code Status and Medical Interventions:   Ordered at: 09/01/23 9175     Code Status (Patient has no pulse and is not breathing):    CPR (Attempt to Resuscitate)     Medical Interventions (Patient has pulse or is breathing):    Full       Nery Beltrán, APRN  09/07/23

## 2023-09-07 NOTE — DISCHARGE PLACEMENT REQUEST
"Leanne Callahan (58 y.o. Female)       Date of Birth   1965    Social Security Number       Address   [address] [place] [id number]    Home Phone   [phone]    MRN   [id number]       Sabianist   None    Marital Status                               Admission Date   23    Admission Type   Emergency    Admitting Provider   BIANKA Contreras DO    Attending Provider   BIANKA Contreras DO    Department, Room/Bed   [facility] 6A, N604/1       Discharge Date       Discharge Disposition       Discharge Destination                                 Attending Provider: BIANKA Contreras DO    Allergies: Ace Inhibitors    Isolation: None   Infection: None   Code Status: CPR    Ht: 170.2 cm (67\")   Wt: 119 kg (263 lb)    Admission Cmt: None   Principal Problem: Dehydration [E86.0]                   Active Insurance as of 2023       Primary Coverage       Payor Plan Insurance Group Employer/Plan Group    [facility] MEDICARE REPLACEMENT [facility] DUAL COMPLETE MEDICARE REPLACEMENT [id number]       Payor Plan Address Payor Plan Phone Number Payor Plan Fax Number Effective Dates     [address] [phone]  2023 - None Entered    [facility] [id number]         Subscriber Name Subscriber Birth Date Member ID       LEANNE CALLAHAN 1965 [id number]                     Emergency Contacts        (Rel.) Home Phone Work Phone Mobile Phone    Candelaria Camargo (Daughter) [phone]                 History & Physical        Davina Samuels MD at 23 [facility]              [facility] Medicine Services  HISTORY AND PHYSICAL    Patient Name: Leanne Callahan  : 1965  MRN: [id number]  Primary Care Physician: Provider, No Known    Subjective  Subjective     Chief Complaint:diarrhea and weakness    HPI:  Leanne Callahan is a 58 y.o. female who  has a past medical history of Bleeding ulcer, COPD (chronic " obstructive pulmonary disease), Cronigh HFrEF with ED of 40%, Coronary artery disease, GERD (gastroesophageal reflux disease), History of stomach ulcers, Hypertension, Obsestity, and Stage III CKD (02/15/2023). who presented to the ED with profound weakness, confusion, hypoglycemia that was treated. She continues to feel weak and hungry but has been afraid to eat due to the diarrhea. She denies sick contacts or eating any questionable food, she denies stool in her stool.        Review of Systems   Patient denies weight loss, headaches, changes in vision, fever, chills, sore throat, shortness of breath, cough, nausea or vomiting, abdominal pain or distension, change in urine output or habits, joint pain, rash, itching, numbness/tingling, weakness or bleeding.    Otherwise complete ROS is negative except as mentioned in the HPI.    Personal History         PMH: She  has a past medical history of Bleeding ulcer, COPD (chronic obstructive pulmonary disease), Coronary artery disease, GERD (gastroesophageal reflux disease), History of stomach ulcers, Hypertension, and Stage III CKD (02/15/2023).   PSxH: She  has no past surgical history on file.         FH: Her family history is not on file.   SH: She  reports that she has quit smoking. Her smoking use included cigarettes. She has never used smokeless tobacco. She reports current alcohol use. She reports that she does not use drugs.     Medications:  O2, albuterol sulfate HFA, apixaban, budesonide-formoterol, colchicine, famotidine, nebivolol, tiotropium bromide monohydrate, and torsemide    Allergies   Allergen Reactions    Ace Inhibitors Angioedema     lisinopril       Objective  Objective     Vital Signs:   Temp:  [98.2 °F (36.8 °C)] 98.2 °F (36.8 °C)  Heart Rate:  [78-94] 94  Resp:  [16] 16  BP: (106-130)/(71-98) 126/91  Flow (L/min):  [3] 3    Constitutional: Awake, alert, interactive and pleasant- slow to speak, miserable, weak  Eyes: clear sclerae, no conjunctival  injection  HENT: NCAT, mucous membranes dry, pale  Neck: no masses or lymphadenopathy, trachea midline  Respiratory: good breath sounds bilaterally, respirations unlabored  Cardiovascular: RRR, no murmurs appreciated, palpable peripheral pulses  Abdomen:  soft, obese, tender lower abdomen  Musculoskeletal: No peripheral edema, clubbing or cyanosis  Neurologic: Oriented x 3, speech slow but clear                      Strength symmetric in all extremities                     Cranial Nerves grossly intact, speech clear  Skin: No rashes or jaundice  Psychiatric: Appropriate mood, insight      Result Review:  I have personally reviewed the results from the time of this admission   to 9/1/2023 14:03 EDT and agree with these findings:  [x]  Laboratory  [x]  Microbiology  [x]  Radiology  [x]  EKG/Telemetry   []  Cardiology/Vascular   []  Pathology  [x]  Old records  []  Other:  Most notable findings include: anemia, low K and Mag, elevated creatinine      LAB RESULTS:      Lab 09/01/23  1101   WBC 5.47   HEMOGLOBIN 9.5*   HEMATOCRIT 34.6   PLATELETS 150   NEUTROS ABS 4.44   IMMATURE GRANS (ABS) 0.02   LYMPHS ABS 0.27*   MONOS ABS 0.68   EOS ABS 0.05   MCV 83.4   PROCALCITONIN 0.65*   LACTATE 1.8   PROTIME 22.6*         Lab 09/01/23  1125 09/01/23  1108 09/01/23  1101   SODIUM  --   --  141   POTASSIUM  --   --  2.9*   CHLORIDE  --   --  89*   CO2  --   --  35.0*   ANION GAP  --   --  17.0*   BUN  --   --  30*   CREATININE 2.60 2.60* 2.40*   EGFR  --   --  22.7*   GLUCOSE  --   --  61*   CALCIUM  --   --  8.5*   MAGNESIUM  --   --  1.1*   TSH  --   --  1.840         Lab 09/01/23  1101   TOTAL PROTEIN 6.7   ALBUMIN 3.6   GLOBULIN 3.1   ALT (SGPT) 33   AST (SGOT) 81*   BILIRUBIN 3.4*   ALK PHOS 133*         Lab 09/01/23  1101   PROBNP 21,177.0*   HSTROP T 41*   PROTIME 22.6*   INR 1.97*             Lab 09/01/23  1152   ABO TYPING O   RH TYPING Positive   ANTIBODY SCREEN Negative         Brief Urine Lab Results  (Last result in  the past 365 days)        Color   Clarity   Blood   Leuk Est   Nitrite   Protein   CREAT   Urine HCG        10/01/22 0434 Yellow   Clear   Trace   Negative   Negative   >=300 mg/dL (3+)                 COVID19   Date Value Ref Range Status   09/01/2023 Not Detected Not Detected - Ref. Range Final       CT Abdomen Pelvis Without Contrast    Result Date: 9/1/2023  CT ABDOMEN PELVIS WO CONTRAST Date of Exam: 9/1/2023 11:04 AM EDT Indication: gen weakness. Comparison: None available. Technique: Axial CT images were obtained of the abdomen and pelvis without the administration of contrast. Reconstructed coronal and sagittal images were also obtained. Automated exposure control and iterative construction methods were used. Findings: The lung apices demonstrate small right pleural effusion. There is notable four-chamber cardiac enlargement. The body wall soft tissues demonstrate anasarca. The osseous structures demonstrate no evidence of acute fracture or aggressive osseous lesion. The liver is enlarged. There is no suspicious focal hepatic, splenic or pancreatic lesion on limited noncontrast evaluation. There is a slightly hyperdense exophytic round finding involving the left kidney, favoring a hemorrhagic cyst. There is no evidence of hydronephrosis. Small and large bowel loops are nondilated. Diverticular changes are present without definite evidence of acute diverticulitis. Numerous hyperdense uterine findings are present, likely to represent fibroids but incompletely characterized. There is no free fluid or pneumoperitoneum. Hyperdense gallstones are present without inflammatory signs of cholecystitis.     Impression: Impression: There is marked cardiac enlargement, with diffuse anasarca and a small right pleural effusion. There is also hepatic enlargement and findings are concerning for heart failure. No acute findings are present in the abdomen and pelvis. An exophytic left renal finding is noted, favoring a benign  hemorrhagic cyst, better characterized on recent ultrasound. Nodular hyperdense appearance of the uterus suggesting underlying fibroids. Electronically Signed: Ryley Busby MD  9/1/2023 11:19 AM EDT  Workstation ID: [id number]    CT Head Without Contrast    Result Date: 9/1/2023  CT HEAD WO CONTRAST Date of Exam: 9/1/2023 11:04 AM EDT Indication: gen weakness. Comparison: None available. Technique: Axial CT images were obtained of the head without contrast administration.  Automated exposure control and iterative construction methods were used. Findings: Gray-white differentiation is maintained and there is no evidence of intracranial hemorrhage, mass or mass effect. The ventricles are normal in size and configuration. The orbits are normal. The paranasal sinuses are grossly clear. The calvarium is intact.     Impression: Impression: No acute intracranial abnormality. Electronically Signed: Ryley Busby MD  9/1/2023 11:12 AM EDT  Workstation ID: [id number]     Results for orders placed during the hospital encounter of 02/15/23    Adult Transthoracic Echo Limited W/ Cont if Necessary Per Protocol    Interpretation Summary    The right atrial cavity is dilated.    Left ventricular ejection fraction appears to be 41 - 45%. The left ventricular cavity is mildly dilated. Left ventricular wall thickness is consistent with mild concentric hypertrophy. There is left ventricular global hypokinesis noted.    Moderate tricuspid valve regurgitation is present.    The left atrial cavity is mildly dilated. Saline test results are negative.      The patient qualifies to receive the vaccine, but they have not yet received it.    Assessment & Plan  Assessment / Plan       Dehydration    Diarrhea    HTN (hypertension)    Morbid obesity with BMI of 50.0-59.9, adult    AoC HFpEF    Stage III CKD    Hypomagnesemia    Hypokalemia      Assessment & Plan:  Sonia Bella is a 58 y.o. female who  has a past medical history of Bleeding  ulcer, COPD (chronic obstructive pulmonary disease), Cronigh HFrEF with ED of 40%, Coronary artery disease, GERD (gastroesophageal reflux disease), History of stomach ulcers, Hypertension, Obsestity, and Stage III CKD (02/15/2023). who presented to the ED with profound weakness, confusion, hypoglycemia that was treated.    Diarrhea  -GI PCR is pending    HypoK and MAg  -replete    EF of 40% with hepatic congestion  --  NOT on ACE due to angioedema    Hypoglycemia  -hopefully just due to starvation, check c peptid  -add IVFs, check cortisol    CKD    Obesity  DVT prophylaxis:  Medical DVT prophylaxis orders are present.    CODE STATUS:Full code     Expected Discharge  Expected Discharge Date: 9/3/2023; Expected Discharge Time:     Electronically signed by Davina Samuels MD 23 14:03 EDT            Electronically signed by Davina Samuels MD at 23 1814          Physician Progress Notes (last 24 hours)        Nery Beltrán APRN at 23 1240              [facility] Medicine Services  PROGRESS NOTE    Patient Name: Sonia Bella  : 1965  MRN: [id number]    Date of Admission: 2023  Primary Care Physician: Provider, No Known    Subjective   Subjective     CC:  Follow-up dehydration    HPI:  On 3LNC, baseline oxygen. No soa. Asking when she can leave, cannot sleep and bed is hurting her back.     ROS:  Gen- No fevers, chills  CV- No chest pain, palpitations  Resp- No cough, +chronic dyspnea  GI-no diarrhea    Objective   Objective     Vital Signs:   Temp:  [97.4 °F (36.3 °C)-97.8 °F (36.6 °C)] 97.8 °F (36.6 °C)  Heart Rate:  [77-98] 80  Resp:  [18-20] 18  BP: ()/(61-90) 108/73  Flow (L/min):  [3-4] 3     Physical Exam:  Constitutional: No acute distress, awake, alert  HENT: NCAT, mucous membranes moist  Respiratory: Clear to auscultation bilaterally, respiratory effort normal   Cardiovascular: Irreg/ reg rate, no murmurs, rubs, or gallops  Gastrointestinal:  Positive bowel sounds, soft, nontender, nondistended  Musculoskeletal: No bilateral ankle edema  Psychiatric: Appropriate affect, cooperative  Neurologic: Oriented x 3, strength symmetric in all extremities, Cranial Nerves grossly intact to confrontation, speech clear  Skin: No rashes     Results Reviewed:  LAB RESULTS:      Lab 09/05/23  0445 09/04/23  0200 09/03/23  0514 09/02/23  0109 09/01/23  1725 09/01/23  1101   WBC 5.46 5.37 5.33 5.46  --  5.47   HEMOGLOBIN 8.2* 8.1* 8.3* 8.6*  --  9.5*   HEMATOCRIT 28.9* 28.7* 29.8* 30.7*  --  34.6   PLATELETS 144 130* 123* 138*  --  150   NEUTROS ABS  --   --   --  4.33  --  4.44   IMMATURE GRANS (ABS)  --   --   --  0.03  --  0.02   LYMPHS ABS  --   --   --  0.21*  --  0.27*   MONOS ABS  --   --   --  0.82  --  0.68   EOS ABS  --   --   --  0.05  --  0.05   MCV 80.3 81.1 81.4 80.6  --  83.4   SED RATE  --   --   --   --   --  54*   CRP  --   --   --   --  5.18*  --    PROCALCITONIN  --   --   --   --   --  0.65*   LACTATE  --   --   --   --   --  1.8   PROTIME  --   --   --   --   --  22.6*         Lab 09/07/23  0345 09/06/23  1225 09/05/23  2358 09/05/23 0445 09/04/23  1912 09/04/23  1602 09/04/23  1020 09/04/23  0200 09/03/23  0722 09/03/23  0514 09/02/23  1907 09/02/23  0444 09/01/23  1108 09/01/23  1101   SODIUM 133*  --  132* 134*  --   --   --  139  --  135*  --  142   < > 141   POTASSIUM 4.6  --  4.7 4.3  --   --   --  4.7  --  4.4   < > 3.3*  3.3*   < > 2.9*   CHLORIDE 92*  --  93* 92*  --   --   --  96*  --  93*  --  93*   < > 89*   CO2 28.0  --  26.0 26.0  --   --   --  28.0  --  33.0*  --  33.0*   < > 35.0*   ANION GAP 13.0  --  13.0 16.0*  --   --   --  15.0  --  9.0  --  16.0*   < > 17.0*   BUN 28*  --  30* 30*  --   --   --  29*  --  30*  --  31*   < > 30*   CREATININE 2.13*  --  2.35* 2.44*  --   --   --  2.63*  --  2.46*  --  2.47*   < > 2.40*   EGFR 26.2*  --  23.3* 22.3*  --   --   --  20.4*  --  22.1*  --  22.0*   < > 22.7*   GLUCOSE 86  --  83 67  --    --   --  77  --  81  --  97   < > 61*   CALCIUM 9.0  --  8.9 8.7  --   --   --  8.5*  --  8.5*  --  8.3*   < > 8.5*   MAGNESIUM  --  2.5  --  1.8  --   --   --  1.9  --  2.2  --  2.3   < > 1.1*   PHOSPHORUS 1.7* 2.5 2.0*  --  2.8 2.9   < > 1.7*   < >  --   --   --   --   --    HEMOGLOBIN A1C  --   --   --   --   --   --   --   --   --   --   --   --   --  4.80   TSH  --   --   --   --   --   --   --   --   --   --   --   --   --  1.840    < > = values in this interval not displayed.         Lab 09/05/23 2358 09/01/23  1101   TOTAL PROTEIN  --  6.7   ALBUMIN 3.0* 3.6   GLOBULIN  --  3.1   ALT (SGPT)  --  33   AST (SGOT)  --  81*   BILIRUBIN  --  3.4*   ALK PHOS  --  133*         Lab 09/02/23 2353 09/02/23 2041 09/02/23 1907 09/01/23  1101   PROBNP  --   --   --  21,177.0*   HSTROP T 49* 65* 51* 41*   PROTIME  --   --   --  22.6*   INR  --   --   --  1.97*             Lab 09/05/23  0445 09/01/23  1152   IRON 63  --    IRON SATURATION (TSAT) 19*  --    TIBC 332  --    TRANSFERRIN 223  --    FERRITIN 117.00  --    ABO TYPING  --  O   RH TYPING  --  Positive   ANTIBODY SCREEN  --  Negative         Brief Urine Lab Results  (Last result in the past 365 days)        Color   Clarity   Blood   Leuk Est   Nitrite   Protein   CREAT   Urine HCG        09/01/23 1444 Yellow   Clear   Negative   Trace   Negative   30 mg/dL (1+)                   Microbiology Results Abnormal       Procedure Component Value - Date/Time    Blood Culture - Blood, Hand, Left [[id number]]  (Normal) Collected: 09/02/23 1907    Lab Status: Preliminary result Specimen: Blood from Hand, Left Updated: 09/06/23 2045     Blood Culture No growth at 4 days    Blood Culture - Blood, Arm, Left [[id number]]  (Normal) Collected: 09/02/23 1907    Lab Status: Preliminary result Specimen: Blood from Arm, Left Updated: 09/06/23 2045     Blood Culture No growth at 4 days    Blood Culture - Blood, Arm, Right [[id number]]  (Normal) Collected: 09/01/23 1152    Lab  Status: Final result Specimen: Blood from Arm, Right Updated: 09/06/23 1300     Blood Culture No growth at 5 days    MRSA Screen, PCR (Inpatient) - Swab, Nares [[id number]]  (Normal) Collected: 09/02/23 1635    Lab Status: Final result Specimen: Swab from Nares Updated: 09/02/23 2050     MRSA PCR Negative    Narrative:      The negative predictive value of this diagnostic test is high and should only be used to consider de-escalating anti-MRSA therapy. A positive result may indicate colonization with MRSA and must be correlated clinically.  MRSA Negative    Gastrointestinal Panel, PCR - Stool, Per Rectum [[id number]]  (Normal) Collected: 09/02/23 1551    Lab Status: Final result Specimen: Stool from Per Rectum Updated: 09/02/23 1735     Campylobacter Not Detected     Plesiomonas shigelloides Not Detected     Salmonella Not Detected     Vibrio Not Detected     Vibrio cholerae Not Detected     Yersinia enterocolitica Not Detected     Enteroaggregative E. coli (EAEC) Not Detected     Enteropathogenic E. coli (EPEC) Not Detected     Enterotoxigenic E. coli (ETEC) lt/st Not Detected     Shiga-like toxin-producing E. coli (STEC) stx1/stx2 Not Detected     Shigella/Enteroinvasive E. coli (EIEC) Not Detected     Cryptosporidium Not Detected     Cyclospora cayetanensis Not Detected     Entamoeba histolytica Not Detected     Giardia lamblia Not Detected     Adenovirus F40/41 Not Detected     Astrovirus Not Detected     Norovirus GI/GII Not Detected     Rotavirus A Not Detected     Sapovirus (I, II, IV or V) Not Detected    Respiratory Panel PCR w/COVID-19(SARS-CoV-2) ABBY/SAHRA/ELISABETH/PAD/COR/MAD/JABARI In-House, NP Swab in UTM/VTM, 3-4 HR TAT - Swab, Nasopharynx [[id number]]  (Normal) Collected: 09/01/23 1151    Lab Status: Final result Specimen: Swab from Nasopharynx Updated: 09/01/23 1327     ADENOVIRUS, PCR Not Detected     Coronavirus 229E Not Detected     Coronavirus HKU1 Not Detected     Coronavirus NL63 Not Detected      Coronavirus OC43 Not Detected     COVID19 Not Detected     Human Metapneumovirus Not Detected     Human Rhinovirus/Enterovirus Not Detected     Influenza A PCR Not Detected     Influenza B PCR Not Detected     Parainfluenza Virus 1 Not Detected     Parainfluenza Virus 2 Not Detected     Parainfluenza Virus 3 Not Detected     Parainfluenza Virus 4 Not Detected     RSV, PCR Not Detected     Bordetella pertussis pcr Not Detected     Bordetella parapertussis PCR Not Detected     Chlamydophila pneumoniae PCR Not Detected     Mycoplasma pneumo by PCR Not Detected    Narrative:      In the setting of a positive respiratory panel with a viral infection PLUS a negative procalcitonin without other underlying concern for bacterial infection, consider observing off antibiotics or discontinuation of antibiotics and continue supportive care. If the respiratory panel is positive for atypical bacterial infection (Bordetella pertussis, Chlamydophila pneumoniae, or Mycoplasma pneumoniae), consider antibiotic de-escalation to target atypical bacterial infection.            No radiology results from the last 24 hrs    Results for orders placed during the hospital encounter of 09/01/23    Adult Transthoracic Echo Complete W/ Cont if Necessary Per Protocol    Interpretation Summary    Left ventricular systolic function is mildly decreased. Left ventricular ejection fraction appears to be 41 - 45%.    Left ventricular wall thickness is consistent with moderate concentric hypertrophy.    Mildly reduced right ventricular systolic function noted.    The right ventricular cavity is mild to moderately dilated.    The left atrial cavity is moderately dilated.    Estimated right ventricular systolic pressure from tricuspid regurgitation is normal (<35 mmHg). Calculated right ventricular systolic pressure from tricuspid regurgitation is 29 mmHg.    There is a trivial pericardial effusion adjacent to the right ventricle.      Current  medications:  Scheduled Meds:apixaban, 5 mg, Oral, Q12H  budesonide-formoterol, 2 puff, Inhalation, BID - RT  nebivolol, 2.5 mg, Oral, Daily  pantoprazole, 40 mg, Oral, Daily  thiamine, 100 mg, Oral, Daily  tiotropium bromide monohydrate, 2 puff, Inhalation, Daily - RT      Continuous Infusions:O2, 3 L/min      PRN Meds:.  acetaminophen    Calcium Replacement - Follow Nurse / BPA Driven Protocol    dextrose    dextrose    glucagon (human recombinant)    ipratropium-albuterol    Magnesium Standard Dose Replacement - Follow Nurse / BPA Driven Protocol    metoprolol tartrate    ondansetron    Phosphorus Replacement - Follow Nurse / BPA Driven Protocol    Potassium Replacement - Follow Nurse / BPA Driven Protocol    simethicone    Assessment & Plan   Assessment & Plan     Active Hospital Problems    Diagnosis  POA    **Dehydration [E86.0]  Yes    Diarrhea [R19.7]  Yes    Hypomagnesemia [E83.42]  Yes    Hypokalemia [E87.6]  Yes    Stage III CKD [N18.30]  Yes    AoC HFpEF [I50.33]  Yes    Morbid obesity with BMI of 50.0-59.9, adult [E66.01, Z68.43]  Not Applicable    HTN (hypertension) [I10]  Yes      Resolved Hospital Problems   No resolved problems to display.        Brief Hospital Course to date:  Sonia Bella is a 58 y.o. female with COPD on 3 L chronically, A fib not on AC, HFrEF (EF of 40%), CAD, GERD, prior bleeding stomach ulcers, HTN, Obsestity, and CKD stage III, who presented to the ED with weakness, confusion, hypoglycemia that was treated.    This patient's problems and plans were partially entered by my partner and updated as appropriate by me 09/07/23.     A fib with RVR and associated hypotension  Chest pain, resolved  HFrEF, EF of 40%   Cardiomegaly  --Not on ACE due to angioedema  --TSH within normal limits  --Continue beta blocker at reduced dose with holding parameters  --Continue tele  --status post digoxin x2 per cardiology  --Status post IV fluids  --Resumed Eliquis; had stopped outpatient for an  unclear reason, denied history of bleeding, per daughter, may not have been taking her medications  -- ECHO: LVEF 41 to 45%, moderate concentric LVH, mildly reduced RV systolic function, RV cavity mild to moderately dilated (has been seen previously), trivial pericardial effusion    Anemia  --Appears chronic  --Vitamin B12 level 318, folate level 7 in 2023, will not repeat  --Iron panel, ferritin w/ ferritin/TIBC/Iron/transferrin wnl, iron saturation low    Diarrhea  -GI PCR negative; C. difficile pending (no additional stool --> cancelled)    Hypomagnesemia  Hypokalemia  Hypophosphatemia  -replete    Hypoglycemia, resolved  Dehydration  -Likely due to poor oral intake; status post IV fluids  -C peptide sent  -Thiamine  -AM cortisol 23.11    Staph epi in 1 of 2 blood cultures  --Blood culture 1 of 2 positive for staph epi, suspected contaminant  --Repeat blood cultures NGTD  --Stopped vancomycin    CKD  --Baseline creatinine 1.9-2.3    Deconditioning  --PT/OT consulted --> SNF    COPD  Chronic hypoxia, on 3L continuously at home  --Continue home medications    Obesity - BMI 40.49    Expected Discharge Location and Transportation: SNF, patient amenable  Expected Discharge   Expected Discharge Date: 2023; Expected Discharge Time:      DVT prophylaxis:  Medical DVT prophylaxis orders are present.     AM-PAC 6 Clicks Score (PT): 15 (23)    CODE STATUS:   Code Status and Medical Interventions:   Ordered at: 23 0386     Code Status (Patient has no pulse and is not breathing):    CPR (Attempt to Resuscitate)     Medical Interventions (Patient has pulse or is breathing):    Full       YASHIRA Bray  23        Electronically signed by Nery Beltrán APRN at 23 1249          Physical Therapy Notes (most recent note)        Sapna Mcclain at 23 1104  Version 1 of 1         Patient Name: Sonia Bella  : 1965    MRN: [id number]                               Today's Date: 9/5/2023       Admit Date: 9/1/2023    Visit Dx:     ICD-10-CM ICD-9-CM   1. Generalized weakness  R53.1 780.79   2. Diarrhea, unspecified type  R19.7 787.91   3. Acute UTI  N39.0 599.0   4. Hypokalemia  E87.6 276.8   5. Hypomagnesemia  E83.42 275.2   6. Hypoglycemia  E16.2 251.2   7. Anemia, unspecified type  D64.9 285.9   8. Chronic kidney disease, unspecified CKD stage  N18.9 585.9     Patient Active Problem List   Diagnosis    Angioedema due to angiotensin converting enzyme inhibitor (ACE-I)    HTN (hypertension)    B12 deficiency anemia    Iron deficiency anemia    CKD (chronic kidney disease) stage 3, GFR 30-59 ml/min    COVID-19 virus infection    COPD on home O2 (2L)    Morbid obesity with BMI of 50.0-59.9, adult    AoC HFpEF    Acute type 2 respiratory failure    Human metapneumovirus (hMPV)    GERD    Stage III CKD    Atrial fibrillation with RVR    Acute on chronic HFrEF (heart failure with reduced ejection fraction)    Dehydration    Diarrhea    Hypomagnesemia    Hypokalemia     Past Medical History:   Diagnosis Date    Bleeding ulcer     COPD (chronic obstructive pulmonary disease)     Coronary artery disease     GERD (gastroesophageal reflux disease)     History of stomach ulcers     Hypertension     Stage III CKD 02/15/2023     History reviewed. No pertinent surgical history.   General Information       Row Name 09/05/23 1317          Physical Therapy Time and Intention    Document Type therapy note (daily note)  -KG     Mode of Treatment physical therapy  -KG       Row Name 09/05/23 1317          General Information    Patient Profile Reviewed yes  -KG     Existing Precautions/Restrictions fall;oxygen therapy device and L/min  -KG       Row Name 09/05/23 1317          Cognition    Orientation Status (Cognition) oriented x 4  -KG       Row Name 09/05/23 1317          Safety Issues, Functional Mobility    Impairments Affecting Function (Mobility) balance;endurance/activity  tolerance;strength  -KG               User Key  (r) = Recorded By, (t) = Taken By, (c) = Cosigned By      Initials Name Provider Type    Sapna Colin Physical Therapist                   Mobility       Row Name 09/05/23 1318          Bed Mobility    Bed Mobility supine-sit  -KG     Supine-Sit [place] (Bed Mobility) supervision  -KG     Assistive Device (Bed Mobility) head of bed elevated  -KG       Row Name 09/05/23 1318          Transfers    Comment, (Transfers) STS from EOB, min-A x2, FWW, 1st attempt knees began buckling 30 sec after standing and needed to sit back down, 2nd attempt pt able to remain standing and take a couple side steps toward HOB  -KG       Row Name 09/05/23 1318          Sit-Stand Transfer    Sit-Stand [place] (Transfers) verbal cues;nonverbal cues (demo/gesture);2 person assist;minimum assist (75% patient effort)  -KG     Assistive Device (Sit-Stand Transfers) walker, front-wheeled  -KG               User Key  (r) = Recorded By, (t) = Taken By, (c) = Cosigned By      Initials Name Provider Type    Sapna Colin Physical Therapist                   Obj/Interventions       Row Name 09/05/23 1320          Motor Skills    Therapeutic Exercise other (see comments)  heel slides, ankle pumps, quad sets x10  -KG       Row Name 09/05/23 1320          Balance    Dynamic Standing Balance minimal assist;2-person assist  -KG     Position/Device Used, Standing Balance supported;walker, front-wheeled  -KG     Balance Interventions standing;sit to stand  -KG               User Key  (r) = Recorded By, (t) = Taken By, (c) = Cosigned By      Initials Name Provider Type    Sapna Colin Physical Therapist                   Goals/Plan    No documentation.                  Clinical Impression       Row Name 09/05/23 1320          Pain    Pretreatment Pain Rating 0/10 - no pain  -KG     Posttreatment Pain Rating 0/10 - no pain  -KG       Row Name 09/05/23 1320          Plan of Care  Review    Plan of Care Reviewed With patient  -KG     Progress no change  -KG     Outcome Evaluation STS from EOB, min-A x2, FWW, 1st attempt knees began buckling 30 sec after standing and needed to sit back down, 2nd attempt pt able to remain standing and take a couple side steps toward HOB.  recommend SNF  -KG       Row Name 09/05/23 1320          Vital Signs    Pre Systolic BP Rehab 102  -KG     Pre Treatment Diastolic BP 71  -KG     Post Systolic BP Rehab 105  -KG     Post Treatment Diastolic BP 76  -KG       Row Name 09/05/23 1320          Positioning and Restraints    Pre-Treatment Position in bed  -KG     Post Treatment Position bed  -KG     In Bed notified nsg;fowlers;call light within reach;encouraged to call for assist;exit alarm on  -KG               User Key  (r) = Recorded By, (t) = Taken By, (c) = Cosigned By      Initials Name Provider Type    Sapna Colin Physical Therapist                   Outcome Measures       Row Name 09/05/23 1321          How much help from another person do you currently need...    Turning from your back to your side while in flat bed without using bedrails? 3  -KG     Moving from lying on back to sitting on the side of a flat bed without bedrails? 3  -KG     Moving to and from a bed to a chair (including a wheelchair)? 2  -KG     Standing up from a chair using your arms (e.g., wheelchair, bedside chair)? 3  -KG     Climbing 3-5 steps with a railing? 2  -KG     To walk in hospital room? 2  -KG     AM-PAC 6 Clicks Score (PT) 15  -KG     Highest level of mobility 4 --> Transferred to chair/commode  -KG       Row Name 09/05/23 1321          Functional Assessment    Outcome Measure Options AM-PAC 6 Clicks Basic Mobility (PT)  -KG               User Key  (r) = Recorded By, (t) = Taken By, (c) = Cosigned By      Initials Name Provider Type    Sapna Colin Physical Therapist                                 Physical Therapy Education       Title: PT OT SLP Therapies  (In Progress)       Topic: Physical Therapy (In Progress)       Point: Mobility training (In Progress)       Learning Progress Summary             Patient Acceptance, E, NR by NS at 9/2/2023 1257    Comment: PT POC, importance of OOB activity                         Point: Home exercise program (Not Started)       Learner Progress:  Not documented in this visit.              Point: Body mechanics (In Progress)       Learning Progress Summary             Patient Acceptance, E, NR by NS at 9/2/2023 1257    Comment: PT POC, importance of OOB activity                         Point: Precautions (In Progress)       Learning Progress Summary             Patient Acceptance, E, NR by NS at 9/2/2023 1257    Comment: PT POC, importance of OOB activity                                         User Key       Initials Effective Dates Name Provider Type Discipline    NS 06/16/21 -  Elsi Adams, PT Physical Therapist PT                  PT Recommendation and Plan     Plan of Care Reviewed With: patient  Progress: no change  Outcome Evaluation: STS from EOB, min-A x2, FWW, 1st attempt knees began buckling 30 sec after standing and needed to sit back down, 2nd attempt pt able to remain standing and take a couple side steps toward HOB.  recommend SNF     Time Calculation:         PT Charges       Row Name 09/05/23 1323             Time Calculation    Start Time 1104  -KG      PT Received On 09/05/23  -KG         Timed Charges    77602 - PT Therapeutic Exercise Minutes 8  -KG      13941 - PT Therapeutic Activity Minutes 30  -KG         Total Minutes    Timed Charges Total Minutes 38  -KG       Total Minutes 38  -KG                User Key  (r) = Recorded By, (t) = Taken By, (c) = Cosigned By      Initials Name Provider Type    MANJINDER Sapna Mcclain Physical Therapist                  Therapy Charges for Today       Code Description Service Date Service Provider Modifiers Qty    36713353085 HC PT THER PROC EA 15 MIN 9/5/2023 Johny  Sapna GP 1    43047961928 HC PT THERAPEUTIC ACT EA 15 MIN 2023 Sapna Mcclain GP 2    82226281730 HC PT THER SUPP EA 15 MIN 2023 Sapna Mcclain GP 2            PT G-Codes  Outcome Measure Options: AM-PAC 6 Clicks Basic Mobility (PT)  AM-PAC 6 Clicks Score (PT): 15  AM-PAC 6 Clicks Score (OT): 14       Sapna Mcclain  2023      Electronically signed by Sapna Mcclain at 23 1325          Occupational Therapy Notes (most recent note)        Vanessa Nye, OT at 23 1400          Patient Name: Sonia Bella  : 1965    MRN: [id number]                              Today's Date: 2023       Admit Date: 2023    Visit Dx:     ICD-10-CM ICD-9-CM   1. Generalized weakness  R53.1 780.79   2. Diarrhea, unspecified type  R19.7 787.91   3. Acute UTI  N39.0 599.0   4. Hypokalemia  E87.6 276.8   5. Hypomagnesemia  E83.42 275.2   6. Hypoglycemia  E16.2 251.2   7. Anemia, unspecified type  D64.9 285.9   8. Chronic kidney disease, unspecified CKD stage  N18.9 585.9     Patient Active Problem List   Diagnosis    Angioedema due to angiotensin converting enzyme inhibitor (ACE-I)    HTN (hypertension)    B12 deficiency anemia    Iron deficiency anemia    CKD (chronic kidney disease) stage 3, GFR 30-59 ml/min    COVID-19 virus infection    COPD on home O2 (2L)    Morbid obesity with BMI of 50.0-59.9, adult    AoC HFpEF    Acute type 2 respiratory failure    Human metapneumovirus (hMPV)    GERD    Stage III CKD    Atrial fibrillation with RVR    Acute on chronic HFrEF (heart failure with reduced ejection fraction)    Dehydration    Diarrhea    Hypomagnesemia    Hypokalemia     Past Medical History:   Diagnosis Date    Bleeding ulcer     COPD (chronic obstructive pulmonary disease)     Coronary artery disease     GERD (gastroesophageal reflux disease)     History of stomach ulcers     Hypertension     Stage III CKD 02/15/2023     History reviewed. No pertinent surgical history.   General  Information       Row Name 09/05/23 1539          OT Time and Intention    Document Type therapy note (daily note)  -AF     Mode of Treatment occupational therapy  -AF       Row Name 09/05/23 1539          General Information    Patient Profile Reviewed yes  -AF     Existing Precautions/Restrictions fall;oxygen therapy device and L/min  -AF     Barriers to Rehab medically complex;previous functional deficit  -AF       Row Name 09/05/23 1539          Cognition    Orientation Status (Cognition) oriented x 4  -AF       Row Name 09/05/23 1539          Safety Issues, Functional Mobility    Safety Issues Affecting Function (Mobility) safety precaution awareness;safety precautions follow-through/compliance;sequencing abilities  -AF     Impairments Affecting Function (Mobility) balance;endurance/activity tolerance;strength;postural/trunk control  -AF     Comment, Safety Issues/Impairments (Mobility) activity limited by lethargy  -AF               User Key  (r) = Recorded By, (t) = Taken By, (c) = Cosigned By      Initials Name Provider Type    AF Vanessa Nye OT Occupational Therapist                     Mobility/ADL's       Row Name 09/05/23 1540          Bed Mobility    Bed Mobility rolling left;rolling right  -AF     Rolling Left [place] (Bed Mobility) supervision;verbal cues  -AF     Rolling Right [place] (Bed Mobility) supervision;verbal cues  -AF     Comment, (Bed Mobility) pt declined out of bed activities on this date d/t lethargy.  -AF       Row Name 09/05/23 1540          Activities of Daily Living    BADL Assessment/Intervention upper body dressing;toileting  -AF       Row Name 09/05/23 1540          Upper Body Dressing Assessment/Training    [place] Level (Upper Body Dressing) don;doff;minimum assist (75% patient effort)  -AF     Position (Upper Body Dressing) supine  -AF       Row Name 09/05/23 1540          Toileting Assessment/Training    [place] Level (Toileting) perform perineal  hygiene;dependent (less than 25% patient effort)  -AF     Position (Toileting) supine  therapist assisted NA w/ changing bed and completing hygiene.  -AF               User Key  (r) = Recorded By, (t) = Taken By, (c) = Cosigned By      Initials Name Provider Type    Vanessa Coleman OT Occupational Therapist                   Obj/Interventions       Row Name 09/05/23 1542          Motor Skills    Motor Skills functional endurance  -AF     Functional Endurance decreased functional endurance. Completed bed mobility and BADLs while elevated in bed.  -AF       Row Name 09/05/23 1542          Balance    Balance Assessment sitting static balance;sitting dynamic balance  -AF     Static Sitting Balance supervision  -AF     Dynamic Sitting Balance supervision  -AF     Position, Sitting Balance supported;other (see comments)  in bed  -AF     Balance Interventions sitting;weight shifting activity;occupation based/functional task  -AF               User Key  (r) = Recorded By, (t) = Taken By, (c) = Cosigned By      Initials Name Provider Type    Vanessa Coleman OT Occupational Therapist                   Goals/Plan    No documentation.                  Clinical Impression       Row Name 09/05/23 1544          Pain Assessment    Pretreatment Pain Rating 0/10 - no pain  -AF     Posttreatment Pain Rating 0/10 - no pain  -AF       Row Name 09/05/23 1544          Plan of Care Review    Plan of Care Reviewed With patient  -AF     Progress no change  -AF     Outcome Evaluation Pt continues to demo deficits in strength and endurance caffecting ADL indp. when compared to baseline. Completed grooming task while elevated in bed d/t reports of lethargy. Recc. continued IPOT per POC and DC to SNF.  -AF       Row Name 09/05/23 154          Therapy Assessment/Plan (OT)    Rehab Potential (OT) good, to achieve stated therapy goals  -AF     Criteria for Skilled Therapeutic Interventions Met (OT) yes;skilled treatment is necessary  -AF      Therapy Frequency (OT) daily  -AF       Row Name 09/05/23 1544          Therapy Plan Review/Discharge Plan (OT)    Anticipated Discharge Disposition (OT) skilled nursing facility  -AF       Row Name 09/05/23 1544          Vital Signs    Pre Systolic BP Rehab 105  -AF     Pre Treatment Diastolic BP 76  -AF     Post Systolic BP Rehab 115  -AF     Post Treatment Diastolic BP 84  -AF     Pre SpO2 (%) 90  -AF     O2 Delivery Pre Treatment nasal cannula  -AF     Intra SpO2 (%) 87  -AF     O2 Delivery Intra Treatment nasal cannula  -AF     Post SpO2 (%) 92  -AF     O2 Delivery Post Treatment nasal cannula  -AF     Pre Patient Position Supine  -AF     Intra Patient Position Sitting  elevated in bed; supported long sitting  -AF     Post Patient Position Supine  -AF       Row Name 09/05/23 1544          Positioning and Restraints    Pre-Treatment Position in bed  -AF     Post Treatment Position bed  -AF     In Bed notified nsg;call light within reach;supine;encouraged to call for assist;legs elevated;exit alarm on  -AF               User Key  (r) = Recorded By, (t) = Taken By, (c) = Cosigned By      Initials Name Provider Type    AF Vanessa Nye OT Occupational Therapist                   Outcome Measures       Row Name 09/05/23 1548          How much help from another is currently needed...    Putting on and taking off regular lower body clothing? 1  -AF     Bathing (including washing, rinsing, and drying) 2  -AF     Toileting (which includes using toilet bed pan or urinal) 2  -AF     Putting on and taking off regular upper body clothing 3  -AF     Taking care of personal grooming (such as brushing teeth) 3  -AF     Eating meals 3  -AF     AM-PAC 6 Clicks Score (OT) 14  -AF       Row Name 09/05/23 1321          How much help from another person do you currently need...    Turning from your back to your side while in flat bed without using bedrails? 3  -KG     Moving from lying on back to sitting on the side of a flat bed  without bedrails? 3  -KG     Moving to and from a bed to a chair (including a wheelchair)? 2  -KG     Standing up from a chair using your arms (e.g., wheelchair, bedside chair)? 3  -KG     Climbing 3-5 steps with a railing? 2  -KG     To walk in hospital room? 2  -KG     AM-PAC 6 Clicks Score (PT) 15  -KG     Highest level of mobility 4 --> Transferred to chair/commode  -KG       Row Name 09/05/23 1548 09/05/23 1321       Functional Assessment    Outcome Measure Options AM-PAC 6 Clicks Daily Activity (OT)  -AF AM-PAC 6 Clicks Basic Mobility (PT)  -KG              User Key  (r) = Recorded By, (t) = Taken By, (c) = Cosigned By      Initials Name Provider Type    Sapna Colin Physical Therapist    AF Vanessa Nye OT Occupational Therapist                    Occupational Therapy Education       Title: PT OT SLP Therapies (In Progress)       Topic: Occupational Therapy (In Progress)       Point: ADL training (Done)       Description:   Instruct learner(s) on proper safety adaptation and remediation techniques during self care or transfers.   Instruct in proper use of assistive devices.                  Learning Progress Summary             Patient Acceptance, E,TB, VU by AF at 9/5/2023 1548    Acceptance, E, VU by AN at 9/2/2023 1145                         Point: Home exercise program (Not Started)       Description:   Instruct learner(s) on appropriate technique for monitoring, assisting and/or progressing therapeutic exercises/activities.                  Learner Progress:  Not documented in this visit.              Point: Precautions (Done)       Description:   Instruct learner(s) on prescribed precautions during self-care and functional transfers.                  Learning Progress Summary             Patient Acceptance, E, VU by AN at 9/2/2023 1145                         Point: Body mechanics (Done)       Description:   Instruct learner(s) on proper positioning and spine alignment during self-care,  functional mobility activities and/or exercises.                  Learning Progress Summary             Patient Acceptance, E,TB, VU by AF at 9/5/2023 1548    Acceptance, E, VU by AN at 9/2/2023 1145                                         User Key       Initials Effective Dates Name Provider Type Discipline    AN 09/21/21 -  Maria Alejandra Johnston OT Occupational Therapist OT    AF 08/15/23 -  Vanessa Nye OT Occupational Therapist OT                  OT Recommendation and Plan  Therapy Frequency (OT): daily  Plan of Care Review  Plan of Care Reviewed With: patient  Progress: no change  Outcome Evaluation: Pt continues to demo deficits in strength and endurance caffecting ADL indp. when compared to baseline. Completed grooming task while elevated in bed d/t reports of lethargy. Recc. continued IPOT per POC and DC to SNF.     Time Calculation:         Time Calculation- OT       Row Name 09/05/23 1549             Time Calculation- OT    OT Received On 09/05/23  -AF      OT Goal Re-Cert Due Date 09/12/23  -AF         Timed Charges    44868 - OT Therapeutic Activity Minutes 17  -AF      47825 - OT Self Care/Mgmt Minutes 23  -AF         Total Minutes    Timed Charges Total Minutes 40  -AF       Total Minutes 40  -AF                User Key  (r) = Recorded By, (t) = Taken By, (c) = Cosigned By      Initials Name Provider Type    AF Vanessa Nye OT Occupational Therapist                  Therapy Charges for Today       Code Description Service Date Service Provider Modifiers Qty    74307729063 HC OT THERAPEUTIC ACT EA 15 MIN 9/5/2023 Vanessa Nye OT GO 1    92774841679 HC OT SELF CARE/MGMT/TRAIN EA 15 MIN 9/5/2023 Vanessa Nye OT GO 2                 Vanessa Nye OT  9/5/2023    Electronically signed by Vanessa Nye OT at 09/05/23 1551       Speech Language Pathology Notes (most recent note)    No notes exist for this encounter.

## 2023-09-07 NOTE — PROGRESS NOTES
[facility] at [facility]  IP Progress Note      Chief Complaint/Reason for service: #1 A-fib #2 mild LV dysfunction    Subjective   Subjective: Patient awake and alert.  Feels better and tells me she is going to go to rehab    Past medical, surgical, social and family history reviewed in the patient's electronic medical record.    Objective     Vital Sign Min/Max for last 24 hours  Temp  Min: 97.4 °F (36.3 °C)  Max: 97.8 °F (36.6 °C)   BP  Min: 96/61  Max: 114/81   Pulse  Min: 72  Max: 98   Resp  Min: 18  Max: 20   SpO2  Min: 91 %  Max: 99 %   Flow (L/min)  Min: 3  Max: 4      Intake/Output Summary (Last 24 hours) at 9/7/2023 0953  Last data filed at 9/6/2023 1525  Gross per 24 hour   Intake --   Output 700 ml   Net -700 ml             Current Facility-Administered Medications:     acetaminophen (TYLENOL) tablet 650 mg, 650 mg, Oral, Q6H PRN, Coleen Higgins MD    apixaban (ELIQUIS) tablet 5 mg, 5 mg, Oral, Q12H, Coleen Higgins MD, 5 mg at 09/07/23 0851    budesonide-formoterol (SYMBICORT) 160-4.5 MCG/ACT inhaler 2 puff, 2 puff, Inhalation, BID - RT, Davina Samuels MD, 2 puff at 09/07/23 0922    Calcium Replacement - Follow Nurse / BPA Driven Protocol, , Does not apply, PRN, Issac Rashid MD    dextrose (D50W) (25 g/50 mL) IV injection 25 g, 25 g, Intravenous, Q15 Min PRN, Davina Samuels MD    dextrose (GLUTOSE) oral gel 15 g, 15 g, Oral, Q15 Min PRN, Davina Samuels MD    glucagon (GLUCAGEN) injection 1 mg, 1 mg, Subcutaneous, Q15 Min PRN, Davina Samuels MD    ipratropium-albuterol (DUO-NEB) nebulizer solution 3 mL, 3 mL, Nebulization, Q4H PRN, Coleen Higgins MD    Magnesium Standard Dose Replacement - Follow Nurse / BPA Driven Protocol, , Does not apply, PRN, Issac Rashid MD    metoprolol tartrate (LOPRESSOR) injection 2.5 mg, 2.5 mg, Intravenous, Q6H PRN, Antonella Go PA    nebivolol (BYSTOLIC) tablet 2.5 mg, 2.5 mg, Oral, Daily, Vicky Schmid, APRN, 2.5 mg at  09/06/23 0805    O2 (OXYGEN), 3 L/min, Inhalation, Continuous, Davina Samuels MD    ondansetron (ZOFRAN) injection 4 mg, 4 mg, Intravenous, Q6H PRN, Coleen Higgins MD    pantoprazole (PROTONIX) EC tablet 40 mg, 40 mg, Oral, Daily, Davina Samuels MD, 40 mg at 09/07/23 0851    Phosphorus Replacement - Follow Nurse / BPA Driven Protocol, , Does not apply, PRN, Coleen Higgins MD    Potassium Replacement - Follow Nurse / BPA Driven Protocol, , Does not apply, PRN, Issac Rashid MD    simethicone (MYLICON) chewable tablet 80 mg, 80 mg, Oral, 4x Daily PRN, Coleen Higgins MD, 80 mg at 09/04/23 0856    sodium phosphates 15 mmol in sodium chloride 0.9 % 250 mL infusion, 15 mmol, Intravenous, Once, BIANKA Contreras, DO, 15 mmol at 09/07/23 0845    thiamine (VITAMIN B-1) tablet 100 mg, 100 mg, Oral, Daily, Coleen Higgins MD, 100 mg at 09/07/23 0851    tiotropium (SPIRIVA RESPIMAT) 2.5 mcg/act aerosol solution inhaler, 2 puff, Inhalation, Daily - RT, Davina Samuels MD, 2 puff at 09/07/23 0922    Physical Exam: General pleasant obese female sitting upright in bed not dyspneic tachypneic        HEENT: Nasal O2 is present.  No JVP       Respiratory: Clear bilaterally       Cardiovascular: Irregular rate and rhythm                Neuro: Facial expressions are symmetrical moves all 4 extremities       Skin: Warm and dry       Psych: Pleasant affect    Results Review: The creatinine is improved to 2.13 from 2.3 yesterday.  GFR is improved to 26.2 from 23.  Free T3 is low at 1.58    Radiology Results:  Imaging Results (Last 72 Hours)       Procedure Component Value Units Date/Time    XR Chest 1 View [[id number]] Collected: 09/05/23 0917     Updated: 09/05/23 0921    Narrative:      XR CHEST 1 VW    Date of Exam: 9/5/2023 5:00 AM EDT    Indication: CHF    Comparison: September 4, 2023    Findings:  The heart is enlarged. There is no shay consolidation. No definite effusions are confirmed.      Impression:       Impression:  1.Cardiomegaly.      Electronically Signed: Wilmar Ramos MD    9/5/2023 9:18 AM EDT    Workstation ID: [id number]    XR Chest 1 View [[id number]] Collected: 09/04/23 1447     Updated: 09/04/23 1451    Narrative:      XR CHEST 1 VW    Date of Exam: 9/4/2023 1:21 PM CDT    Indication: SOA    Comparison: 2/17/2023    Findings:  Cardiomediastinal silhouette is enlarged, more pronounced than previous exam. No airspace disease, pneumothorax, nor pleural effusion.No acute osseous abnormality identified.      Impression:      Impression:  Enlarging cardiac silhouette without acute pulmonary process identified.      Electronically Signed: Rickey Werner MD    9/4/2023 1:48 PM CDT    Workstation ID: [id number]            EKG: A-fib    ECHO: EF 41 to 45%    Tele: A-fib    Assessment   Assessment/Plan: A-fib-heart rates are now well controlled and blood pressure stable.  We will continue Bystolic at low-dose and Eliquis.  We will sign off the case  2 LV dysfunction  3 low free T3-consider liothyronine supplementation    Akhil Iglesias MD  09/07/23  09:53 EDT

## 2023-09-08 PROCEDURE — 97530 THERAPEUTIC ACTIVITIES: CPT

## 2023-09-08 PROCEDURE — 94799 UNLISTED PULMONARY SVC/PX: CPT

## 2023-09-08 PROCEDURE — 99232 SBSQ HOSP IP/OBS MODERATE 35: CPT | Performed by: NURSE PRACTITIONER

## 2023-09-08 PROCEDURE — 94664 DEMO&/EVAL PT USE INHALER: CPT

## 2023-09-08 PROCEDURE — 97110 THERAPEUTIC EXERCISES: CPT

## 2023-09-08 RX ORDER — TROLAMINE SALICYLATE 10 G/100G
1 CREAM TOPICAL 3 TIMES DAILY PRN
Status: DISCONTINUED | OUTPATIENT
Start: 2023-09-08 | End: 2023-09-12 | Stop reason: HOSPADM

## 2023-09-08 RX ORDER — LIOTHYRONINE SODIUM 5 UG/1
5 TABLET ORAL DAILY
Status: DISCONTINUED | OUTPATIENT
Start: 2023-09-08 | End: 2023-09-08

## 2023-09-08 RX ADMIN — ACETAMINOPHEN 650 MG: 325 TABLET ORAL at 22:15

## 2023-09-08 RX ADMIN — APIXABAN 5 MG: 5 TABLET, FILM COATED ORAL at 19:35

## 2023-09-08 RX ADMIN — TIOTROPIUM BROMIDE INHALATION SPRAY 2 PUFF: 3.12 SPRAY, METERED RESPIRATORY (INHALATION) at 08:01

## 2023-09-08 RX ADMIN — PANTOPRAZOLE SODIUM 40 MG: 40 TABLET, DELAYED RELEASE ORAL at 08:51

## 2023-09-08 RX ADMIN — THIAMINE HCL TAB 100 MG 100 MG: 100 TAB at 08:52

## 2023-09-08 RX ADMIN — BUDESONIDE AND FORMOTEROL FUMARATE DIHYDRATE 2 PUFF: 160; 4.5 AEROSOL RESPIRATORY (INHALATION) at 08:01

## 2023-09-08 RX ADMIN — APIXABAN 5 MG: 5 TABLET, FILM COATED ORAL at 08:51

## 2023-09-08 RX ADMIN — NEBIVOLOL 2.5 MG: 5 TABLET ORAL at 08:51

## 2023-09-08 NOTE — THERAPY TREATMENT NOTE
Patient Name: Sonia Bella  : 1965    MRN: [id number]                              Today's Date: 2023       Admit Date: 2023    Visit Dx:     ICD-10-CM ICD-9-CM   1. Generalized weakness  R53.1 780.79   2. Diarrhea, unspecified type  R19.7 787.91   3. Acute UTI  N39.0 599.0   4. Hypokalemia  E87.6 276.8   5. Hypomagnesemia  E83.42 275.2   6. Hypoglycemia  E16.2 251.2   7. Anemia, unspecified type  D64.9 285.9   8. Chronic kidney disease, unspecified CKD stage  N18.9 585.9     Patient Active Problem List   Diagnosis    Angioedema due to angiotensin converting enzyme inhibitor (ACE-I)    HTN (hypertension)    B12 deficiency anemia    Iron deficiency anemia    CKD (chronic kidney disease) stage 3, GFR 30-59 ml/min    COVID-19 virus infection    COPD on home O2 (2L)    Morbid obesity with BMI of 50.0-59.9, adult    AoC HFpEF    Acute type 2 respiratory failure    Human metapneumovirus (hMPV)    GERD    Stage III CKD    Atrial fibrillation with RVR    Acute on chronic HFrEF (heart failure with reduced ejection fraction)    Dehydration    Diarrhea    Hypomagnesemia    Hypokalemia     Past Medical History:   Diagnosis Date    Bleeding ulcer     COPD (chronic obstructive pulmonary disease)     Coronary artery disease     GERD (gastroesophageal reflux disease)     History of stomach ulcers     Hypertension     Stage III CKD 02/15/2023     History reviewed. No pertinent surgical history.   General Information       Row Name 23 1138          Physical Therapy Time and Intention    Document Type therapy note (daily note)  -ML     Mode of Treatment physical therapy  -ML       Row Name 23 1138          General Information    Patient Profile Reviewed yes  -ML     Existing Precautions/Restrictions fall  -ML     Barriers to Rehab medically complex;previous functional deficit  -ML       Row Name 23 1138          Cognition    Orientation Status (Cognition) oriented x 3  -ML       Row Name 23  1138          Safety Issues, Functional Mobility    Safety Issues Affecting Function (Mobility) awareness of need for assistance;insight into deficits/self-awareness;safety precaution awareness;safety precautions follow-through/compliance;sequencing abilities  -     Impairments Affecting Function (Mobility) balance;endurance/activity tolerance;strength;postural/trunk control  -ML               User Key  (r) = Recorded By, (t) = Taken By, (c) = Cosigned By      Initials Name Provider Type     Fannie Macias Physical Therapist                   Mobility       Row Name 09/08/23 1139          Bed Mobility    Comment, (Bed Mobility) Patient found seated at EOB  -ML       Row Name 09/08/23 1139          Bed-Chair Transfer    Bed-Chair [facility] (Transfers) contact guard;verbal cues  -ML     Assistive Device (Bed-Chair Transfers) other (see comments)  UE support  -ML     Comment, (Bed-Chair Transfer) patient completed stand pivot transfer from EOB to BSC to EOB  to chair  -ML       Row Name 09/08/23 1139          Sit-Stand Transfer    Sit-Stand [facility] (Transfers) verbal cues;minimum assist (75% patient effort)  first sit to stand required minAx1, subsequent sit to stands required CGA  -ML     Assistive Device (Sit-Stand Transfers) other (see comments)  UE support  -ML       Row Name 09/08/23 1139          Gait/Stairs (Locomotion)    [facility] Level (Gait) not tested  -ML               User Key  (r) = Recorded By, (t) = Taken By, (c) = Cosigned By      Initials Name Provider Type    Fannie Matamoros Physical Therapist                   Obj/Interventions       Row Name 09/08/23 1148          Motor Skills    Therapeutic Exercise shoulder;hip;knee  -       Row Name 09/08/23 1148          Shoulder (Therapeutic Exercise)    Shoulder (Therapeutic Exercise) AROM (active range of motion)  -ML     Shoulder AROM (Therapeutic Exercise) bilateral;scapular retraction;10 repetitions  -ML       Row Name 09/08/23 1148           Hip (Therapeutic Exercise)    Hip (Therapeutic Exercise) strengthening exercise  -ML     Hip Strengthening (Therapeutic Exercise) bilateral;marching while seated;20 repititions  -ML       Row Name 09/08/23 1148          Knee (Therapeutic Exercise)    Knee (Therapeutic Exercise) strengthening exercise  -ML     Knee Strengthening (Therapeutic Exercise) bilateral;LAQ (long arc quad);20 repititions  -ML       Row Name 09/08/23 1148          Balance    Balance Assessment sitting static balance;sitting dynamic balance;sit to stand dynamic balance;standing static balance;standing dynamic balance  -ML     Static Sitting Balance independent  -ML     Dynamic Sitting Balance standby assist  -ML     Position, Sitting Balance unsupported;sitting edge of bed;other (see comments)  sitting on BSC  -ML     Sit to Stand Dynamic Balance verbal cues;minimal assist  progressing to CGA  -ML     Static Standing Balance contact guard  -ML     Dynamic Standing Balance contact guard;verbal cues  -ML     Position/Device Used, Standing Balance supported  -ML     Balance Interventions sitting;standing;sit to stand;supported;weight shifting activity  -ML     Comment, Balance forward flexed posture in standing  -ML               User Key  (r) = Recorded By, (t) = Taken By, (c) = Cosigned By      Initials Name Provider Type    ML Fannie Macias Physical Therapist                   Goals/Plan    No documentation.                  Clinical Impression       Row Name 09/08/23 1149          Pain    Pretreatment Pain Rating 0/10 - no pain  -ML     Posttreatment Pain Rating 0/10 - no pain  -ML       Row Name 09/08/23 1149          Plan of Care Review    Plan of Care Reviewed With patient  -ML     Progress improving  -ML     Outcome Evaluation Patient required less assistance to complete transfers, however, patient self directing during treatment session. Patient continues to present below baseline for mobility and would continue to benefit from skilled  PT to address strength, balalnce and activity tolerance deficits. Continue current PT POC.  -ML       Row Name 09/08/23 1149          Therapy Assessment/Plan (PT)    Rehab Potential (PT) fair, will monitor progress closely  -ML       Row Name 09/08/23 1149          Vital Signs    Pre Patient Position Sitting  -ML     Intra Patient Position Standing  -ML     Post Patient Position Sitting  -ML       Row Name 09/08/23 1149          Positioning and Restraints    Pre-Treatment Position in bed  -ML     Post Treatment Position chair  -ML     In Chair notified nsg;sitting;encouraged to call for assist;call light within reach;exit alarm on;waffle cushion  -ML               User Key  (r) = Recorded By, (t) = Taken By, (c) = Cosigned By      Initials Name Provider Type    Fannie Matamoros Physical Therapist                   Outcome Measures       Row Name 09/08/23 1153 09/08/23 0800       How much help from another person do you currently need...    Turning from your back to your side while in flat bed without using bedrails? 3  -ML 3  -BW    Moving from lying on back to sitting on the side of a flat bed without bedrails? 3  -ML 3  -BW    Moving to and from a bed to a chair (including a wheelchair)? 3  -ML 2  -BW    Standing up from a chair using your arms (e.g., wheelchair, bedside chair)? 3  -ML 3  -BW    Climbing 3-5 steps with a railing? 2  -ML 2  -BW    To walk in hospital room? 2  -ML 2  -BW    AM-PAC 6 Clicks Score (PT) 16  -ML 15  -BW    Highest level of mobility 5 --> Static standing  -ML 4 --> Transferred to chair/commode  -BW      Row Name 09/08/23 1153          Functional Assessment    Outcome Measure Options AM-PAC 6 Clicks Basic Mobility (PT)  -ML               User Key  (r) = Recorded By, (t) = Taken By, (c) = Cosigned By      Initials Name Provider Type    Fannie Matamoros Physical Therapist    Reji Rosenbaum, RN Registered Nurse                                 Physical Therapy Education       Title: PT  OT SLP Therapies (In Progress)       Topic: Physical Therapy (In Progress)       Point: Mobility training (Done)       Learning Progress Summary             Patient Acceptance, E, VU,NR by ML at 9/8/2023 1153    Acceptance, E, NR by NS at 9/2/2023 1257    Comment: PT POC, importance of OOB activity                         Point: Home exercise program (Done)       Learning Progress Summary             Patient Acceptance, E, VU,NR by ML at 9/8/2023 1153                         Point: Body mechanics (In Progress)       Learning Progress Summary             Patient Acceptance, E, NR by NS at 9/2/2023 1257    Comment: PT POC, importance of OOB activity                         Point: Precautions (Done)       Learning Progress Summary             Patient Acceptance, E, VU,NR by ML at 9/8/2023 1153    Acceptance, E, NR by NS at 9/2/2023 1257    Comment: PT POC, importance of OOB activity                                         User Key       Initials Effective Dates Name Provider Type Discipline    NS 06/16/21 -  Elsi Adams, PT Physical Therapist PT     04/22/21 -  Fannie Macias Physical Therapist PT                  PT Recommendation and Plan     Plan of Care Reviewed With: patient  Progress: improving  Outcome Evaluation: Patient required less assistance to complete transfers, however, patient self directing during treatment session. Patient continues to present below baseline for mobility and would continue to benefit from skilled PT to address strength, balalnce and activity tolerance deficits. Continue current PT POC.     Time Calculation:         PT Charges       Row Name 09/08/23 1154             Time Calculation    Start Time 1107  -ML      PT Received On 09/08/23  -ML         Timed Charges    19122 - PT Therapeutic Exercise Minutes 8  -ML      23527 - PT Therapeutic Activity Minutes 16  -ML         Total Minutes    Timed Charges Total Minutes 24  -ML       Total Minutes 24  -ML                User Key  (r) =  Recorded By, (t) = Taken By, (c) = Cosigned By      Initials Name Provider Type    Fannie Matamoros Physical Therapist                  Therapy Charges for Today       Code Description Service Date Service Provider Modifiers Qty    94612584288 HC PT THER PROC EA 15 MIN 9/8/2023 Fannie Macias GP 1    50593317816 HC PT THERAPEUTIC ACT EA 15 MIN 9/8/2023 Fannie Macias GP 1            PT G-Codes  Outcome Measure Options: AM-PAC 6 Clicks Basic Mobility (PT)  AM-PAC 6 Clicks Score (PT): 16  AM-PAC 6 Clicks Score (OT): 14  PT Discharge Summary  Anticipated Discharge Disposition (PT): skilled nursing facility    Fannie Macias  9/8/2023

## 2023-09-08 NOTE — PROGRESS NOTES
[facility] Medicine Services  PROGRESS NOTE    Patient Name: Sonia Bella  : 1965  MRN: [id number]    Date of Admission: 2023  Primary Care Physician: Provider, No Known    Subjective   Subjective     CC:  Follow-up dehydration    HPI:  Sitting up in chair, has not been able to sleep well due to pain. Very eager to go home. Asking to go home if cannot get to rehab soon, but daughters want the patient to go to rehab prior to returning home with them (discussed with Candelaria on phone today).     ROS:  Gen- No fevers, chills  CV- No chest pain, palpitations  Resp- No cough, +chronic dyspnea  GI-no diarrhea    Objective   Objective     Vital Signs:   Temp:  [97.4 °F (36.3 °C)-98.1 °F (36.7 °C)] 98.1 °F (36.7 °C)  Heart Rate:  [] 97  Resp:  [18-22] 18  BP: ()/(72-98) 100/90  Flow (L/min):  [3-4] 4     Physical Exam:  Constitutional: No acute distress, awake, alert  HENT: NCAT, mucous membranes moist  Respiratory: Clear to auscultation bilaterally, respiratory effort normal   Cardiovascular: Irreg/ reg rate, no murmurs, rubs, or gallops  Gastrointestinal: Positive bowel sounds, soft, nontender, nondistended  Musculoskeletal: No bilateral ankle edema  Psychiatric: Appropriate affect, cooperative  Neurologic: Oriented x 3, strength symmetric in all extremities, Cranial Nerves grossly intact to confrontation, speech clear  Skin: No rashes     Results Reviewed:  LAB RESULTS:      Lab 23  0445 23  0200 23  0514 23  0109 23  1725   WBC 5.46 5.37 5.33 5.46  --    HEMOGLOBIN 8.2* 8.1* 8.3* 8.6*  --    HEMATOCRIT 28.9* 28.7* 29.8* 30.7*  --    PLATELETS 144 130* 123* 138*  --    NEUTROS ABS  --   --   --  4.33  --    IMMATURE GRANS (ABS)  --   --   --  0.03  --    LYMPHS ABS  --   --   --  0.21*  --    MONOS ABS  --   --   --  0.82  --    EOS ABS  --   --   --  0.05  --    MCV 80.3 81.1 81.4 80.6  --    CRP  --   --   --   --  5.18*         Lab  09/07/23  1933 09/07/23  0345 09/06/23  1225 09/05/23 2358 09/05/23 0445 09/04/23 1912 09/04/23  1020 09/04/23  0200 09/03/23 0722 09/03/23 0514 09/02/23 1907 09/02/23 0444   SODIUM  --  133*  --  132* 134*  --   --  139  --  135*  --  142   POTASSIUM  --  4.6  --  4.7 4.3  --   --  4.7  --  4.4   < > 3.3*  3.3*   CHLORIDE  --  92*  --  93* 92*  --   --  96*  --  93*  --  93*   CO2  --  28.0  --  26.0 26.0  --   --  28.0  --  33.0*  --  33.0*   ANION GAP  --  13.0  --  13.0 16.0*  --   --  15.0  --  9.0  --  16.0*   BUN  --  28*  --  30* 30*  --   --  29*  --  30*  --  31*   CREATININE  --  2.13*  --  2.35* 2.44*  --   --  2.63*  --  2.46*  --  2.47*   EGFR  --  26.2*  --  23.3* 22.3*  --   --  20.4*  --  22.1*  --  22.0*   GLUCOSE  --  86  --  83 67  --   --  77  --  81  --  97   CALCIUM  --  9.0  --  8.9 8.7  --   --  8.5*  --  8.5*  --  8.3*   MAGNESIUM  --   --  2.5  --  1.8  --   --  1.9  --  2.2  --  2.3   PHOSPHORUS 2.3* 1.7* 2.5 2.0*  --  2.8   < > 1.7*   < >  --   --   --     < > = values in this interval not displayed.         Lab 09/05/23 2358   ALBUMIN 3.0*         Lab 09/02/23 2353 09/02/23 2041 09/02/23 1907   HSTROP T 49* 65* 51*             Lab 09/05/23  0445   IRON 63   IRON SATURATION (TSAT) 19*   TIBC 332   TRANSFERRIN 223   FERRITIN 117.00         Brief Urine Lab Results  (Last result in the past 365 days)        Color   Clarity   Blood   Leuk Est   Nitrite   Protein   CREAT   Urine HCG        09/01/23 1444 Yellow   Clear   Negative   Trace   Negative   30 mg/dL (1+)                   Microbiology Results Abnormal       Procedure Component Value - Date/Time    Blood Culture - Blood, Hand, Left [[id number]]  (Normal) Collected: 09/02/23 1907    Lab Status: Final result Specimen: Blood from Hand, Left Updated: 09/07/23 2045     Blood Culture No growth at 5 days    Blood Culture - Blood, Arm, Left [[id number]]  (Normal) Collected: 09/02/23 1907    Lab Status: Final result Specimen: Blood  from Arm, Left Updated: 09/07/23 2045     Blood Culture No growth at 5 days    Blood Culture - Blood, Arm, Right [[id number]]  (Normal) Collected: 09/01/23 1152    Lab Status: Final result Specimen: Blood from Arm, Right Updated: 09/06/23 1300     Blood Culture No growth at 5 days    MRSA Screen, PCR (Inpatient) - Swab, Nares [[id number]]  (Normal) Collected: 09/02/23 1635    Lab Status: Final result Specimen: Swab from Nares Updated: 09/02/23 2050     MRSA PCR Negative    Narrative:      The negative predictive value of this diagnostic test is high and should only be used to consider de-escalating anti-MRSA therapy. A positive result may indicate colonization with MRSA and must be correlated clinically.  MRSA Negative    Gastrointestinal Panel, PCR - Stool, Per Rectum [[id number]]  (Normal) Collected: 09/02/23 1551    Lab Status: Final result Specimen: Stool from Per Rectum Updated: 09/02/23 1735     Campylobacter Not Detected     Plesiomonas shigelloides Not Detected     Salmonella Not Detected     Vibrio Not Detected     Vibrio cholerae Not Detected     Yersinia enterocolitica Not Detected     Enteroaggregative E. coli (EAEC) Not Detected     Enteropathogenic E. coli (EPEC) Not Detected     Enterotoxigenic E. coli (ETEC) lt/st Not Detected     Shiga-like toxin-producing E. coli (STEC) stx1/stx2 Not Detected     Shigella/Enteroinvasive E. coli (EIEC) Not Detected     Cryptosporidium Not Detected     Cyclospora cayetanensis Not Detected     Entamoeba histolytica Not Detected     Giardia lamblia Not Detected     Adenovirus F40/41 Not Detected     Astrovirus Not Detected     Norovirus GI/GII Not Detected     Rotavirus A Not Detected     Sapovirus (I, II, IV or V) Not Detected    Respiratory Panel PCR w/COVID-19(SARS-CoV-2) [facility]/ELIS[facility] In-House, NP Swab in UTM/VTM, 3-4 HR TAT - Swab, Nasopharynx [[id number]]  (Normal) Collected: 09/01/23 1151    Lab Status: Final result Specimen: Swab from Nasopharynx  Updated: 09/01/23 1327     ADENOVIRUS, PCR Not Detected     Coronavirus 229E Not Detected     Coronavirus HKU1 Not Detected     Coronavirus NL63 Not Detected     Coronavirus OC43 Not Detected     COVID19 Not Detected     Human Metapneumovirus Not Detected     Human Rhinovirus/Enterovirus Not Detected     Influenza A PCR Not Detected     Influenza B PCR Not Detected     Parainfluenza Virus 1 Not Detected     Parainfluenza Virus 2 Not Detected     Parainfluenza Virus 3 Not Detected     Parainfluenza Virus 4 Not Detected     RSV, PCR Not Detected     Bordetella pertussis pcr Not Detected     Bordetella parapertussis PCR Not Detected     Chlamydophila pneumoniae PCR Not Detected     Mycoplasma pneumo by PCR Not Detected    Narrative:      In the setting of a positive respiratory panel with a viral infection PLUS a negative procalcitonin without other underlying concern for bacterial infection, consider observing off antibiotics or discontinuation of antibiotics and continue supportive care. If the respiratory panel is positive for atypical bacterial infection (Bordetella pertussis, Chlamydophila pneumoniae, or Mycoplasma pneumoniae), consider antibiotic de-escalation to target atypical bacterial infection.            No radiology results from the last 24 hrs    Results for orders placed during the hospital encounter of 09/01/23    Adult Transthoracic Echo Complete W/ Cont if Necessary Per Protocol    Interpretation Summary    Left ventricular systolic function is mildly decreased. Left ventricular ejection fraction appears to be 41 - 45%.    Left ventricular wall thickness is consistent with moderate concentric hypertrophy.    Mildly reduced right ventricular systolic function noted.    The right ventricular cavity is mild to moderately dilated.    The left atrial cavity is moderately dilated.    Estimated right ventricular systolic pressure from tricuspid regurgitation is normal (<35 mmHg). Calculated right ventricular  systolic pressure from tricuspid regurgitation is 29 mmHg.    There is a trivial pericardial effusion adjacent to the right ventricle.      Current medications:  Scheduled Meds:apixaban, 5 mg, Oral, Q12H  budesonide-formoterol, 2 puff, Inhalation, BID - RT  nebivolol, 2.5 mg, Oral, Daily  pantoprazole, 40 mg, Oral, Daily  thiamine, 100 mg, Oral, Daily  tiotropium bromide monohydrate, 2 puff, Inhalation, Daily - RT      Continuous Infusions:O2, 3 L/min      PRN Meds:.  acetaminophen    Calcium Replacement - Follow Nurse / BPA Driven Protocol    dextrose    dextrose    glucagon (human recombinant)    ipratropium-albuterol    Magnesium Standard Dose Replacement - Follow Nurse / BPA Driven Protocol    metoprolol tartrate    ondansetron    Phosphorus Replacement - Follow Nurse / BPA Driven Protocol    Potassium Replacement - Follow Nurse / BPA Driven Protocol    simethicone    Assessment & Plan   Assessment & Plan     Active Hospital Problems    Diagnosis  POA    **Dehydration [E86.0]  Yes    Diarrhea [R19.7]  Yes    Hypomagnesemia [E83.42]  Yes    Hypokalemia [E87.6]  Yes    Stage III CKD [N18.30]  Yes    AoC HFpEF [I50.33]  Yes    Morbid obesity with BMI of 50.0-59.9, adult [E66.01, Z68.43]  Not Applicable    HTN (hypertension) [I10]  Yes      Resolved Hospital Problems   No resolved problems to display.        Brief Hospital Course to date:  Sonia Bella is a 58 y.o. female with COPD on 3 L chronically, A fib not on AC, HFrEF (EF of 40%), CAD, GERD, prior bleeding stomach ulcers, HTN, Obsestity, and CKD stage III, who presented to the ED with weakness, confusion, hypoglycemia that was treated.    This patient's problems and plans were partially entered by my partner and updated as appropriate by me 09/08/23.     A fib with RVR and associated hypotension  Chest pain, resolved  HFrEF, EF of 40%   Cardiomegaly  --Not on ACE due to angioedema  --TSH within normal limits  --Continue beta blocker at reduced dose with  holding parameters  --Continue tele  --status post digoxin x2 per cardiology  --Status post IV fluids  --Resumed Eliquis; had stopped outpatient for an unclear reason, denied history of bleeding, per daughter, may not have been taking her medications  --9/5 ECHO: LVEF 41 to 45%, moderate concentric LVH, mildly reduced RV systolic function, RV cavity mild to moderately dilated (has been seen previously), trivial pericardial effusion  --rate controlled     Anemia  --Appears chronic  --Vitamin B12 level 318, folate level 7 in August 2023, will not repeat  --Iron panel, ferritin w/ ferritin/TIBC/Iron/transferrin wnl, iron saturation low    Diarrhea  -GI PCR negative; C. difficile pending (no additional stool --> cancelled)    Hypomagnesemia  Hypokalemia  Hypophosphatemia  -replete    Hypoglycemia, resolved  Dehydration  -Likely due to poor oral intake; status post IV fluids  -C peptide sent  -Thiamine  -AM cortisol 23.11    Staph epi in 1 of 2 blood cultures  --Blood culture 1 of 2 positive for staph epi, suspected contaminant  --Repeat blood cultures NGTD  --Stopped vancomycin    CKD  --Baseline creatinine 1.9-2.3    Deconditioning  Chronic pain   --PT/OT consulted --> SNF; awaiting insurance approval   --Kpad/ topical pain relief/ try waffle mattress on bed    COPD  Chronic hypoxia, on 3L continuously at home  --Continue home medications    Obesity - BMI 40.49    Expected Discharge Location and Transportation: SNF, patient amenable  Expected Discharge   Expected Discharge Date: 9/11/2023; Expected Discharge Time:      DVT prophylaxis:  Medical DVT prophylaxis orders are present.     AM-PAC 6 Clicks Score (PT): 16 (09/08/23 4443)    CODE STATUS:   Code Status and Medical Interventions:   Ordered at: 09/01/23 5103     Code Status (Patient has no pulse and is not breathing):    CPR (Attempt to Resuscitate)     Medical Interventions (Patient has pulse or is breathing):    Full       Nery Beltrán, APRN  09/08/23

## 2023-09-08 NOTE — PLAN OF CARE
Goal Outcome Evaluation:  Plan of Care Reviewed With: patient        Progress: improving  Outcome Evaluation: Patient required less assistance to complete transfers, however, patient self directing during treatment session. Patient continues to present below baseline for mobility and would continue to benefit from skilled PT to address strength, balance and activity tolerance deficits. Continue current PT POC.      Anticipated Discharge Disposition (PT): skilled nursing facility

## 2023-09-08 NOTE — NURSING NOTE
Patient A/O x 4, on room air, a-fib on the monitor. No c/o pain to this writer but complained to provider. Patient is requesting discharge and adamant about leaving facility, health care team notified. Daughter refusing to let patient come home to her residence. Awaiting pre-cert from [place]. Will continue with current plan of care.

## 2023-09-08 NOTE — DISCHARGE PLACEMENT REQUEST
"Leanne Callahan (58 y.o. Female)           Date of Birth   1965    Social Security Number       Address   [address] [place] [address]    Home Phone   [phone]    MRN   [id number]       Mormonism   None    Marital Status                               Admission Date   23    Admission Type   Emergency    Admitting Provider   BIANKA Contreras DO    Attending Provider   BIANKA Contreras DO    Department, Room/Bed   [facility] 6A, N604/1       Discharge Date       Discharge Disposition       Discharge Destination                                 Attending Provider: BIANKA Contreras DO    Allergies: Ace Inhibitors    Isolation: None   Infection: None   Code Status: CPR    Ht: 170.2 cm (67\")   Wt: 137 kg (301 lb 1.6 oz)    Admission Cmt: None   Principal Problem: Dehydration [E86.0]                   Active Insurance as of 2023       Primary Coverage       Payor Plan Insurance Group Employer/Plan Group    UNITED [employer] MEDICARE REPLACEMENT [facility] DUAL COMPLETE MEDICARE REPLACEMENT [id number]       Payor Plan Address Payor Plan Phone Number Payor Plan Fax Number Effective Dates    [address] [phone]  2023 - None Entered    [facility] [id number]         Subscriber Name Subscriber Birth Date Member ID       LEANNE CALLAHAN 1965 [id number]                     Emergency Contacts        (Rel.) Home Phone Work Phone Mobile Phone    Candelaria Camargo (Daughter) [phone]                 History & Physical        Davina Samuels MD at 23 [facility]              [facility] Medicine Services  HISTORY AND PHYSICAL    Patient Name: Leanne Callahan  : 1965  MRN: [id number]  Primary Care Physician: Provider, No Known    Subjective  Subjective     Chief Complaint:diarrhea and weakness    HPI:  Leanne Callahan is a 58 y.o. female who  has a past medical history of Bleeding ulcer, COPD " (chronic obstructive pulmonary disease), Cronigh HFrEF with ED of 40%, Coronary artery disease, GERD (gastroesophageal reflux disease), History of stomach ulcers, Hypertension, Obsestity, and Stage III CKD (02/15/2023). who presented to the ED with profound weakness, confusion, hypoglycemia that was treated. She continues to feel weak and hungry but has been afraid to eat due to the diarrhea. She denies sick contacts or eating any questionable food, she denies stool in her stool.        Review of Systems   Patient denies weight loss, headaches, changes in vision, fever, chills, sore throat, shortness of breath, cough, nausea or vomiting, abdominal pain or distension, change in urine output or habits, joint pain, rash, itching, numbness/tingling, weakness or bleeding.    Otherwise complete ROS is negative except as mentioned in the HPI.    Personal History         PMH: She  has a past medical history of Bleeding ulcer, COPD (chronic obstructive pulmonary disease), Coronary artery disease, GERD (gastroesophageal reflux disease), History of stomach ulcers, Hypertension, and Stage III CKD (02/15/2023).   PSxH: She  has no past surgical history on file.         FH: Her family history is not on file.   SH: She  reports that she has quit smoking. Her smoking use included cigarettes. She has never used smokeless tobacco. She reports current alcohol use. She reports that she does not use drugs.     Medications:  O2, albuterol sulfate HFA, apixaban, budesonide-formoterol, colchicine, famotidine, nebivolol, tiotropium bromide monohydrate, and torsemide    Allergies   Allergen Reactions    Ace Inhibitors Angioedema     lisinopril       Objective  Objective     Vital Signs:   Temp:  [98.2 °F (36.8 °C)] 98.2 °F (36.8 °C)  Heart Rate:  [78-94] 94  Resp:  [16] 16  BP: (106-130)/(71-98) 126/91  Flow (L/min):  [3] 3    Constitutional: Awake, alert, interactive and pleasant- slow to speak, miserable, weak  Eyes: clear sclerae, no  conjunctival injection  HENT: NCAT, mucous membranes dry, pale  Neck: no masses or lymphadenopathy, trachea midline  Respiratory: good breath sounds bilaterally, respirations unlabored  Cardiovascular: RRR, no murmurs appreciated, palpable peripheral pulses  Abdomen:  soft, obese, tender lower abdomen  Musculoskeletal: No peripheral edema, clubbing or cyanosis  Neurologic: Oriented x 3, speech slow but clear                      Strength symmetric in all extremities                     Cranial Nerves grossly intact, speech clear  Skin: No rashes or jaundice  Psychiatric: Appropriate mood, insight      Result Review:  I have personally reviewed the results from the time of this admission   to 9/1/2023 14:03 EDT and agree with these findings:  [x]  Laboratory  [x]  Microbiology  [x]  Radiology  [x]  EKG/Telemetry   []  Cardiology/Vascular   []  Pathology  [x]  Old records  []  Other:  Most notable findings include: anemia, low K and Mag, elevated creatinine      LAB RESULTS:      Lab 09/01/23  1101   WBC 5.47   HEMOGLOBIN 9.5*   HEMATOCRIT 34.6   PLATELETS 150   NEUTROS ABS 4.44   IMMATURE GRANS (ABS) 0.02   LYMPHS ABS 0.27*   MONOS ABS 0.68   EOS ABS 0.05   MCV 83.4   PROCALCITONIN 0.65*   LACTATE 1.8   PROTIME 22.6*         Lab 09/01/23  1125 09/01/23  1108 09/01/23  1101   SODIUM  --   --  141   POTASSIUM  --   --  2.9*   CHLORIDE  --   --  89*   CO2  --   --  35.0*   ANION GAP  --   --  17.0*   BUN  --   --  30*   CREATININE 2.60 2.60* 2.40*   EGFR  --   --  22.7*   GLUCOSE  --   --  61*   CALCIUM  --   --  8.5*   MAGNESIUM  --   --  1.1*   TSH  --   --  1.840         Lab 09/01/23  1101   TOTAL PROTEIN 6.7   ALBUMIN 3.6   GLOBULIN 3.1   ALT (SGPT) 33   AST (SGOT) 81*   BILIRUBIN 3.4*   ALK PHOS 133*         Lab 09/01/23  1101   PROBNP 21,177.0*   HSTROP T 41*   PROTIME 22.6*   INR 1.97*             Lab 09/01/23  1152   ABO TYPING O   RH TYPING Positive   ANTIBODY SCREEN Negative         Brief Urine Lab Results   (Last result in the past 365 days)        Color   Clarity   Blood   Leuk Est   Nitrite   Protein   CREAT   Urine HCG        10/01/22 0434 Yellow   Clear   Trace   Negative   Negative   >=300 mg/dL (3+)                 COVID19   Date Value Ref Range Status   09/01/2023 Not Detected Not Detected - Ref. Range Final       CT Abdomen Pelvis Without Contrast    Result Date: 9/1/2023  CT ABDOMEN PELVIS WO CONTRAST Date of Exam: 9/1/2023 11:04 AM EDT Indication: gen weakness. Comparison: None available. Technique: Axial CT images were obtained of the abdomen and pelvis without the administration of contrast. Reconstructed coronal and sagittal images were also obtained. Automated exposure control and iterative construction methods were used. Findings: The lung apices demonstrate small right pleural effusion. There is notable four-chamber cardiac enlargement. The body wall soft tissues demonstrate anasarca. The osseous structures demonstrate no evidence of acute fracture or aggressive osseous lesion. The liver is enlarged. There is no suspicious focal hepatic, splenic or pancreatic lesion on limited noncontrast evaluation. There is a slightly hyperdense exophytic round finding involving the left kidney, favoring a hemorrhagic cyst. There is no evidence of hydronephrosis. Small and large bowel loops are nondilated. Diverticular changes are present without definite evidence of acute diverticulitis. Numerous hyperdense uterine findings are present, likely to represent fibroids but incompletely characterized. There is no free fluid or pneumoperitoneum. Hyperdense gallstones are present without inflammatory signs of cholecystitis.     Impression: Impression: There is marked cardiac enlargement, with diffuse anasarca and a small right pleural effusion. There is also hepatic enlargement and findings are concerning for heart failure. No acute findings are present in the abdomen and pelvis. An exophytic left renal finding is noted,  favoring a benign hemorrhagic cyst, better characterized on recent ultrasound. Nodular hyperdense appearance of the uterus suggesting underlying fibroids. Electronically Signed: Ryley Busby MD  9/1/2023 11:19 AM EDT  Workstation ID: [id number]    CT Head Without Contrast    Result Date: 9/1/2023  CT HEAD WO CONTRAST Date of Exam: 9/1/2023 11:04 AM EDT Indication: gen weakness. Comparison: None available. Technique: Axial CT images were obtained of the head without contrast administration.  Automated exposure control and iterative construction methods were used. Findings: Gray-white differentiation is maintained and there is no evidence of intracranial hemorrhage, mass or mass effect. The ventricles are normal in size and configuration. The orbits are normal. The paranasal sinuses are grossly clear. The calvarium is intact.     Impression: Impression: No acute intracranial abnormality. Electronically Signed: Ryley Busby MD  9/1/2023 11:12 AM EDT  Workstation ID: [id number]     Results for orders placed during the hospital encounter of 02/15/23    Adult Transthoracic Echo Limited W/ Cont if Necessary Per Protocol    Interpretation Summary    The right atrial cavity is dilated.    Left ventricular ejection fraction appears to be 41 - 45%. The left ventricular cavity is mildly dilated. Left ventricular wall thickness is consistent with mild concentric hypertrophy. There is left ventricular global hypokinesis noted.    Moderate tricuspid valve regurgitation is present.    The left atrial cavity is mildly dilated. Saline test results are negative.      The patient qualifies to receive the vaccine, but they have not yet received it.    Assessment & Plan  Assessment / Plan       Dehydration    Diarrhea    HTN (hypertension)    Morbid obesity with BMI of 50.0-59.9, adult    AoC HFpEF    Stage III CKD    Hypomagnesemia    Hypokalemia      Assessment & Plan:  Sonia Bella is a 58 y.o. female who  has a past medical  history of Bleeding ulcer, COPD (chronic obstructive pulmonary disease), Cronigh HFrEF with ED of 40%, Coronary artery disease, GERD (gastroesophageal reflux disease), History of stomach ulcers, Hypertension, Obsestity, and Stage III CKD (02/15/2023). who presented to the ED with profound weakness, confusion, hypoglycemia that was treated.    Diarrhea  -GI PCR is pending    HypoK and MAg  -replete    EF of 40% with hepatic congestion  --  NOT on ACE due to angioedema    Hypoglycemia  -hopefully just due to starvation, check c peptid  -add IVFs, check cortisol    CKD    Obesity  DVT prophylaxis:  Medical DVT prophylaxis orders are present.    CODE STATUS:Full code     Expected Discharge  Expected Discharge Date: 9/3/2023; Expected Discharge Time:     Electronically signed by Davina Samuels MD 23 14:03 EDT            Electronically signed by Davina Samuels MD at 23 1814          Physician Progress Notes (most recent note)        Nery Beltrán APRN at 23 1240              [facility] Medicine Services  PROGRESS NOTE    Patient Name: Sonia Bella  : 1965  MRN: [id number]    Date of Admission: 2023  Primary Care Physician: Provider, No Known    Subjective   Subjective     CC:  Follow-up dehydration    HPI:  On 3LNC, baseline oxygen. No soa. Asking when she can leave, cannot sleep and bed is hurting her back.     ROS:  Gen- No fevers, chills  CV- No chest pain, palpitations  Resp- No cough, +chronic dyspnea  GI-no diarrhea    Objective   Objective     Vital Signs:   Temp:  [97.4 °F (36.3 °C)-97.8 °F (36.6 °C)] 97.8 °F (36.6 °C)  Heart Rate:  [77-98] 80  Resp:  [18-20] 18  BP: ()/(61-90) 108/73  Flow (L/min):  [3-4] 3     Physical Exam:  Constitutional: No acute distress, awake, alert  HENT: NCAT, mucous membranes moist  Respiratory: Clear to auscultation bilaterally, respiratory effort normal   Cardiovascular: Irreg/ reg rate, no murmurs, rubs, or  gallops  Gastrointestinal: Positive bowel sounds, soft, nontender, nondistended  Musculoskeletal: No bilateral ankle edema  Psychiatric: Appropriate affect, cooperative  Neurologic: Oriented x 3, strength symmetric in all extremities, Cranial Nerves grossly intact to confrontation, speech clear  Skin: No rashes     Results Reviewed:  LAB RESULTS:      Lab 09/05/23  0445 09/04/23  0200 09/03/23  0514 09/02/23  0109 09/01/23  1725 09/01/23  1101   WBC 5.46 5.37 5.33 5.46  --  5.47   HEMOGLOBIN 8.2* 8.1* 8.3* 8.6*  --  9.5*   HEMATOCRIT 28.9* 28.7* 29.8* 30.7*  --  34.6   PLATELETS 144 130* 123* 138*  --  150   NEUTROS ABS  --   --   --  4.33  --  4.44   IMMATURE GRANS (ABS)  --   --   --  0.03  --  0.02   LYMPHS ABS  --   --   --  0.21*  --  0.27*   MONOS ABS  --   --   --  0.82  --  0.68   EOS ABS  --   --   --  0.05  --  0.05   MCV 80.3 81.1 81.4 80.6  --  83.4   SED RATE  --   --   --   --   --  54*   CRP  --   --   --   --  5.18*  --    PROCALCITONIN  --   --   --   --   --  0.65*   LACTATE  --   --   --   --   --  1.8   PROTIME  --   --   --   --   --  22.6*         Lab 09/07/23  0345 09/06/23  1225 09/05/23  2358 09/05/23 0445 09/04/23  1912 09/04/23  1602 09/04/23  1020 09/04/23  0200 09/03/23  0722 09/03/23  0514 09/02/23  1907 09/02/23  0444 09/01/23  1108 09/01/23  1101   SODIUM 133*  --  132* 134*  --   --   --  139  --  135*  --  142   < > 141   POTASSIUM 4.6  --  4.7 4.3  --   --   --  4.7  --  4.4   < > 3.3*  3.3*   < > 2.9*   CHLORIDE 92*  --  93* 92*  --   --   --  96*  --  93*  --  93*   < > 89*   CO2 28.0  --  26.0 26.0  --   --   --  28.0  --  33.0*  --  33.0*   < > 35.0*   ANION GAP 13.0  --  13.0 16.0*  --   --   --  15.0  --  9.0  --  16.0*   < > 17.0*   BUN 28*  --  30* 30*  --   --   --  29*  --  30*  --  31*   < > 30*   CREATININE 2.13*  --  2.35* 2.44*  --   --   --  2.63*  --  2.46*  --  2.47*   < > 2.40*   EGFR 26.2*  --  23.3* 22.3*  --   --   --  20.4*  --  22.1*  --  22.0*   < > 22.7*    GLUCOSE 86  --  83 67  --   --   --  77  --  81  --  97   < > 61*   CALCIUM 9.0  --  8.9 8.7  --   --   --  8.5*  --  8.5*  --  8.3*   < > 8.5*   MAGNESIUM  --  2.5  --  1.8  --   --   --  1.9  --  2.2  --  2.3   < > 1.1*   PHOSPHORUS 1.7* 2.5 2.0*  --  2.8 2.9   < > 1.7*   < >  --   --   --   --   --    HEMOGLOBIN A1C  --   --   --   --   --   --   --   --   --   --   --   --   --  4.80   TSH  --   --   --   --   --   --   --   --   --   --   --   --   --  1.840    < > = values in this interval not displayed.         Lab 09/05/23 2358 09/01/23  1101   TOTAL PROTEIN  --  6.7   ALBUMIN 3.0* 3.6   GLOBULIN  --  3.1   ALT (SGPT)  --  33   AST (SGOT)  --  81*   BILIRUBIN  --  3.4*   ALK PHOS  --  133*         Lab 09/02/23 2353 09/02/23 2041 09/02/23 1907 09/01/23  1101   PROBNP  --   --   --  21,177.0*   HSTROP T 49* 65* 51* 41*   PROTIME  --   --   --  22.6*   INR  --   --   --  1.97*             Lab 09/05/23  0445 09/01/23  1152   IRON 63  --    IRON SATURATION (TSAT) 19*  --    TIBC 332  --    TRANSFERRIN 223  --    FERRITIN 117.00  --    ABO TYPING  --  O   RH TYPING  --  Positive   ANTIBODY SCREEN  --  Negative         Brief Urine Lab Results  (Last result in the past 365 days)        Color   Clarity   Blood   Leuk Est   Nitrite   Protein   CREAT   Urine HCG        09/01/23 1444 Yellow   Clear   Negative   Trace   Negative   30 mg/dL (1+)                   Microbiology Results Abnormal       Procedure Component Value - Date/Time    Blood Culture - Blood, Hand, Left [[id number]]  (Normal) Collected: 09/02/23 1907    Lab Status: Preliminary result Specimen: Blood from Hand, Left Updated: 09/06/23 2045     Blood Culture No growth at 4 days    Blood Culture - Blood, Arm, Left [[id number]]  (Normal) Collected: 09/02/23 1907    Lab Status: Preliminary result Specimen: Blood from Arm, Left Updated: 09/06/23 2045     Blood Culture No growth at 4 days    Blood Culture - Blood, Arm, Right [[id number]]  (Normal) Collected:  09/01/23 1152    Lab Status: Final result Specimen: Blood from Arm, Right Updated: 09/06/23 1300     Blood Culture No growth at 5 days    MRSA Screen, PCR (Inpatient) - Swab, Nares [[id number]]  (Normal) Collected: 09/02/23 1635    Lab Status: Final result Specimen: Swab from Nares Updated: 09/02/23 2050     MRSA PCR Negative    Narrative:      The negative predictive value of this diagnostic test is high and should only be used to consider de-escalating anti-MRSA therapy. A positive result may indicate colonization with MRSA and must be correlated clinically.  MRSA Negative    Gastrointestinal Panel, PCR - Stool, Per Rectum [[id number]]  (Normal) Collected: 09/02/23 1551    Lab Status: Final result Specimen: Stool from Per Rectum Updated: 09/02/23 1735     Campylobacter Not Detected     Plesiomonas shigelloides Not Detected     Salmonella Not Detected     Vibrio Not Detected     Vibrio cholerae Not Detected     Yersinia enterocolitica Not Detected     Enteroaggregative E. coli (EAEC) Not Detected     Enteropathogenic E. coli (EPEC) Not Detected     Enterotoxigenic E. coli (ETEC) lt/st Not Detected     Shiga-like toxin-producing E. coli (STEC) stx1/stx2 Not Detected     Shigella/Enteroinvasive E. coli (EIEC) Not Detected     Cryptosporidium Not Detected     Cyclospora cayetanensis Not Detected     Entamoeba histolytica Not Detected     Giardia lamblia Not Detected     Adenovirus F40/41 Not Detected     Astrovirus Not Detected     Norovirus GI/GII Not Detected     Rotavirus A Not Detected     Sapovirus (I, II, IV or V) Not Detected    Respiratory Panel PCR w/COVID-19(SARS-CoV-2) ABBY/SAHRA/ELISABETH/PAD/COR/MAD/JABARI In-House, NP Swab in UTM/VTM, 3-4 HR TAT - Swab, Nasopharynx [[id number]]  (Normal) Collected: 09/01/23 1151    Lab Status: Final result Specimen: Swab from Nasopharynx Updated: 09/01/23 1327     ADENOVIRUS, PCR Not Detected     Coronavirus 229E Not Detected     Coronavirus HKU1 Not Detected     Coronavirus NL63 Not  Detected     Coronavirus OC43 Not Detected     COVID19 Not Detected     Human Metapneumovirus Not Detected     Human Rhinovirus/Enterovirus Not Detected     Influenza A PCR Not Detected     Influenza B PCR Not Detected     Parainfluenza Virus 1 Not Detected     Parainfluenza Virus 2 Not Detected     Parainfluenza Virus 3 Not Detected     Parainfluenza Virus 4 Not Detected     RSV, PCR Not Detected     Bordetella pertussis pcr Not Detected     Bordetella parapertussis PCR Not Detected     Chlamydophila pneumoniae PCR Not Detected     Mycoplasma pneumo by PCR Not Detected    Narrative:      In the setting of a positive respiratory panel with a viral infection PLUS a negative procalcitonin without other underlying concern for bacterial infection, consider observing off antibiotics or discontinuation of antibiotics and continue supportive care. If the respiratory panel is positive for atypical bacterial infection (Bordetella pertussis, Chlamydophila pneumoniae, or Mycoplasma pneumoniae), consider antibiotic de-escalation to target atypical bacterial infection.            No radiology results from the last 24 hrs    Results for orders placed during the hospital encounter of 09/01/23    Adult Transthoracic Echo Complete W/ Cont if Necessary Per Protocol    Interpretation Summary    Left ventricular systolic function is mildly decreased. Left ventricular ejection fraction appears to be 41 - 45%.    Left ventricular wall thickness is consistent with moderate concentric hypertrophy.    Mildly reduced right ventricular systolic function noted.    The right ventricular cavity is mild to moderately dilated.    The left atrial cavity is moderately dilated.    Estimated right ventricular systolic pressure from tricuspid regurgitation is normal (<35 mmHg). Calculated right ventricular systolic pressure from tricuspid regurgitation is 29 mmHg.    There is a trivial pericardial effusion adjacent to the right ventricle.      Current  medications:  Scheduled Meds:apixaban, 5 mg, Oral, Q12H  budesonide-formoterol, 2 puff, Inhalation, BID - RT  nebivolol, 2.5 mg, Oral, Daily  pantoprazole, 40 mg, Oral, Daily  thiamine, 100 mg, Oral, Daily  tiotropium bromide monohydrate, 2 puff, Inhalation, Daily - RT      Continuous Infusions:O2, 3 L/min      PRN Meds:.  acetaminophen    Calcium Replacement - Follow Nurse / BPA Driven Protocol    dextrose    dextrose    glucagon (human recombinant)    ipratropium-albuterol    Magnesium Standard Dose Replacement - Follow Nurse / BPA Driven Protocol    metoprolol tartrate    ondansetron    Phosphorus Replacement - Follow Nurse / BPA Driven Protocol    Potassium Replacement - Follow Nurse / BPA Driven Protocol    simethicone    Assessment & Plan   Assessment & Plan     Active Hospital Problems    Diagnosis  POA    **Dehydration [E86.0]  Yes    Diarrhea [R19.7]  Yes    Hypomagnesemia [E83.42]  Yes    Hypokalemia [E87.6]  Yes    Stage III CKD [N18.30]  Yes    AoC HFpEF [I50.33]  Yes    Morbid obesity with BMI of 50.0-59.9, adult [E66.01, Z68.43]  Not Applicable    HTN (hypertension) [I10]  Yes      Resolved Hospital Problems   No resolved problems to display.        Brief Hospital Course to date:  Sonia Bella is a 58 y.o. female with COPD on 3 L chronically, A fib not on AC, HFrEF (EF of 40%), CAD, GERD, prior bleeding stomach ulcers, HTN, Obsestity, and CKD stage III, who presented to the ED with weakness, confusion, hypoglycemia that was treated.    This patient's problems and plans were partially entered by my partner and updated as appropriate by me 09/07/23.     A fib with RVR and associated hypotension  Chest pain, resolved  HFrEF, EF of 40%   Cardiomegaly  --Not on ACE due to angioedema  --TSH within normal limits  --Continue beta blocker at reduced dose with holding parameters  --Continue tele  --status post digoxin x2 per cardiology  --Status post IV fluids  --Resumed Eliquis; had stopped outpatient for an  unclear reason, denied history of bleeding, per daughter, may not have been taking her medications  -- ECHO: LVEF 41 to 45%, moderate concentric LVH, mildly reduced RV systolic function, RV cavity mild to moderately dilated (has been seen previously), trivial pericardial effusion    Anemia  --Appears chronic  --Vitamin B12 level 318, folate level 7 in 2023, will not repeat  --Iron panel, ferritin w/ ferritin/TIBC/Iron/transferrin wnl, iron saturation low    Diarrhea  -GI PCR negative; C. difficile pending (no additional stool --> cancelled)    Hypomagnesemia  Hypokalemia  Hypophosphatemia  -replete    Hypoglycemia, resolved  Dehydration  -Likely due to poor oral intake; status post IV fluids  -C peptide sent  -Thiamine  -AM cortisol 23.11    Staph epi in 1 of 2 blood cultures  --Blood culture 1 of 2 positive for staph epi, suspected contaminant  --Repeat blood cultures NGTD  --Stopped vancomycin    CKD  --Baseline creatinine 1.9-2.3    Deconditioning  --PT/OT consulted --> SNF    COPD  Chronic hypoxia, on 3L continuously at home  --Continue home medications    Obesity - BMI 40.49    Expected Discharge Location and Transportation: SNF, patient amenable  Expected Discharge   Expected Discharge Date: 2023; Expected Discharge Time:      DVT prophylaxis:  Medical DVT prophylaxis orders are present.     AM-PAC 6 Clicks Score (PT): 15 (23)    CODE STATUS:   Code Status and Medical Interventions:   Ordered at: 23 6269     Code Status (Patient has no pulse and is not breathing):    CPR (Attempt to Resuscitate)     Medical Interventions (Patient has pulse or is breathing):    Full       YASHIRA Bray  23        Electronically signed by Nery Beltrán APRN at 23 1249          Physical Therapy Notes (most recent note)        Fannie Macias at 23 1107  Version 1 of 1         Patient Name: Sonia Bella  : 1965    MRN: [id number]                               Today's Date: 9/8/2023       Admit Date: 9/1/2023    Visit Dx:     ICD-10-CM ICD-9-CM   1. Generalized weakness  R53.1 780.79   2. Diarrhea, unspecified type  R19.7 787.91   3. Acute UTI  N39.0 599.0   4. Hypokalemia  E87.6 276.8   5. Hypomagnesemia  E83.42 275.2   6. Hypoglycemia  E16.2 251.2   7. Anemia, unspecified type  D64.9 285.9   8. Chronic kidney disease, unspecified CKD stage  N18.9 585.9     Patient Active Problem List   Diagnosis    Angioedema due to angiotensin converting enzyme inhibitor (ACE-I)    HTN (hypertension)    B12 deficiency anemia    Iron deficiency anemia    CKD (chronic kidney disease) stage 3, GFR 30-59 ml/min    COVID-19 virus infection    COPD on home O2 (2L)    Morbid obesity with BMI of 50.0-59.9, adult    AoC HFpEF    Acute type 2 respiratory failure    Human metapneumovirus (hMPV)    GERD    Stage III CKD    Atrial fibrillation with RVR    Acute on chronic HFrEF (heart failure with reduced ejection fraction)    Dehydration    Diarrhea    Hypomagnesemia    Hypokalemia     Past Medical History:   Diagnosis Date    Bleeding ulcer     COPD (chronic obstructive pulmonary disease)     Coronary artery disease     GERD (gastroesophageal reflux disease)     History of stomach ulcers     Hypertension     Stage III CKD 02/15/2023     History reviewed. No pertinent surgical history.   General Information       Row Name 09/08/23 1138          Physical Therapy Time and Intention    Document Type therapy note (daily note)  -ML     Mode of Treatment physical therapy  -ML       Row Name 09/08/23 1138          General Information    Patient Profile Reviewed yes  -ML     Existing Precautions/Restrictions fall  -ML     Barriers to Rehab medically complex;previous functional deficit  -ML       Row Name 09/08/23 1138          Cognition    Orientation Status (Cognition) oriented x 3  -ML       Row Name 09/08/23 1138          Safety Issues, Functional Mobility    Safety Issues Affecting Function (Mobility)  awareness of need for assistance;insight into deficits/self-awareness;safety precaution awareness;safety precautions follow-through/compliance;sequencing abilities  -ML     Impairments Affecting Function (Mobility) balance;endurance/activity tolerance;strength;postural/trunk control  -ML               User Key  (r) = Recorded By, (t) = Taken By, (c) = Cosigned By      Initials Name Provider Type    Fannie Matamoros Physical Therapist                   Mobility       Row Name 09/08/23 1139          Bed Mobility    Comment, (Bed Mobility) Patient found seated at EOB  -ML       Row Name 09/08/23 1139          Bed-Chair Transfer    Bed-Chair [place] (Transfers) contact guard;verbal cues  -ML     Assistive Device (Bed-Chair Transfers) other (see comments)  UE support  -ML     Comment, (Bed-Chair Transfer) patient completed stand pivot transfer from EOB to BSC to EOB  to chair  -ML       Row Name 09/08/23 1139          Sit-Stand Transfer    Sit-Stand [place] (Transfers) verbal cues;minimum assist (75% patient effort)  first sit to stand required minAx1, subsequent sit to stands required CGA  -ML     Assistive Device (Sit-Stand Transfers) other (see comments)  UE support  -ML       Row Name 09/08/23 1139          Gait/Stairs (Locomotion)    [place] Level (Gait) not tested  -ML               User Key  (r) = Recorded By, (t) = Taken By, (c) = Cosigned By      Initials Name Provider Type    Fannie Matamoros Physical Therapist                   Obj/Interventions       Row Name 09/08/23 1148          Motor Skills    Therapeutic Exercise shoulder;hip;knee  -       Row Name 09/08/23 1148          Shoulder (Therapeutic Exercise)    Shoulder (Therapeutic Exercise) AROM (active range of motion)  -     Shoulder AROM (Therapeutic Exercise) bilateral;scapular retraction;10 repetitions  -ML       Row Name 09/08/23 1148          Hip (Therapeutic Exercise)    Hip (Therapeutic Exercise) strengthening exercise  -     Hip  Strengthening (Therapeutic Exercise) bilateral;marching while seated;20 repititions  -ML       Row Name 09/08/23 1148          Knee (Therapeutic Exercise)    Knee (Therapeutic Exercise) strengthening exercise  -ML     Knee Strengthening (Therapeutic Exercise) bilateral;LAQ (long arc quad);20 repititions  -ML       Row Name 09/08/23 1148          Balance    Balance Assessment sitting static balance;sitting dynamic balance;sit to stand dynamic balance;standing static balance;standing dynamic balance  -ML     Static Sitting Balance independent  -ML     Dynamic Sitting Balance standby assist  -ML     Position, Sitting Balance unsupported;sitting edge of bed;other (see comments)  sitting on BSC  -ML     Sit to Stand Dynamic Balance verbal cues;minimal assist  progressing to CGA  -ML     Static Standing Balance contact guard  -ML     Dynamic Standing Balance contact guard;verbal cues  -ML     Position/Device Used, Standing Balance supported  -ML     Balance Interventions sitting;standing;sit to stand;supported;weight shifting activity  -ML     Comment, Balance forward flexed posture in standing  -ML               User Key  (r) = Recorded By, (t) = Taken By, (c) = Cosigned By      Initials Name Provider Type    ML Fannie Macias Physical Therapist                   Goals/Plan    No documentation.                  Clinical Impression       Row Name 09/08/23 1149          Pain    Pretreatment Pain Rating 0/10 - no pain  -ML     Posttreatment Pain Rating 0/10 - no pain  -ML       Row Name 09/08/23 1149          Plan of Care Review    Plan of Care Reviewed With patient  -ML     Progress improving  -ML     Outcome Evaluation Patient required less assistance to complete transfers, however, patient self directing during treatment session. Patient continues to present below baseline for mobility and would continue to benefit from skilled PT to address strength, balalnce and activity tolerance deficits. Continue current PT POC.  -ML        Row Name 09/08/23 1149          Therapy Assessment/Plan (PT)    Rehab Potential (PT) fair, will monitor progress closely  -ML       Row Name 09/08/23 1149          Vital Signs    Pre Patient Position Sitting  -ML     Intra Patient Position Standing  -ML     Post Patient Position Sitting  -ML       Row Name 09/08/23 1149          Positioning and Restraints    Pre-Treatment Position in bed  -ML     Post Treatment Position chair  -ML     In Chair notified nsg;sitting;encouraged to call for assist;call light within reach;exit alarm on;waffle cushion  -ML               User Key  (r) = Recorded By, (t) = Taken By, (c) = Cosigned By      Initials Name Provider Type    Fannie Matamoros Physical Therapist                   Outcome Measures       Row Name 09/08/23 1153 09/08/23 0800       How much help from another person do you currently need...    Turning from your back to your side while in flat bed without using bedrails? 3  -ML 3  -BW    Moving from lying on back to sitting on the side of a flat bed without bedrails? 3  -ML 3  -BW    Moving to and from a bed to a chair (including a wheelchair)? 3  -ML 2  -BW    Standing up from a chair using your arms (e.g., wheelchair, bedside chair)? 3  -ML 3  -BW    Climbing 3-5 steps with a railing? 2  -ML 2  -BW    To walk in hospital room? 2  -ML 2  -BW    AM-PAC 6 Clicks Score (PT) 16  -ML 15  -BW    Highest level of mobility 5 --> Static standing  -ML 4 --> Transferred to chair/commode  -BW      Row Name 09/08/23 1153          Functional Assessment    Outcome Measure Options AM-PAC 6 Clicks Basic Mobility (PT)  -ML               User Key  (r) = Recorded By, (t) = Taken By, (c) = Cosigned By      Initials Name Provider Type    Fannie Matamoros Physical Therapist    Reji Rosenbaum, RN Registered Nurse                                 Physical Therapy Education       Title: PT OT SLP Therapies (In Progress)       Topic: Physical Therapy (In Progress)       Point: Mobility  training (Done)       Learning Progress Summary             Patient Acceptance, E, VU,NR by ML at 9/8/2023 1153    Acceptance, E, NR by NS at 9/2/2023 1257    Comment: PT POC, importance of OOB activity                         Point: Home exercise program (Done)       Learning Progress Summary             Patient Acceptance, E, VU,NR by ML at 9/8/2023 1153                         Point: Body mechanics (In Progress)       Learning Progress Summary             Patient Acceptance, E, NR by NS at 9/2/2023 1257    Comment: PT POC, importance of OOB activity                         Point: Precautions (Done)       Learning Progress Summary             Patient Acceptance, E, VU,NR by ML at 9/8/2023 1153    Acceptance, E, NR by NS at 9/2/2023 1257    Comment: PT POC, importance of OOB activity                                         User Key       Initials Effective Dates Name Provider Type Discipline    NS 06/16/21 -  Elsi Adams, PT Physical Therapist PT     04/22/21 -  Fannie Macias Physical Therapist PT                  PT Recommendation and Plan     Plan of Care Reviewed With: patient  Progress: improving  Outcome Evaluation: Patient required less assistance to complete transfers, however, patient self directing during treatment session. Patient continues to present below baseline for mobility and would continue to benefit from skilled PT to address strength, balalnce and activity tolerance deficits. Continue current PT POC.     Time Calculation:         PT Charges       Row Name 09/08/23 1154             Time Calculation    Start Time 1107  -ML      PT Received On 09/08/23  -ML         Timed Charges    98628 - PT Therapeutic Exercise Minutes 8  -ML      23959 - PT Therapeutic Activity Minutes 16  -ML         Total Minutes    Timed Charges Total Minutes 24  -ML       Total Minutes 24  -ML                User Key  (r) = Recorded By, (t) = Taken By, (c) = Cosigned By      Initials Name Provider Type    ML Fannie Macias  Physical Therapist                  Therapy Charges for Today       Code Description Service Date Service Provider Modifiers Qty    86856707283 HC PT THER PROC EA 15 MIN 2023 Fannie Macias GP 1    01834892976 HC PT THERAPEUTIC ACT EA 15 MIN 2023 Fannie Macias GP 1            PT G-Codes  Outcome Measure Options: AM-PAC 6 Clicks Basic Mobility (PT)  AM-PAC 6 Clicks Score (PT): 16  AM-PAC 6 Clicks Score (OT): 14  PT Discharge Summary  Anticipated Discharge Disposition (PT): skilled nursing facility    Fannie Macias  2023      Electronically signed by Fannie Macias at 23 1154          Occupational Therapy Notes (most recent note)        Vanessa Nye OT at 23 1400          Patient Name: Sonia Bella  : 1965    MRN: [id number]                              Today's Date: 2023       Admit Date: 2023    Visit Dx:     ICD-10-CM ICD-9-CM   1. Generalized weakness  R53.1 780.79   2. Diarrhea, unspecified type  R19.7 787.91   3. Acute UTI  N39.0 599.0   4. Hypokalemia  E87.6 276.8   5. Hypomagnesemia  E83.42 275.2   6. Hypoglycemia  E16.2 251.2   7. Anemia, unspecified type  D64.9 285.9   8. Chronic kidney disease, unspecified CKD stage  N18.9 585.9     Patient Active Problem List   Diagnosis    Angioedema due to angiotensin converting enzyme inhibitor (ACE-I)    HTN (hypertension)    B12 deficiency anemia    Iron deficiency anemia    CKD (chronic kidney disease) stage 3, GFR 30-59 ml/min    COVID-19 virus infection    COPD on home O2 (2L)    Morbid obesity with BMI of 50.0-59.9, adult    AoC HFpEF    Acute type 2 respiratory failure    Human metapneumovirus (hMPV)    GERD    Stage III CKD    Atrial fibrillation with RVR    Acute on chronic HFrEF (heart failure with reduced ejection fraction)    Dehydration    Diarrhea    Hypomagnesemia    Hypokalemia     Past Medical History:   Diagnosis Date    Bleeding ulcer     COPD (chronic obstructive pulmonary disease)     Coronary artery disease      GERD (gastroesophageal reflux disease)     History of stomach ulcers     Hypertension     Stage III CKD 02/15/2023     History reviewed. No pertinent surgical history.   General Information       Row Name 09/05/23 1539          OT Time and Intention    Document Type therapy note (daily note)  -AF     Mode of Treatment occupational therapy  -AF       Row Name 09/05/23 1539          General Information    Patient Profile Reviewed yes  -AF     Existing Precautions/Restrictions fall;oxygen therapy device and L/min  -AF     Barriers to Rehab medically complex;previous functional deficit  -AF       Row Name 09/05/23 1539          Cognition    Orientation Status (Cognition) oriented x 4  -AF       Row Name 09/05/23 1539          Safety Issues, Functional Mobility    Safety Issues Affecting Function (Mobility) safety precaution awareness;safety precautions follow-through/compliance;sequencing abilities  -AF     Impairments Affecting Function (Mobility) balance;endurance/activity tolerance;strength;postural/trunk control  -AF     Comment, Safety Issues/Impairments (Mobility) activity limited by lethargy  -AF               User Key  (r) = Recorded By, (t) = Taken By, (c) = Cosigned By      Initials Name Provider Type    AF Vanessa Nye OT Occupational Therapist                     Mobility/ADL's       Row Name 09/05/23 1540          Bed Mobility    Bed Mobility rolling left;rolling right  -AF     Rolling Left [place] (Bed Mobility) supervision;verbal cues  -AF     Rolling Right [place] (Bed Mobility) supervision;verbal cues  -AF     Comment, (Bed Mobility) pt declined out of bed activities on this date d/t lethargy.  -AF       Row Name 09/05/23 1540          Activities of Daily Living    BADL Assessment/Intervention upper body dressing;toileting  -AF       Row Name 09/05/23 1540          Upper Body Dressing Assessment/Training    [place] Level (Upper Body Dressing) don;doff;minimum assist (75% patient effort)   -AF     Position (Upper Body Dressing) supine  -AF       Row Name 09/05/23 1540          Toileting Assessment/Training    [place] Level (Toileting) perform perineal hygiene;dependent (less than 25% patient effort)  -AF     Position (Toileting) supine  therapist assisted NA w/ changing bed and completing hygiene.  -AF               User Key  (r) = Recorded By, (t) = Taken By, (c) = Cosigned By      Initials Name Provider Type    Vanessa Coleman OT Occupational Therapist                   Obj/Interventions       Row Name 09/05/23 1542          Motor Skills    Motor Skills functional endurance  -AF     Functional Endurance decreased functional endurance. Completed bed mobility and BADLs while elevated in bed.  -AF       Row Name 09/05/23 1542          Balance    Balance Assessment sitting static balance;sitting dynamic balance  -AF     Static Sitting Balance supervision  -AF     Dynamic Sitting Balance supervision  -AF     Position, Sitting Balance supported;other (see comments)  in bed  -AF     Balance Interventions sitting;weight shifting activity;occupation based/functional task  -AF               User Key  (r) = Recorded By, (t) = Taken By, (c) = Cosigned By      Initials Name Provider Type    Vanessa Coleman OT Occupational Therapist                   Goals/Plan    No documentation.                  Clinical Impression       Row Name 09/05/23 1540          Pain Assessment    Pretreatment Pain Rating 0/10 - no pain  -AF     Posttreatment Pain Rating 0/10 - no pain  -AF       Row Name 09/05/23 1544          Plan of Care Review    Plan of Care Reviewed With patient  -AF     Progress no change  -AF     Outcome Evaluation Pt continues to demo deficits in strength and endurance caffecting ADL indp. when compared to baseline. Completed grooming task while elevated in bed d/t reports of lethargy. Recc. continued IPOT per POC and DC to SNF.  -AF       Row Name 09/05/23 1548          Therapy Assessment/Plan (OT)     Rehab Potential (OT) good, to achieve stated therapy goals  -AF     Criteria for Skilled Therapeutic Interventions Met (OT) yes;skilled treatment is necessary  -AF     Therapy Frequency (OT) daily  -AF       Row Name 09/05/23 1544          Therapy Plan Review/Discharge Plan (OT)    Anticipated Discharge Disposition (OT) skilled nursing facility  -AF       Row Name 09/05/23 1544          Vital Signs    Pre Systolic BP Rehab 105  -AF     Pre Treatment Diastolic BP 76  -AF     Post Systolic BP Rehab 115  -AF     Post Treatment Diastolic BP 84  -AF     Pre SpO2 (%) 90  -AF     O2 Delivery Pre Treatment nasal cannula  -AF     Intra SpO2 (%) 87  -AF     O2 Delivery Intra Treatment nasal cannula  -AF     Post SpO2 (%) 92  -AF     O2 Delivery Post Treatment nasal cannula  -AF     Pre Patient Position Supine  -AF     Intra Patient Position Sitting  elevated in bed; supported long sitting  -AF     Post Patient Position Supine  -AF       Row Name 09/05/23 1544          Positioning and Restraints    Pre-Treatment Position in bed  -AF     Post Treatment Position bed  -AF     In Bed notified nsg;call light within reach;supine;encouraged to call for assist;legs elevated;exit alarm on  -AF               User Key  (r) = Recorded By, (t) = Taken By, (c) = Cosigned By      Initials Name Provider Type    AF Vanessa Nye OT Occupational Therapist                   Outcome Measures       Row Name 09/05/23 1548          How much help from another is currently needed...    Putting on and taking off regular lower body clothing? 1  -AF     Bathing (including washing, rinsing, and drying) 2  -AF     Toileting (which includes using toilet bed pan or urinal) 2  -AF     Putting on and taking off regular upper body clothing 3  -AF     Taking care of personal grooming (such as brushing teeth) 3  -AF     Eating meals 3  -AF     AM-PAC 6 Clicks Score (OT) 14  -AF       Row Name 09/05/23 1321          How much help from another person do you  currently need...    Turning from your back to your side while in flat bed without using bedrails? 3  -KG     Moving from lying on back to sitting on the side of a flat bed without bedrails? 3  -KG     Moving to and from a bed to a chair (including a wheelchair)? 2  -KG     Standing up from a chair using your arms (e.g., wheelchair, bedside chair)? 3  -KG     Climbing 3-5 steps with a railing? 2  -KG     To walk in hospital room? 2  -KG     AM-PAC 6 Clicks Score (PT) 15  -KG     Highest level of mobility 4 --> Transferred to chair/commode  -KG       Row Name 09/05/23 1548 09/05/23 1321       Functional Assessment    Outcome Measure Options AM-PAC 6 Clicks Daily Activity (OT)  -AF AM-PAC 6 Clicks Basic Mobility (PT)  -KG              User Key  (r) = Recorded By, (t) = Taken By, (c) = Cosigned By      Initials Name Provider Type    KG Sapna Mcclain Physical Therapist    Vanessa Coleman OT Occupational Therapist                    Occupational Therapy Education       Title: PT OT SLP Therapies (In Progress)       Topic: Occupational Therapy (In Progress)       Point: ADL training (Done)       Description:   Instruct learner(s) on proper safety adaptation and remediation techniques during self care or transfers.   Instruct in proper use of assistive devices.                  Learning Progress Summary             Patient Acceptance, E,TB, VU by AF at 9/5/2023 1548    Acceptance, E, VU by AN at 9/2/2023 1145                         Point: Home exercise program (Not Started)       Description:   Instruct learner(s) on appropriate technique for monitoring, assisting and/or progressing therapeutic exercises/activities.                  Learner Progress:  Not documented in this visit.              Point: Precautions (Done)       Description:   Instruct learner(s) on prescribed precautions during self-care and functional transfers.                  Learning Progress Summary             Patient Acceptance, E, VU by AN at  9/2/2023 1145                         Point: Body mechanics (Done)       Description:   Instruct learner(s) on proper positioning and spine alignment during self-care, functional mobility activities and/or exercises.                  Learning Progress Summary             Patient Acceptance, E,TB, VU by AF at 9/5/2023 1548    Acceptance, E, VU by AN at 9/2/2023 1145                                         User Key       Initials Effective Dates Name Provider Type Discipline    AN 09/21/21 -  Maria Alejandra Johnston OT Occupational Therapist OT    AF 08/15/23 -  Vanessa Nye OT Occupational Therapist OT                  OT Recommendation and Plan  Therapy Frequency (OT): daily  Plan of Care Review  Plan of Care Reviewed With: patient  Progress: no change  Outcome Evaluation: Pt continues to demo deficits in strength and endurance caffecting ADL indp. when compared to baseline. Completed grooming task while elevated in bed d/t reports of lethargy. Recc. continued IPOT per POC and DC to SNF.     Time Calculation:         Time Calculation- OT       Row Name 09/05/23 1549             Time Calculation- OT    OT Received On 09/05/23  -AF      OT Goal Re-Cert Due Date 09/12/23  -AF         Timed Charges    94723 - OT Therapeutic Activity Minutes 17  -AF      36225 - OT Self Care/Mgmt Minutes 23  -AF         Total Minutes    Timed Charges Total Minutes 40  -AF       Total Minutes 40  -AF                User Key  (r) = Recorded By, (t) = Taken By, (c) = Cosigned By      Initials Name Provider Type    AF Vanessa Nye OT Occupational Therapist                  Therapy Charges for Today       Code Description Service Date Service Provider Modifiers Qty    02281234475 HC OT THERAPEUTIC ACT EA 15 MIN 9/5/2023 Vanessa Nye OT GO 1    13351976300 HC OT SELF CARE/MGMT/TRAIN EA 15 MIN 9/5/2023 Vanessa Nye OT GO 2                 Vanessa Nye OT  9/5/2023    Electronically signed by Vanessa Nye OT at 09/05/23 4121

## 2023-09-08 NOTE — PLAN OF CARE
Goal Outcome Evaluation:            VSS, on 3L nasal cannula. No c/o of SOB or pain. No acute events to note overt night. Resting comfortably, bed in lowest position, bed alarm on and audible, call light in reach. Fall and safety precautions maintained.

## 2023-09-08 NOTE — CASE MANAGEMENT/SOCIAL WORK
Continued Stay Note  [facility]     Patient Name: Sonia Bella  MRN: [id number]  Today's Date: 9/8/2023    Admit Date: 9/1/2023    Plan: discharge plan   Discharge Plan       Row Name 09/08/23 1504       Plan    Plan discharge plan    Plan Comments Per Darya, the precert has been iniated. Janet will notify weekend CM if precert comes back,  Per Majo ([facility] for [facility]) [facility] can accept pt tomorrow if pt has insurance approval. Transportation tentatively arranged with [employer] to transport pt tomorrow via w/c to [facility] at 1630 (ref. number  [id number]) and will pick pt up from room. Call Majo([facility]) if pt approved at [phone] and she will retrieve discharge summary from [facility].  CM will cont to follow    Final Discharge Disposition Code 03 - skilled nursing facility (SNF)                   Discharge Codes    No documentation.                 Expected Discharge Date and Time       Expected Discharge Date Expected Discharge Time    Sep 11, 2023               Asiya Corey RN

## 2023-09-09 PROCEDURE — 94799 UNLISTED PULMONARY SVC/PX: CPT

## 2023-09-09 PROCEDURE — 99233 SBSQ HOSP IP/OBS HIGH 50: CPT | Performed by: NURSE PRACTITIONER

## 2023-09-09 RX ADMIN — APIXABAN 5 MG: 5 TABLET, FILM COATED ORAL at 09:07

## 2023-09-09 RX ADMIN — PANTOPRAZOLE SODIUM 40 MG: 40 TABLET, DELAYED RELEASE ORAL at 09:07

## 2023-09-09 RX ADMIN — TROLAMINE SALICYLATE 1 APPLICATION: 100 CREAM TOPICAL at 10:22

## 2023-09-09 RX ADMIN — TIOTROPIUM BROMIDE INHALATION SPRAY 2 PUFF: 3.12 SPRAY, METERED RESPIRATORY (INHALATION) at 08:59

## 2023-09-09 RX ADMIN — BUDESONIDE AND FORMOTEROL FUMARATE DIHYDRATE 2 PUFF: 160; 4.5 AEROSOL RESPIRATORY (INHALATION) at 08:59

## 2023-09-09 RX ADMIN — BUDESONIDE AND FORMOTEROL FUMARATE DIHYDRATE 2 PUFF: 160; 4.5 AEROSOL RESPIRATORY (INHALATION) at 19:03

## 2023-09-09 RX ADMIN — THIAMINE HCL TAB 100 MG 100 MG: 100 TAB at 09:07

## 2023-09-09 RX ADMIN — TROLAMINE SALICYLATE 1 APPLICATION: 100 CREAM TOPICAL at 18:36

## 2023-09-09 RX ADMIN — APIXABAN 5 MG: 5 TABLET, FILM COATED ORAL at 21:02

## 2023-09-09 NOTE — CASE MANAGEMENT/SOCIAL WORK
Discharge Planning Assessment  [facility]     Patient Name: Sonia Bella  MRN: [id number]  Today's Date: 9/9/2023    Admit Date: 9/1/2023    Plan: [facility] once insurance approves   Discharge Needs Assessment    No documentation.                  Discharge Plan       Row Name 09/09/23 1503       Plan    Plan [facility] once insurance approves    Plan Comments Transfer cancelled for 9-9 to [facility], still waiting on insurance approval. I called [facility] and spoke with Rebeka and cancelled  at 1630 for today.    Final Discharge Disposition Code 03 - skilled nursing facility (SNF)                  Continued Care and Services - Admitted Since 9/1/2023       Destination       Service Provider Request Status Selected Services Address Phone Fax Patient Preferred    [facility] Pending - No Request Sent N/A [address], [place] [id number] [phone] --    [facility] Pending - No Request Sent N/A [address] [place] [address] [phone] --    [facility] Pending - No Request Sent N/A [address] [place] [address] [phone] --    [facility] Pending - No Request Sent N/A [address] [place] [address] [phone] --    [facility] Pending - No Request Sent N/A [address] [address] [phone] --    [facility] Pending - No [facility] Sent N/A [address] [facility] [id number] [phone] --    [facility] Pending - No Request Sent N/A [address] [place] [id number] [phone] --    [facility] Pending - No Request Sent N/A [address] [place] [id number] [phone] --    [facility] Pending - No Request Sent N/A [address], [facility]  [id number] [phone] --    [facility] Pending - No Request Sent N/A [address] [facility] [address] [phone] --    [facility] Pending - No Request Sent N/A [address] [place] [phone] -- -- --                  Expected Discharge Date and Time       Expected Discharge Date Expected Discharge Time    Sep 11, 2023            Demographic Summary    No documentation.                  Functional Status    No documentation.                  Psychosocial    No documentation.                  Abuse/Neglect    No documentation.                  Legal    No documentation.                  Substance Abuse    No documentation.                  Patient Forms    No documentation.                     Hillary Barcenas, RN

## 2023-09-09 NOTE — PROGRESS NOTES
[facility] Medicine Services  PROGRESS NOTE    Patient Name: Sonia Bella  : 1965  MRN: [id number]    Date of Admission: 2023  Primary Care Physician: Provider, No Known    Subjective   Subjective     CC:  Follow-up dehydration    HPI:  Patient very upset about not being discharged today, but it was explained to her that her insurance has not approved her rehab bed yet.  She stated that she could leave if she wanted to and it was explained to her she would have to leave AGAINST MEDICAL ADVICE.     Objective   Objective     Vital Signs:   Temp:  [97.5 °F (36.4 °C)-98.3 °F (36.8 °C)] 98 °F (36.7 °C)  Heart Rate:  [] 98  Resp:  [16-18] 18  BP: ()/(71-87) 115/81  Flow (L/min):  [2] 2     Physical Exam:  Constitutional: Alert, morbidly obese female sitting up in bed in NAD  Eyes: EOMI, sclerae anicteric, no conjunctival injection  Head: NCAT  ENT: [place], moist mucous membranes   Respiratory: Nonlabored, symmetrical chest expansion, CTAB, 2L  Cardiovascular: IRR IRR, HR 99, no M/R/G, +DP pulses bilaterally  Gastrointestinal: Morbidly obese, soft, NT, ND +BS  Musculoskeletal: CARL; +trace LE edema bilaterally (R>L)  Neurologic: Oriented x4, equal strength, no focal deficits, follows all commands, speech clear  Skin: No rashes on exposed skin  Psychiatric: Pleasant and cooperative; normal affect    Results Reviewed:  LAB RESULTS:      Lab 23  02023  0514   WBC 5.46 5.37 5.33   HEMOGLOBIN 8.2* 8.1* 8.3*   HEMATOCRIT 28.9* 28.7* 29.8*   PLATELETS 144 130* 123*   MCV 80.3 81.1 81.4           Lab 23  1933 09/07/23  0345 23  1225 23  2358 235 23  19123  1020 23  0200 23  0722 23  0514   SODIUM  --  133*  --  132* 134*  --   --  139  --  135*   POTASSIUM  --  4.6  --  4.7 4.3  --   --  4.7  --  4.4   CHLORIDE  --  92*  --  93* 92*  --   --  96*  --  93*   CO2  --  28.0  --  26.0 26.0  --    --  28.0  --  33.0*   ANION GAP  --  13.0  --  13.0 16.0*  --   --  15.0  --  9.0   BUN  --  28*  --  30* 30*  --   --  29*  --  30*   CREATININE  --  2.13*  --  2.35* 2.44*  --   --  2.63*  --  2.46*   EGFR  --  26.2*  --  23.3* 22.3*  --   --  20.4*  --  22.1*   GLUCOSE  --  86  --  83 67  --   --  77  --  81   CALCIUM  --  9.0  --  8.9 8.7  --   --  8.5*  --  8.5*   MAGNESIUM  --   --  2.5  --  1.8  --   --  1.9  --  2.2   PHOSPHORUS 2.3* 1.7* 2.5 2.0*  --  2.8   < > 1.7*   < >  --     < > = values in this interval not displayed.           Lab 09/05/23 2358   ALBUMIN 3.0*           Lab 09/02/23 2353 09/02/23 2041 09/02/23 1907   HSTROP T 49* 65* 51*               Lab 09/05/23  0445   IRON 63   IRON SATURATION (TSAT) 19*   TIBC 332   TRANSFERRIN 223   FERRITIN 117.00           Brief Urine Lab Results  (Last result in the past 365 days)        Color   Clarity   Blood   Leuk Est   Nitrite   Protein   CREAT   Urine HCG        09/01/23 1444 Yellow   Clear   Negative   Trace   Negative   30 mg/dL (1+)                   Microbiology Results Abnormal       Procedure Component Value - Date/Time    Blood Culture - Blood, Hand, Left [[id number]]  (Normal) Collected: 09/02/23 1907    Lab Status: Final result Specimen: Blood from Hand, Left Updated: 09/07/23 2045     Blood Culture No growth at 5 days    Blood Culture - Blood, Arm, Left [[id number]]  (Normal) Collected: 09/02/23 1907    Lab Status: Final result Specimen: Blood from Arm, Left Updated: 09/07/23 2045     Blood Culture No growth at 5 days    Blood Culture - Blood, Arm, Right [[id number]]  (Normal) Collected: 09/01/23 1152    Lab Status: Final result Specimen: Blood from Arm, Right Updated: 09/06/23 1300     Blood Culture No growth at 5 days    MRSA Screen, PCR (Inpatient) - Swab, Nares [[id number]]  (Normal) Collected: 09/02/23 1635    Lab Status: Final result Specimen: Swab from Nares Updated: 09/02/23 2050     MRSA PCR Negative    Narrative:      The negative  predictive value of this diagnostic test is high and should only be used to consider de-escalating anti-MRSA therapy. A positive result may indicate colonization with MRSA and must be correlated clinically.  MRSA Negative    Gastrointestinal Panel, PCR - Stool, Per Rectum [[id number]]  (Normal) Collected: 09/02/23 1551    Lab Status: Final result Specimen: Stool from Per Rectum Updated: 09/02/23 1735     Campylobacter Not Detected     Plesiomonas shigelloides Not Detected     Salmonella Not Detected     Vibrio Not Detected     Vibrio cholerae Not Detected     Yersinia enterocolitica Not Detected     Enteroaggregative E. coli (EAEC) Not Detected     Enteropathogenic E. coli (EPEC) Not Detected     Enterotoxigenic E. coli (ETEC) lt/st Not Detected     Shiga-like toxin-producing E. coli (STEC) stx1/stx2 Not Detected     Shigella/Enteroinvasive E. coli (EIEC) Not Detected     Cryptosporidium Not Detected     Cyclospora cayetanensis Not Detected     Entamoeba histolytica Not Detected     Giardia lamblia Not Detected     Adenovirus F40/41 Not Detected     Astrovirus Not Detected     Norovirus GI/GII Not Detected     Rotavirus A Not Detected     Sapovirus (I, II, IV or V) Not Detected    Respiratory Panel PCR w/COVID-19(SARS-CoV-2) ABBY/SAHRA/ELISABETH/PAD/COR/MAD/JABARI In-House, NP Swab in UTM/VTM, 3-4 HR TAT - Swab, Nasopharynx [[id number]]  (Normal) Collected: 09/01/23 1151    Lab Status: Final result Specimen: Swab from Nasopharynx Updated: 09/01/23 1327     ADENOVIRUS, PCR Not Detected     Coronavirus 229E Not Detected     Coronavirus HKU1 Not Detected     Coronavirus NL63 Not Detected     Coronavirus OC43 Not Detected     COVID19 Not Detected     Human Metapneumovirus Not Detected     Human Rhinovirus/Enterovirus Not Detected     Influenza A PCR Not Detected     Influenza B PCR Not Detected     Parainfluenza Virus 1 Not Detected     Parainfluenza Virus 2 Not Detected     Parainfluenza Virus 3 Not Detected     Parainfluenza Virus  4 Not Detected     RSV, PCR Not Detected     Bordetella pertussis pcr Not Detected     Bordetella parapertussis PCR Not Detected     Chlamydophila pneumoniae PCR Not Detected     Mycoplasma pneumo by PCR Not Detected    Narrative:      In the setting of a positive respiratory panel with a viral infection PLUS a negative procalcitonin without other underlying concern for bacterial infection, consider observing off antibiotics or discontinuation of antibiotics and continue supportive care. If the respiratory panel is positive for atypical bacterial infection (Bordetella pertussis, Chlamydophila pneumoniae, or Mycoplasma pneumoniae), consider antibiotic de-escalation to target atypical bacterial infection.            No radiology results from the last 24 hrs    Results for orders placed during the hospital encounter of 09/01/23    Adult Transthoracic Echo Complete W/ Cont if Necessary Per Protocol    Interpretation Summary    Left ventricular systolic function is mildly decreased. Left ventricular ejection fraction appears to be 41 - 45%.    Left ventricular wall thickness is consistent with moderate concentric hypertrophy.    Mildly reduced right ventricular systolic function noted.    The right ventricular cavity is mild to moderately dilated.    The left atrial cavity is moderately dilated.    Estimated right ventricular systolic pressure from tricuspid regurgitation is normal (<35 mmHg). Calculated right ventricular systolic pressure from tricuspid regurgitation is 29 mmHg.    There is a trivial pericardial effusion adjacent to the right ventricle.      Current medications:  Scheduled Meds:apixaban, 5 mg, Oral, Q12H  budesonide-formoterol, 2 puff, Inhalation, BID - RT  nebivolol, 2.5 mg, Oral, Daily  pantoprazole, 40 mg, Oral, Daily  thiamine, 100 mg, Oral, Daily  tiotropium bromide monohydrate, 2 puff, Inhalation, Daily - RT      Continuous Infusions:O2, 3 L/min      PRN Meds:.  acetaminophen    Calcium  Replacement - Follow Nurse / BPA Driven Protocol    dextrose    dextrose    glucagon (human recombinant)    ipratropium-albuterol    Magnesium Standard Dose Replacement - Follow Nurse / BPA Driven Protocol    metoprolol tartrate    ondansetron    Phosphorus Replacement - Follow Nurse / BPA Driven Protocol    Potassium Replacement - Follow Nurse / BPA Driven Protocol    simethicone    trolamine salicylate    Assessment & Plan   Assessment & Plan     Active Hospital Problems    Diagnosis  POA    **Dehydration [E86.0]  Yes    Diarrhea [R19.7]  Yes    Hypomagnesemia [E83.42]  Yes    Hypokalemia [E87.6]  Yes    Stage III CKD [N18.30]  Yes    AoC HFpEF [I50.33]  Yes    Morbid obesity with BMI of 50.0-59.9, adult [E66.01, Z68.43]  Not Applicable    HTN (hypertension) [I10]  Yes      Resolved Hospital Problems   No resolved problems to display.        Brief Hospital Course to date:  Sonia Bella is a 58 y.o. female with COPD on 3 L chronically, A fib not on AC, HFrEF (EF of 40%), CAD, GERD, prior bleeding stomach ulcers, HTN, Obsestity, and CKD stage III, who presented to the ED with weakness, confusion, hypoglycemia that was treated.     A fib with RVR and associated hypotension  Chest pain, resolved  HFrEF, EF of 40%   Cardiomegaly  --Not on ACE due to angioedema  --TSH within normal limits  --Continue beta blocker at reduced dose with holding parameters  --Continue tele  --status post digoxin x2 per cardiology  --Status post IV fluids  --Resumed Eliquis; had stopped outpatient for an unclear reason, denied history of bleeding, per daughter, may not have been taking her medications  --9/5 ECHO: LVEF 41 to 45%, moderate concentric LVH, mildly reduced RV systolic function, RV cavity mild to moderately dilated (has been seen previously), trivial pericardial effusion  --rate controlled     Anemia  --Appears chronic  --Vitamin B12 level 318, folate level 7 in August 2023, will not repeat  --Iron panel, ferritin w/  ferritin/TIBC/Iron/transferrin wnl, iron saturation low  --AM labs    Diarrhea  -GI PCR negative; C. difficile pending (no additional stool --> cancelled)    Hypomagnesemia  Hypokalemia  Hypophosphatemia  -- Replace per protocol    Hypoglycemia, resolved  Dehydration  -Likely due to poor oral intake; status post IV fluids  -C peptide sent  -Thiamine  -AM cortisol 23.11    Staph epi in 1 of 2 blood cultures  --Blood culture 1 of 2 positive for staph epi, suspected contaminant  --Repeat blood cultures NGTD  --Stopped vancomycin    CKD  --Baseline creatinine 1.9-2.3    Deconditioning  Chronic pain   --PT/OT consulted --> SNF; awaiting insurance approval   --Kpad/ topical pain relief/ try waffle mattress on bed    COPD  Chronic hypoxia, on 3L continuously at home  --Continue home medications    Obesity - BMI 47.36    9/9 1337: Attempted to call daughter, Candelaria, to clarify that she is unable to take care of her mother at home.  She states that she cannot be held in the hospital and I explained that she could leave AGAINST MEDICAL ADVICE, because we do not have a safe home placement for her at this time.  Patient states that her daughter cannot take care of her because she has 4 kids.  We will attempt to call again.    Expected Discharge Location and Transportation: SNF, patient amenable    Expected Discharge   Expected Discharge Date: 9/11/2023; Expected Discharge Time:      DVT prophylaxis:  Medical DVT prophylaxis orders are present.     AM-PAC 6 Clicks Score (PT): 16 (09/08/23 1936)    CODE STATUS:   Code Status and Medical Interventions:   Ordered at: 09/01/23 1017     Code Status (Patient has no pulse and is not breathing):    CPR (Attempt to Resuscitate)     Medical Interventions (Patient has pulse or is breathing):    Full       Madai Dobbs, APRN  09/09/23

## 2023-09-10 LAB
ANION GAP SERPL CALCULATED.3IONS-SCNC: 12 MMOL/L (ref 5–15)
BUN SERPL-MCNC: 26 MG/DL (ref 6–20)
BUN/CREAT SERPL: 13.8 (ref 7–25)
CALCIUM SPEC-SCNC: 9.4 MG/DL (ref 8.6–10.5)
CHLORIDE SERPL-SCNC: 86 MMOL/L (ref 98–107)
CO2 SERPL-SCNC: 28 MMOL/L (ref 22–29)
CREAT SERPL-MCNC: 1.88 MG/DL (ref 0.57–1)
DEPRECATED RDW RBC AUTO: 58.4 FL (ref 37–54)
EGFRCR SERPLBLD CKD-EPI 2021: 30.5 ML/MIN/1.73
ERYTHROCYTE [DISTWIDTH] IN BLOOD BY AUTOMATED COUNT: 23.5 % (ref 12.3–15.4)
GLUCOSE SERPL-MCNC: 82 MG/DL (ref 65–99)
HCT VFR BLD AUTO: 24.3 % (ref 34–46.6)
HGB BLD-MCNC: 7.2 G/DL (ref 12–15.9)
MAGNESIUM SERPL-MCNC: 2.2 MG/DL (ref 1.6–2.6)
MCH RBC QN AUTO: 23.8 PG (ref 26.6–33)
MCHC RBC AUTO-ENTMCNC: 29.6 G/DL (ref 31.5–35.7)
MCV RBC AUTO: 80.2 FL (ref 79–97)
PHOSPHATE SERPL-MCNC: 1.9 MG/DL (ref 2.5–4.5)
PHOSPHATE SERPL-MCNC: 2.5 MG/DL (ref 2.5–4.5)
PLATELET # BLD AUTO: 178 10*3/MM3 (ref 140–450)
PMV BLD AUTO: 10.9 FL (ref 6–12)
POTASSIUM SERPL-SCNC: 5 MMOL/L (ref 3.5–5.2)
RBC # BLD AUTO: 3.03 10*6/MM3 (ref 3.77–5.28)
SODIUM SERPL-SCNC: 126 MMOL/L (ref 136–145)
WBC NRBC COR # BLD: 5.1 10*3/MM3 (ref 3.4–10.8)

## 2023-09-10 PROCEDURE — 94664 DEMO&/EVAL PT USE INHALER: CPT

## 2023-09-10 PROCEDURE — 99233 SBSQ HOSP IP/OBS HIGH 50: CPT | Performed by: NURSE PRACTITIONER

## 2023-09-10 PROCEDURE — 84100 ASSAY OF PHOSPHORUS: CPT | Performed by: FAMILY MEDICINE

## 2023-09-10 PROCEDURE — 99418 PROLNG IP/OBS E/M EA 15 MIN: CPT | Performed by: NURSE PRACTITIONER

## 2023-09-10 PROCEDURE — 94799 UNLISTED PULMONARY SVC/PX: CPT

## 2023-09-10 PROCEDURE — 83735 ASSAY OF MAGNESIUM: CPT | Performed by: NURSE PRACTITIONER

## 2023-09-10 PROCEDURE — 84100 ASSAY OF PHOSPHORUS: CPT | Performed by: NURSE PRACTITIONER

## 2023-09-10 PROCEDURE — 85027 COMPLETE CBC AUTOMATED: CPT | Performed by: NURSE PRACTITIONER

## 2023-09-10 PROCEDURE — 80048 BASIC METABOLIC PNL TOTAL CA: CPT | Performed by: NURSE PRACTITIONER

## 2023-09-10 RX ORDER — BUMETANIDE 1 MG/1
1 TABLET ORAL DAILY
Status: DISCONTINUED | OUTPATIENT
Start: 2023-09-10 | End: 2023-09-12 | Stop reason: HOSPADM

## 2023-09-10 RX ORDER — BUMETANIDE 0.25 MG/ML
1 INJECTION INTRAMUSCULAR; INTRAVENOUS ONCE
Status: DISCONTINUED | OUTPATIENT
Start: 2023-09-10 | End: 2023-09-10

## 2023-09-10 RX ADMIN — TIOTROPIUM BROMIDE INHALATION SPRAY 2 PUFF: 3.12 SPRAY, METERED RESPIRATORY (INHALATION) at 08:41

## 2023-09-10 RX ADMIN — POTASSIUM & SODIUM PHOSPHATES POWDER PACK 280-160-250 MG 2 PACKET: 280-160-250 PACK at 08:27

## 2023-09-10 RX ADMIN — BUMETANIDE 1 MG: 1 TABLET ORAL at 12:14

## 2023-09-10 RX ADMIN — APIXABAN 5 MG: 5 TABLET, FILM COATED ORAL at 21:21

## 2023-09-10 RX ADMIN — TROLAMINE SALICYLATE 1 APPLICATION: 100 CREAM TOPICAL at 21:22

## 2023-09-10 RX ADMIN — BUDESONIDE AND FORMOTEROL FUMARATE DIHYDRATE 2 PUFF: 160; 4.5 AEROSOL RESPIRATORY (INHALATION) at 20:19

## 2023-09-10 RX ADMIN — BUDESONIDE AND FORMOTEROL FUMARATE DIHYDRATE 2 PUFF: 160; 4.5 AEROSOL RESPIRATORY (INHALATION) at 08:41

## 2023-09-10 RX ADMIN — APIXABAN 5 MG: 5 TABLET, FILM COATED ORAL at 08:28

## 2023-09-10 RX ADMIN — NEBIVOLOL 2.5 MG: 5 TABLET ORAL at 08:27

## 2023-09-10 RX ADMIN — PANTOPRAZOLE SODIUM 40 MG: 40 TABLET, DELAYED RELEASE ORAL at 08:28

## 2023-09-10 RX ADMIN — THIAMINE HCL TAB 100 MG 100 MG: 100 TAB at 08:28

## 2023-09-10 NOTE — PROGRESS NOTES
"    [facility]  PROGRESS NOTE    Patient Name: Sonia Bella  : 1965  MRN: [id number]    Date of Admission: 2023  Primary Care Physician: Provider, No Known    Subjective   Subjective     CC:  Follow-up dehydration    HPI:  Patient sitting up in bed demanding that she be discharged today.  Explained that her sodium was too low and her hemoglobin was too low to discharge today.  Patient is angry and states that she can go if she wants. It was explained again that she was free to leave AGAINST MEDICAL ADVICE, but she was not medically cleared to be discharged today due to some of her lab results that was explained to her.    Addendum 1305: Daughter was in the room and wanted to have a conversation between patient, provider and herself.  Long discussion ensued going over all the lab work and diagnostic studies and explaining with the need.  Labs were rechecked today patient had a sodium of 126 and hemoglobin is 7.2, so explained to patient that she was getting \"fluid pill\" and she will be on a fluid restriction trying to get those numbers at so she can be discharged tomorrow pending insurance approval for the [place].  Daughter had to stop and talk directly to patient to explain everything that was said.  Patient is adamant about being discharged soon and it was explained again that she would have to leave AGAINST MEDICAL ADVICE because she is not medically ready for discharge.  Answered all questions to daughters satisfaction.  Patient stated she had no other questions.    Total time spent: 90 minuntes  Time spent includes time reviewing chart, face-to-face time, counseling patient/family/caregiver, ordering medications/tests/procedures, communicating with other health care professionals, documenting clinical information in the electronic health record, and coordination of care.      Objective   Objective     Vital Signs:   Temp:  [97.5 °F (36.4 °C)-98.1 °F " (36.7 °C)] 97.9 °F (36.6 °C)  Heart Rate:  [] 102  Resp:  [16-18] 16  BP: (101-119)/(75-90) 101/77  Flow (L/min):  [2] 2     Physical Exam:  Constitutional: Alert, morbidly obese female sitting up in bed in NAD  Eyes: EOMI, sclerae anicteric, no conjunctival injection  Head: NCAT  ENT: [place], moist mucous membranes   Respiratory: Nonlabored, symmetrical chest expansion, CTAB, 2L  Cardiovascular: IRR IRR, HR 99, no M/R/G, +DP pulses bilaterally  Gastrointestinal: Morbidly obese, soft, NT, ND +BS  Musculoskeletal: CARL; +2RLE and +1LLE edema   Neurologic: Oriented x4, equal strength, no focal deficits, follows all commands, speech clear  Skin: No rashes on exposed skin  Psychiatric: Cooperative but angry that we want discharge her; angry affect    Results Reviewed:  LAB RESULTS:      Lab 09/10/23  0334 09/05/23  0445 09/04/23  0200   WBC 5.10 5.46 5.37   HEMOGLOBIN 7.2* 8.2* 8.1*   HEMATOCRIT 24.3* 28.9* 28.7*   PLATELETS 178 144 130*   MCV 80.2 80.3 81.1         Lab 09/10/23  0334 09/07/23  1933 09/07/23  0345 09/06/23  1225 09/05/23  2358 09/05/23  0445 09/04/23  1020 09/04/23  0200   SODIUM 126*  --  133*  --  132* 134*  --  139   POTASSIUM 5.0  --  4.6  --  4.7 4.3  --  4.7   CHLORIDE 86*  --  92*  --  93* 92*  --  96*   CO2 28.0  --  28.0  --  26.0 26.0  --  28.0   ANION GAP 12.0  --  13.0  --  13.0 16.0*  --  15.0   BUN 26*  --  28*  --  30* 30*  --  29*   CREATININE 1.88*  --  2.13*  --  2.35* 2.44*  --  2.63*   EGFR 30.5*  --  26.2*  --  23.3* 22.3*  --  20.4*   GLUCOSE 82  --  86  --  83 67  --  77   CALCIUM 9.4  --  9.0  --  8.9 8.7  --  8.5*   MAGNESIUM 2.2  --   --  2.5  --  1.8  --  1.9   PHOSPHORUS 1.9* 2.3* 1.7* 2.5 2.0*  --    < > 1.7*    < > = values in this interval not displayed.         Lab 09/05/23  2358   ALBUMIN 3.0*                   Lab 09/05/23  0445   IRON 63   IRON SATURATION (TSAT) 19*   TIBC 332   TRANSFERRIN 223   FERRITIN 117.00         Brief Urine Lab Results  (Last result in the  past 365 days)        Color   Clarity   Blood   Leuk Est   Nitrite   Protein   CREAT   Urine HCG        09/01/23 1444 Yellow   Clear   Negative   Trace   Negative   30 mg/dL (1+)                   Microbiology Results Abnormal       Procedure Component Value - Date/Time    Blood Culture - Blood, Hand, Left [[id number]]  (Normal) Collected: 09/02/23 1907    Lab Status: Final result Specimen: Blood from Hand, Left Updated: 09/07/23 2045     Blood Culture No growth at 5 days    Blood Culture - Blood, Arm, Left [[id number]]  (Normal) Collected: 09/02/23 1907    Lab Status: Final result Specimen: Blood from Arm, Left Updated: 09/07/23 2045     Blood Culture No growth at 5 days    Blood Culture - Blood, Arm, Right [[id number]]  (Normal) Collected: 09/01/23 1152    Lab Status: Final result Specimen: Blood from Arm, Right Updated: 09/06/23 1300     Blood Culture No growth at 5 days    MRSA Screen, PCR (Inpatient) - Swab, Nares [[id number]]  (Normal) Collected: 09/02/23 1635    Lab Status: Final result Specimen: Swab from Nares Updated: 09/02/23 2050     MRSA PCR Negative    Narrative:      The negative predictive value of this diagnostic test is high and should only be used to consider de-escalating anti-MRSA therapy. A positive result may indicate colonization with MRSA and must be correlated clinically.  MRSA Negative    Gastrointestinal Panel, PCR - Stool, Per Rectum [[id number]]  (Normal) Collected: 09/02/23 1551    Lab Status: Final result Specimen: Stool from Per Rectum Updated: 09/02/23 1735     Campylobacter Not Detected     Plesiomonas shigelloides Not Detected     Salmonella Not Detected     Vibrio Not Detected     Vibrio cholerae Not Detected     Yersinia enterocolitica Not Detected     Enteroaggregative E. coli (EAEC) Not Detected     Enteropathogenic E. coli (EPEC) Not Detected     Enterotoxigenic E. coli (ETEC) lt/st Not Detected     Shiga-like toxin-producing E. coli (STEC) stx1/stx2 Not Detected      Shigella/Enteroinvasive E. coli (EIEC) Not Detected     Cryptosporidium Not Detected     Cyclospora cayetanensis Not Detected     Entamoeba histolytica Not Detected     Giardia lamblia Not Detected     Adenovirus F40/41 Not Detected     Astrovirus Not Detected     Norovirus GI/GII Not Detected     Rotavirus A Not Detected     Sapovirus (I, II, IV or V) Not Detected    Respiratory Panel PCR w/COVID-19(SARS-CoV-2) ABBY/SAHRA/ELISABETH/PAD/COR/MAD/JABARI In-House, NP Swab in UTM/VTM, 3-4 HR TAT - Swab, Nasopharynx [[id number]]  (Normal) Collected: 09/01/23 1151    Lab Status: Final result Specimen: Swab from Nasopharynx Updated: 09/01/23 1327     ADENOVIRUS, PCR Not Detected     Coronavirus 229E Not Detected     Coronavirus HKU1 Not Detected     Coronavirus NL63 Not Detected     Coronavirus OC43 Not Detected     COVID19 Not Detected     Human Metapneumovirus Not Detected     Human Rhinovirus/Enterovirus Not Detected     Influenza A PCR Not Detected     Influenza B PCR Not Detected     Parainfluenza Virus 1 Not Detected     Parainfluenza Virus 2 Not Detected     Parainfluenza Virus 3 Not Detected     Parainfluenza Virus 4 Not Detected     RSV, PCR Not Detected     Bordetella pertussis pcr Not Detected     Bordetella parapertussis PCR Not Detected     Chlamydophila pneumoniae PCR Not Detected     Mycoplasma pneumo by PCR Not Detected    Narrative:      In the setting of a positive respiratory panel with a viral infection PLUS a negative procalcitonin without other underlying concern for bacterial infection, consider observing off antibiotics or discontinuation of antibiotics and continue supportive care. If the respiratory panel is positive for atypical bacterial infection (Bordetella pertussis, Chlamydophila pneumoniae, or Mycoplasma pneumoniae), consider antibiotic de-escalation to target atypical bacterial infection.            No radiology results from the last 24 hrs    Results for orders placed during the hospital encounter of  09/01/23    Adult Transthoracic Echo Complete W/ Cont if Necessary Per Protocol    Interpretation Summary    Left ventricular systolic function is mildly decreased. Left ventricular ejection fraction appears to be 41 - 45%.    Left ventricular wall thickness is consistent with moderate concentric hypertrophy.    Mildly reduced right ventricular systolic function noted.    The right ventricular cavity is mild to moderately dilated.    The left atrial cavity is moderately dilated.    Estimated right ventricular systolic pressure from tricuspid regurgitation is normal (<35 mmHg). Calculated right ventricular systolic pressure from tricuspid regurgitation is 29 mmHg.    There is a trivial pericardial effusion adjacent to the right ventricle.      Current medications:  Scheduled Meds:apixaban, 5 mg, Oral, Q12H  budesonide-formoterol, 2 puff, Inhalation, BID - RT  bumetanide, 1 mg, Intravenous, Once  nebivolol, 2.5 mg, Oral, Daily  pantoprazole, 40 mg, Oral, Daily  thiamine, 100 mg, Oral, Daily  tiotropium bromide monohydrate, 2 puff, Inhalation, Daily - RT      Continuous Infusions:O2, 3 L/min      PRN Meds:.  acetaminophen    Calcium Replacement - Follow Nurse / BPA Driven Protocol    dextrose    dextrose    glucagon (human recombinant)    ipratropium-albuterol    Magnesium Standard Dose Replacement - Follow Nurse / BPA Driven Protocol    metoprolol tartrate    ondansetron    Phosphorus Replacement - Follow Nurse / BPA Driven Protocol    Potassium Replacement - Follow Nurse / BPA Driven Protocol    simethicone    trolamine salicylate    Assessment & Plan   Assessment & Plan     Active Hospital Problems    Diagnosis  POA    **Dehydration [E86.0]  Yes    Diarrhea [R19.7]  Yes    Hypomagnesemia [E83.42]  Yes    Hypokalemia [E87.6]  Yes    Stage III CKD [N18.30]  Yes    AoC HFpEF [I50.33]  Yes    Morbid obesity with BMI of 50.0-59.9, adult [E66.01, Z68.43]  Not Applicable    HTN (hypertension) [I10]  Yes      Resolved  Hospital Problems   No resolved problems to display.        Brief Hospital Course to date:  Sonia Bella is a 58 y.o. female with COPD on 3 L chronically, A fib not on AC, HFrEF (EF of 40%), CAD, GERD, prior bleeding stomach ulcers, HTN, Obsestity, and CKD stage III, who presented to the ED with weakness, confusion, hypoglycemia that was treated.     A fib with RVR and associated hypotension  Chest pain, resolved  HFrEF, EF of 40%   Cardiomegaly  --Not on ACE due to angioedema  --TSH within normal limits  --Continue beta blocker at reduced dose with holding parameters  --Continue tele  --status post digoxin x2 per cardiology  --Status post IV fluids  --Resumed Eliquis; had stopped outpatient for an unclear reason, denied history of bleeding, per daughter, may not have been taking her medications  --9/5 ECHO: LVEF 41 to 45%, moderate concentric LVH, mildly reduced RV systolic function, RV cavity mild to moderately dilated (has been seen previously), trivial pericardial effusion  --rate controlled   --IV Bumex x 1 dose given (9/10)  --Fluid restriction    Hyponatremia  --Na 126  --IV Bumex x 1 dose given (9/10)  --AM labs    Anemia  --Hgb 7.2- decreased from 8.2 (9/5/23)  --Appears chronic  --Vitamin B12 level 318, folate level 7 in August 2023, will not repeat  --Iron panel, ferritin w/ ferritin/TIBC/Iron/transferrin wnl, iron saturation low  -- IV Bumex x1 dose given for low sodium and will hopefully increase Hgb  -- Transfuse PRBC for Hgb <7    Diarrhea  -GI PCR negative; C. difficile pending (no additional stool --> cancelled)    Hypomagnesemia  Hypokalemia  Hypophosphatemia  -- Replace per protocol    Hypoglycemia, resolved  Dehydration  -Likely due to poor oral intake; status post IV fluids  -C peptide sent  -Thiamine  -AM cortisol 23.11    Staph epi in 1 of 2 blood cultures  --Blood culture 1 of 2 positive for staph epi, suspected contaminant  --Repeat blood cultures NGTD  --Stopped vancomycin    CKD  --Cr  1.88-improving  --Baseline creatinine 1.9-2.3    Deconditioning  Chronic pain   --PT/OT consulted --> SNF; awaiting insurance approval   --Kpad/ topical pain relief/ try waffle mattress on bed    COPD  Chronic hypoxia, on 3L continuously at home  --Continue home medications    Obesity - BMI 47.36    9/9 1337: Attempted to call daughter, Candelaria, to clarify that she is unable to take care of her mother at home.  She states that she cannot be held in the hospital and I explained that she could leave AGAINST MEDICAL ADVICE, because we do not have a safe home placement for her at this time.  Patient states that her daughter cannot take care of her because she has 4 kids.  We will attempt to call again.    9/10 10:50: Attempted to leave a message for daughter, Candelaria, to clarify whether she is comfortable taking her home and to let her know that the patient has not medically ready to be discharged today d/t abnormal labs.    Expected Discharge Location and Transportation: SNF, patient amenable    Expected Discharge   Expected Discharge Date: 9/11/2023; Expected Discharge Time:      DVT prophylaxis:  Medical DVT prophylaxis orders are present.     AM-PAC 6 Clicks Score (PT): 16 (09/09/23 0800)    CODE STATUS:   Code Status and Medical Interventions:   Ordered at: 09/01/23 1837     Code Status (Patient has no pulse and is not breathing):    CPR (Attempt to Resuscitate)     Medical Interventions (Patient has pulse or is breathing):    Full       Madai Dobbs, YASHIRA  09/10/23

## 2023-09-10 NOTE — PLAN OF CARE
Goal Outcome Evaluation:  Plan of Care Reviewed With: patient, daughter        Progress: no change    Na 126 this am, Bumex x 1 administered,  ml, placed on 1500 ml fluid restriction, patient continues to refuse food, had boost this pm, daughter at bedside and updated per APRN

## 2023-09-11 LAB
ABO GROUP BLD: NORMAL
ALBUMIN SERPL-MCNC: 3.2 G/DL (ref 3.5–5.2)
ALBUMIN/GLOB SERPL: 1.1 G/DL
ALP SERPL-CCNC: 124 U/L (ref 39–117)
ALT SERPL W P-5'-P-CCNC: 67 U/L (ref 1–33)
ANION GAP SERPL CALCULATED.3IONS-SCNC: 10 MMOL/L (ref 5–15)
AST SERPL-CCNC: 114 U/L (ref 1–32)
BASOPHILS # BLD AUTO: 0.04 10*3/MM3 (ref 0–0.2)
BASOPHILS NFR BLD AUTO: 0.7 % (ref 0–1.5)
BILIRUB SERPL-MCNC: 4.5 MG/DL (ref 0–1.2)
BLD GP AB SCN SERPL QL: NEGATIVE
BUN SERPL-MCNC: 27 MG/DL (ref 6–20)
BUN/CREAT SERPL: 14.2 (ref 7–25)
CALCIUM SPEC-SCNC: 9.5 MG/DL (ref 8.6–10.5)
CHLORIDE SERPL-SCNC: 90 MMOL/L (ref 98–107)
CO2 SERPL-SCNC: 28 MMOL/L (ref 22–29)
CREAT SERPL-MCNC: 1.9 MG/DL (ref 0.57–1)
DEPRECATED RDW RBC AUTO: 60 FL (ref 37–54)
EGFRCR SERPLBLD CKD-EPI 2021: 30.1 ML/MIN/1.73
EOSINOPHIL # BLD AUTO: 0.06 10*3/MM3 (ref 0–0.4)
EOSINOPHIL NFR BLD AUTO: 1.1 % (ref 0.3–6.2)
ERYTHROCYTE [DISTWIDTH] IN BLOOD BY AUTOMATED COUNT: 24.5 % (ref 12.3–15.4)
GLOBULIN UR ELPH-MCNC: 2.9 GM/DL
GLUCOSE SERPL-MCNC: 88 MG/DL (ref 65–99)
HCT VFR BLD AUTO: 24.6 % (ref 34–46.6)
HGB BLD-MCNC: 7.2 G/DL (ref 12–15.9)
IMM GRANULOCYTES # BLD AUTO: 0.04 10*3/MM3 (ref 0–0.05)
IMM GRANULOCYTES NFR BLD AUTO: 0.7 % (ref 0–0.5)
LYMPHOCYTES # BLD AUTO: 0.36 10*3/MM3 (ref 0.7–3.1)
LYMPHOCYTES NFR BLD AUTO: 6.6 % (ref 19.6–45.3)
MAGNESIUM SERPL-MCNC: 2.2 MG/DL (ref 1.6–2.6)
MCH RBC QN AUTO: 24 PG (ref 26.6–33)
MCHC RBC AUTO-ENTMCNC: 29.3 G/DL (ref 31.5–35.7)
MCV RBC AUTO: 82 FL (ref 79–97)
MONOCYTES # BLD AUTO: 0.76 10*3/MM3 (ref 0.1–0.9)
MONOCYTES NFR BLD AUTO: 14 % (ref 5–12)
NEUTROPHILS NFR BLD AUTO: 4.17 10*3/MM3 (ref 1.7–7)
NEUTROPHILS NFR BLD AUTO: 76.9 % (ref 42.7–76)
NRBC BLD AUTO-RTO: 0.9 /100 WBC (ref 0–0.2)
PLATELET # BLD AUTO: 173 10*3/MM3 (ref 140–450)
PMV BLD AUTO: 10.6 FL (ref 6–12)
POTASSIUM SERPL-SCNC: 5.1 MMOL/L (ref 3.5–5.2)
PROT SERPL-MCNC: 6.1 G/DL (ref 6–8.5)
RBC # BLD AUTO: 3 10*6/MM3 (ref 3.77–5.28)
RH BLD: POSITIVE
SODIUM SERPL-SCNC: 128 MMOL/L (ref 136–145)
T&S EXPIRATION DATE: NORMAL
WBC NRBC COR # BLD: 5.43 10*3/MM3 (ref 3.4–10.8)

## 2023-09-11 PROCEDURE — 85025 COMPLETE CBC W/AUTO DIFF WBC: CPT | Performed by: FAMILY MEDICINE

## 2023-09-11 PROCEDURE — 36430 TRANSFUSION BLD/BLD COMPNT: CPT

## 2023-09-11 PROCEDURE — 83735 ASSAY OF MAGNESIUM: CPT | Performed by: FAMILY MEDICINE

## 2023-09-11 PROCEDURE — 94799 UNLISTED PULMONARY SVC/PX: CPT

## 2023-09-11 PROCEDURE — P9016 RBC LEUKOCYTES REDUCED: HCPCS

## 2023-09-11 PROCEDURE — 86901 BLOOD TYPING SEROLOGIC RH(D): CPT | Performed by: NURSE PRACTITIONER

## 2023-09-11 PROCEDURE — 86900 BLOOD TYPING SEROLOGIC ABO: CPT | Performed by: NURSE PRACTITIONER

## 2023-09-11 PROCEDURE — 86850 RBC ANTIBODY SCREEN: CPT | Performed by: NURSE PRACTITIONER

## 2023-09-11 PROCEDURE — 99232 SBSQ HOSP IP/OBS MODERATE 35: CPT | Performed by: NURSE PRACTITIONER

## 2023-09-11 PROCEDURE — 86900 BLOOD TYPING SEROLOGIC ABO: CPT

## 2023-09-11 PROCEDURE — 86923 COMPATIBILITY TEST ELECTRIC: CPT

## 2023-09-11 PROCEDURE — 80053 COMPREHEN METABOLIC PANEL: CPT | Performed by: FAMILY MEDICINE

## 2023-09-11 RX ORDER — BUMETANIDE 0.25 MG/ML
1 INJECTION INTRAMUSCULAR; INTRAVENOUS ONCE
Status: COMPLETED | OUTPATIENT
Start: 2023-09-11 | End: 2023-09-11

## 2023-09-11 RX ADMIN — BUMETANIDE 1 MG: 1 TABLET ORAL at 09:18

## 2023-09-11 RX ADMIN — PANTOPRAZOLE SODIUM 40 MG: 40 TABLET, DELAYED RELEASE ORAL at 09:18

## 2023-09-11 RX ADMIN — BUDESONIDE AND FORMOTEROL FUMARATE DIHYDRATE 2 PUFF: 160; 4.5 AEROSOL RESPIRATORY (INHALATION) at 20:47

## 2023-09-11 RX ADMIN — THIAMINE HCL TAB 100 MG 100 MG: 100 TAB at 09:18

## 2023-09-11 RX ADMIN — BUMETANIDE 1 MG: 0.25 INJECTION, SOLUTION INTRAMUSCULAR; INTRAVENOUS at 14:05

## 2023-09-11 RX ADMIN — NEBIVOLOL 2.5 MG: 5 TABLET ORAL at 09:18

## 2023-09-11 RX ADMIN — APIXABAN 5 MG: 5 TABLET, FILM COATED ORAL at 09:18

## 2023-09-11 RX ADMIN — ACETAMINOPHEN 650 MG: 325 TABLET ORAL at 20:14

## 2023-09-11 RX ADMIN — BUDESONIDE AND FORMOTEROL FUMARATE DIHYDRATE 2 PUFF: 160; 4.5 AEROSOL RESPIRATORY (INHALATION) at 09:49

## 2023-09-11 RX ADMIN — APIXABAN 5 MG: 5 TABLET, FILM COATED ORAL at 20:14

## 2023-09-11 RX ADMIN — TIOTROPIUM BROMIDE INHALATION SPRAY 2 PUFF: 3.12 SPRAY, METERED RESPIRATORY (INHALATION) at 13:26

## 2023-09-11 NOTE — PLAN OF CARE
Goal Outcome Evaluation:  Plan of Care Reviewed With: patient        Progress: no change  Outcome Evaluation: VSS, 2LNC. 1 unit PRBC transfused this shift for Hemoglobin of 7.2. No reaction suspected. No complaints of pain this shift. Continue POC.

## 2023-09-11 NOTE — PROGRESS NOTES
[facility] Medicine Services  PROGRESS NOTE    Patient Name: Sonia Bella  : 1965  MRN: [id number]    Date of Admission: 2023  Primary Care Physician: Provider, No Known    Subjective   Subjective     CC:  Follow-up dehydration    HPI:  Patient seen resting up in bed in no acute distress.  Awake and alert.  Chronically ill-appearing.  Still feels weak.  Lower extremity swelling.  Denies current shortness of breath at rest.  Rare cough.  Otherwise no new issues.  Awaiting rehab.      ROS:  Gen- No fevers, chills  CV- No chest pain, palpitations  Resp- No cough, dyspnea  GI- No N/V/D, abd pain      Objective   Objective     Vital Signs:   Temp:  [97.4 °F (36.3 °C)-98.3 °F (36.8 °C)] 97.8 °F (36.6 °C)  Heart Rate:  [] 90  Resp:  [18-20] 20  BP: (104-118)/(78-92) 113/78  Flow (L/min):  [2] 2     Physical Exam:  Constitutional: No acute distress, awake, alert.  Resting up in bed.  Chronic ill appearing female.  HENT: NCAT, mucous membranes moist  Respiratory: Clear to auscultation bilaterally but decreased at bases, respiratory effort normal on 2 LNC.  Cardiovascular: RRR, no murmurs, rubs, or gallops  Gastrointestinal: Positive bowel sounds, soft, nontender, nondistended.  Morbidly obese.  Musculoskeletal: 1+ pitting edema BLE.  Johnson spontaneously.  Psychiatric: Appropriate affect, cooperative  Neurologic: Oriented x 3, strength symmetric in all extremities, Cranial Nerves grossly intact to confrontation, speech clear.  Follows commands.  Skin: No rashes      Results Reviewed:  LAB RESULTS:      Lab 23  1019 09/10/23  0334 23  0445   WBC 5.43 5.10 5.46   HEMOGLOBIN 7.2* 7.2* 8.2*   HEMATOCRIT 24.6* 24.3* 28.9*   PLATELETS 173 178 144   NEUTROS ABS 4.17  --   --    IMMATURE GRANS (ABS) 0.04  --   --    LYMPHS ABS 0.36*  --   --    MONOS ABS 0.76  --   --    EOS ABS 0.06  --   --    MCV 82.0 80.2 80.3         Lab 23  1019 09/10/23  1540 09/10/23  0334  09/07/23  1933 09/07/23  0345 09/06/23  1225 09/05/23  2358 09/05/23  0445   SODIUM 128*  --  126*  --  133*  --  132* 134*   POTASSIUM 5.1  --  5.0  --  4.6  --  4.7 4.3   CHLORIDE 90*  --  86*  --  92*  --  93* 92*   CO2 28.0  --  28.0  --  28.0  --  26.0 26.0   ANION GAP 10.0  --  12.0  --  13.0  --  13.0 16.0*   BUN 27*  --  26*  --  28*  --  30* 30*   CREATININE 1.90*  --  1.88*  --  2.13*  --  2.35* 2.44*   EGFR 30.1*  --  30.5*  --  26.2*  --  23.3* 22.3*   GLUCOSE 88  --  82  --  86  --  83 67   CALCIUM 9.5  --  9.4  --  9.0  --  8.9 8.7   MAGNESIUM 2.2  --  2.2  --   --  2.5  --  1.8   PHOSPHORUS  --  2.5 1.9* 2.3* 1.7* 2.5 2.0*  --          Lab 09/11/23  1019 09/05/23 2358   TOTAL PROTEIN 6.1  --    ALBUMIN 3.2* 3.0*   GLOBULIN 2.9  --    ALT (SGPT) 67*  --    AST (SGOT) 114*  --    BILIRUBIN 4.5*  --    ALK PHOS 124*  --                    Lab 09/05/23  0445   IRON 63   IRON SATURATION (TSAT) 19*   TIBC 332   TRANSFERRIN 223   FERRITIN 117.00         Brief Urine Lab Results  (Last result in the past 365 days)        Color   Clarity   Blood   Leuk Est   Nitrite   Protein   CREAT   Urine HCG        09/01/23 1444 Yellow   Clear   Negative   Trace   Negative   30 mg/dL (1+)                   Microbiology Results Abnormal       Procedure Component Value - Date/Time    Blood Culture - Blood, Hand, Left [[id number]]  (Normal) Collected: 09/02/23 1907    Lab Status: Final result Specimen: Blood from Hand, Left Updated: 09/07/23 2045     Blood Culture No growth at 5 days    Blood Culture - Blood, Arm, Left [[id number]]  (Normal) Collected: 09/02/23 1907    Lab Status: Final result Specimen: Blood from Arm, Left Updated: 09/07/23 2045     Blood Culture No growth at 5 days    Blood Culture - Blood, Arm, Right [[id number]]  (Normal) Collected: 09/01/23 1152    Lab Status: Final result Specimen: Blood from Arm, Right Updated: 09/06/23 1300     Blood Culture No growth at 5 days    MRSA Screen, PCR (Inpatient) - Swab,  Nares [[id number]]  (Normal) Collected: 09/02/23 1635    Lab Status: Final result Specimen: Swab from Nares Updated: 09/02/23 2050     MRSA PCR Negative    Narrative:      The negative predictive value of this diagnostic test is high and should only be used to consider de-escalating anti-MRSA therapy. A positive result may indicate colonization with MRSA and must be correlated clinically.  MRSA Negative    Gastrointestinal Panel, PCR - Stool, Per Rectum [[id number]]  (Normal) Collected: 09/02/23 1551    Lab Status: Final result Specimen: Stool from Per Rectum Updated: 09/02/23 1735     Campylobacter Not Detected     Plesiomonas shigelloides Not Detected     Salmonella Not Detected     Vibrio Not Detected     Vibrio cholerae Not Detected     Yersinia enterocolitica Not Detected     Enteroaggregative E. coli (EAEC) Not Detected     Enteropathogenic E. coli (EPEC) Not Detected     Enterotoxigenic E. coli (ETEC) lt/st Not Detected     Shiga-like toxin-producing E. coli (STEC) stx1/stx2 Not Detected     Shigella/Enteroinvasive E. coli (EIEC) Not Detected     Cryptosporidium Not Detected     Cyclospora cayetanensis Not Detected     Entamoeba histolytica Not Detected     Giardia lamblia Not Detected     Adenovirus F40/41 Not Detected     Astrovirus Not Detected     Norovirus GI/GII Not Detected     Rotavirus A Not Detected     Sapovirus (I, II, IV or V) Not Detected    Respiratory Panel PCR w/COVID-19(SARS-CoV-2) ABBY/SAHRA/ELISABETH/PAD/COR/MAD/JABARI In-House, NP Swab in UTM/VTM, 3-4 HR TAT - Swab, Nasopharynx [[id number]]  (Normal) Collected: 09/01/23 1151    Lab Status: Final result Specimen: Swab from Nasopharynx Updated: 09/01/23 1327     ADENOVIRUS, PCR Not Detected     Coronavirus 229E Not Detected     Coronavirus HKU1 Not Detected     Coronavirus NL63 Not Detected     Coronavirus OC43 Not Detected     COVID19 Not Detected     Human Metapneumovirus Not Detected     Human Rhinovirus/Enterovirus Not Detected     Influenza A PCR Not  Detected     Influenza B PCR Not Detected     Parainfluenza Virus 1 Not Detected     Parainfluenza Virus 2 Not Detected     Parainfluenza Virus 3 Not Detected     Parainfluenza Virus 4 Not Detected     RSV, PCR Not Detected     Bordetella pertussis pcr Not Detected     Bordetella parapertussis PCR Not Detected     Chlamydophila pneumoniae PCR Not Detected     Mycoplasma pneumo by PCR Not Detected    Narrative:      In the setting of a positive respiratory panel with a viral infection PLUS a negative procalcitonin without other underlying concern for bacterial infection, consider observing off antibiotics or discontinuation of antibiotics and continue supportive care. If the respiratory panel is positive for atypical bacterial infection (Bordetella pertussis, Chlamydophila pneumoniae, or Mycoplasma pneumoniae), consider antibiotic de-escalation to target atypical bacterial infection.            No radiology results from the last 24 hrs    Results for orders placed during the hospital encounter of 09/01/23    Adult Transthoracic Echo Complete W/ Cont if Necessary Per Protocol    Interpretation Summary    Left ventricular systolic function is mildly decreased. Left ventricular ejection fraction appears to be 41 - 45%.    Left ventricular wall thickness is consistent with moderate concentric hypertrophy.    Mildly reduced right ventricular systolic function noted.    The right ventricular cavity is mild to moderately dilated.    The left atrial cavity is moderately dilated.    Estimated right ventricular systolic pressure from tricuspid regurgitation is normal (<35 mmHg). Calculated right ventricular systolic pressure from tricuspid regurgitation is 29 mmHg.    There is a trivial pericardial effusion adjacent to the right ventricle.      Current medications:  Scheduled Meds:apixaban, 5 mg, Oral, Q12H  budesonide-formoterol, 2 puff, Inhalation, BID - RT  bumetanide, 1 mg, Oral, Daily  nebivolol, 2.5 mg, Oral,  Daily  pantoprazole, 40 mg, Oral, Daily  thiamine, 100 mg, Oral, Daily  tiotropium bromide monohydrate, 2 puff, Inhalation, Daily - RT      Continuous Infusions:O2, 3 L/min      PRN Meds:.  acetaminophen    Calcium Replacement - Follow Nurse / BPA Driven Protocol    dextrose    dextrose    glucagon (human recombinant)    ipratropium-albuterol    Magnesium Standard Dose Replacement - Follow Nurse / BPA Driven Protocol    metoprolol tartrate    ondansetron    Phosphorus Replacement - Follow Nurse / BPA Driven Protocol    Potassium Replacement - Follow Nurse / BPA Driven Protocol    simethicone    trolamine salicylate    Assessment & Plan   Assessment & Plan     Active Hospital Problems    Diagnosis  POA    **Dehydration [E86.0]  Yes    Diarrhea [R19.7]  Yes    Hypomagnesemia [E83.42]  Yes    Hypokalemia [E87.6]  Yes    Stage III CKD [N18.30]  Yes    AoC HFpEF [I50.33]  Yes    Morbid obesity with BMI of 50.0-59.9, adult [E66.01, Z68.43]  Not Applicable    HTN (hypertension) [I10]  Yes      Resolved Hospital Problems   No resolved problems to display.        Brief Hospital Course to date:  Sonia Bella is a 58 y.o. female with COPD on 3 L chronically, A fib not on AC, HFrEF (EF of 40%), CAD, GERD, prior bleeding stomach ulcers, HTN, Obsestity, and CKD stage III, who presented to the ED with weakness, confusion, hypoglycemia that was treated.     This patient's problems and plans were partially entered by my partner and updated as appropriate by me 09/11/23.    Assessment/plan:  Patient is new to me today    A fib with RVR and associated hypotension  Chest pain, resolved  HFrEF, EF of 40%   Cardiomegaly  --Not on ACE due to angioedema  --TSH within normal limits  --Continue beta blocker at reduced dose with holding parameters  --Continue tele  --status post digoxin x2 per cardiology  --Status post IV fluids  -- Prior resumed Eliquis; had stopped outpatient for an unclear reason, denied history of bleeding, per  daughter, may not have been taking her medications  --9/5 ECHO: LVEF 41 to 45%, moderate concentric LVH, mildly reduced RV systolic function, RV cavity mild to moderately dilated (has been seen previously), trivial pericardial effusion  --rate controlled   --IV Bumex x 1 dose given (9/10)  --Fluid restriction    Hyponatremia  --Na only up to 128 today.  --s/p IV Bumex x 1 dose given (9/10).  --Giving 1 unit PRBCs today followed by additional dose of IV Bumex.  --AM labs    Anemia  --Hgb 7.2 again today.  Weak.  Cardiac disease.- decreased from 8.2 (9/5/23)  --Vitamin B12 level 318, folate level 7 in August 2023, will not repeat  --Iron panel, ferritin w/ ferritin/TIBC/Iron/transferrin wnl, iron saturation low  -- IV Bumex x1 dose given for low sodium no improvement noted in hemoglobin  -- Spoke with attending MD, will transfuse 1 unit PRBCs today due to symptomatic anemia and history of cardiac disease.  Follow-up with 1 mg IV Bumex after transfusion.  -- A.m. labs    Diarrhea  -GI PCR negative; C. difficile pending (no additional stool --> cancelled)    Hypomagnesemia  Hypokalemia  Hypophosphatemia  -- Replace per protocol    Hypoglycemia, resolved  Dehydration  -Likely due to poor oral intake; status post IV fluids  -C peptide sent  -Thiamine  -AM cortisol 23.11    Staph epi in 1 of 2 blood cultures  --Blood culture 1 of 2 positive for staph epi, suspected contaminant  --Repeat blood cultures NGTD 5 days.  -- Prior stopped vancomycin, afebrile    CKD  --Cr 1.90-proved, generally stable.  Monitor.  --Baseline creatinine 1.9-2.3    Deconditioning  Chronic pain   --PT/OT consulted --> SNF; awaiting insurance approval   --Kpad/ topical pain relief/ try waffle mattress on bed    COPD  Chronic hypoxia, on 3L continuously at home  --Continue home medications, oxygen as needed    Obesity - BMI 47.36  -- Complicates all aspects of care      9/11: Spoke with .  Patient is not ready for transfer today.   Hemoglobin 7.2 and sodium is still low.  Needing 1 unit PRBC and further diuresis.  Possible transfer tomorrow pending morning labs.    Expected Discharge Location and Transportation: SNF, patient amenable and medically stable.    Expected Discharge   Expected Discharge Date: 9/12/2023; Expected Discharge Time:      DVT prophylaxis:  Medical DVT prophylaxis orders are present.     AM-PAC 6 Clicks Score (PT): 16 (09/09/23 0800)    CODE STATUS:   Code Status and Medical Interventions:   Ordered at: 09/01/23 1505     Code Status (Patient has no pulse and is not breathing):    CPR (Attempt to Resuscitate)     Medical Interventions (Patient has pulse or is breathing):    Full       Jennifer Ybarra, APRN  09/11/23

## 2023-09-11 NOTE — NURSING NOTE
Denies SOA or pain. 2L NC with spot checks due to patient fusing pulse ox monitoring. Checks ranging around 100% Continue to follow 1500ML fluid restriction.

## 2023-09-11 NOTE — CASE MANAGEMENT/SOCIAL WORK
Continued Stay Note  [facility]     Patient Name: Sonia Bella  MRN: [id number]  Today's Date: 9/11/2023    Admit Date: 9/1/2023    Plan: discharge plan   Discharge Plan       Row Name 09/11/23 1357       Plan    Plan discharge plan    Plan Comments Pt has insurance approval for a skilled nursing bed at [facility] today. Per hospitalist, pt not medically ready for discharge today as she needs blood.  Transportation tentatively scheduled for tomorrow, Tues, 9/12 at 1400 with [facility].  I updated Majo(liason for [facility]), pt and pt's daughter Candelaria. CM will follow  up tomorrow.    Final Discharge Disposition Code 03 - skilled nursing facility (SNF)                   Discharge Codes    No documentation.                 Expected Discharge Date and Time       Expected Discharge Date Expected Discharge Time    Sep 11, 2023               Asiya Corey RN

## 2023-09-12 VITALS
HEART RATE: 113 BPM | RESPIRATION RATE: 18 BRPM | HEIGHT: 67 IN | WEIGHT: 293 LBS | BODY MASS INDEX: 45.99 KG/M2 | TEMPERATURE: 98.1 F | DIASTOLIC BLOOD PRESSURE: 79 MMHG | OXYGEN SATURATION: 98 % | SYSTOLIC BLOOD PRESSURE: 108 MMHG

## 2023-09-12 PROBLEM — I50.23 ACUTE ON CHRONIC SYSTOLIC HEART FAILURE: Status: ACTIVE | Noted: 2023-09-12

## 2023-09-12 PROBLEM — I50.21 ACUTE SYSTOLIC HEART FAILURE: Status: RESOLVED | Noted: 2023-09-12 | Resolved: 2023-09-12

## 2023-09-12 PROBLEM — I50.22 CHRONIC SYSTOLIC HEART FAILURE: Status: ACTIVE | Noted: 2023-09-12

## 2023-09-12 PROBLEM — R19.7 DIARRHEA: Status: RESOLVED | Noted: 2023-09-01 | Resolved: 2023-09-12

## 2023-09-12 PROBLEM — I50.21 ACUTE SYSTOLIC HEART FAILURE: Status: ACTIVE | Noted: 2023-09-12

## 2023-09-12 LAB
ALBUMIN SERPL-MCNC: 3.1 G/DL (ref 3.5–5.2)
ALBUMIN/GLOB SERPL: 0.9 G/DL
ALP SERPL-CCNC: 124 U/L (ref 39–117)
ALT SERPL W P-5'-P-CCNC: 70 U/L (ref 1–33)
ANION GAP SERPL CALCULATED.3IONS-SCNC: 11 MMOL/L (ref 5–15)
AST SERPL-CCNC: 110 U/L (ref 1–32)
BASOPHILS # BLD AUTO: 0.03 10*3/MM3 (ref 0–0.2)
BASOPHILS NFR BLD AUTO: 0.3 % (ref 0–1.5)
BH BB BLOOD EXPIRATION DATE: NORMAL
BH BB BLOOD TYPE BARCODE: 5100
BH BB DISPENSE STATUS: NORMAL
BH BB PRODUCT CODE: NORMAL
BH BB UNIT NUMBER: NORMAL
BILIRUB SERPL-MCNC: 4.9 MG/DL (ref 0–1.2)
BUN SERPL-MCNC: 26 MG/DL (ref 6–20)
BUN/CREAT SERPL: 12.8 (ref 7–25)
CALCIUM SPEC-SCNC: 9.7 MG/DL (ref 8.6–10.5)
CHLORIDE SERPL-SCNC: 89 MMOL/L (ref 98–107)
CO2 SERPL-SCNC: 29 MMOL/L (ref 22–29)
CREAT SERPL-MCNC: 2.03 MG/DL (ref 0.57–1)
CROSSMATCH INTERPRETATION: NORMAL
DEPRECATED RDW RBC AUTO: 60.4 FL (ref 37–54)
EGFRCR SERPLBLD CKD-EPI 2021: 27.8 ML/MIN/1.73
EOSINOPHIL # BLD AUTO: 0.04 10*3/MM3 (ref 0–0.4)
EOSINOPHIL NFR BLD AUTO: 0.4 % (ref 0.3–6.2)
ERYTHROCYTE [DISTWIDTH] IN BLOOD BY AUTOMATED COUNT: 24.6 % (ref 12.3–15.4)
GLOBULIN UR ELPH-MCNC: 3.4 GM/DL
GLUCOSE SERPL-MCNC: 74 MG/DL (ref 65–99)
HCT VFR BLD AUTO: 27.3 % (ref 34–46.6)
HGB BLD-MCNC: 8.2 G/DL (ref 12–15.9)
IMM GRANULOCYTES # BLD AUTO: 0.05 10*3/MM3 (ref 0–0.05)
IMM GRANULOCYTES NFR BLD AUTO: 0.5 % (ref 0–0.5)
LYMPHOCYTES # BLD AUTO: 0.39 10*3/MM3 (ref 0.7–3.1)
LYMPHOCYTES NFR BLD AUTO: 3.9 % (ref 19.6–45.3)
MAGNESIUM SERPL-MCNC: 2 MG/DL (ref 1.6–2.6)
MCH RBC QN AUTO: 24.7 PG (ref 26.6–33)
MCHC RBC AUTO-ENTMCNC: 30 G/DL (ref 31.5–35.7)
MCV RBC AUTO: 82.2 FL (ref 79–97)
MONOCYTES # BLD AUTO: 0.85 10*3/MM3 (ref 0.1–0.9)
MONOCYTES NFR BLD AUTO: 8.4 % (ref 5–12)
NEUTROPHILS NFR BLD AUTO: 8.73 10*3/MM3 (ref 1.7–7)
NEUTROPHILS NFR BLD AUTO: 86.5 % (ref 42.7–76)
NRBC BLD AUTO-RTO: 0.3 /100 WBC (ref 0–0.2)
PLATELET # BLD AUTO: 187 10*3/MM3 (ref 140–450)
PMV BLD AUTO: 11.1 FL (ref 6–12)
POTASSIUM SERPL-SCNC: 5 MMOL/L (ref 3.5–5.2)
PROT SERPL-MCNC: 6.5 G/DL (ref 6–8.5)
RBC # BLD AUTO: 3.32 10*6/MM3 (ref 3.77–5.28)
SODIUM SERPL-SCNC: 129 MMOL/L (ref 136–145)
UNIT  ABO: NORMAL
UNIT  RH: NORMAL
WBC NRBC COR # BLD: 10.09 10*3/MM3 (ref 3.4–10.8)

## 2023-09-12 PROCEDURE — 97110 THERAPEUTIC EXERCISES: CPT

## 2023-09-12 PROCEDURE — 94799 UNLISTED PULMONARY SVC/PX: CPT

## 2023-09-12 PROCEDURE — 85025 COMPLETE CBC W/AUTO DIFF WBC: CPT | Performed by: FAMILY MEDICINE

## 2023-09-12 PROCEDURE — 99239 HOSP IP/OBS DSCHRG MGMT >30: CPT | Performed by: NURSE PRACTITIONER

## 2023-09-12 PROCEDURE — 80053 COMPREHEN METABOLIC PANEL: CPT | Performed by: FAMILY MEDICINE

## 2023-09-12 PROCEDURE — 83735 ASSAY OF MAGNESIUM: CPT | Performed by: FAMILY MEDICINE

## 2023-09-12 RX ORDER — LANOLIN ALCOHOL/MO/W.PET/CERES
100 CREAM (GRAM) TOPICAL DAILY
Start: 2023-09-13

## 2023-09-12 RX ORDER — BUMETANIDE 1 MG/1
1 TABLET ORAL DAILY
Start: 2023-09-13

## 2023-09-12 RX ORDER — TROLAMINE SALICYLATE 10 G/100G
1 CREAM TOPICAL 3 TIMES DAILY PRN
Start: 2023-09-12

## 2023-09-12 RX ORDER — IPRATROPIUM BROMIDE AND ALBUTEROL SULFATE 2.5; .5 MG/3ML; MG/3ML
3 SOLUTION RESPIRATORY (INHALATION) EVERY 4 HOURS PRN
Qty: 360 ML
Start: 2023-09-12

## 2023-09-12 RX ORDER — SIMETHICONE 80 MG
80 TABLET,CHEWABLE ORAL 4 TIMES DAILY PRN
Start: 2023-09-12

## 2023-09-12 RX ADMIN — BUDESONIDE AND FORMOTEROL FUMARATE DIHYDRATE 2 PUFF: 160; 4.5 AEROSOL RESPIRATORY (INHALATION) at 09:47

## 2023-09-12 RX ADMIN — NEBIVOLOL 2.5 MG: 5 TABLET ORAL at 08:25

## 2023-09-12 RX ADMIN — PANTOPRAZOLE SODIUM 40 MG: 40 TABLET, DELAYED RELEASE ORAL at 08:25

## 2023-09-12 RX ADMIN — BUMETANIDE 1 MG: 1 TABLET ORAL at 08:25

## 2023-09-12 RX ADMIN — APIXABAN 5 MG: 5 TABLET, FILM COATED ORAL at 08:26

## 2023-09-12 RX ADMIN — TIOTROPIUM BROMIDE INHALATION SPRAY 2 PUFF: 3.12 SPRAY, METERED RESPIRATORY (INHALATION) at 09:47

## 2023-09-12 RX ADMIN — THIAMINE HCL TAB 100 MG 100 MG: 100 TAB at 08:25

## 2023-09-12 NOTE — THERAPY TREATMENT NOTE
Patient Name: Sonia Bella  : 1965    MRN: [id number]                              Today's Date: 2023       Admit Date: 2023    Visit Dx:     ICD-10-CM ICD-9-CM   1. Generalized weakness  R53.1 780.79   2. Diarrhea, unspecified type  R19.7 787.91   3. Acute UTI  N39.0 599.0   4. Hypokalemia  E87.6 276.8   5. Hypomagnesemia  E83.42 275.2   6. Hypoglycemia  E16.2 251.2   7. Anemia, unspecified type  D64.9 285.9   8. Chronic kidney disease, unspecified CKD stage  N18.9 585.9     Patient Active Problem List   Diagnosis    Angioedema due to angiotensin converting enzyme inhibitor (ACE-I)    HTN (hypertension)    B12 deficiency anemia    Iron deficiency anemia    CKD (chronic kidney disease) stage 3, GFR 30-59 ml/min    COVID-19 virus infection    COPD on home O2 (2L)    Morbid obesity with BMI of 50.0-59.9, adult    AoC HFpEF    Acute type 2 respiratory failure    Human metapneumovirus (hMPV)    GERD    Stage III CKD    Atrial fibrillation with RVR    Acute on chronic HFrEF (heart failure with reduced ejection fraction)    Dehydration    Hypomagnesemia    Hypokalemia    Chronic systolic heart failure    Acute on chronic systolic heart failure     Past Medical History:   Diagnosis Date    Bleeding ulcer     COPD (chronic obstructive pulmonary disease)     Coronary artery disease     GERD (gastroesophageal reflux disease)     History of stomach ulcers     Hypertension     Stage III CKD 02/15/2023     History reviewed. No pertinent surgical history.   General Information       Row Name 23 0954          Physical Therapy Time and Intention    Document Type therapy note (daily note)  -KW     Mode of Treatment physical therapy  -       Row Name 23 0954          General Information    Patient Profile Reviewed yes  -KW     Existing Precautions/Restrictions fall  -KW               User Key  (r) = Recorded By, (t) = Taken By, (c) = Cosigned By      Initials Name Provider Type    PABLITO Aguilar  ZORAN Frey Physical Therapist                   Mobility    No documentation.                  Obj/Interventions    No documentation.                  Goals/Plan    No documentation.                  Clinical Impression       Row Name 09/12/23 1122          Pain    Pretreatment Pain Rating 0/10 - no pain  -KW       Row Name 09/12/23 1122          Plan of Care Review    Plan of Care Reviewed With patient  -KW     Progress no change  -KW     Outcome Evaluation atient with increased fatigue and declined to complete OOB activity, however agreeable to complete supine exercises. Patient completed UE exercises. The patient continues to present below baseline for functional mobility. The patient would continue to benefit from skilled PT to address strength, balance and activity tolerance deficits. Continue to current PT POC  -KW       Row Name 09/12/23 1122          Positioning and Restraints    Pre-Treatment Position in bed  -KW     Post Treatment Position bed  -KW     In Bed call light within reach;encouraged to call for assist;supine  -KW               User Key  (r) = Recorded By, (t) = Taken By, (c) = Cosigned By      Initials Name Provider Type    Shante Malloy, ZORAN Physical Therapist                   Outcome Measures       Row Name 09/12/23 0954          How much help from another person do you currently need...    Turning from your back to your side while in flat bed without using bedrails? 3  -KW     Moving from lying on back to sitting on the side of a flat bed without bedrails? 3  -KW     Moving to and from a bed to a chair (including a wheelchair)? 3  -KW     Standing up from a chair using your arms (e.g., wheelchair, bedside chair)? 3  -KW     Climbing 3-5 steps with a railing? 2  -KW     To walk in hospital room? 2  -KW     AM-PAC 6 Clicks Score (PT) 16  -KW     Highest level of mobility 5 --> Static standing  -KW       Row Name 09/12/23 0954          Functional Assessment    Outcome Measure Options  AM-PAC 6 Clicks Basic Mobility (PT)  -PABLITO               User Key  (r) = Recorded By, (t) = Taken By, (c) = Cosigned By      Initials Name Provider Type    Shante Malloy, PT Physical Therapist                                 Physical Therapy Education       Title: PT OT SLP Therapies (Resolved)       Topic: Physical Therapy (Resolved)       Point: Mobility training (Resolved)       Learning Progress Summary             Patient Acceptance, E, VU,NR by ML at 9/8/2023 1153    Acceptance, E, NR by NS at 9/2/2023 1257    Comment: PT POC, importance of OOB activity                         Point: Home exercise program (Resolved)       Learning Progress Summary             Patient Acceptance, E, VU,NR by ML at 9/8/2023 1153                         Point: Body mechanics (Resolved)       Learning Progress Summary             Patient Acceptance, E, NR by NS at 9/2/2023 1257    Comment: PT POC, importance of OOB activity                         Point: Precautions (Resolved)       Learning Progress Summary             Patient Acceptance, E, VU,NR by ML at 9/8/2023 1153    Acceptance, E, NR by NS at 9/2/2023 1257    Comment: PT POC, importance of OOB activity                                         User Key       Initials Effective Dates Name Provider Type Discipline    NS 06/16/21 -  Elsi Adams, PT Physical Therapist PT     04/22/21 -  Fannie Macias Physical Therapist PT                  PT Recommendation and Plan     Plan of Care Reviewed With: patient  Progress: no change  Outcome Evaluation: atient with increased fatigue and declined to complete OOB activity, however agreeable to complete supine exercises. Patient completed UE exercises. The patient continues to present below baseline for functional mobility. The patient would continue to benefit from skilled PT to address strength, balance and activity tolerance deficits. Continue to current PT POC     Time Calculation:         PT Charges       Row Name 09/12/23  0954             Time Calculation    Start Time 0954  -KW      PT Received On 09/12/23  -KW         Timed Charges    77214 - PT Therapeutic Exercise Minutes 13  -KW         Total Minutes    Timed Charges Total Minutes 13  -KW       Total Minutes 13  -KW                User Key  (r) = Recorded By, (t) = Taken By, (c) = Cosigned By      Initials Name Provider Type    Shante Malloy PT Physical Therapist                  Therapy Charges for Today       Code Description Service Date Service Provider Modifiers Qty    [id number] HC PT THER PROC EA 15 MIN 9/12/2023 Shante Aguilar PT GP 1            PT G-Codes  Outcome Measure Options: AM-PAC 6 Clicks Basic Mobility (PT)  AM-PAC 6 Clicks Score (PT): 16  AM-PAC 6 Clicks Score (OT): 14  PT Discharge Summary  Anticipated Discharge Disposition (PT): skilled nursing facility    Shante Aguilar PT  9/12/2023

## 2023-09-12 NOTE — DISCHARGE SUMMARY
[facility] Medicine Services  DISCHARGE SUMMARY    Patient Name: Sonia Bella  : 1965  MRN: [id number]    Date of Admission: 2023 10:34 AM  Date of Discharge:  2023  Primary Care Physician: Provider, No Known    Consults       Date and Time Order Name Status Description    2023  8:25 AM Inpatient Cardiology Consult Completed             Hospital Course     Presenting Problem: Weakness, confusion and hyperglycemia    Active Hospital Problems    Diagnosis  POA   • **Dehydration [E86.0]  Yes   • Chronic systolic heart failure [I50.22]  Yes   • Hypomagnesemia [E83.42]  Yes   • Hypokalemia [E87.6]  Yes   • Stage III CKD [N18.30]  Yes   • AoC HFpEF [I50.33]  Yes   • Morbid obesity with BMI of 50.0-59.9, adult [E66.01, Z68.43]  Not Applicable   • HTN (hypertension) [I10]  Yes      Resolved Hospital Problems    Diagnosis Date Resolved POA   • Acute systolic heart failure [I50.21] 2023 Yes   • Diarrhea [R19.7] 2023 Yes        Hospital Course:  Sonia Bella is a 58 y.o. female with COPD on 3 L chronically, A fib not on AC, HFrEF (EF of 40%), CAD, GERD, prior bleeding stomach ulcers, HTN, Obsestity, and CKD stage III, who presented to the ED with weakness, confusion, hypoglycemia that was treated.     A fib with RVR and associated hypotension  Chest pain, resolved  HFrEF, EF of 40%   Cardiomegaly  --Not on ACE due to angioedema  --TSH within normal limits; Free T3 1.58  --Continue beta blocker at reduced dose with holding parameters  --Continue tele  --status post digoxin x2 per cardiology  --Status post IV fluids  -- Prior resumed Eliquis; had stopped outpatient for an unclear reason, denied history of bleeding, per daughter, may not have been taking her medications  -- ECHO: LVEF 41 to 45%, moderate concentric LVH, mildly reduced RV systolic function, RV cavity mild to moderately dilated (has been seen previously), trivial pericardial effusion  --rate  controlled   --IV Bumex x 1 dose given (9/10)  --Fluid restriction     Hyponatremia  --Na 129  --s/p IV Bumex x 1 dose given (9/10).  --Giving 1 unit PRBCs today followed by additional dose of IV Bumex.  -- Will discharge on PO Bumex     Anemia  --Hgb 8.2   --Vitamin B12 level 318, folate level 7 in August 2023, will not repeat  --Iron panel, ferritin w/ ferritin/TIBC/Iron/transferrin wnl, iron saturation low  -- IV Bumex x1 dose given for Na improvement and after transfusion (9/11/2023)  -- Transfused 1 unit PRBCs 9/11/23 due to symptomatic anemia and history of cardiac disease.       Diarrhea  -GI PCR negative; C. difficile pending (no additional stool --> cancelled)     Hypomagnesemia  Hypokalemia  Hypophosphatemia  -- Replace per protocol     Hypoglycemia, resolved  Dehydration  -Likely due to poor oral intake; status post IV fluids  -C peptide sent  -Thiamine  -AM cortisol 23.11     Staph epi in 1 of 2 blood cultures  --Blood culture 1 of 2 positive for staph epi, suspected contaminant  --Repeat blood cultures NGTD 5 days.  -- Prior stopped vancomycin, afebrile     CKD  --Cr 2.03  --Baseline creatinine 1.9-2.3  --Recheck potassium on Thursday or Friday of this week     Deconditioning  Chronic pain   --PT/OT consulted --> SNF; awaiting insurance approval   --Kpad/ topical pain relief/ try waffle mattress on bed     COPD  Chronic hypoxia, on 3L continuously at home  --Continue home medications, oxygen as needed     Obesity - BMI 46.74  -- Complicates all aspects of care      Discharge Follow Up Recommendations for outpatient labs/diagnostics:  -- Follow-up with facility provider in 1 to 2 days  --Follow-up with your family doctor within a week after discharge from facility  --Follow-up with Dr. Pugh in 4 to 6 weeks  --Check BMP (Cr) on Friday  -- Start taking Bumex 1 mg daily  --Continue taking Eliquis for atrial fibrillation    Day of Discharge     HPI:   Sitting up in bed very happy about being discharged  to facility today.  Patient states she talked to the [place] liaison and really liked her.  No complaints this morning. +BM.    Vital Signs:   Temp:  [97.8 °F (36.6 °C)-98.3 °F (36.8 °C)] 98.2 °F (36.8 °C)  Heart Rate:  [] 104  Resp:  [16-20] 20  BP: ()/(54-92) 119/92  Flow (L/min):  [2] 2  Physical Exam:  Constitutional: Alert, morbidly obese chronically ill-appearing female sitting up in bed in NAD  Eyes: EOMI, sclerae anicteric, no conjunctival injection  Head: NCAT  ENT: [place], moist mucous membranes   Respiratory: Nonlabored, symmetrical chest expansion, CTAB, 2L  Cardiovascular: IRR IRR, , no M/R/G, +DP pulses bilaterally  Gastrointestinal: Morbidly obese, soft, NT, ND +BS  Musculoskeletal: CARL; +1LLE edema bilaterally  Neurologic: Oriented x4, equal strength, no focal deficits, follows all commands, speech clear  Skin: No rashes on exposed skin  Psychiatric: Pleasant and cooperative; normal affect    Pertinent  and/or Most Recent Results     LAB RESULTS:      Lab 09/12/23  0337 09/11/23  1019 09/10/23  0334   WBC 10.09 5.43 5.10   HEMOGLOBIN 8.2* 7.2* 7.2*   HEMATOCRIT 27.3* 24.6* 24.3*   PLATELETS 187 173 178   NEUTROS ABS 8.73* 4.17  --    IMMATURE GRANS (ABS) 0.05 0.04  --    LYMPHS ABS 0.39* 0.36*  --    MONOS ABS 0.85 0.76  --    EOS ABS 0.04 0.06  --    MCV 82.2 82.0 80.2         Lab 09/12/23  0337 09/11/23  1019 09/10/23  1540 09/10/23  0334 09/07/23  1933 09/07/23  0345 09/06/23  1225 09/05/23  2358   SODIUM 129* 128*  --  126*  --  133*  --  132*   POTASSIUM 5.0 5.1  --  5.0  --  4.6  --  4.7   CHLORIDE 89* 90*  --  86*  --  92*  --  93*   CO2 29.0 28.0  --  28.0  --  28.0  --  26.0   ANION GAP 11.0 10.0  --  12.0  --  13.0  --  13.0   BUN 26* 27*  --  26*  --  28*  --  30*   CREATININE 2.03* 1.90*  --  1.88*  --  2.13*  --  2.35*   EGFR 27.8* 30.1*  --  30.5*  --  26.2*  --  23.3*   GLUCOSE 74 88  --  82  --  86  --  83   CALCIUM 9.7 9.5  --  9.4  --  9.0  --  8.9   MAGNESIUM 2.0  2.2  --  2.2  --   --  2.5  --    PHOSPHORUS  --   --  2.5 1.9* 2.3* 1.7* 2.5 2.0*         Lab 09/12/23  0337 09/11/23  1019 09/05/23  2358   TOTAL PROTEIN 6.5 6.1  --    ALBUMIN 3.1* 3.2* 3.0*   GLOBULIN 3.4 2.9  --    ALT (SGPT) 70* 67*  --    AST (SGOT) 110* 114*  --    BILIRUBIN 4.9* 4.5*  --    ALK PHOS 124* 124*  --                  Lab 09/11/23  1401   ABO TYPING O   RH TYPING Positive   ANTIBODY SCREEN Negative         Brief Urine Lab Results  (Last result in the past 365 days)        Color   Clarity   Blood   Leuk Est   Nitrite   Protein   CREAT   Urine HCG        09/01/23 1444 Yellow   Clear   Negative   Trace   Negative   30 mg/dL (1+)                 Microbiology Results (last 10 days)       Procedure Component Value - Date/Time    Blood Culture - Blood, Arm, Left [[id number]]  (Normal) Collected: 09/02/23 1907    Lab Status: Final result Specimen: Blood from Arm, Left Updated: 09/07/23 2045     Blood Culture No growth at 5 days    Blood Culture - Blood, Hand, Left [[id number]]  (Normal) Collected: 09/02/23 1907    Lab Status: Final result Specimen: Blood from Hand, Left Updated: 09/07/23 2045     Blood Culture No growth at 5 days    MRSA Screen, PCR (Inpatient) - Swab, Nares [[id number]]  (Normal) Collected: 09/02/23 1635    Lab Status: Final result Specimen: Swab from Nares Updated: 09/02/23 2050     MRSA PCR Negative    Narrative:      The negative predictive value of this diagnostic test is high and should only be used to consider de-escalating anti-MRSA therapy. A positive result may indicate colonization with MRSA and must be correlated clinically.  MRSA Negative    Gastrointestinal Panel, PCR - Stool, Per Rectum [[id number]]  (Normal) Collected: 09/02/23 1551    Lab Status: Final result Specimen: Stool from Per Rectum Updated: 09/02/23 1735     Campylobacter Not Detected     Plesiomonas shigelloides Not Detected     Salmonella Not Detected     Vibrio Not Detected     Vibrio cholerae Not Detected      Yersinia enterocolitica Not Detected     Enteroaggregative E. coli (EAEC) Not Detected     Enteropathogenic E. coli (EPEC) Not Detected     Enterotoxigenic E. coli (ETEC) lt/st Not Detected     Shiga-like toxin-producing E. coli (STEC) stx1/stx2 Not Detected     Shigella/Enteroinvasive E. coli (EIEC) Not Detected     Cryptosporidium Not Detected     Cyclospora cayetanensis Not Detected     Entamoeba histolytica Not Detected     Giardia lamblia Not Detected     Adenovirus F40/41 Not Detected     Astrovirus Not Detected     Norovirus GI/GII Not Detected     Rotavirus A Not Detected     Sapovirus (I, II, IV or V) Not Detected            Adult Transthoracic Echo Complete W/ Cont if Necessary Per Protocol    Result Date: 9/5/2023  •  Left ventricular systolic function is mildly decreased. Left ventricular ejection fraction appears to be 41 - 45%. •  Left ventricular wall thickness is consistent with moderate concentric hypertrophy. •  Mildly reduced right ventricular systolic function noted. •  The right ventricular cavity is mild to moderately dilated. •  The left atrial cavity is moderately dilated. •  Estimated right ventricular systolic pressure from tricuspid regurgitation is normal (<35 mmHg). Calculated right ventricular systolic pressure from tricuspid regurgitation is 29 mmHg. •  There is a trivial pericardial effusion adjacent to the right ventricle.     CT Abdomen Pelvis Without Contrast    Result Date: 9/1/2023  CT ABDOMEN PELVIS WO CONTRAST Date of Exam: 9/1/2023 11:04 AM EDT Indication: gen weakness. Comparison: None available. Technique: Axial CT images were obtained of the abdomen and pelvis without the administration of contrast. Reconstructed coronal and sagittal images were also obtained. Automated exposure control and iterative construction methods were used. Findings: The lung apices demonstrate small right pleural effusion. There is notable four-chamber cardiac enlargement. The body wall soft  tissues demonstrate anasarca. The osseous structures demonstrate no evidence of acute fracture or aggressive osseous lesion. The liver is enlarged. There is no suspicious focal hepatic, splenic or pancreatic lesion on limited noncontrast evaluation. There is a slightly hyperdense exophytic round finding involving the left kidney, favoring a hemorrhagic cyst. There is no evidence of hydronephrosis. Small and large bowel loops are nondilated. Diverticular changes are present without definite evidence of acute diverticulitis. Numerous hyperdense uterine findings are present, likely to represent fibroids but incompletely characterized. There is no free fluid or pneumoperitoneum. Hyperdense gallstones are present without inflammatory signs of cholecystitis.     Impression: There is marked cardiac enlargement, with diffuse anasarca and a small right pleural effusion. There is also hepatic enlargement and findings are concerning for heart failure. No acute findings are present in the abdomen and pelvis. An exophytic left renal finding is noted, favoring a benign hemorrhagic cyst, better characterized on recent ultrasound. Nodular hyperdense appearance of the uterus suggesting underlying fibroids. Electronically Signed: Ryley Busby MD  9/1/2023 11:19 AM EDT  Workstation ID: [id number]    CT Head Without Contrast    Result Date: 9/1/2023  CT HEAD WO CONTRAST Date of Exam: 9/1/2023 11:04 AM EDT Indication: gen weakness. Comparison: None available. Technique: Axial CT images were obtained of the head without contrast administration.  Automated exposure control and iterative construction methods were used. Findings: Gray-white differentiation is maintained and there is no evidence of intracranial hemorrhage, mass or mass effect. The ventricles are normal in size and configuration. The orbits are normal. The paranasal sinuses are grossly clear. The calvarium is intact.     Impression: No acute intracranial abnormality.  Electronically Signed: Ryley Busby MD  9/1/2023 11:12 AM EDT  Workstation ID: [id number]    XR Chest 1 View    Result Date: 9/5/2023  XR CHEST 1 VW Date of Exam: 9/5/2023 5:00 AM EDT Indication: CHF Comparison: September 4, 2023 Findings: The heart is enlarged. There is no shay consolidation. No definite effusions are confirmed.     Impression: 1.Cardiomegaly. Electronically Signed: Wilmar Ramos MD  9/5/2023 9:18 AM EDT  Workstation ID: [id number]    XR Chest 1 View    Result Date: 9/4/2023  XR CHEST 1 VW Date of Exam: 9/4/2023 1:21 PM CDT Indication: SOA Comparison: 2/17/2023 Findings: Cardiomediastinal silhouette is enlarged, more pronounced than previous exam. No airspace disease, pneumothorax, nor pleural effusion.No acute osseous abnormality identified.     Impression: Enlarging cardiac silhouette without acute pulmonary process identified. Electronically Signed: Rickey Werner MD  9/4/2023 1:48 PM CDT  Workstation ID: [id number]     Results for orders placed during the hospital encounter of 09/30/22    Duplex Venous Lower Extremity - Bilateral CAR    Interpretation Summary  · Normal bilateral lower extremity venous duplex scan.      Results for orders placed during the hospital encounter of 09/30/22    Duplex Venous Lower Extremity - Bilateral CAR    Interpretation Summary  · Normal bilateral lower extremity venous duplex scan.      Results for orders placed during the hospital encounter of 09/01/23    Adult Transthoracic Echo Complete W/ Cont if Necessary Per Protocol    Interpretation Summary  •  Left ventricular systolic function is mildly decreased. Left ventricular ejection fraction appears to be 41 - 45%.  •  Left ventricular wall thickness is consistent with moderate concentric hypertrophy.  •  Mildly reduced right ventricular systolic function noted.  •  The right ventricular cavity is mild to moderately dilated.  •  The left atrial cavity is moderately dilated.  •  Estimated right ventricular systolic  pressure from tricuspid regurgitation is normal (<35 mmHg). Calculated right ventricular systolic pressure from tricuspid regurgitation is 29 mmHg.  •  There is a trivial pericardial effusion adjacent to the right ventricle.      Plan for Follow-up of Pending Labs/Results:     Discharge Details        Discharge Medications        New Medications        Instructions Start Date   bumetanide 1 MG tablet  Commonly known as: BUMEX   1 mg, Oral, Daily   Start Date: September 13, 2023     ipratropium-albuterol 0.5-2.5 mg/3 ml nebulizer  Commonly known as: DUO-NEB   3 mL, Nebulization, Every 4 Hours PRN      simethicone 80 MG chewable tablet  Commonly known as: MYLICON   80 mg, Oral, 4 Times Daily PRN      trolamine salicylate 10 % cream  Commonly known as: ASPERCREME   1 application , Topical, 3 Times Daily PRN             Changes to Medications        Instructions Start Date   Eliquis 5 MG tablet tablet  Generic drug: apixaban  What changed: additional instructions   5 mg, Oral, Every 12 Hours Scheduled             Continue These Medications        Instructions Start Date   albuterol sulfate  (90 Base) MCG/ACT inhaler  Commonly known as: PROVENTIL HFA;VENTOLIN HFA;PROAIR HFA   2 puffs, Inhalation, 2 Times Daily      budesonide-formoterol 80-4.5 MCG/ACT inhaler  Commonly known as: SYMBICORT   2 puffs, Inhalation, 2 Times Daily - RT      famotidine 20 MG tablet  Commonly known as: PEPCID   20 mg, Oral, Daily      ibuprofen 200 MG tablet  Commonly known as: ADVIL,MOTRIN   200 mg, Oral, Every 6 Hours PRN, OTC      nebivolol 10 MG tablet  Commonly known as: BYSTOLIC   10 mg, Oral, Daily      O2  Commonly known as: OXYGEN   3 L/min, Inhalation, Continuous      pantoprazole 40 MG EC tablet  Commonly known as: PROTONIX   40 mg, Oral, Daily      Spiriva Respimat 2.5 MCG/ACT aerosol solution inhaler  Generic drug: tiotropium bromide monohydrate   2 puffs, Inhalation, Daily - RT               Allergies   Allergen Reactions    • Ace Inhibitors Angioedema     lisinopril         Discharge Disposition:  Skilled Nursing Facility (DC - External)    Diet:  Hospital:  Diet Order   Procedures   • Diet: Fluid Restriction (240 mL/tray) Diets; 1500 mL/day; Texture: Regular Texture (IDDSI 7); Fluid Consistency: Thin (IDDSI 0)       Activity:  Activity Instructions       Activity as Tolerated              Restrictions or Other Recommendations:  None       CODE STATUS:    Code Status and Medical Interventions:   Ordered at: 09/01/23 1837     Code Status (Patient has no pulse and is not breathing):    CPR (Attempt to Resuscitate)     Medical Interventions (Patient has pulse or is breathing):    Full       Future Appointments   Date Time Provider Department Center   9/12/2023  2:00 PM [facility]   9/14/2023  1:45 PM Teresa Posadas APRN [facility]       Additional Instructions for the Follow-ups that You Need to Schedule       Discharge Follow-up with PCP   As directed       Currently Documented PCP:    Provider, No Known    PCP Phone Number:    None     Follow Up Details: Follow-up for family doctor 1 week after discharge from facility        Discharge Follow-up with Specified Provider: Dr. Pugh; 4 to 6 weeks; please schedule appointment prior to patient's discharge   As directed      To: Dr. Pugh; 4 to 6 weeks; please schedule appointment prior to patient's discharge        Discharge Follow-up with Specified Provider: Follow-up with facility provider in 1 to 2 days   As directed      To: Follow-up with facility provider in 1 to 2 days                      YASHIRA Ramires  09/12/23      Time Spent on Discharge:  I spent 40  minutes on this discharge activity which included: face-to-face encounter with the patient, reviewing the data in the system, coordination of the care with the nursing staff as well as consultants, documentation, and entering orders.

## 2023-09-12 NOTE — PLAN OF CARE
Goal Outcome Evaluation:  Plan of Care Reviewed With: patient        Progress: no change  Outcome Evaluation: atient with increased fatigue and declined to complete OOB activity, however agreeable to complete supine exercises. Patient completed UE exercises. The patient continues to present below baseline for functional mobility. The patient would continue to benefit from skilled PT to address strength, balance and activity tolerance deficits. Continue to current PT POC      Anticipated Discharge Disposition (PT): skilled nursing facility

## 2023-09-12 NOTE — CASE MANAGEMENT/SOCIAL WORK
Case Management Discharge Note      Final Note: Pt has insurance approval for a bed at [facility] and per provider, pt is medically ready for discharge. [facility] is able to accept pt today and has access to retrieve discharage summary from [facility]. Pt's nurse can call report to [phone] and send a copy of the discharge summary with pt.  Any controlled meds will be escribed to the facility's pharmacy. Transportation arranged with [facility] to transport pt to [facility] today at 1400. PCS form on chart. I spoke with daughter, Candelaria Camargo on phone and she and her mother(patient) are agreeable to discharge plan.         Selected Continued Care - Admitted Since 9/1/2023       Destination Coordination complete.      Service Provider Selected Services Address Phone Fax Patient Preferred    [facility] [address] [id number] [phone] --              Durable Medical Equipment    No services have been selected for the patient.                Dialysis/Infusion    No services have been selected for the patient.                Home Medical Care    No services have been selected for the patient.                Therapy    No services have been selected for the patient.                Community Resources    No services have been selected for the patient.                Community & DME    No services have been selected for the patient.                    Transportation Services  Ambulance: [facility]  Other: Other ([facility] w/c)    Final Discharge Disposition Code: 03 - skilled nursing facility (SNF)

## 2023-09-22 NOTE — PROGRESS NOTES
Enter Query Response Below      Query Response: Longstanding persistent atrial fibrillation              If applicable, please update the problem list.     Patient: Sonia Bella        : 1965  Account: [id number]           Admit Date: 2023        How to Respond to this query:       a. Click New Note     b. Answer query within the yellow box.                c. Update the Problem List, if applicable.      If you have any questions about this query contact me at: [email or web address]     Dr. Pugh:        Cardiology consult note states permanent atrial fibrillation since at least 2023, normally on bystolic 10mg at home for rate control, will give 1 dose of iv digoxin, another liter of normal saline, resume nebivolol when blood pressure more stable, will add lopressor 2.5mg iv as long as blood pressure is above 100mmHG systolic, restart eliquis 5mg twice daily if no contraindications. Discharge summary states afib with rvr and associated hypotension.     Please clarify conflicting documentation as:    Permanent atrial fibrillation  Atrial fibrillation  Other- specify__________  Unable to determine.    By submitting this query, we are merely seeking further clarification of documentation to accurately reflect all conditions that you are monitoring, evaluating, treating or that extend the hospitalization or utilize additional resources of care. Please utilize your independent clinical judgment when addressing the question(s) above.     This query and your response, once completed, will be entered into the legal medical record.    Sincerely,  Cali TIRADON   Clinical Documentation Integrity Program   [email or web address]

## 2024-01-09 NOTE — TELEPHONE ENCOUNTER
"Advised caller that yes she should be seen emergently right now     Reason for Disposition  • Sounds like a life-threatening emergency to the triager    Additional Information  • Negative: SEVERE difficulty breathing (e.g., struggling for each breath, speaks in single words, pulse > 120)  • Negative: Bluish (or gray) lips or face now  • Negative: Difficult to awaken or acting confused (e.g., disoriented, slurred speech)    Answer Assessment - Initial Assessment Questions  1. MAIN CONCERN OR SYMPTOM: \"What's your main concern?\" (e.g., low oxygen level, breathing difficulty) \"What question do you have?\"      Hypoxia  2.  OXYGEN EQUIPMENT:  Are you having trouble with your oxygen equipment?  (e.g., cannula, mask, tubing, tank, concentrator)      Cannula 2 liters  3. ONSET: \"When did the  this  start?\"       Two weeks  4. OXYGEN THERAPY:     - \"Do you currently use home oxygen?\" (e.g., yes, no).     - If Yes, ask: \"What is your oxygen source?\" (e.g., O2 tank, O2 concentrator).     - If Yes, ask: \"How do you get the oxygen?\" (e.g., nasal prongs, face mask).     - If Yes, ask: \"How much oxygen are you supposed to use?\" (e.g., 1-2 L NC)      2 liter cannula  5. PULSE OXIMETER:     - \"Do you have a pulse oximeter (pulse ox)?\"  (e.g., yes, no)     - If Yes, ask: \"Where do you place the probe?\" (e.g., fingertip, ear lobe)      SPO2 reads 70's when she ambulates and when at rest  82 % on 2 liters  6. O2 MONITORING: \"What is the oxygen level (pulse ox reading)?\" (e.g., %)      81% on 2liters  7. VSS MONITORING: \"Do you monitor/measure your oxygen level or vital signs (e.g., yes, no, measurements are automatically sent to provider/call center). Document CURRENT and NORMAL BASELINE values if available.      -  P: \"What is your pulse rate per minute?\"    -  RR: \"What is your respiratory rate per minute?\"      She is not sure  8. BREATHING DIFFICULTY: \"Are you having any difficulty breathing?\" If Yes, ask: \"How bad is it?\"  " "(e.g., none, mild, moderate, severe)    - MILD: No SOB at rest, mild SOB with walking, speaks normally in sentences, able to lie down, no retractions, pulse < 100.    - MODERATE: SOB at rest, SOB with minimal exertion and prefers to sit, cannot lie down flat, speaks in phrases, mild retractions, audible wheezing, pulse 100-120.    - SEVERE: Very SOB at rest, speaks in single words, struggling to breathe, sitting hunched forward, retractions, pulse > 120       Feels SOA at rest yes  9. OTHER SYMPTOMS: \"Do you have any other symptoms?\" (e.g., fever, change in sputum)      COPD, chronic renal failure, acute on chronic respiratory failure in Sept 2022, HTN, Anemia  10. 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 Patient states that he has been having a dry cough for the past week, worsened over the past few days. Denies SOB with the cough, denies hemoptysis. Reports fevers at home to 101.5 with associated diaphoresis. Said that he has significant pinprick like b/l chest pain with his cough but denies any chest pain when not coughing or with ambulation. Also reports rhinorrhea and sore throat. Reports generalized weakness and poor appetite. States his daughter was visiting him over the week end last week and she was positive for COVID19. Patient tested positive for COVID19 2 days prior and was started on paxlovid as outpatient. Of note, family states that the patient has had worsening confusion at home over the past 6 months (becoming disoriented to time) but recently has been more confused, talking about seeing people that are not present.     On arrival to the ED his vitals were T 98 -> 99.2, P 80, /72, RR 18, ) sat 100% RA. His lab work showed leukocytosis with neutrophilia and bandemia, MABLE, mild hyponatremia, indeterminant but stable troponins, and elevated lactate to 2.3. His COVID19 swab was positive. His CXR showed bibasilar crackles.  (23 Dec 2023 22:51):    pt has been here for past two weeks and now pulm called fro excessive secretions   he is able to answer simple questions:  grand sons at bedside:         ?FOLLOWING PRESENT  [ x] Hx of PE/DVT, [ x] Hx COPD, [ x] Hx of Asthma, [y ] Hx of Hospitalization, [x ]  Hx of BiPAP/CPAP use, [x ] Hx of CANDIDO    Allergies    fluoroquinolone antibiotics (Other)  Cipro (Unknown)  Tegretol (Unknown)  carbamazepine (Other)    Intolerances        PAST MEDICAL & SURGICAL HISTORY:  Hyperlipemia      Hypertension      Coronary Artery Disease      Diabetes Mellitus Type II      Stented Coronary Artery  total 5 stents, last stent 5/2019      Neuropathy      Myocardial infarction      Stroke  mild left facial numbness   no other residuals verbalized      Myoclonic jerking      Stage 3 chronic kidney disease      History of Cataract Extraction      Hx of CABG      H/O coronary angiogram      S/P coronary artery stent placement  1/6/09      S/P placement of cardiac pacemaker          FAMILY HISTORY:  No pertinent family history in first degree relatives        Social History: [x  ] TOBACCO                  [ x ] ETOH                                 [ x ] IVDA/DRUGS    REVIEW OF SYSTEMS      General:	x    Skin/Breast:x  	  Ophthalmologic:x  	  ENMT:	x    Respiratory and Thorax:  excessive secretions and gurgling in thraot  	  Cardiovascular:	x    Gastrointestinal:	x    Genitourinary:	x    Musculoskeletal:	x    Neurological:	x    Psychiatric:	x    Hematology/Lymphatics:	x    Endocrine:	x    Allergic/Immunologic:	x    MEDICATIONS  (STANDING):  albuterol/ipratropium for Nebulization 3 milliLiter(s) Nebulizer every 12 hours  amLODIPine   Tablet 5 milliGRAM(s) Oral daily  aspirin  chewable 81 milliGRAM(s) Oral daily  dornase cierra Solution 2.5 milliGRAM(s) Inhalation every 12 hours  escitalopram 10 milliGRAM(s) Oral daily  gabapentin Solution 100 milliGRAM(s) Oral two times a day  heparin   Injectable 5000 Unit(s) SubCutaneous every 8 hours  insulin glargine Injectable (LANTUS) 14 Unit(s) SubCutaneous at bedtime  insulin lispro (ADMELOG) corrective regimen sliding scale   SubCutaneous every 6 hours  levETIRAcetam   Injectable 250 milliGRAM(s) IV Push every 12 hours  melatonin 3 milliGRAM(s) Oral at bedtime  memantine 5 milliGRAM(s) Oral two times a day  metoprolol tartrate Injectable 5 milliGRAM(s) IV Push every 6 hours  mometasone 110 MICROgram(s) Inhaler 2 Puff(s) Inhalation every 12 hours  pantoprazole  Injectable 40 milliGRAM(s) IV Push every 12 hours  ranolazine 500 milliGRAM(s) Oral two times a day  sucralfate 1 Gram(s) Oral every 12 hours  ticagrelor 60 milliGRAM(s) Enteral Tube every 12 hours    MEDICATIONS  (PRN):  acetaminophen   IVPB .. 1000 milliGRAM(s) IV Intermittent every 6 hours PRN Mild Pain (1 - 3), Moderate Pain (4 - 6)       Vital Signs Last 24 Hrs  T(C): 36.3 (09 Jan 2024 09:58), Max: 36.9 (09 Jan 2024 06:00)  T(F): 97.4 (09 Jan 2024 09:58), Max: 98.5 (09 Jan 2024 06:00)  HR: 84 (09 Jan 2024 09:58) (78 - 88)  BP: 134/66 (09 Jan 2024 09:58) (120/55 - 167/69)  BP(mean): --  RR: 18 (09 Jan 2024 09:58) (17 - 18)  SpO2: 99% (09 Jan 2024 09:58) (94% - 100%)    Parameters below as of 09 Jan 2024 09:58  Patient On (Oxygen Delivery Method): nasal cannula    Orthostatic VS          I&O's Summary    08 Jan 2024 07:01  -  09 Jan 2024 07:00  --------------------------------------------------------  IN: 400 mL / OUT: 400 mL / NET: 0 mL        Physical Exam:   GENERAL: NAD, well-groomed, well-developed  HEENT: JAVAD/   Atraumatic, Normocephalic  ENMT: No tonsillar erythema, exudates, or enlargement; Moist mucous membranes, Good dentition, No lesions  NECK: Supple, No JVD, Normal thyroid  CHEST/LUNG: Mild coarse rhocni ; with gurgling sounds in throat  CVS: Regular rate and rhythm; No murmurs, rubs, or gallops  GI: : Soft, Nontender, Nondistended; Bowel sounds present  NERVOUS SYSTEM:  Alert & awake  EXTREMITIES:-edema  LYMPH: No lymphadenopathy noted  SKIN: No rashes or lesions  ENDOCRINOLOGY: No Thyromegaly  PSYCH: calm     Labs:                              8.0    6.48  )-----------( 284      ( 09 Jan 2024 08:45 )             25.7                         9.2    10.00 )-----------( 312      ( 07 Jan 2024 06:00 )             28.6                         9.0    10.27 )-----------( 301      ( 06 Jan 2024 07:35 )             27.7                         9.2    12.65 )-----------( 314      ( 05 Jan 2024 22:19 )             28.8     01-09    140  |  108<H>  |  21  ----------------------------<  146<H>  5.3   |  25  |  1.33<H>  01-07    137  |  109<H>  |  21  ----------------------------<  301<H>  5.2   |  19<L>  |  1.35<H>  01-06    143  |  111<H>  |  20  ----------------------------<  195<H>  5.0   |  20<L>  |  1.49<H>  01-05    142  |  111<H>  |  22  ----------------------------<  219<H>  5.1   |  19<L>  |  1.45<H>    Ca    7.9<L>      09 Jan 2024 08:45  Phos  2.8     01-09  Mg     2.00     01-09    TPro  6.0  /  Alb  2.8<L>  /  TBili  0.4  /  DBili  x   /  AST  16  /  ALT  18  /  AlkPhos  78  01-09  TPro  6.2  /  Alb  2.8<L>  /  TBili  0.6  /  DBili  x   /  AST  33  /  ALT  19  /  AlkPhos  62  01-06    CAPILLARY BLOOD GLUCOSE      POCT Blood Glucose.: 130 mg/dL (09 Jan 2024 12:32)  POCT Blood Glucose.: 126 mg/dL (09 Jan 2024 08:53)  POCT Blood Glucose.: 145 mg/dL (09 Jan 2024 00:41)  POCT Blood Glucose.: 188 mg/dL (08 Jan 2024 21:13)  POCT Blood Glucose.: 199 mg/dL (08 Jan 2024 18:04)    LIVER FUNCTIONS - ( 09 Jan 2024 08:45 )  Alb: 2.8 g/dL / Pro: 6.0 g/dL / ALK PHOS: 78 U/L / ALT: 18 U/L / AST: 16 U/L / GGT: x             Urinalysis Basic - ( 09 Jan 2024 08:45 )    Color: x / Appearance: x / SG: x / pH: x  Gluc: 146 mg/dL / Ketone: x  / Bili: x / Urobili: x   Blood: x / Protein: x / Nitrite: x   Leuk Esterase: x / RBC: x / WBC x   Sq Epi: x / Non Sq Epi: x / Bacteria: x      D DImer      Studies  Chest X-RAY  CT SCAN Chest   CT Abdomen  Venous Dopplers: LE:   Others      rad< from: US Duplex Venous Lower Ext Complete, Bilateral (01.08.24 @ 13:36) >  COMPARISON: None available.    TECHNIQUE: Duplex sonography of the BILATERAL LOWER extremity veins with   color and spectral Doppler, with and without compression.    FINDINGS:    RIGHT:  Normal compressibility of the RIGHT common femoral, femoral and popliteal   veins.  Doppler examination shows normal spontaneous and phasic flow.  No RIGHT calf vein thrombosis is detected.    LEFT:  Normal compressibility of the LEFT common femoral, femoral and popliteal   veins.  Doppler examination shows normal spontaneous and phasic flow.  No LEFT calf vein thrombosis is detected.    IMPRESSION:  No evidence of deep venous thrombosis in either lower extremity.            --- End of Report ---            TRACY WEISS MD; Attending Radiologist  This document has been electronically signed. Jan 8 2024  1:38PM    < end of copied text >  < from: Xray Chest 1 View AP/PA (01.06.24 @ 13:27) >  ACC: [id number] EXAM:  XR CHEST AP OR PA 1V   ORDERED BY: MARIANELA ARGUETA     PROCEDURE DATE:  01/06/2024          INTERPRETATION:  EXAMINATION: XR ABDOMEN URGENT    CLINICAL INDICATION: NGT Placement    TECHNIQUE: Single frontal, portable view of the chest was obtained.    COMPARISON: Chest x-ray 1/2/2024    FINDINGS:  Enteric tube with tip overlying stomach. Left-sided pacemaker. Median   sternotomy wires.  The heart is not accurately assessed in this AP projection.  Moderate bilateral pleuraleffusions.  There is no pneumothorax.  No acute bony abnormality.    IMPRESSION:  Moderate bilateral pleural effusions.  Status post NG tube placement.    --- End of Report ---          SUZY STARR MD; Resident Radiologist  This document has been electronically signed.  AMELIA ANGLIN MD; Attending Radiologist  This document has been electronically signed. Jan 6 2024  1:26PM    < end of copied text >        < from: TTE W or WO Ultrasound Enhancing Agent (12.25.23 @ 11:09) >  Study Information:  Image quality for this study is technically difficult.    _______________________________________________________________________________________     CONCLUSIONS:      1. Technically difficult image quality.   2. Left ventricular cavity is normal. Left ventricular wallthickness is normal. Left ventricular systolic function is normal with an ejection fraction of 62 % by Florence's method of disks. There are no regional wall motion abnormalities seen.   3. Normal right ventricular cavity size and probably normal systolic function.   4. Structurally normal mitral valve with normal leaflet excursion. There is calcification of the mitral valve annulus. There is mild mitral regurgitation.   5. Aortic valve leaflet morphology not well visualized. with reduced systolicexcursion. There is calcification of the aortic valve leaflets. The peak transaortic velocity is 2.47 m/s, peak transaortic gradient is 24.4 mmHg and mean transaortic gradient is 14.0 mmHg with an LVOT/aortic valve VTI ratio of 0.20. Probable mild tomoderate aortic stenosis. There is mild aortic regurgitation.   6. No prior echocardiogram is available for comparison.    < end of copied text >  < from: TTE W or WO Ultrasound Enhancing Agent (12.25.23 @ 11:09) >  Study Information:  Image quality for this study is technically difficult.    _______________________________________________________________________________________     CONCLUSIONS:      1. Technically difficult image quality.   2. Left ventricular cavity is normal. Left ventricular wallthickness is normal. Left ventricular systolic function is normal with an ejection fraction of 62 % by Florence's method of disks. There are no regional wall motion abnormalities seen.   3. Normal right ventricular cavity size and probably normal systolic function.   4. Structurally normal mitral valve with normal leaflet excursion. There is calcification of the mitral valve annulus. There is mild mitral regurgitation.   5. Aortic valve leaflet morphology not well visualized. with reduced systolicexcursion. There is calcification of the aortic valve leaflets. The peak transaortic velocity is 2.47 m/s, peak transaortic gradient is 24.4 mmHg and mean transaortic gradient is 14.0 mmHg with an LVOT/aortic valve VTI ratio of 0.20. Probable mild tomoderate aortic stenosis. There is mild aortic regurgitation.   6. No prior echocardiogram is available for comparison.    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 Patient states that he has been having a dry cough for the past week, worsened over the past few days. Denies SOB with the cough, denies hemoptysis. Reports fevers at home to 101.5 with associated diaphoresis. Said that he has significant pinprick like b/l chest pain with his cough but denies any chest pain when not coughing or with ambulation. Also reports rhinorrhea and sore throat. Reports generalized weakness and poor appetite. States his daughter was visiting him over the week end last week and she was positive for COVID19. Patient tested positive for COVID19 2 days prior and was started on paxlovid as outpatient. Of note, family states that the patient has had worsening confusion at home over the past 6 months (becoming disoriented to time) but recently has been more confused, talking about seeing people that are not present.     On arrival to the ED his vitals were T 98 -> 99.2, P 80, /72, RR 18, ) sat 100% RA. His lab work showed leukocytosis with neutrophilia and bandemia, MABLE, mild hyponatremia, indeterminant but stable troponins, and elevated lactate to 2.3. His COVID19 swab was positive. His CXR showed bibasilar crackles.  (23 Dec 2023 22:51):    pt has been here for past two weeks and now pulm called fro excessive secretions   he is able to answer simple questions:  grand sons at bedside:         ?FOLLOWING PRESENT  [ x] Hx of PE/DVT, [ x] Hx COPD, [ x] Hx of Asthma, [y ] Hx of Hospitalization, [x ]  Hx of BiPAP/CPAP use, [x ] Hx of CANDIDO    Allergies    fluoroquinolone antibiotics (Other)  Cipro (Unknown)  Tegretol (Unknown)  carbamazepine (Other)    Intolerances        PAST MEDICAL & SURGICAL HISTORY:  Hyperlipemia      Hypertension      Coronary Artery Disease      Diabetes Mellitus Type II      Stented Coronary Artery  total 5 stents, last stent 5/2019      Neuropathy      Myocardial infarction      Stroke  mild left facial numbness   no other residuals verbalized      Myoclonic jerking      Stage 3 chronic kidney disease      History of Cataract Extraction      Hx of CABG      H/O coronary angiogram      S/P coronary artery stent placement  1/6/09      S/P placement of cardiac pacemaker          FAMILY HISTORY:  No pertinent family history in first degree relatives        Social History: [x  ] TOBACCO                  [ x ] ETOH                                 [ x ] IVDA/DRUGS    REVIEW OF SYSTEMS      General:	x    Skin/Breast:x  	  Ophthalmologic:x  	  ENMT:	x    Respiratory and Thorax:  excessive secretions and gurgling in thraot  	  Cardiovascular:	x    Gastrointestinal:	x    Genitourinary:	x    Musculoskeletal:	x    Neurological:	x    Psychiatric:	x    Hematology/Lymphatics:	x    Endocrine:	x    Allergic/Immunologic:	x    MEDICATIONS  (STANDING):  albuterol/ipratropium for Nebulization 3 milliLiter(s) Nebulizer every 12 hours  amLODIPine   Tablet 5 milliGRAM(s) Oral daily  aspirin  chewable 81 milliGRAM(s) Oral daily  dornase cierra Solution 2.5 milliGRAM(s) Inhalation every 12 hours  escitalopram 10 milliGRAM(s) Oral daily  gabapentin Solution 100 milliGRAM(s) Oral two times a day  heparin   Injectable 5000 Unit(s) SubCutaneous every 8 hours  insulin glargine Injectable (LANTUS) 14 Unit(s) SubCutaneous at bedtime  insulin lispro (ADMELOG) corrective regimen sliding scale   SubCutaneous every 6 hours  levETIRAcetam   Injectable 250 milliGRAM(s) IV Push every 12 hours  melatonin 3 milliGRAM(s) Oral at bedtime  memantine 5 milliGRAM(s) Oral two times a day  metoprolol tartrate Injectable 5 milliGRAM(s) IV Push every 6 hours  mometasone 110 MICROgram(s) Inhaler 2 Puff(s) Inhalation every 12 hours  pantoprazole  Injectable 40 milliGRAM(s) IV Push every 12 hours  ranolazine 500 milliGRAM(s) Oral two times a day  sucralfate 1 Gram(s) Oral every 12 hours  ticagrelor 60 milliGRAM(s) Enteral Tube every 12 hours    MEDICATIONS  (PRN):  acetaminophen   IVPB .. 1000 milliGRAM(s) IV Intermittent every 6 hours PRN Mild Pain (1 - 3), Moderate Pain (4 - 6)       Vital Signs Last 24 Hrs  T(C): 36.3 (09 Jan 2024 09:58), Max: 36.9 (09 Jan 2024 06:00)  T(F): 97.4 (09 Jan 2024 09:58), Max: 98.5 (09 Jan 2024 06:00)  HR: 84 (09 Jan 2024 09:58) (78 - 88)  BP: 134/66 (09 Jan 2024 09:58) (120/55 - 167/69)  BP(mean): --  RR: 18 (09 Jan 2024 09:58) (17 - 18)  SpO2: 99% (09 Jan 2024 09:58) (94% - 100%)    Parameters below as of 09 Jan 2024 09:58  Patient On (Oxygen Delivery Method): nasal cannula    Orthostatic VS          I&O's Summary    08 Jan 2024 07:01  -  09 Jan 2024 07:00  --------------------------------------------------------  IN: 400 mL / OUT: 400 mL / NET: 0 mL        Physical Exam:   GENERAL: NAD, well-groomed, well-developed  HEENT: JAVAD/   Atraumatic, Normocephalic  ENMT: No tonsillar erythema, exudates, or enlargement; Moist mucous membranes, Good dentition, No lesions  NECK: Supple, No JVD, Normal thyroid  CHEST/LUNG: Mild coarse rhocni ; with gurgling sounds in throat  CVS: Regular rate and rhythm; No murmurs, rubs, or gallops  GI: : Soft, Nontender, Nondistended; Bowel sounds present  NERVOUS SYSTEM:  Alert & awake  EXTREMITIES:-edema  LYMPH: No lymphadenopathy noted  SKIN: No rashes or lesions  ENDOCRINOLOGY: No Thyromegaly  PSYCH: calm     Labs:                              8.0    6.48  )-----------( 284      ( 09 Jan 2024 08:45 )             25.7                         9.2    10.00 )-----------( 312      ( 07 Jan 2024 06:00 )             28.6                         9.0    10.27 )-----------( 301      ( 06 Jan 2024 07:35 )             27.7                         9.2    12.65 )-----------( 314      ( 05 Jan 2024 22:19 )             28.8     01-09    140  |  108<H>  |  21  ----------------------------<  146<H>  5.3   |  25  |  1.33<H>  01-07    137  |  109<H>  |  21  ----------------------------<  301<H>  5.2   |  19<L>  |  1.35<H>  01-06    143  |  111<H>  |  20  ----------------------------<  195<H>  5.0   |  20<L>  |  1.49<H>  01-05    142  |  111<H>  |  22  ----------------------------<  219<H>  5.1   |  19<L>  |  1.45<H>    Ca    7.9<L>      09 Jan 2024 08:45  Phos  2.8     01-09  Mg     2.00     01-09    TPro  6.0  /  Alb  2.8<L>  /  TBili  0.4  /  DBili  x   /  AST  16  /  ALT  18  /  AlkPhos  78  01-09  TPro  6.2  /  Alb  2.8<L>  /  TBili  0.6  /  DBili  x   /  AST  33  /  ALT  19  /  AlkPhos  62  01-06    CAPILLARY BLOOD GLUCOSE      POCT Blood Glucose.: 130 mg/dL (09 Jan 2024 12:32)  POCT Blood Glucose.: 126 mg/dL (09 Jan 2024 08:53)  POCT Blood Glucose.: 145 mg/dL (09 Jan 2024 00:41)  POCT Blood Glucose.: 188 mg/dL (08 Jan 2024 21:13)  POCT Blood Glucose.: 199 mg/dL (08 Jan 2024 18:04)    LIVER FUNCTIONS - ( 09 Jan 2024 08:45 )  Alb: 2.8 g/dL / Pro: 6.0 g/dL / ALK PHOS: 78 U/L / ALT: 18 U/L / AST: 16 U/L / GGT: x             Urinalysis Basic - ( 09 Jan 2024 08:45 )    Color: x / Appearance: x / SG: x / pH: x  Gluc: 146 mg/dL / Ketone: x  / Bili: x / Urobili: x   Blood: x / Protein: x / Nitrite: x   Leuk Esterase: x / RBC: x / WBC x   Sq Epi: x / Non Sq Epi: x / Bacteria: x      D DImer      Studies  Chest X-RAY  CT SCAN Chest   CT Abdomen  Venous Dopplers: LE:   Others      rad< from: US Duplex Venous Lower Ext Complete, Bilateral (01.08.24 @ 13:36) >  COMPARISON: None available.    TECHNIQUE: Duplex sonography of the BILATERAL LOWER extremity veins with   color and spectral Doppler, with and without compression.    FINDINGS:    RIGHT:  Normal compressibility of the RIGHT common femoral, femoral and popliteal   veins.  Doppler examination shows normal spontaneous and phasic flow.  No RIGHT calf vein thrombosis is detected.    LEFT:  Normal compressibility of the LEFT common femoral, femoral and popliteal   veins.  Doppler examination shows normal spontaneous and phasic flow.  No LEFT calf vein thrombosis is detected.    IMPRESSION:  No evidence of deep venous thrombosis in either lower extremity.            --- End of Report ---            TRACY WEISS MD; Attending Radiologist  This document has been electronically signed. Jan 8 2024  1:38PM    < end of copied text >  < from: Xray Chest 1 View AP/PA (01.06.24 @ 13:27) >  ACC: [id number] EXAM:  XR CHEST AP OR PA 1V   ORDERED BY: MARIANELA ARGUETA     PROCEDURE DATE:  01/06/2024          INTERPRETATION:  EXAMINATION: XR ABDOMEN URGENT    CLINICAL INDICATION: NGT Placement    TECHNIQUE: Single frontal, portable view of the chest was obtained.    COMPARISON: Chest x-ray 1/2/2024    FINDINGS:  Enteric tube with tip overlying stomach. Left-sided pacemaker. Median   sternotomy wires.  The heart is not accurately assessed in this AP projection.  Moderate bilateral pleuraleffusions.  There is no pneumothorax.  No acute bony abnormality.    IMPRESSION:  Moderate bilateral pleural effusions.  Status post NG tube placement.    --- End of Report ---          SUZY STARR MD; Resident Radiologist  This document has been electronically signed.  AMELIA ANGLIN MD; Attending Radiologist  This document has been electronically signed. Jan 6 2024  1:26PM    < end of copied text >        < from: TTE W or WO Ultrasound Enhancing Agent (12.25.23 @ 11:09) >  Study Information:  Image quality for this study is technically difficult.    _______________________________________________________________________________________     CONCLUSIONS:      1. Technically difficult image quality.   2. Left ventricular cavity is normal. Left ventricular wallthickness is normal. Left ventricular systolic function is normal with an ejection fraction of 62 % by Florence's method of disks. There are no regional wall motion abnormalities seen.   3. Normal right ventricular cavity size and probably normal systolic function.   4. Structurally normal mitral valve with normal leaflet excursion. There is calcification of the mitral valve annulus. There is mild mitral regurgitation.   5. Aortic valve leaflet morphology not well visualized. with reduced systolicexcursion. There is calcification of the aortic valve leaflets. The peak transaortic velocity is 2.47 m/s, peak transaortic gradient is 24.4 mmHg and mean transaortic gradient is 14.0 mmHg with an LVOT/aortic valve VTI ratio of 0.20. Probable mild tomoderate aortic stenosis. There is mild aortic regurgitation.   6. No prior echocardiogram is available for comparison.    < end of copied text >  < from: TTE W or WO Ultrasound Enhancing Agent (12.25.23 @ 11:09) >  Study Information:  Image quality for this study is technically difficult.    _______________________________________________________________________________________     CONCLUSIONS:      1. Technically difficult image quality.   2. Left ventricular cavity is normal. Left ventricular wallthickness is normal. Left ventricular systolic function is normal with an ejection fraction of 62 % by Florence's method of disks. There are no regional wall motion abnormalities seen.   3. Normal right ventricular cavity size and probably normal systolic function.   4. Structurally normal mitral valve with normal leaflet excursion. There is calcification of the mitral valve annulus. There is mild mitral regurgitation.   5. Aortic valve leaflet morphology not well visualized. with reduced systolicexcursion. There is calcification of the aortic valve leaflets. The peak transaortic velocity is 2.47 m/s, peak transaortic gradient is 24.4 mmHg and mean transaortic gradient is 14.0 mmHg with an LVOT/aortic valve VTI ratio of 0.20. Probable mild tomoderate aortic stenosis. There is mild aortic regurgitation.   6. No prior echocardiogram is available for comparison.    < end of copied text >

## 2024-05-03 ENCOUNTER — DOCUMENTATION (OUTPATIENT)
Dept: FAMILY MEDICINE CLINIC | Facility: CLINIC | Age: 59
End: 2024-05-03
Payer: MEDICARE

## 2024-05-03 RX ORDER — DULOXETIN HYDROCHLORIDE 20 MG/1
20 CAPSULE, DELAYED RELEASE ORAL DAILY
COMMUNITY

## 2024-05-03 RX ORDER — DAPAGLIFLOZIN 10 MG/1
10 TABLET, FILM COATED ORAL DAILY
COMMUNITY

## 2024-05-03 RX ORDER — BUDESONIDE AND FORMOTEROL FUMARATE DIHYDRATE 160; 4.5 UG/1; UG/1
2 AEROSOL RESPIRATORY (INHALATION)
COMMUNITY

## 2024-05-03 RX ORDER — METOPROLOL SUCCINATE 25 MG/1
12.5 TABLET, EXTENDED RELEASE ORAL DAILY
COMMUNITY

## 2024-05-03 RX ORDER — ACETAMINOPHEN 500 MG
1000 TABLET ORAL EVERY 6 HOURS PRN
COMMUNITY

## 2024-05-03 RX ORDER — CYCLOBENZAPRINE HCL 10 MG
10 TABLET ORAL DAILY PRN
COMMUNITY

## 2024-05-03 RX ORDER — ERGOCALCIFEROL 1.25 MG/1
50000 CAPSULE ORAL WEEKLY
COMMUNITY

## 2024-05-03 RX ORDER — ALLOPURINOL 100 MG/1
50 TABLET ORAL DAILY
COMMUNITY

## 2024-05-03 RX ORDER — FUROSEMIDE 20 MG/1
20 TABLET ORAL DAILY PRN
COMMUNITY

## 2024-05-08 NOTE — PROGRESS NOTES
Nursing Home History and Physical       Randy JamarDO yeimi  [address] [place] [address] Phone: [phone]  Fax: [phone]     PATIENT NAME: Sonia Bella                                                                          YOB: 1965           DATE OF SERVICE: 05/09/2024  FACILITY:  [facility]    CHIEF COMPLAINT:  Nursing facility admission        History of Present Illness  The patient is a 59-year-old female with a history of CKD, atrial fibrillation with RVR, heart failure with reduced ejection fraction, COPD on 3 L nasal cannula at baseline, who was recently hospitalized at  for concerns of failure to thrive with rhinovirus and viral pneumonia. The patient also had severe ELIANE associated with nausea, vomiting, and diarrhea. She was given supportive care in the hospital and had gradual improvement in function. However, due to significant weakness and debility, she was then transferred to this facility for further strengthening and rehab. The patient is being seen with the nurse at bedside. Since her admission to the facility, the patient has been fairly independent and has made good progress with physical therapy.    The patient reports experiencing significant pain in her left knee, particularly when standing. She has been attempting to bear more weight on her right leg, which has led to a crackling and popping sensation. She has a history of chronic left knee pain, for which she underwent cartilage removal surgery several years ago. However, she has not received any steroid injections for her knee pain.  She shares that she has not tolerated MARCIO hose in the past.       PAST MEDICAL & SURGICAL HISTORY:   Past Medical History:   Diagnosis Date    Anemia     Atrial fibrillation with RVR     Bleeding ulcer     Breast nodule     LEFT    Cardiomyopathy     Chronic combined systolic and diastolic CHF (congestive heart failure)     CKD (chronic kidney disease) stage 3      Cognitive communication deficit     COPD (chronic obstructive pulmonary disease)     Coronary artery disease     Depression     Diarrhea, unspecified     Diverticulosis     Dysphagia, oropharyngeal phase     Elevated troponin     GERD (gastroesophageal reflux disease)     Gout, unspecified     History of stomach ulcers     Hypertension     Hypotension, unspecified     Muscle weakness (generalized)     Parainfluenza virus pneumonia     Peripheral neuropathy     Pulmonary hypertension     Renal cyst     Stage III CKD 02/15/2023    Transaminitis     Uremia     Vitamin D deficiency       Past Surgical History:   Procedure Laterality Date    COLONOSCOPY           MEDICATIONS:  I have reviewed and reconciled the patients medication list in the patients chart at the [facility] nursing [facility] on 05/09/2024.      ALLERGIES:  Allergies   Allergen Reactions    Ace Inhibitors Angioedema     lisinopril    Iodinated Contrast Media Unknown - High Severity     1Mix IVs in D5W         SOCIAL HISTORY:  Social History     Socioeconomic History    Marital status:    Tobacco Use    Smoking status: Former     Types: Cigarettes    Smokeless tobacco: Never   Vaping Use    Vaping status: Never Used   Substance and Sexual Activity    Alcohol use: Yes    Drug use: Never    Sexual activity: Never       FAMILY HISTORY:  Family History   Family history unknown: Yes        REVIEW OF SYSTEMS:  Review of Systems   Constitutional:  Negative for chills, fatigue and fever.   HENT:  Negative for congestion, ear pain, rhinorrhea, sinus pressure and sore throat.    Eyes:  Negative for visual disturbance.   Respiratory:  Negative for cough, chest tightness, shortness of breath and wheezing.    Cardiovascular:  Negative for chest pain, palpitations and leg swelling.   Gastrointestinal:  Negative for abdominal pain, blood in stool, constipation, diarrhea, nausea and vomiting.   Endocrine: Negative for polydipsia and polyuria.   Genitourinary:   Negative for dysuria and hematuria.   Musculoskeletal:  Negative for arthralgias and back pain.   Skin:  Negative for rash.   Neurological:  Negative for dizziness, light-headedness, numbness and headaches.   Psychiatric/Behavioral:  Negative for dysphoric mood and sleep disturbance. The patient is not nervous/anxious.          PHYSICAL EXAMINATION:   VITAL SIGNS: /69   Pulse 86   Temp 97.4 °F (36.3 °C)   Resp 16   SpO2 96%     Physical Exam  Vitals and nursing note reviewed.   Constitutional:       Appearance: Normal appearance. She is well-developed.      Comments: Frail elderly female, no distress   HENT:      Head: Normocephalic and atraumatic.      Nose: Nose normal.      Mouth/Throat:      Mouth: Mucous membranes are moist.      Pharynx: No oropharyngeal exudate.   Eyes:      General: No scleral icterus.     Conjunctiva/sclera: Conjunctivae normal.      Pupils: Pupils are equal, round, and reactive to light.   Neck:      Thyroid: No thyromegaly.   Cardiovascular:      Rate and Rhythm: Normal rate and regular rhythm.      Heart sounds: Normal heart sounds. No murmur heard.     No friction rub. No gallop.   Pulmonary:      Effort: Pulmonary effort is normal. No respiratory distress.      Breath sounds: Normal breath sounds. No wheezing.   Abdominal:      General: Bowel sounds are normal. There is no distension.      Palpations: Abdomen is soft.      Tenderness: There is no abdominal tenderness.   Musculoskeletal:         General: No deformity or signs of injury.      Cervical back: Normal range of motion and neck supple.   Lymphadenopathy:      Cervical: No cervical adenopathy.   Skin:     General: Skin is warm and dry.      Findings: No rash.   Neurological:      Mental Status: She is alert and oriented to person, place, and time.   Psychiatric:         Mood and Affect: Mood normal.         Behavior: Behavior normal.         RECORDS REVIEW:   Discharge summary [facility] 5/3/2024    Results for  orders placed or performed in visit on 10/24/23   Comprehensive Metabolic Panel    Specimen: Blood   Result Value Ref Range    Glucose 77 65 - 99 mg/dL    BUN 34 (H) 6 - 20 mg/dL    Creatinine 1.46 (H) 0.57 - 1.00 mg/dL    Sodium 139 136 - 145 mmol/L    Potassium 3.6 3.5 - 5.2 mmol/L    Chloride 97 (L) 98 - 107 mmol/L    CO2 32.0 (H) 22.0 - 29.0 mmol/L    Calcium 9.9 8.6 - 10.5 mg/dL    Total Protein 7.0 6.0 - 8.5 g/dL    Albumin 2.7 (L) 3.5 - 5.2 g/dL    ALT (SGPT) 19 1 - 33 U/L    AST (SGOT) 31 1 - 32 U/L    Alkaline Phosphatase 302 (H) 39 - 117 U/L    Total Bilirubin 1.7 (H) 0.0 - 1.2 mg/dL    Globulin 4.3 gm/dL    A/G Ratio 0.6 g/dL    BUN/Creatinine Ratio 23.3 7.0 - 25.0    Anion Gap 10.0 5.0 - 15.0 mmol/L    eGFR 41.3 (L) >60.0 mL/min/1.73   CBC (No Diff)    Specimen: Blood   Result Value Ref Range    WBC 10.46 3.40 - 10.80 10*3/mm3    RBC 3.36 (L) 3.77 - 5.28 10*6/mm3    Hemoglobin 8.7 (L) 12.0 - 15.9 g/dL    Hematocrit 29.8 (L) 34.0 - 46.6 %    MCV 88.7 79.0 - 97.0 fL    MCH 25.9 (L) 26.6 - 33.0 pg    MCHC 29.2 (L) 31.5 - 35.7 g/dL    RDW 19.7 (H) 12.3 - 15.4 %    RDW-SD 63.0 (H) 37.0 - 54.0 fl    MPV 9.8 6.0 - 12.0 fL    Platelets 281 140 - 450 10*3/mm3   Ammonia    Specimen: Blood   Result Value Ref Range    Ammonia 38 11 - 51 umol/L        ASSESSMENT   Diagnoses and all orders for this visit:    1. Resistant hypertension (Primary)    2. Rhinovirus infection    3. Parainfluenza infection    4. Gastroenteritis    5. AoC HFpEF    6. Atrial fibrillation with RVR    7. Stage 3a chronic kidney disease        PLAN  Assessment & Plan  Parainfluenza and rhinovirus infection.  The patient is advised to persist with supportive care, including physical therapy and occupational therapy for strengthening following viral illness.    Gastroenteritis.  The patient's condition has shown improvement.  Nurses monitor for any recurrent episodes of nausea, vomiting, or diarrhea.    Left knee pain - new concern  The patient is  advised to use a soft compression brace for support. PT to supply if possible in facility. Cont tylenol for pain.  May consider Voltaren gel in future if pain persists.     Acute kidney injury with underlying CKD stage 3a.  The patient's baseline creatinine level is approximately 1.4. The patient's renal function will be monitored 1 week post-discharge.    Peripheral neuropathy.  The condition is stable with duloxetine.    Gout.  The patient is advised to continue with allopurinol.    Systolic congestive heart failure.  The patient's most recent left ventricular ejection fraction (LVEF) was 41 percent on 09/2024. The patient appears euvolemic with the current diuretic regimen. The patient is to continue metoprolol and as-needed Lasix.    Atrial fibrillation with RVR, history of RVR.  The patient should continue with metoprolol and Eliquis.    COPD with chronic hypoxia.  The patient is advised to continue with 3 L nasal cannula support for supportive oxygen. The patient should continue with the current inhaler regimen.    Incidental finding of left-sided retroareolar nodule noted on CT chest on 04/21/2025.  The patient will require an outpatient mammogram.         [x]  Discussed Patient in detail with nursing/staff, addressed all needs today.     [x]  Plan of Care Reviewed   [x]  PT/OT Reviewed   [x]  Order Changes  []  Discharge Plans Reviewed  [x]  Advance Directive on file with Nursing Home.   [x]  POA on file with Nursing Home.    [x]  Code Status listed and reviewed.     Randy Roldan DO.  5/13/2024      **Part of this note may be an electronic transcription/translation of spoken language to printed text using the Dragon Dictation System.**    Patient or patient representative verbalized consent for the use of Ambient Listening during the visit with  Randy Roldan DO for chart documentation. 5/13/2024  11:37 EDT

## 2024-05-09 ENCOUNTER — NURSING HOME (OUTPATIENT)
Dept: INTERNAL MEDICINE | Facility: CLINIC | Age: 59
End: 2024-05-09
Payer: MEDICARE

## 2024-05-09 VITALS
TEMPERATURE: 97.4 F | SYSTOLIC BLOOD PRESSURE: 118 MMHG | OXYGEN SATURATION: 96 % | HEART RATE: 86 BPM | RESPIRATION RATE: 16 BRPM | DIASTOLIC BLOOD PRESSURE: 69 MMHG

## 2024-05-09 DIAGNOSIS — B34.8 RHINOVIRUS INFECTION: ICD-10-CM

## 2024-05-09 DIAGNOSIS — I1A.0 RESISTANT HYPERTENSION: Primary | Chronic | ICD-10-CM

## 2024-05-09 DIAGNOSIS — B34.8 PARAINFLUENZA INFECTION: ICD-10-CM

## 2024-05-09 DIAGNOSIS — I48.91 ATRIAL FIBRILLATION WITH RVR: ICD-10-CM

## 2024-05-09 DIAGNOSIS — I50.33 ACUTE ON CHRONIC DIASTOLIC HEART FAILURE: ICD-10-CM

## 2024-05-09 DIAGNOSIS — K52.9 GASTROENTERITIS: ICD-10-CM

## 2024-05-09 DIAGNOSIS — N18.31 STAGE 3A CHRONIC KIDNEY DISEASE: Chronic | ICD-10-CM

## 2024-05-09 PROCEDURE — 99305 1ST NF CARE MODERATE MDM 35: CPT | Performed by: INTERNAL MEDICINE

## 2024-05-09 NOTE — LETTER
Nursing Home History and Physical       Randy JamarDO yeimi  [address] [place] [address] Phone: [phone]  Fax: [phone]     PATIENT NAME: Sonia Bella                                                                          YOB: 1965           DATE OF SERVICE: 05/09/2024  FACILITY:  [facility]    CHIEF COMPLAINT:  Nursing facility admission        History of Present Illness  The patient is a 59-year-old female with a history of CKD, atrial fibrillation with RVR, heart failure with reduced ejection fraction, COPD on 3 L nasal cannula at baseline, who was recently hospitalized at  for concerns of failure to thrive with rhinovirus and viral pneumonia. The patient also had severe ELIANE associated with nausea, vomiting, and diarrhea. She was given supportive care in the hospital and had gradual improvement in function. However, due to significant weakness and debility, she was then transferred to this facility for further strengthening and rehab. The patient is being seen with the nurse at bedside. Since her admission to the facility, the patient has been fairly independent and has made good progress with physical therapy.    The patient reports experiencing significant pain in her left knee, particularly when standing. She has been attempting to bear more weight on her right leg, which has led to a crackling and popping sensation. She has a history of chronic left knee pain, for which she underwent cartilage removal surgery several years ago. However, she has not received any steroid injections for her knee pain.  She shares that she has not tolerated MARCIO hose in the past.       PAST MEDICAL & SURGICAL HISTORY:   Past Medical History:   Diagnosis Date    Anemia     Atrial fibrillation with RVR     Bleeding ulcer     Breast nodule     LEFT    Cardiomyopathy     Chronic combined systolic and diastolic CHF (congestive heart failure)     CKD (chronic kidney disease) stage 3      Cognitive communication deficit     COPD (chronic obstructive pulmonary disease)     Coronary artery disease     Depression     Diarrhea, unspecified     Diverticulosis     Dysphagia, oropharyngeal phase     Elevated troponin     GERD (gastroesophageal reflux disease)     Gout, unspecified     History of stomach ulcers     Hypertension     Hypotension, unspecified     Muscle weakness (generalized)     Parainfluenza virus pneumonia     Peripheral neuropathy     Pulmonary hypertension     Renal cyst     Stage III CKD 02/15/2023    Transaminitis     Uremia     Vitamin D deficiency       Past Surgical History:   Procedure Laterality Date    COLONOSCOPY           MEDICATIONS:  I have reviewed and reconciled the patients medication list in the patients chart at the [facility] nursing [facility] on 05/09/2024.      ALLERGIES:  Allergies   Allergen Reactions    Ace Inhibitors Angioedema     lisinopril    Iodinated Contrast Media Unknown - High Severity     1Mix IVs in D5W         SOCIAL HISTORY:  Social History     Socioeconomic History    Marital status:    Tobacco Use    Smoking status: Former     Types: Cigarettes    Smokeless tobacco: Never   Vaping Use    Vaping status: Never Used   Substance and Sexual Activity    Alcohol use: Yes    Drug use: Never    Sexual activity: Never       FAMILY HISTORY:  Family History   Family history unknown: Yes        REVIEW OF SYSTEMS:  Review of Systems   Constitutional:  Negative for chills, fatigue and fever.   HENT:  Negative for congestion, ear pain, rhinorrhea, sinus pressure and sore throat.    Eyes:  Negative for visual disturbance.   Respiratory:  Negative for cough, chest tightness, shortness of breath and wheezing.    Cardiovascular:  Negative for chest pain, palpitations and leg swelling.   Gastrointestinal:  Negative for abdominal pain, blood in stool, constipation, diarrhea, nausea and vomiting.   Endocrine: Negative for polydipsia and polyuria.   Genitourinary:   Negative for dysuria and hematuria.   Musculoskeletal:  Negative for arthralgias and back pain.   Skin:  Negative for rash.   Neurological:  Negative for dizziness, light-headedness, numbness and headaches.   Psychiatric/Behavioral:  Negative for dysphoric mood and sleep disturbance. The patient is not nervous/anxious.          PHYSICAL EXAMINATION:   VITAL SIGNS: /69   Pulse 86   Temp 97.4 °F (36.3 °C)   Resp 16   SpO2 96%     Physical Exam  Vitals and nursing note reviewed.   Constitutional:       Appearance: Normal appearance. She is well-developed.      Comments: Frail elderly female, no distress   HENT:      Head: Normocephalic and atraumatic.      Nose: Nose normal.      Mouth/Throat:      Mouth: Mucous membranes are moist.      Pharynx: No oropharyngeal exudate.   Eyes:      General: No scleral icterus.     Conjunctiva/sclera: Conjunctivae normal.      Pupils: Pupils are equal, round, and reactive to light.   Neck:      Thyroid: No thyromegaly.   Cardiovascular:      Rate and Rhythm: Normal rate and regular rhythm.      Heart sounds: Normal heart sounds. No murmur heard.     No friction rub. No gallop.   Pulmonary:      Effort: Pulmonary effort is normal. No respiratory distress.      Breath sounds: Normal breath sounds. No wheezing.   Abdominal:      General: Bowel sounds are normal. There is no distension.      Palpations: Abdomen is soft.      Tenderness: There is no abdominal tenderness.   Musculoskeletal:         General: No deformity or signs of injury.      Cervical back: Normal range of motion and neck supple.   Lymphadenopathy:      Cervical: No cervical adenopathy.   Skin:     General: Skin is warm and dry.      Findings: No rash.   Neurological:      Mental Status: She is alert and oriented to person, place, and time.   Psychiatric:         Mood and Affect: Mood normal.         Behavior: Behavior normal.         RECORDS REVIEW:   Discharge summary [facility] 5/3/2024    Results for  orders placed or performed in visit on 10/24/23   Comprehensive Metabolic Panel    Specimen: Blood   Result Value Ref Range    Glucose 77 65 - 99 mg/dL    BUN 34 (H) 6 - 20 mg/dL    Creatinine 1.46 (H) 0.57 - 1.00 mg/dL    Sodium 139 136 - 145 mmol/L    Potassium 3.6 3.5 - 5.2 mmol/L    Chloride 97 (L) 98 - 107 mmol/L    CO2 32.0 (H) 22.0 - 29.0 mmol/L    Calcium 9.9 8.6 - 10.5 mg/dL    Total Protein 7.0 6.0 - 8.5 g/dL    Albumin 2.7 (L) 3.5 - 5.2 g/dL    ALT (SGPT) 19 1 - 33 U/L    AST (SGOT) 31 1 - 32 U/L    Alkaline Phosphatase 302 (H) 39 - 117 U/L    Total Bilirubin 1.7 (H) 0.0 - 1.2 mg/dL    Globulin 4.3 gm/dL    A/G Ratio 0.6 g/dL    BUN/Creatinine Ratio 23.3 7.0 - 25.0    Anion Gap 10.0 5.0 - 15.0 mmol/L    eGFR 41.3 (L) >60.0 mL/min/1.73   CBC (No Diff)    Specimen: Blood   Result Value Ref Range    WBC 10.46 3.40 - 10.80 10*3/mm3    RBC 3.36 (L) 3.77 - 5.28 10*6/mm3    Hemoglobin 8.7 (L) 12.0 - 15.9 g/dL    Hematocrit 29.8 (L) 34.0 - 46.6 %    MCV 88.7 79.0 - 97.0 fL    MCH 25.9 (L) 26.6 - 33.0 pg    MCHC 29.2 (L) 31.5 - 35.7 g/dL    RDW 19.7 (H) 12.3 - 15.4 %    RDW-SD 63.0 (H) 37.0 - 54.0 fl    MPV 9.8 6.0 - 12.0 fL    Platelets 281 140 - 450 10*3/mm3   Ammonia    Specimen: Blood   Result Value Ref Range    Ammonia 38 11 - 51 umol/L        ASSESSMENT   Diagnoses and all orders for this visit:    1. Resistant hypertension (Primary)    2. Rhinovirus infection    3. Parainfluenza infection    4. Gastroenteritis    5. AoC HFpEF    6. Atrial fibrillation with RVR    7. Stage 3a chronic kidney disease        PLAN  Assessment & Plan  Parainfluenza and rhinovirus infection.  The patient is advised to persist with supportive care, including physical therapy and occupational therapy for strengthening following viral illness.    Gastroenteritis.  The patient's condition has shown improvement.  Nurses monitor for any recurrent episodes of nausea, vomiting, or diarrhea.    Left knee pain - new concern  The patient is  advised to use a soft compression brace for support. PT to supply if possible in facility. Cont tylenol for pain.  May consider Voltaren gel in future if pain persists.     Acute kidney injury with underlying CKD stage 3a.  The patient's baseline creatinine level is approximately 1.4. The patient's renal function will be monitored 1 week post-discharge.    Peripheral neuropathy.  The condition is stable with duloxetine.    Gout.  The patient is advised to continue with allopurinol.    Systolic congestive heart failure.  The patient's most recent left ventricular ejection fraction (LVEF) was 41 percent on 09/2024. The patient appears euvolemic with the current diuretic regimen. The patient is to continue metoprolol and as-needed Lasix.    Atrial fibrillation with RVR, history of RVR.  The patient should continue with metoprolol and Eliquis.    COPD with chronic hypoxia.  The patient is advised to continue with 3 L nasal cannula support for supportive oxygen. The patient should continue with the current inhaler regimen.    Incidental finding of left-sided retroareolar nodule noted on CT chest on 04/21/2025.  The patient will require an outpatient mammogram.         [x]  Discussed Patient in detail with nursing/staff, addressed all needs today.     [x]  Plan of Care Reviewed   [x]  PT/OT Reviewed   [x]  Order Changes  []  Discharge Plans Reviewed  [x]  Advance Directive on file with Nursing Home.   [x]  POA on file with Nursing Home.    [x]  Code Status listed and reviewed.     Randy Roldan DO.  5/13/2024      **Part of this note may be an electronic transcription/translation of spoken language to printed text using the Dragon Dictation System.**    Patient or patient representative verbalized consent for the use of Ambient Listening during the visit with  Randy Roldan DO for chart documentation. 5/13/2024  11:37 EDT

## 2024-10-11 ENCOUNTER — TRANSCRIBE ORDERS (OUTPATIENT)
Dept: HOME HEALTH SERVICES | Facility: HOME HEALTHCARE | Age: 59
End: 2024-10-11
Payer: MEDICARE

## 2024-10-11 ENCOUNTER — HOME HEALTH ADMISSION (OUTPATIENT)
Dept: HOME HEALTH SERVICES | Facility: HOME HEALTHCARE | Age: 59
End: 2024-10-11
Payer: MEDICARE

## 2024-10-11 DIAGNOSIS — N39.0 URINARY TRACT INFECTION WITHOUT HEMATURIA, SITE UNSPECIFIED: Primary | ICD-10-CM
